# Patient Record
Sex: FEMALE | Race: WHITE | Employment: OTHER | RURAL
[De-identification: names, ages, dates, MRNs, and addresses within clinical notes are randomized per-mention and may not be internally consistent; named-entity substitution may affect disease eponyms.]

---

## 2017-01-25 ENCOUNTER — NURSE ONLY (OUTPATIENT)
Dept: PRIMARY CARE CLINIC | Age: 72
End: 2017-01-25
Payer: MEDICAID

## 2017-01-25 DIAGNOSIS — E53.8 VITAMIN B12 DEFICIENCY: Primary | ICD-10-CM

## 2017-01-25 PROCEDURE — 96372 THER/PROPH/DIAG INJ SC/IM: CPT | Performed by: NURSE PRACTITIONER

## 2017-01-25 RX ORDER — CYANOCOBALAMIN 1000 UG/ML
1000 INJECTION INTRAMUSCULAR; SUBCUTANEOUS ONCE
Status: COMPLETED | OUTPATIENT
Start: 2017-01-25 | End: 2017-01-25

## 2017-01-25 RX ADMIN — CYANOCOBALAMIN 1000 MCG: 1000 INJECTION INTRAMUSCULAR; SUBCUTANEOUS at 13:29

## 2017-01-31 ENCOUNTER — OFFICE VISIT (OUTPATIENT)
Dept: PRIMARY CARE CLINIC | Age: 72
End: 2017-01-31
Payer: MEDICAID

## 2017-01-31 ENCOUNTER — TELEPHONE (OUTPATIENT)
Dept: PRIMARY CARE CLINIC | Age: 72
End: 2017-01-31

## 2017-01-31 VITALS
BODY MASS INDEX: 42.38 KG/M2 | DIASTOLIC BLOOD PRESSURE: 60 MMHG | WEIGHT: 217 LBS | OXYGEN SATURATION: 98 % | HEART RATE: 63 BPM | SYSTOLIC BLOOD PRESSURE: 110 MMHG

## 2017-01-31 DIAGNOSIS — E03.9 HYPOTHYROIDISM, UNSPECIFIED TYPE: ICD-10-CM

## 2017-01-31 DIAGNOSIS — M54.9 CHRONIC BACK PAIN, UNSPECIFIED BACK LOCATION, UNSPECIFIED BACK PAIN LATERALITY: ICD-10-CM

## 2017-01-31 DIAGNOSIS — R32 URINARY INCONTINENCE, UNSPECIFIED TYPE: ICD-10-CM

## 2017-01-31 DIAGNOSIS — E11.9 TYPE 2 DIABETES MELLITUS WITHOUT COMPLICATION, WITHOUT LONG-TERM CURRENT USE OF INSULIN (HCC): ICD-10-CM

## 2017-01-31 DIAGNOSIS — K21.9 GASTROESOPHAGEAL REFLUX DISEASE WITHOUT ESOPHAGITIS: ICD-10-CM

## 2017-01-31 DIAGNOSIS — E53.8 B12 DEFICIENCY: ICD-10-CM

## 2017-01-31 DIAGNOSIS — I10 ESSENTIAL HYPERTENSION: Primary | ICD-10-CM

## 2017-01-31 DIAGNOSIS — E78.5 HYPERLIPIDEMIA, UNSPECIFIED HYPERLIPIDEMIA TYPE: ICD-10-CM

## 2017-01-31 DIAGNOSIS — G89.29 CHRONIC BACK PAIN, UNSPECIFIED BACK LOCATION, UNSPECIFIED BACK PAIN LATERALITY: ICD-10-CM

## 2017-01-31 LAB
A/G RATIO: 1.5 (ref 0.8–2)
ALBUMIN SERPL-MCNC: 3.9 G/DL (ref 3.4–4.8)
ALP BLD-CCNC: 113 U/L (ref 25–100)
ALT SERPL-CCNC: 12 U/L (ref 4–36)
ANION GAP SERPL CALCULATED.3IONS-SCNC: 13 MMOL/L (ref 3–16)
AST SERPL-CCNC: 13 U/L (ref 8–33)
BILIRUB SERPL-MCNC: 0.3 MG/DL (ref 0.3–1.2)
BUN BLDV-MCNC: 13 MG/DL (ref 6–20)
CALCIUM SERPL-MCNC: 9 MG/DL (ref 8.5–10.5)
CHLORIDE BLD-SCNC: 100 MMOL/L (ref 98–107)
CO2: 26 MMOL/L (ref 20–30)
CREAT SERPL-MCNC: 0.9 MG/DL (ref 0.4–1.2)
GFR AFRICAN AMERICAN: >59
GFR NON-AFRICAN AMERICAN: >60
GLOBULIN: 2.6 G/DL
GLUCOSE BLD-MCNC: 335 MG/DL (ref 74–106)
HBA1C MFR BLD: 11.1 %
POTASSIUM SERPL-SCNC: 4.2 MMOL/L (ref 3.4–5.1)
SODIUM BLD-SCNC: 139 MMOL/L (ref 136–145)
TOTAL PROTEIN: 6.5 G/DL (ref 6.4–8.3)
TSH SERPL DL<=0.05 MIU/L-ACNC: 1.76 UIU/ML (ref 0.35–5.5)

## 2017-01-31 PROCEDURE — 99214 OFFICE O/P EST MOD 30 MIN: CPT | Performed by: NURSE PRACTITIONER

## 2017-01-31 RX ORDER — LEVOTHYROXINE SODIUM 0.12 MG/1
125 TABLET ORAL DAILY
Qty: 30 TABLET | Refills: 5 | Status: SHIPPED | OUTPATIENT
Start: 2017-01-31 | End: 2017-07-24 | Stop reason: SDUPTHER

## 2017-01-31 RX ORDER — LEVOTHYROXINE SODIUM 0.12 MG/1
125 TABLET ORAL DAILY
COMMUNITY
End: 2017-01-31 | Stop reason: SDUPTHER

## 2017-01-31 ASSESSMENT — ENCOUNTER SYMPTOMS
RESPIRATORY NEGATIVE: 1
GASTROINTESTINAL NEGATIVE: 1

## 2017-02-02 ENCOUNTER — TELEPHONE (OUTPATIENT)
Dept: PRIMARY CARE CLINIC | Age: 72
End: 2017-02-02

## 2017-02-09 ENCOUNTER — TELEPHONE (OUTPATIENT)
Dept: PRIMARY CARE CLINIC | Age: 72
End: 2017-02-09

## 2017-02-09 RX ORDER — AMOXICILLIN AND CLAVULANATE POTASSIUM 875; 125 MG/1; MG/1
1 TABLET, FILM COATED ORAL 2 TIMES DAILY
Qty: 20 TABLET | Refills: 0 | Status: SHIPPED | OUTPATIENT
Start: 2017-02-09 | End: 2017-02-19

## 2017-02-19 ASSESSMENT — ENCOUNTER SYMPTOMS
NAUSEA: 0
BACK PAIN: 1
SHORTNESS OF BREATH: 0
ABDOMINAL PAIN: 0
EYE PAIN: 0
VOMITING: 0
SORE THROAT: 0
COUGH: 0

## 2017-03-17 ENCOUNTER — TELEPHONE (OUTPATIENT)
Dept: PRIMARY CARE CLINIC | Age: 72
End: 2017-03-17

## 2017-03-22 RX ORDER — LORATADINE 10 MG/1
TABLET ORAL
Qty: 30 TABLET | Refills: 0 | Status: SHIPPED | OUTPATIENT
Start: 2017-03-22 | End: 2017-05-22 | Stop reason: SDUPTHER

## 2017-04-05 ENCOUNTER — NURSE ONLY (OUTPATIENT)
Dept: PRIMARY CARE CLINIC | Age: 72
End: 2017-04-05
Payer: MEDICAID

## 2017-04-05 DIAGNOSIS — E53.8 VITAMIN B12 DEFICIENCY: Primary | ICD-10-CM

## 2017-04-05 PROCEDURE — 96372 THER/PROPH/DIAG INJ SC/IM: CPT | Performed by: NURSE PRACTITIONER

## 2017-04-05 RX ORDER — CYANOCOBALAMIN 1000 UG/ML
1000 INJECTION INTRAMUSCULAR; SUBCUTANEOUS ONCE
Status: COMPLETED | OUTPATIENT
Start: 2017-04-05 | End: 2017-04-05

## 2017-04-05 RX ADMIN — CYANOCOBALAMIN 1000 MCG: 1000 INJECTION INTRAMUSCULAR; SUBCUTANEOUS at 14:19

## 2017-04-11 RX ORDER — FLUCONAZOLE 150 MG/1
150 TABLET ORAL ONCE
Qty: 1 TABLET | Refills: 1 | Status: SHIPPED | OUTPATIENT
Start: 2017-04-11 | End: 2017-04-11

## 2017-04-20 ENCOUNTER — OFFICE VISIT (OUTPATIENT)
Dept: PRIMARY CARE CLINIC | Age: 72
End: 2017-04-20
Payer: MEDICAID

## 2017-04-20 VITALS
HEART RATE: 78 BPM | WEIGHT: 215 LBS | SYSTOLIC BLOOD PRESSURE: 124 MMHG | DIASTOLIC BLOOD PRESSURE: 64 MMHG | OXYGEN SATURATION: 94 % | BODY MASS INDEX: 41.99 KG/M2

## 2017-04-20 DIAGNOSIS — R13.10 DYSPHAGIA, UNSPECIFIED TYPE: ICD-10-CM

## 2017-04-20 DIAGNOSIS — E53.8 B12 DEFICIENCY: ICD-10-CM

## 2017-04-20 DIAGNOSIS — E11.9 TYPE 2 DIABETES MELLITUS WITHOUT COMPLICATION, WITHOUT LONG-TERM CURRENT USE OF INSULIN (HCC): ICD-10-CM

## 2017-04-20 DIAGNOSIS — E03.9 HYPOTHYROIDISM, UNSPECIFIED TYPE: ICD-10-CM

## 2017-04-20 DIAGNOSIS — I10 ESSENTIAL HYPERTENSION: Primary | ICD-10-CM

## 2017-04-20 PROCEDURE — 99213 OFFICE O/P EST LOW 20 MIN: CPT | Performed by: NURSE PRACTITIONER

## 2017-04-20 ASSESSMENT — ENCOUNTER SYMPTOMS
GASTROINTESTINAL NEGATIVE: 1
RESPIRATORY NEGATIVE: 1

## 2017-04-21 RX ORDER — CYANOCOBALAMIN 1000 UG/ML
1000 INJECTION INTRAMUSCULAR; SUBCUTANEOUS ONCE
Qty: 4 ML | Refills: 0 | Status: SHIPPED | OUTPATIENT
Start: 2017-04-21 | End: 2017-06-01 | Stop reason: SDUPTHER

## 2017-05-07 ASSESSMENT — ENCOUNTER SYMPTOMS
SHORTNESS OF BREATH: 0
ABDOMINAL PAIN: 0
EYE PAIN: 0
COUGH: 0
NAUSEA: 0
BACK PAIN: 1
VOMITING: 0
SORE THROAT: 0
TROUBLE SWALLOWING: 1

## 2017-07-15 ENCOUNTER — OFFICE VISIT (OUTPATIENT)
Dept: PRIMARY CARE CLINIC | Age: 72
End: 2017-07-15
Payer: MEDICAID

## 2017-07-15 VITALS
BODY MASS INDEX: 41.6 KG/M2 | OXYGEN SATURATION: 96 % | WEIGHT: 213 LBS | HEART RATE: 84 BPM | SYSTOLIC BLOOD PRESSURE: 144 MMHG | DIASTOLIC BLOOD PRESSURE: 72 MMHG

## 2017-07-15 DIAGNOSIS — M25.552 LEFT HIP PAIN: Primary | ICD-10-CM

## 2017-07-15 PROCEDURE — 99213 OFFICE O/P EST LOW 20 MIN: CPT | Performed by: NURSE PRACTITIONER

## 2017-07-15 RX ORDER — KETOROLAC TROMETHAMINE 30 MG/ML
60 INJECTION, SOLUTION INTRAMUSCULAR; INTRAVENOUS ONCE
Qty: 2 ML | Refills: 0
Start: 2017-07-15 | End: 2017-07-15 | Stop reason: CLARIF

## 2017-07-25 ENCOUNTER — HOSPITAL ENCOUNTER (OUTPATIENT)
Dept: OTHER | Age: 72
Discharge: HOME | End: 2017-07-25
Attending: NURSE PRACTITIONER | Admitting: NURSE PRACTITIONER

## 2017-07-25 DIAGNOSIS — E11.9 TYPE 2 DIABETES MELLITUS WITHOUT COMPLICATION, WITHOUT LONG-TERM CURRENT USE OF INSULIN (HCC): ICD-10-CM

## 2017-07-25 DIAGNOSIS — I10 ESSENTIAL HYPERTENSION: ICD-10-CM

## 2017-07-25 DIAGNOSIS — E03.9 HYPOTHYROIDISM, UNSPECIFIED TYPE: ICD-10-CM

## 2017-07-25 LAB
A/G RATIO: 1.2 (ref 0.8–2)
ALBUMIN SERPL-MCNC: 3.6 G/DL (ref 3.4–4.8)
ALP BLD-CCNC: 101 U/L (ref 25–100)
ALT SERPL-CCNC: 10 U/L (ref 4–36)
ANION GAP SERPL CALCULATED.3IONS-SCNC: 11 MMOL/L (ref 3–16)
AST SERPL-CCNC: 11 U/L (ref 8–33)
BASOPHILS ABSOLUTE: 0 K/UL (ref 0–0.1)
BASOPHILS RELATIVE PERCENT: 0.4 %
BILIRUB SERPL-MCNC: 0.3 MG/DL (ref 0.3–1.2)
BUN BLDV-MCNC: 13 MG/DL (ref 6–20)
CALCIUM SERPL-MCNC: 9.3 MG/DL (ref 8.5–10.5)
CHLORIDE BLD-SCNC: 102 MMOL/L (ref 98–107)
CO2: 28 MMOL/L (ref 20–30)
CREAT SERPL-MCNC: 0.8 MG/DL (ref 0.4–1.2)
EOSINOPHILS ABSOLUTE: 0.2 K/UL (ref 0–0.4)
EOSINOPHILS RELATIVE PERCENT: 1.7 %
GFR AFRICAN AMERICAN: >59
GFR NON-AFRICAN AMERICAN: >60
GLOBULIN: 3.1 G/DL
GLUCOSE BLD-MCNC: 226 MG/DL (ref 74–106)
HBA1C MFR BLD: 10.6 %
HCT VFR BLD CALC: 41.3 % (ref 37–47)
HEMOGLOBIN: 13.4 G/DL (ref 11.5–16.5)
LYMPHOCYTES ABSOLUTE: 2.2 K/UL (ref 1.5–4)
LYMPHOCYTES RELATIVE PERCENT: 24.2 % (ref 20–40)
MCH RBC QN AUTO: 28.6 PG (ref 27–32)
MCHC RBC AUTO-ENTMCNC: 32.4 G/DL (ref 31–35)
MCV RBC AUTO: 88.1 FL (ref 80–100)
MONOCYTES ABSOLUTE: 0.4 K/UL (ref 0.2–0.8)
MONOCYTES RELATIVE PERCENT: 4.5 % (ref 3–10)
NEUTROPHILS ABSOLUTE: 6.4 K/UL (ref 2–7.5)
NEUTROPHILS RELATIVE PERCENT: 69.2 %
PDW BLD-RTO: 13.9 % (ref 11–16)
PLATELET # BLD: 168 K/UL (ref 150–400)
PMV BLD AUTO: 11.5 FL (ref 6–10)
POTASSIUM SERPL-SCNC: 4.1 MMOL/L (ref 3.4–5.1)
RBC # BLD: 4.69 M/UL (ref 3.8–5.8)
SODIUM BLD-SCNC: 141 MMOL/L (ref 136–145)
TOTAL PROTEIN: 6.7 G/DL (ref 6.4–8.3)
TSH SERPL DL<=0.05 MIU/L-ACNC: 1.53 UIU/ML (ref 0.35–5.5)
WBC # BLD: 9.3 K/UL (ref 4–11)

## 2017-07-27 ENCOUNTER — OFFICE VISIT (OUTPATIENT)
Dept: PRIMARY CARE CLINIC | Age: 72
End: 2017-07-27
Payer: MEDICAID

## 2017-07-27 VITALS
DIASTOLIC BLOOD PRESSURE: 70 MMHG | SYSTOLIC BLOOD PRESSURE: 120 MMHG | HEIGHT: 60 IN | BODY MASS INDEX: 42.21 KG/M2 | HEART RATE: 81 BPM | OXYGEN SATURATION: 94 % | WEIGHT: 215 LBS

## 2017-07-27 DIAGNOSIS — Z12.39 SCREENING FOR BREAST CANCER: Primary | ICD-10-CM

## 2017-07-27 DIAGNOSIS — I10 ESSENTIAL HYPERTENSION: ICD-10-CM

## 2017-07-27 DIAGNOSIS — E11.9 TYPE 2 DIABETES MELLITUS WITHOUT COMPLICATION, WITHOUT LONG-TERM CURRENT USE OF INSULIN (HCC): ICD-10-CM

## 2017-07-27 DIAGNOSIS — R32 URINARY INCONTINENCE, UNSPECIFIED TYPE: ICD-10-CM

## 2017-07-27 PROCEDURE — 99213 OFFICE O/P EST LOW 20 MIN: CPT | Performed by: NURSE PRACTITIONER

## 2017-07-27 ASSESSMENT — ENCOUNTER SYMPTOMS
COUGH: 0
NAUSEA: 0
EYE DISCHARGE: 0
DIARRHEA: 0
EYE REDNESS: 0
ABDOMINAL PAIN: 0
BACK PAIN: 1
CONSTIPATION: 0
SORE THROAT: 0
VOMITING: 0
SHORTNESS OF BREATH: 0
EYE ITCHING: 0
RHINORRHEA: 0

## 2017-10-19 ENCOUNTER — OFFICE VISIT (OUTPATIENT)
Dept: UROLOGY | Facility: CLINIC | Age: 72
End: 2017-10-19

## 2017-10-19 VITALS
OXYGEN SATURATION: 96 % | TEMPERATURE: 98.5 F | HEART RATE: 87 BPM | DIASTOLIC BLOOD PRESSURE: 85 MMHG | SYSTOLIC BLOOD PRESSURE: 132 MMHG | RESPIRATION RATE: 16 BRPM

## 2017-10-19 DIAGNOSIS — G47.30 SLEEP APNEA, UNSPECIFIED TYPE: Primary | ICD-10-CM

## 2017-10-19 DIAGNOSIS — N32.81 OVERACTIVE BLADDER: ICD-10-CM

## 2017-10-19 DIAGNOSIS — R35.1 NOCTURIA MORE THAN TWICE PER NIGHT: ICD-10-CM

## 2017-10-19 DIAGNOSIS — R32 URINARY INCONTINENCE, UNSPECIFIED TYPE: Primary | ICD-10-CM

## 2017-10-19 LAB
BILIRUB BLD-MCNC: NEGATIVE MG/DL
CLARITY, POC: CLEAR
COLOR UR: YELLOW
GLUCOSE UR STRIP-MCNC: ABNORMAL MG/DL
KETONES UR QL: NEGATIVE
LEUKOCYTE EST, POC: NEGATIVE
NITRITE UR-MCNC: NEGATIVE MG/ML
PH UR: 5.5 [PH] (ref 5–8)
PROT UR STRIP-MCNC: NEGATIVE MG/DL
RBC # UR STRIP: ABNORMAL /UL
SP GR UR: 1.02 (ref 1–1.03)
UROBILINOGEN UR QL: NORMAL

## 2017-10-19 PROCEDURE — 99203 OFFICE O/P NEW LOW 30 MIN: CPT | Performed by: UROLOGY

## 2017-10-19 PROCEDURE — 52000 CYSTOURETHROSCOPY: CPT | Performed by: UROLOGY

## 2017-10-19 RX ORDER — LANCETS 30 GAUGE
EACH MISCELLANEOUS
COMMUNITY
Start: 2014-09-17 | End: 2021-05-11

## 2017-10-19 RX ORDER — LEVOTHYROXINE SODIUM 150 UG/1
175 CAPSULE ORAL NIGHTLY
COMMUNITY
Start: 2017-07-25

## 2017-10-19 RX ORDER — CYANOCOBALAMIN 1000 UG/ML
INJECTION, SOLUTION INTRAMUSCULAR; SUBCUTANEOUS
COMMUNITY
Start: 2016-02-01 | End: 2021-05-11

## 2017-10-19 RX ORDER — FLUOROURACIL 50 MG/G
CREAM TOPICAL
COMMUNITY
Start: 2014-10-16 | End: 2021-05-11

## 2017-10-19 RX ORDER — LANCETS 30 GAUGE
EACH MISCELLANEOUS
COMMUNITY
Start: 2014-10-16 | End: 2021-05-11

## 2017-10-19 RX ORDER — ESOMEPRAZOLE MAGNESIUM 20 MG/1
40 TABLET, DELAYED RELEASE ORAL 2 TIMES DAILY
COMMUNITY
Start: 2016-03-17

## 2017-10-19 RX ORDER — ATENOLOL 100 MG/1
100 TABLET ORAL NIGHTLY
COMMUNITY
Start: 2017-05-22

## 2017-10-19 RX ORDER — CHOLECALCIFEROL (VITAMIN D3) 1250 MCG
50000 CAPSULE ORAL
COMMUNITY
Start: 2015-08-20 | End: 2022-06-03

## 2017-10-19 RX ORDER — ESTRADIOL 0.5 MG/1
0.5 TABLET ORAL
COMMUNITY
Start: 2016-03-17 | End: 2021-05-11

## 2017-10-19 RX ORDER — CLONAZEPAM 1 MG/1
.5-1 TABLET ORAL
COMMUNITY
Start: 2015-03-05 | End: 2021-05-11

## 2017-10-19 RX ORDER — OXYCODONE HYDROCHLORIDE 15 MG/1
15 TABLET ORAL EVERY 6 HOURS PRN
COMMUNITY
End: 2022-08-02 | Stop reason: HOSPADM

## 2017-10-19 RX ORDER — FENTANYL 25 UG/H
PATCH TRANSDERMAL
COMMUNITY
End: 2021-05-11

## 2017-10-19 RX ORDER — CYANOCOBALAMIN 1000 UG/ML
INJECTION, SOLUTION INTRAMUSCULAR; SUBCUTANEOUS
COMMUNITY
Start: 2013-04-15 | End: 2021-05-11

## 2017-10-19 RX ORDER — GLIPIZIDE 10 MG/1
10 TABLET, FILM COATED, EXTENDED RELEASE ORAL 2 TIMES DAILY
COMMUNITY
Start: 2017-07-20

## 2017-10-19 RX ORDER — CITALOPRAM 20 MG/1
20 TABLET ORAL DAILY
COMMUNITY
Start: 2017-07-25 | End: 2021-06-04

## 2017-10-19 RX ORDER — FUROSEMIDE 20 MG/1
TABLET ORAL
COMMUNITY
Start: 2016-03-21 | End: 2021-05-11

## 2017-10-19 RX ORDER — SYRING-NEEDL,DISP,INSUL,0.3 ML 30 GX5/16"
SYRINGE, EMPTY DISPOSABLE MISCELLANEOUS
COMMUNITY
Start: 2014-10-16 | End: 2021-05-11

## 2017-10-19 RX ORDER — SIMVASTATIN 40 MG
40 TABLET ORAL NIGHTLY
COMMUNITY
Start: 2017-05-22

## 2017-10-19 RX ORDER — CYANOCOBALAMIN 1000 UG/ML
INJECTION, SOLUTION INTRAMUSCULAR; SUBCUTANEOUS
COMMUNITY
Start: 2017-09-22 | End: 2021-05-11

## 2017-10-19 RX ORDER — LORATADINE 10 MG/1
TABLET ORAL
COMMUNITY
Start: 2017-05-22 | End: 2021-05-11

## 2017-10-19 RX ORDER — LISINOPRIL 10 MG/1
20 TABLET ORAL NIGHTLY
COMMUNITY
Start: 2017-05-22

## 2017-11-21 ENCOUNTER — OFFICE VISIT (OUTPATIENT)
Dept: UROLOGY | Facility: CLINIC | Age: 72
End: 2017-11-21

## 2017-11-21 VITALS
OXYGEN SATURATION: 94 % | HEART RATE: 78 BPM | WEIGHT: 215 LBS | HEIGHT: 60 IN | BODY MASS INDEX: 42.21 KG/M2 | DIASTOLIC BLOOD PRESSURE: 80 MMHG | SYSTOLIC BLOOD PRESSURE: 147 MMHG | TEMPERATURE: 97.1 F

## 2017-11-21 DIAGNOSIS — N32.81 OVERACTIVE BLADDER: Primary | ICD-10-CM

## 2017-11-21 PROCEDURE — 99213 OFFICE O/P EST LOW 20 MIN: CPT | Performed by: UROLOGY

## 2017-12-05 ENCOUNTER — TELEPHONE (OUTPATIENT)
Dept: UROLOGY | Facility: CLINIC | Age: 72
End: 2017-12-05

## 2017-12-06 DIAGNOSIS — N32.81 OAB (OVERACTIVE BLADDER): Primary | ICD-10-CM

## 2017-12-06 RX ORDER — OXYBUTYNIN CHLORIDE 10 MG/1
10 TABLET, EXTENDED RELEASE ORAL DAILY
Qty: 30 TABLET | Refills: 2 | Status: SHIPPED | OUTPATIENT
Start: 2017-12-06 | End: 2017-12-20 | Stop reason: SINTOL

## 2017-12-20 DIAGNOSIS — N32.81 OAB (OVERACTIVE BLADDER): Primary | ICD-10-CM

## 2017-12-20 RX ORDER — TROSPIUM CHLORIDE ER 60 MG/1
60 CAPSULE ORAL EVERY MORNING
Qty: 30 CAPSULE | Refills: 11 | Status: SHIPPED | OUTPATIENT
Start: 2017-12-20 | End: 2021-05-11

## 2017-12-22 ENCOUNTER — TELEPHONE (OUTPATIENT)
Dept: PRIMARY CARE CLINIC | Age: 72
End: 2017-12-22

## 2017-12-22 ENCOUNTER — NURSE ONLY (OUTPATIENT)
Dept: PRIMARY CARE CLINIC | Age: 72
End: 2017-12-22
Payer: MEDICAID

## 2017-12-22 DIAGNOSIS — E53.8 VITAMIN B12 DEFICIENCY: Primary | ICD-10-CM

## 2017-12-22 PROCEDURE — 96372 THER/PROPH/DIAG INJ SC/IM: CPT | Performed by: NURSE PRACTITIONER

## 2017-12-22 RX ORDER — CYANOCOBALAMIN 1000 UG/ML
1000 INJECTION INTRAMUSCULAR; SUBCUTANEOUS ONCE
Status: COMPLETED | OUTPATIENT
Start: 2017-12-22 | End: 2017-12-22

## 2017-12-22 RX ADMIN — CYANOCOBALAMIN 1000 MCG: 1000 INJECTION INTRAMUSCULAR; SUBCUTANEOUS at 13:52

## 2017-12-22 NOTE — PROGRESS NOTES
After obtaining consent, and per orders of Dr. Christy Vazquez, injection of B 12 given in Right deltoid by Nasima Siu. Patient instructed to remain in clinic for 20 minutes afterwards, and to report any adverse reaction to me immediately.

## 2017-12-27 ENCOUNTER — TELEPHONE (OUTPATIENT)
Dept: PRIMARY CARE CLINIC | Age: 72
End: 2017-12-27

## 2018-01-18 RX ORDER — PROPRANOLOL HYDROCHLORIDE 10 MG/1
TABLET ORAL
Qty: 120 CAPSULE | Refills: 0 | Status: SHIPPED | OUTPATIENT
Start: 2018-01-18 | End: 2018-04-17 | Stop reason: SDUPTHER

## 2018-02-14 RX ORDER — CITALOPRAM 20 MG/1
TABLET ORAL
Qty: 30 TABLET | Refills: 5 | Status: SHIPPED | OUTPATIENT
Start: 2018-02-14 | End: 2018-09-27 | Stop reason: SDUPTHER

## 2018-02-14 RX ORDER — GLIPIZIDE 10 MG/1
TABLET, FILM COATED, EXTENDED RELEASE ORAL
Qty: 60 TABLET | Refills: 5 | Status: SHIPPED | OUTPATIENT
Start: 2018-02-14 | End: 2019-01-22 | Stop reason: SDUPTHER

## 2018-02-14 RX ORDER — EMPAGLIFLOZIN 10 MG/1
TABLET, FILM COATED ORAL
Qty: 30 TABLET | Refills: 5 | Status: SHIPPED | OUTPATIENT
Start: 2018-02-14 | End: 2018-04-20 | Stop reason: DRUGHIGH

## 2018-02-14 RX ORDER — LEVOTHYROXINE SODIUM 0.12 MG/1
TABLET ORAL
Qty: 30 TABLET | Refills: 5 | Status: SHIPPED | OUTPATIENT
Start: 2018-02-14 | End: 2018-04-20 | Stop reason: SDUPTHER

## 2018-03-21 ENCOUNTER — NURSE ONLY (OUTPATIENT)
Dept: PRIMARY CARE CLINIC | Age: 73
End: 2018-03-21
Payer: MEDICAID

## 2018-03-21 DIAGNOSIS — E53.8 VITAMIN B12 DEFICIENCY: Primary | ICD-10-CM

## 2018-03-21 PROCEDURE — 96372 THER/PROPH/DIAG INJ SC/IM: CPT | Performed by: NURSE PRACTITIONER

## 2018-03-21 RX ORDER — CYANOCOBALAMIN 1000 UG/ML
1000 INJECTION INTRAMUSCULAR; SUBCUTANEOUS ONCE
Status: COMPLETED | OUTPATIENT
Start: 2018-03-21 | End: 2018-03-21

## 2018-03-21 RX ADMIN — CYANOCOBALAMIN 1000 MCG: 1000 INJECTION INTRAMUSCULAR; SUBCUTANEOUS at 14:39

## 2018-03-21 NOTE — PROGRESS NOTES
After obtaining consent, and per orders of Marcin Hernandez, injection of  vitamon B 12 given in Left deltoid by [employer]. Patient instructed to remain in clinic for 20 minutes afterwards, and to report any adverse reaction to me immediately.

## 2018-04-02 ENCOUNTER — NURSE ONLY (OUTPATIENT)
Dept: PRIMARY CARE CLINIC | Age: 73
End: 2018-04-02
Payer: MEDICAID

## 2018-04-02 DIAGNOSIS — E53.8 VITAMIN B12 DEFICIENCY: Primary | ICD-10-CM

## 2018-04-02 PROCEDURE — 96372 THER/PROPH/DIAG INJ SC/IM: CPT | Performed by: FAMILY MEDICINE

## 2018-04-02 RX ORDER — CYANOCOBALAMIN 1000 UG/ML
1000 INJECTION INTRAMUSCULAR; SUBCUTANEOUS ONCE
Status: COMPLETED | OUTPATIENT
Start: 2018-04-02 | End: 2018-04-02

## 2018-04-02 RX ADMIN — CYANOCOBALAMIN 1000 MCG: 1000 INJECTION INTRAMUSCULAR; SUBCUTANEOUS at 13:44

## 2018-04-17 ENCOUNTER — OFFICE VISIT (OUTPATIENT)
Dept: PRIMARY CARE CLINIC | Age: 73
End: 2018-04-17
Payer: MEDICAID

## 2018-04-17 ENCOUNTER — HOSPITAL ENCOUNTER (OUTPATIENT)
Dept: OTHER | Age: 73
Discharge: HOME | End: 2018-04-17
Attending: NURSE PRACTITIONER | Admitting: NURSE PRACTITIONER

## 2018-04-17 VITALS
WEIGHT: 207 LBS | DIASTOLIC BLOOD PRESSURE: 80 MMHG | HEART RATE: 88 BPM | BODY MASS INDEX: 40.43 KG/M2 | OXYGEN SATURATION: 97 % | SYSTOLIC BLOOD PRESSURE: 122 MMHG

## 2018-04-17 DIAGNOSIS — E78.5 HYPERLIPIDEMIA, UNSPECIFIED HYPERLIPIDEMIA TYPE: ICD-10-CM

## 2018-04-17 DIAGNOSIS — E03.9 HYPOTHYROIDISM, UNSPECIFIED TYPE: ICD-10-CM

## 2018-04-17 DIAGNOSIS — E55.9 VITAMIN D DEFICIENCY: ICD-10-CM

## 2018-04-17 DIAGNOSIS — E11.9 TYPE 2 DIABETES MELLITUS WITHOUT COMPLICATION, WITHOUT LONG-TERM CURRENT USE OF INSULIN (HCC): ICD-10-CM

## 2018-04-17 DIAGNOSIS — I10 ESSENTIAL HYPERTENSION: ICD-10-CM

## 2018-04-17 DIAGNOSIS — R32 URINARY INCONTINENCE, UNSPECIFIED TYPE: ICD-10-CM

## 2018-04-17 DIAGNOSIS — E53.8 B12 DEFICIENCY: Primary | ICD-10-CM

## 2018-04-17 PROCEDURE — 99214 OFFICE O/P EST MOD 30 MIN: CPT | Performed by: NURSE PRACTITIONER

## 2018-04-17 RX ORDER — ATENOLOL 100 MG/1
TABLET ORAL
Qty: 30 TABLET | Refills: 5 | Status: SHIPPED | OUTPATIENT
Start: 2018-04-17 | End: 2019-03-21 | Stop reason: SDUPTHER

## 2018-04-17 RX ORDER — CYANOCOBALAMIN 1000 UG/ML
1000 INJECTION INTRAMUSCULAR; SUBCUTANEOUS ONCE
Status: COMPLETED | OUTPATIENT
Start: 2018-04-17 | End: 2018-04-17

## 2018-04-17 RX ORDER — LISINOPRIL 10 MG/1
TABLET ORAL
Qty: 30 TABLET | Refills: 5 | Status: SHIPPED | OUTPATIENT
Start: 2018-04-17 | End: 2018-10-29 | Stop reason: SDUPTHER

## 2018-04-17 RX ORDER — SIMVASTATIN 20 MG
TABLET ORAL
Qty: 30 TABLET | Refills: 5 | Status: SHIPPED | OUTPATIENT
Start: 2018-04-17 | End: 2018-04-20 | Stop reason: SDUPTHER

## 2018-04-17 RX ADMIN — CYANOCOBALAMIN 1000 MCG: 1000 INJECTION INTRAMUSCULAR; SUBCUTANEOUS at 15:43

## 2018-04-17 ASSESSMENT — ENCOUNTER SYMPTOMS
VOMITING: 0
NAUSEA: 0
ABDOMINAL PAIN: 0
COUGH: 0
SORE THROAT: 0
SHORTNESS OF BREATH: 0
EYE PAIN: 0

## 2018-04-18 LAB
A/G RATIO: 1.4 (ref 0.8–2)
ALBUMIN SERPL-MCNC: 4.2 G/DL (ref 3.4–4.8)
ALP BLD-CCNC: 118 U/L (ref 25–100)
ALT SERPL-CCNC: 15 U/L (ref 4–36)
ANION GAP SERPL CALCULATED.3IONS-SCNC: 12 MMOL/L (ref 3–16)
AST SERPL-CCNC: 16 U/L (ref 8–33)
BILIRUB SERPL-MCNC: 0.4 MG/DL (ref 0.3–1.2)
BUN BLDV-MCNC: 21 MG/DL (ref 6–20)
CALCIUM SERPL-MCNC: 9.3 MG/DL (ref 8.5–10.5)
CHLORIDE BLD-SCNC: 103 MMOL/L (ref 98–107)
CHOLESTEROL, TOTAL: 204 MG/DL (ref 0–200)
CO2: 26 MMOL/L (ref 20–30)
CREAT SERPL-MCNC: 0.9 MG/DL (ref 0.4–1.2)
GFR AFRICAN AMERICAN: >59
GFR NON-AFRICAN AMERICAN: >60
GLOBULIN: 2.9 G/DL
GLUCOSE BLD-MCNC: 262 MG/DL (ref 74–106)
HBA1C MFR BLD: 8.5 %
HDLC SERPL-MCNC: 56 MG/DL (ref 40–60)
LDL CHOLESTEROL CALCULATED: 121 MG/DL
POTASSIUM SERPL-SCNC: 4.5 MMOL/L (ref 3.4–5.1)
SODIUM BLD-SCNC: 141 MMOL/L (ref 136–145)
TOTAL PROTEIN: 7.1 G/DL (ref 6.4–8.3)
TRIGL SERPL-MCNC: 134 MG/DL (ref 0–249)
TSH SERPL DL<=0.05 MIU/L-ACNC: 0.18 UIU/ML (ref 0.35–5.5)
VITAMIN D 25-HYDROXY: 34.1 (ref 32–100)
VLDLC SERPL CALC-MCNC: 27 MG/DL

## 2018-04-20 ENCOUNTER — TELEPHONE (OUTPATIENT)
Dept: PRIMARY CARE CLINIC | Age: 73
End: 2018-04-20

## 2018-04-20 RX ORDER — SIMVASTATIN 40 MG
TABLET ORAL
Qty: 30 TABLET | Refills: 3 | Status: SHIPPED | OUTPATIENT
Start: 2018-04-20 | End: 2018-08-27 | Stop reason: SDUPTHER

## 2018-04-20 RX ORDER — LEVOTHYROXINE SODIUM 112 UG/1
TABLET ORAL
Qty: 30 TABLET | Refills: 3 | Status: SHIPPED | OUTPATIENT
Start: 2018-04-20 | End: 2018-08-27 | Stop reason: SDUPTHER

## 2018-04-22 ASSESSMENT — ENCOUNTER SYMPTOMS: BACK PAIN: 1

## 2018-05-02 ENCOUNTER — TELEPHONE (OUTPATIENT)
Dept: PRIMARY CARE CLINIC | Age: 73
End: 2018-05-02

## 2018-05-09 ENCOUNTER — NURSE ONLY (OUTPATIENT)
Dept: PRIMARY CARE CLINIC | Age: 73
End: 2018-05-09
Payer: MEDICAID

## 2018-05-09 DIAGNOSIS — E53.8 B12 DEFICIENCY: Primary | ICD-10-CM

## 2018-05-09 PROCEDURE — 96372 THER/PROPH/DIAG INJ SC/IM: CPT | Performed by: NURSE PRACTITIONER

## 2018-05-09 RX ORDER — CYANOCOBALAMIN 1000 UG/ML
1000 INJECTION INTRAMUSCULAR; SUBCUTANEOUS ONCE
Status: COMPLETED | OUTPATIENT
Start: 2018-05-09 | End: 2018-05-09

## 2018-05-09 RX ADMIN — CYANOCOBALAMIN 1000 MCG: 1000 INJECTION INTRAMUSCULAR; SUBCUTANEOUS at 11:34

## 2018-05-17 ENCOUNTER — NURSE ONLY (OUTPATIENT)
Dept: PRIMARY CARE CLINIC | Age: 73
End: 2018-05-17
Payer: MEDICAID

## 2018-05-17 DIAGNOSIS — E53.8 VITAMIN B12 DEFICIENCY: Primary | ICD-10-CM

## 2018-05-17 PROCEDURE — 96372 THER/PROPH/DIAG INJ SC/IM: CPT | Performed by: NURSE PRACTITIONER

## 2018-05-17 RX ORDER — CYANOCOBALAMIN 1000 UG/ML
1000 INJECTION INTRAMUSCULAR; SUBCUTANEOUS ONCE
Status: COMPLETED | OUTPATIENT
Start: 2018-05-17 | End: 2018-05-17

## 2018-05-17 RX ADMIN — CYANOCOBALAMIN 1000 MCG: 1000 INJECTION INTRAMUSCULAR; SUBCUTANEOUS at 14:30

## 2018-05-29 ENCOUNTER — NURSE ONLY (OUTPATIENT)
Dept: PRIMARY CARE CLINIC | Age: 73
End: 2018-05-29
Payer: MEDICAID

## 2018-05-29 ENCOUNTER — TELEPHONE (OUTPATIENT)
Dept: PRIMARY CARE CLINIC | Age: 73
End: 2018-05-29

## 2018-05-29 DIAGNOSIS — E53.8 VITAMIN B12 DEFICIENCY: Primary | ICD-10-CM

## 2018-05-29 PROCEDURE — 96372 THER/PROPH/DIAG INJ SC/IM: CPT | Performed by: NURSE PRACTITIONER

## 2018-05-29 RX ORDER — FLUCONAZOLE 150 MG/1
150 TABLET ORAL ONCE
Qty: 2 TABLET | Refills: 2 | Status: SHIPPED | OUTPATIENT
Start: 2018-05-29 | End: 2018-05-29

## 2018-05-29 RX ORDER — CYANOCOBALAMIN 1000 UG/ML
1000 INJECTION INTRAMUSCULAR; SUBCUTANEOUS ONCE
Status: COMPLETED | OUTPATIENT
Start: 2018-05-29 | End: 2018-05-29

## 2018-05-29 RX ADMIN — CYANOCOBALAMIN 1000 MCG: 1000 INJECTION INTRAMUSCULAR; SUBCUTANEOUS at 11:15

## 2018-06-11 ENCOUNTER — NURSE ONLY (OUTPATIENT)
Dept: PRIMARY CARE CLINIC | Age: 73
End: 2018-06-11
Payer: MEDICAID

## 2018-06-11 DIAGNOSIS — E53.8 VITAMIN B12 DEFICIENCY: Primary | ICD-10-CM

## 2018-06-11 PROCEDURE — 96372 THER/PROPH/DIAG INJ SC/IM: CPT | Performed by: NURSE PRACTITIONER

## 2018-06-11 RX ORDER — CYANOCOBALAMIN 1000 UG/ML
1000 INJECTION INTRAMUSCULAR; SUBCUTANEOUS ONCE
Status: COMPLETED | OUTPATIENT
Start: 2018-06-11 | End: 2018-06-11

## 2018-06-11 RX ADMIN — CYANOCOBALAMIN 1000 MCG: 1000 INJECTION INTRAMUSCULAR; SUBCUTANEOUS at 13:39

## 2018-06-15 ENCOUNTER — OFFICE VISIT (OUTPATIENT)
Dept: PRIMARY CARE CLINIC | Age: 73
End: 2018-06-15
Payer: MEDICAID

## 2018-06-15 VITALS
WEIGHT: 214 LBS | HEART RATE: 115 BPM | DIASTOLIC BLOOD PRESSURE: 70 MMHG | OXYGEN SATURATION: 90 % | BODY MASS INDEX: 41.79 KG/M2 | SYSTOLIC BLOOD PRESSURE: 112 MMHG

## 2018-06-15 DIAGNOSIS — R32 URINARY INCONTINENCE, UNSPECIFIED TYPE: ICD-10-CM

## 2018-06-15 DIAGNOSIS — R13.10 DYSPHAGIA, UNSPECIFIED TYPE: Primary | ICD-10-CM

## 2018-06-15 DIAGNOSIS — E89.0 HISTORY OF THYROIDECTOMY: ICD-10-CM

## 2018-06-15 DIAGNOSIS — R14.0 ABDOMINAL BLOATING: ICD-10-CM

## 2018-06-15 DIAGNOSIS — R10.31 RLQ ABDOMINAL PAIN: ICD-10-CM

## 2018-06-15 DIAGNOSIS — M54.2 NECK PAIN: ICD-10-CM

## 2018-06-15 PROCEDURE — 99213 OFFICE O/P EST LOW 20 MIN: CPT | Performed by: NURSE PRACTITIONER

## 2018-06-15 ASSESSMENT — ENCOUNTER SYMPTOMS
EYE PAIN: 0
VOMITING: 0
SHORTNESS OF BREATH: 0
COUGH: 0
NAUSEA: 0
SORE THROAT: 0

## 2018-06-18 ENCOUNTER — TELEPHONE (OUTPATIENT)
Dept: PRIMARY CARE CLINIC | Age: 73
End: 2018-06-18

## 2018-06-18 ASSESSMENT — ENCOUNTER SYMPTOMS
TROUBLE SWALLOWING: 1
ABDOMINAL PAIN: 1

## 2018-06-27 ENCOUNTER — HOSPITAL ENCOUNTER (OUTPATIENT)
Dept: GENERAL RADIOLOGY | Age: 73
Discharge: HOME | End: 2018-06-27
Admitting: NURSE PRACTITIONER

## 2018-06-27 DIAGNOSIS — R13.10 DYSPHAGIA, UNSPECIFIED TYPE: ICD-10-CM

## 2018-06-27 DIAGNOSIS — R14.0 ABDOMINAL BLOATING: ICD-10-CM

## 2018-06-27 DIAGNOSIS — M54.2 NECK PAIN: ICD-10-CM

## 2018-06-27 DIAGNOSIS — R10.31 RLQ ABDOMINAL PAIN: ICD-10-CM

## 2018-06-27 DIAGNOSIS — E89.0 HISTORY OF THYROIDECTOMY: ICD-10-CM

## 2018-06-27 DIAGNOSIS — R13.10 DYSPHAGIA: ICD-10-CM

## 2018-06-27 LAB
BUN BLDV-MCNC: 20 MG/DL (ref 6–20)
CREAT SERPL-MCNC: 1 MG/DL (ref 0.4–1.2)
GFR AFRICAN AMERICAN: >59
GFR NON-AFRICAN AMERICAN: 55

## 2018-06-28 ENCOUNTER — NURSE ONLY (OUTPATIENT)
Dept: PRIMARY CARE CLINIC | Age: 73
End: 2018-06-28
Payer: MEDICAID

## 2018-06-28 DIAGNOSIS — E53.8 B12 DEFICIENCY: Primary | ICD-10-CM

## 2018-06-28 PROCEDURE — 96372 THER/PROPH/DIAG INJ SC/IM: CPT | Performed by: NURSE PRACTITIONER

## 2018-06-28 RX ORDER — CYANOCOBALAMIN 1000 UG/ML
1000 INJECTION INTRAMUSCULAR; SUBCUTANEOUS ONCE
Status: COMPLETED | OUTPATIENT
Start: 2018-06-28 | End: 2018-06-28

## 2018-06-28 RX ADMIN — CYANOCOBALAMIN 1000 MCG: 1000 INJECTION INTRAMUSCULAR; SUBCUTANEOUS at 16:21

## 2018-09-06 ENCOUNTER — NURSE ONLY (OUTPATIENT)
Dept: PRIMARY CARE CLINIC | Age: 73
End: 2018-09-06
Payer: MEDICAID

## 2018-09-06 DIAGNOSIS — R21 RASH: ICD-10-CM

## 2018-09-06 DIAGNOSIS — E53.8 VITAMIN B12 DEFICIENCY: Primary | ICD-10-CM

## 2018-09-06 PROCEDURE — 96372 THER/PROPH/DIAG INJ SC/IM: CPT | Performed by: NURSE PRACTITIONER

## 2018-09-06 RX ORDER — DEXAMETHASONE SODIUM PHOSPHATE 10 MG/ML
6 INJECTION INTRAMUSCULAR; INTRAVENOUS ONCE
Status: COMPLETED | OUTPATIENT
Start: 2018-09-06 | End: 2018-09-06

## 2018-09-06 RX ORDER — CYANOCOBALAMIN 1000 UG/ML
1000 INJECTION INTRAMUSCULAR; SUBCUTANEOUS ONCE
Status: COMPLETED | OUTPATIENT
Start: 2018-09-06 | End: 2018-09-06

## 2018-09-06 RX ADMIN — CYANOCOBALAMIN 1000 MCG: 1000 INJECTION INTRAMUSCULAR; SUBCUTANEOUS at 17:32

## 2018-09-06 RX ADMIN — DEXAMETHASONE SODIUM PHOSPHATE 6 MG: 10 INJECTION INTRAMUSCULAR; INTRAVENOUS at 17:33

## 2018-09-06 NOTE — PROGRESS NOTES
Administrations This Visit     cyanocobalamin injection 1,000 mcg     Admin Date  09/06/2018  17:32 Action  Given Dose  1000 mcg Route  Intramuscular Site  Deltoid Right Administered By  Carter James MA    Ordering Provider:  TING Garcia    NDC:  84168-758-77    Lot#:  [id number]    :  [address] [facility]    Patient Supplied?:  No    Comments:  exp. 12/19          dexamethasone (DECADRON) injection 6 mg     Admin Date  09/06/2018  17:33 Action  Given Dose  6 mg Route  Intramuscular Site  Deltoid Left Administered By  Carter James MA    Ordering Provider:  TING Garcia    NDC:  76999-545-21    Lot#:  [id number]    :  [address] [facility]    Patient Supplied?:  No    Comments:  12/19

## 2018-09-16 ENCOUNTER — APPOINTMENT (OUTPATIENT)
Dept: GENERAL RADIOLOGY | Facility: HOSPITAL | Age: 73
End: 2018-09-16
Payer: MEDICAID

## 2018-09-16 ENCOUNTER — HOSPITAL ENCOUNTER (EMERGENCY)
Facility: HOSPITAL | Age: 73
Discharge: HOME | End: 2018-09-16
Attending: EMERGENCY MEDICINE
Payer: MEDICAID

## 2018-09-16 VITALS
OXYGEN SATURATION: 98 % | HEIGHT: 60 IN | WEIGHT: 205 LBS | RESPIRATION RATE: 16 BRPM | SYSTOLIC BLOOD PRESSURE: 161 MMHG | BODY MASS INDEX: 40.25 KG/M2 | TEMPERATURE: 98.3 F | HEART RATE: 87 BPM | DIASTOLIC BLOOD PRESSURE: 81 MMHG

## 2018-09-16 DIAGNOSIS — M19.049 ARTHRITIS PAIN, HAND: ICD-10-CM

## 2018-09-16 DIAGNOSIS — M25.531 WRIST PAIN, RIGHT: Primary | ICD-10-CM

## 2018-09-16 PROCEDURE — 29125 APPL SHORT ARM SPLINT STATIC: CPT

## 2018-09-16 PROCEDURE — 73110 X-RAY EXAM OF WRIST: CPT

## 2018-09-16 PROCEDURE — 99283 EMERGENCY DEPT VISIT LOW MDM: CPT

## 2018-09-16 ASSESSMENT — PAIN DESCRIPTION - ORIENTATION: ORIENTATION: RIGHT

## 2018-09-16 ASSESSMENT — PAIN SCALES - GENERAL: PAINLEVEL_OUTOF10: 9

## 2018-09-16 ASSESSMENT — PAIN DESCRIPTION - PAIN TYPE: TYPE: ACUTE PAIN

## 2018-09-16 ASSESSMENT — PAIN DESCRIPTION - LOCATION: LOCATION: WRIST

## 2018-09-16 NOTE — ED NOTES
Ice applied to pts right wrist.      [address] Piter Mccurdy RN  09/16/18 [id number]

## 2018-09-16 NOTE — ED PROVIDER NOTES
right distal radius without swelling, range of motion is limited of the right wrist secondary to pain. Distal pulses and neurovascular status is intact. There is no tenderness to palpation with range of motion to the right elbow. 5 out of 5 strength in all 4 extremities. No focal muscle deficits are appreciated  Neuro: Motor intact, sensory intact, level of consciousness is normal  Dermatology: Skin is warm and dry  Psych: Mentation is grossly normal, cognition is grossly normal. Affect is appropriate. DIAGNOSTIC RESULTS     EKG: All EKG's are interpreted by the Emergency Department Physician who either signs or Co-signs this chart in the absence of a cardiologist.      RADIOLOGY:   Non-plain film images such as CT, Ultrasound and MRI are read by the radiologist. Plain radiographic images are visualized and preliminarily interpreted by the emergency physician with the below findings:      [] Radiologist's Report Reviewed:  XR WRIST RIGHT (MIN 3 VIEWS)   Final Result      1. No acute osseous abnormality. 2. Degenerative changes in the STT and first CMC joints. ED BEDSIDE ULTRASOUND:   Performed by ED Physician - none    LABS:  Labs Reviewed - No data to display    I have reviewed and interpreted all of the currently available lab results from this visit (if applicable):  No results found for this visit on 09/16/18. All other labs were within normal range or not returned as of this dictation. EMERGENCY DEPARTMENT COURSE and DIFFERENTIAL DIAGNOSIS/MDM:   Vitals:    Vitals:    09/16/18 0834   BP: (!) 161/81   Pulse: 87   Resp: 16   Temp: 98.3 °F (36.8 °C)   TempSrc: Oral   SpO2: 98%   Weight: 205 lb (93 kg)   Height: 5' (1.524 m)       Patient with right wrist discomfort mainly over the distal radius. No injury or fall. Point tenderness on physical examination, distal neurovascular status intact.   Patient does have underlying rheumatoid arthritis with nodules in the hand, could be an underlying arthritic flare. We did obtain an x-ray of the right wrist, discussed placing the patient in a cock up wrist splint, ice, she does have a prescription for steroids at home which she should initiate in addition to her pain medication, ice and splinting. Patient cannot use anti-inflammatories due to gastric issues. patient updated findings, we discussed she would benefit from follow-up with a rheumatologist given her underlying arthritic changes and degeneration. The patient will follow-up with their PCP in 1-2 days for reevaluation. If the patient or family members have any further concerns or any worsening symptoms they will return to the ED for reevaluation. CONSULTS:  None    PROCEDURES:  Procedures    CRITICAL CARE TIME    Total Critical Care time was 0 minutes, excluding separately reportable procedures. There was a high probability of clinically significant/life threatening deterioration in the patient's condition which required my urgent intervention. FINAL IMPRESSION      1. Wrist pain, right    2. Arthritis pain, hand          DISPOSITION/PLAN   DISPOSITION  dc      PATIENT REFERRED TO:  Jared Dalal MD  [facility]  Phone:  [facility] [address] [facility] [address]  [facility] [address]  [place], [address] [facility]  Schedule an appointment as soon as possible for a visit       Dasia Barriga TING  [facility]  [phone]    In 2 days      [facility] Emergency Department  [facility] [address] [facility]  [phone]    If symptoms worsen      DISCHARGE MEDICATIONS:  New Prescriptions    No medications on file       Comment: Please note this report has been produced using speech recognition software and may contain errors related to that system including errors in grammar, punctuation, and spelling, as well as words and phrases that may be inappropriate.  If there are any questions or concerns please feel free to contact the dictating provider for clarification.     Janett Dies, DO  Attending Emergency Physician              Janett Dies, DO  09/16/18 7894

## 2018-09-18 ENCOUNTER — TELEPHONE (OUTPATIENT)
Dept: PRIMARY CARE CLINIC | Age: 73
End: 2018-09-18

## 2018-09-18 DIAGNOSIS — M06.30 RHEUMATOID NODULES (HCC): ICD-10-CM

## 2018-09-18 DIAGNOSIS — M25.539 PAIN IN WRIST, UNSPECIFIED LATERALITY: Primary | ICD-10-CM

## 2018-09-18 DIAGNOSIS — M19.039 ARTHRITIS, WRIST: Primary | ICD-10-CM

## 2018-09-20 ENCOUNTER — NURSE ONLY (OUTPATIENT)
Dept: PRIMARY CARE CLINIC | Age: 73
End: 2018-09-20
Payer: MEDICAID

## 2018-09-20 DIAGNOSIS — E53.8 VITAMIN B12 DEFICIENCY: Primary | ICD-10-CM

## 2018-09-20 PROCEDURE — 96372 THER/PROPH/DIAG INJ SC/IM: CPT | Performed by: INTERNAL MEDICINE

## 2018-09-20 RX ORDER — CYANOCOBALAMIN 1000 UG/ML
1000 INJECTION INTRAMUSCULAR; SUBCUTANEOUS ONCE
Status: COMPLETED | OUTPATIENT
Start: 2018-09-20 | End: 2018-09-20

## 2018-09-20 RX ADMIN — CYANOCOBALAMIN 1000 MCG: 1000 INJECTION INTRAMUSCULAR; SUBCUTANEOUS at 11:57

## 2018-09-27 ENCOUNTER — OFFICE VISIT (OUTPATIENT)
Dept: PRIMARY CARE CLINIC | Age: 73
End: 2018-09-27
Payer: MEDICAID

## 2018-09-27 ENCOUNTER — HOSPITAL ENCOUNTER (OUTPATIENT)
Facility: HOSPITAL | Age: 73
Discharge: HOME | End: 2018-09-27
Payer: MEDICAID

## 2018-09-27 VITALS
HEART RATE: 99 BPM | OXYGEN SATURATION: 96 % | SYSTOLIC BLOOD PRESSURE: 140 MMHG | BODY MASS INDEX: 40.62 KG/M2 | WEIGHT: 208 LBS | DIASTOLIC BLOOD PRESSURE: 80 MMHG

## 2018-09-27 DIAGNOSIS — B37.9 YEAST INFECTION: ICD-10-CM

## 2018-09-27 DIAGNOSIS — F41.9 ANXIETY: ICD-10-CM

## 2018-09-27 DIAGNOSIS — E11.9 TYPE 2 DIABETES MELLITUS WITHOUT COMPLICATION, UNSPECIFIED WHETHER LONG TERM INSULIN USE (HCC): ICD-10-CM

## 2018-09-27 DIAGNOSIS — E03.9 HYPOTHYROIDISM, UNSPECIFIED TYPE: Primary | ICD-10-CM

## 2018-09-27 DIAGNOSIS — R23.2 HOT FLASHES: ICD-10-CM

## 2018-09-27 DIAGNOSIS — E53.8 VITAMIN B12 DEFICIENCY: ICD-10-CM

## 2018-09-27 DIAGNOSIS — E03.9 HYPOTHYROIDISM, UNSPECIFIED TYPE: ICD-10-CM

## 2018-09-27 LAB
A/G RATIO: 1.4 (ref 0.8–2)
ALBUMIN SERPL-MCNC: 3.9 G/DL (ref 3.4–4.8)
ALP BLD-CCNC: 106 U/L (ref 25–100)
ALT SERPL-CCNC: 11 U/L (ref 4–36)
ANION GAP SERPL CALCULATED.3IONS-SCNC: 14 MMOL/L (ref 3–16)
AST SERPL-CCNC: 12 U/L (ref 8–33)
BILIRUB SERPL-MCNC: 0.4 MG/DL (ref 0.3–1.2)
BUN BLDV-MCNC: 17 MG/DL (ref 6–20)
CALCIUM SERPL-MCNC: 8.9 MG/DL (ref 8.5–10.5)
CHLORIDE BLD-SCNC: 104 MMOL/L (ref 98–107)
CO2: 24 MMOL/L (ref 20–30)
CREAT SERPL-MCNC: 0.8 MG/DL (ref 0.4–1.2)
GFR AFRICAN AMERICAN: >59
GFR NON-AFRICAN AMERICAN: >60
GLOBULIN: 2.7 G/DL
GLUCOSE BLD-MCNC: 149 MG/DL (ref 74–106)
HBA1C MFR BLD: 8.8 %
POTASSIUM SERPL-SCNC: 4 MMOL/L (ref 3.4–5.1)
SODIUM BLD-SCNC: 142 MMOL/L (ref 136–145)
TOTAL PROTEIN: 6.6 G/DL (ref 6.4–8.3)
TSH SERPL DL<=0.05 MIU/L-ACNC: 1.56 UIU/ML (ref 0.35–5.5)

## 2018-09-27 PROCEDURE — 84443 ASSAY THYROID STIM HORMONE: CPT

## 2018-09-27 PROCEDURE — 83036 HEMOGLOBIN GLYCOSYLATED A1C: CPT

## 2018-09-27 PROCEDURE — 80053 COMPREHEN METABOLIC PANEL: CPT

## 2018-09-27 PROCEDURE — 99214 OFFICE O/P EST MOD 30 MIN: CPT | Performed by: NURSE PRACTITIONER

## 2018-09-27 PROCEDURE — 36415 COLL VENOUS BLD VENIPUNCTURE: CPT

## 2018-09-27 RX ORDER — CYANOCOBALAMIN 1000 UG/ML
1000 INJECTION INTRAMUSCULAR; SUBCUTANEOUS ONCE
Status: COMPLETED | OUTPATIENT
Start: 2018-09-27 | End: 2018-09-27

## 2018-09-27 RX ORDER — FLUCONAZOLE 100 MG/1
100 TABLET ORAL DAILY
Qty: 10 TABLET | Refills: 2 | Status: SHIPPED | OUTPATIENT
Start: 2018-09-27 | End: 2018-10-02

## 2018-09-27 RX ORDER — CITALOPRAM 20 MG/1
TABLET ORAL
Qty: 30 TABLET | Refills: 5 | Status: SHIPPED | OUTPATIENT
Start: 2018-09-27 | End: 2019-02-14 | Stop reason: SDUPTHER

## 2018-09-27 RX ADMIN — CYANOCOBALAMIN 1000 MCG: 1000 INJECTION INTRAMUSCULAR; SUBCUTANEOUS at 11:25

## 2018-09-27 ASSESSMENT — ENCOUNTER SYMPTOMS
EYE PAIN: 0
VOMITING: 0
SHORTNESS OF BREATH: 0
SORE THROAT: 0
NAUSEA: 0
ABDOMINAL PAIN: 0
COUGH: 0

## 2018-09-27 ASSESSMENT — PATIENT HEALTH QUESTIONNAIRE - PHQ9
SUM OF ALL RESPONSES TO PHQ9 QUESTIONS 1 & 2: 0
SUM OF ALL RESPONSES TO PHQ QUESTIONS 1-9: 0
SUM OF ALL RESPONSES TO PHQ QUESTIONS 1-9: 0
2. FEELING DOWN, DEPRESSED OR HOPELESS: 0
1. LITTLE INTEREST OR PLEASURE IN DOING THINGS: 0

## 2018-10-07 NOTE — PROGRESS NOTES
SUBJECTIVE:    Patient ID: Julio Pa is a 72 y.o. female. Medical history Review  Past Medical, Family, and Social History reviewed and does contribute to the patient presenting condition    Health Maintenance Due   Topic Date Due    Hepatitis C screen  1945    DTaP/Tdap/Td vaccine (1 - Tdap) 09/19/1966    Shingles Vaccine (1 of 2 - 2 Dose Series) 09/19/1997    Pneumococcal low/med risk (1 of 2 - PCV13) 09/19/2012    Diabetic foot exam  11/19/2015    Diabetic microalbuminuria test  11/19/2015    Diabetic retinal exam  11/19/2015    Breast cancer screen  03/17/2018    Flu vaccine (1) 09/01/2018        HPI:   Chief Complaint   Patient presents with    Other     Patient here today for hot flashes, she doesn't know if it is her thyroid or her hormones. She states she has another yeast infection, those pills aren't working. She would like anxiety medication. She has mouth soreness and vaginal discharge, wears incontinence pads. Patient's medications, allergies, past medical, surgical, social and family histories were reviewed and updated as appropriate. Review of Systems Reviewed and acurate. See MA note. OBJECTIVE:  BP (!) 140/80 (Site: Right Upper Arm, Position: Sitting, Cuff Size: Medium Adult)   Pulse 99   Wt 208 lb (94.3 kg)   SpO2 96%   BMI 40.62 kg/m²    Physical Exam   Constitutional: She is oriented to person, place, and time. She appears well-developed and well-nourished. No distress. HENT:   Head: Normocephalic. Right Ear: Tympanic membrane normal.   Left Ear: Tympanic membrane normal.   Mouth/Throat: No oropharyngeal exudate. Tongue coated white   Eyes: Lids are normal.   Neck: Neck supple. Cardiovascular: Normal rate, regular rhythm and normal heart sounds. Pulmonary/Chest: Effort normal and breath sounds normal.   Abdominal: Soft. Bowel sounds are normal. She exhibits no distension. There is no tenderness.    Musculoskeletal: She exhibits no edema.   Lymphadenopathy:     She has no cervical adenopathy. Neurological: She is alert and oriented to person, place, and time. Skin: Skin is warm and dry. Psychiatric: She has a normal mood and affect. Vitals reviewed. Results in Past 30 Days  Result Component Current Result Ref Range Previous Result Ref Range   Alb 3.9 (9/27/2018) 3.4 - 4.8 g/dL Not in Time Range    Albumin/Globulin Ratio 1.4 (9/27/2018) 0.8 - 2.0 Not in Time Range    Alkaline Phosphatase 106 (H) (9/27/2018) 25 - 100 U/L Not in Time Range    ALT 11 (9/27/2018) 4 - 36 U/L Not in Time Range    AST 12 (9/27/2018) 8 - 33 U/L Not in Time Range    BUN 17 (9/27/2018) 6 - 20 mg/dL Not in Time Range    Calcium 8.9 (9/27/2018) 8.5 - 10.5 mg/dL Not in Time Range    Chloride 104 (9/27/2018) 98 - 107 mmol/L Not in Time Range    CO2 24 (9/27/2018) 20 - 30 mmol/L Not in Time Range    CREATININE 0.8 (9/27/2018) 0.4 - 1.2 mg/dL Not in Time Range    GFR  >59 (9/27/2018) >59 Not in Time Range    GFR Non- >60 (9/27/2018) >59 Not in Time Range    Globulin 2.7 (9/27/2018) g/dL Not in Time Range    Glucose 149 (H) (9/27/2018) 74 - 106 mg/dL Not in Time Range    Potassium 4.0 (9/27/2018) 3.4 - 5.1 mmol/L Not in Time Range    Sodium 142 (9/27/2018) 136 - 145 mmol/L Not in Time Range    Total Bilirubin 0.4 (9/27/2018) 0.3 - 1.2 mg/dL Not in Time Range    Total Protein 6.6 (9/27/2018) 6.4 - 8.3 g/dL Not in Time Range        Hemoglobin A1C (%)   Date Value   09/27/2018 8.8 (H)     LDL Calculated (mg/dL)   Date Value   04/17/2018 121         Lab Results   Component Value Date    WBC 9.3 07/25/2017    NEUTROABS 6.4 07/25/2017    HGB 13.4 07/25/2017    HCT 41.3 07/25/2017    MCV 88.1 07/25/2017     07/25/2017       Lab Results   Component Value Date    TSH 1.56 09/27/2018       Prior to Visit Medications    Medication Sig Taking?  Authorizing Provider   citalopram (CELEXA) 20 MG tablet TAKE ONE TABLET BY MOUTH DAILY FOR Take 1 tablet by mouth daily  TING Eagle   Lancet Device MISC 1 Device by Does not apply route once for 1 dose. TING Eagle       ASSESSMENT:  1. Hypothyroidism, unspecified type    2. Hot flashes    3. Type 2 diabetes mellitus without complication, unspecified whether long term insulin use ([facility]    4. Yeast infection    5. Vitamin B12 deficiency    6. Anxiety          PLAN:    Orders Placed This Encounter   Medications    cyanocobalamin injection 1,000 mcg    fluconazole (DIFLUCAN) 100 MG tablet     Sig: Take 1 tablet by mouth daily for 5 days     Dispense:  10 tablet     Refill:  2    citalopram (CELEXA) 20 MG tablet     Sig: TAKE ONE TABLET BY MOUTH DAILY FOR ANXIETY     Dispense:  30 tablet     Refill:  5     Orders Placed This Encounter   Procedures    TSH without Reflex    COMPREHENSIVE METABOLIC PANEL    HEMOGLOBIN A1C     There are no Patient Instructions on file for this visit. Return in about 3 months (around 12/27/2018).

## 2018-10-14 ENCOUNTER — OFFICE VISIT (OUTPATIENT)
Dept: PRIMARY CARE CLINIC | Age: 73
End: 2018-10-14
Payer: MEDICAID

## 2018-10-14 VITALS
OXYGEN SATURATION: 98 % | WEIGHT: 211.2 LBS | TEMPERATURE: 98.5 F | HEART RATE: 82 BPM | BODY MASS INDEX: 41.25 KG/M2 | SYSTOLIC BLOOD PRESSURE: 122 MMHG | DIASTOLIC BLOOD PRESSURE: 70 MMHG

## 2018-10-14 DIAGNOSIS — M19.90 ARTHRITIS: Primary | ICD-10-CM

## 2018-10-14 PROCEDURE — 99213 OFFICE O/P EST LOW 20 MIN: CPT | Performed by: NURSE PRACTITIONER

## 2018-10-14 RX ORDER — METHYLPREDNISOLONE 4 MG/1
TABLET ORAL
Qty: 30 TABLET | Refills: 0 | Status: SHIPPED | OUTPATIENT
Start: 2018-10-14 | End: 2019-04-22

## 2018-10-14 RX ORDER — METHYLPREDNISOLONE 4 MG/1
2 TABLET ORAL DAILY
COMMUNITY
End: 2018-10-14 | Stop reason: SDUPTHER

## 2018-10-14 NOTE — PATIENT INSTRUCTIONS
· Keep a list of your medicines with you. List all of the prescription medicines, nonprescription medicines, supplements, natural remedies, and vitamins that you take. Tell your healthcare providers who treat you about all of the products you are taking. Your provider can provide you with a form to keep track of them. Just ask. · Follow the directions that come with your medicine, including information about food or alcohol. Make sure you know how and when to take your medicine. Do not take more or less than you are supposed to take. · Keep all medicines out of the reach of children. · Store medicines according to the directions on the label. · Monitor yourself. Learn to know how your body reacts to your new medicine and keep track of how it makes you feel before attempting (If your provider has allowed you to do so) to drive or go to work. · Seek emergency medical attention if you think you have used too much of this medicine. An overdose of any prescription medicine can be fatal. Overdose symptoms may include extreme drowsiness, muscle weakness, confusion, cold and clammy skin, pinpoint pupils, shallow breathing, slow heart rate, fainting, or coma. · Don't share prescription medicines with others, even when they seem to have the same symptoms. What may be good for you may be harmful to others. · If you are no longer taking a prescribed medication and you have pills left please take your pills out of their original containers. Mix crushed pills with an undesirable substance, such as cat litter or used coffee grounds. Put the mixture into a disposable container with a lid, such as an empty margarine tub, or into a sealable bag. Cover up or remove any of your personal information on the empty containers by covering it with black permanent marker or duct tape. Place the sealed container with the mixture, and the empty drug containers, in the trash.    · If you use a medication that is in the form of a patch, last two joints of that finger. 4. Repeat 8 to 12 times. 5. Repeat steps 1 through 4 with each finger. 6. Switch hands and repeat steps 1 through 5, even if only one hand is sore. DIP flexion    4. With your good hand, grasp one finger of your affected hand. Your thumb will be on the top side of your finger just below the joint that is closest to your fingernail. 5. Slowly bend your affected finger only at the joint closest to your fingernail. 6. Repeat 8 to 12 times. 7. Repeat steps 1 through 3 with each finger. 8. Switch hands and repeat steps 1 through 4, even if only one hand is sore. Follow-up care is a key part of your treatment and safety. Be sure to make and go to all appointments, and call your doctor if you are having problems. It's also a good idea to know your test results and keep a list of the medicines you take. Where can you learn more? Go to https://[email or web address]. org and sign in to your [employer] account. Enter E328 in the [employer] box to learn more about \"Hand Arthritis: Exercises. \"     If you do not have an account, please click on the \"Sign Up Now\" link. Current as of: November 29, 2017  Content Version: 11.7  © 6463-8966 [employer]. Care instructions adapted under license by [facility] ([facility]). If you have questions about a medical condition or this instruction, always ask your healthcare professional. [place] [address] any warranty or liability for your use of this information.

## 2018-10-14 NOTE — PROGRESS NOTES
SUBJECTIVE:  Yani Prakash is a 72 y.o. y/o female that presents with Foot Pain (Patient has arthritis and her feet hurt, patient states she needs a steroid prescription.)  . HPI:  Symptoms have been present for greater than 5 year(s). she describes the pain as aching, burning, throbbing  in bilateral feet. She is going [place] [facility] [place] with a friend and she needs some prednisone to take on the trip    Inciting injury or history of trauma? No  Pain is relieved by - steroids  Pain is aggravated by - activity  Radiation of the pain? No  Paresthesias of the extremities? No  Saddle anesthesia? No  Bowel or bladder incontinence? No  Treatments tried - steroids and foot doc gives me injecions        OBJECTIVE:  /70   Pulse 82   Temp 98.5 °F (36.9 °C) (Oral)   Wt 211 lb 3.2 oz (95.8 kg)   SpO2 98%   BMI 41.25 kg/m²   Physical Examination: General appearance - alert, well appearing, and in no distress  Chest - clear to auscultation, no wheezes, rales or rhonchi, symmetric air entry  Heart - normal rate, regular rhythm, normal S1, S2, no murmurs, rubs, clicks or gallops  Back exam - limited range of motion,  5/5 strength globally and symmetrically in the LEs, 2+ patellar reflexes b/l  Extremities - peripheral pulses normal, no pedal edema, no clubbing or cyanosis  Skin -  Skin color, texture, turgor normal. No rashes or lesions. Psych - Affect normal, no evidence of depression or anxiety    ASSESSMENT & Rachid Perea was seen today for foot pain. Diagnoses and all orders for this visit:    Arthritis    Other orders  -     methylPREDNISolone (MEDROL) 4 MG tablet; You may take 1-2 tablets every 6 hours as needed for arthritis        Return if symptoms worsen or fail to improve. Kristen Barroso received counseling on the following healthy behaviors: medication adherence  Reviewed prior labs and health maintenance. Continue current medications, diet and exercise.   Discussed use, benefit, and side effects of

## 2018-10-15 ENCOUNTER — TELEPHONE (OUTPATIENT)
Dept: FAMILY MEDICINE CLINIC | Age: 73
End: 2018-10-15

## 2018-10-15 RX ORDER — METHYLPREDNISOLONE 4 MG/1
TABLET ORAL
Qty: 21 TABLET | Refills: 0 | Status: SHIPPED | OUTPATIENT
Start: 2018-10-15 | End: 2018-10-21

## 2018-10-30 ENCOUNTER — TELEPHONE (OUTPATIENT)
Dept: PRIMARY CARE CLINIC | Age: 73
End: 2018-10-30

## 2018-12-28 ENCOUNTER — NURSE ONLY (OUTPATIENT)
Dept: PRIMARY CARE CLINIC | Age: 73
End: 2018-12-28
Payer: MEDICAID

## 2018-12-28 DIAGNOSIS — E53.8 B12 DEFICIENCY: Primary | ICD-10-CM

## 2018-12-28 PROCEDURE — 96372 THER/PROPH/DIAG INJ SC/IM: CPT | Performed by: NURSE PRACTITIONER

## 2018-12-28 RX ORDER — CYANOCOBALAMIN 1000 UG/ML
1000 INJECTION INTRAMUSCULAR; SUBCUTANEOUS ONCE
Status: COMPLETED | OUTPATIENT
Start: 2018-12-28 | End: 2018-12-28

## 2018-12-28 RX ADMIN — CYANOCOBALAMIN 1000 MCG: 1000 INJECTION INTRAMUSCULAR; SUBCUTANEOUS at 14:35

## 2018-12-28 NOTE — PROGRESS NOTES
Administrations This Visit     cyanocobalamin injection 1,000 mcg     Admin Date  12/28/2018  14:35 Action  Given Dose  1000 mcg Route  Intramuscular Site  Deltoid Right Administered By  Colton Lopez    Ordering Provider:  TING Coleman    NDC:  18335-512-32    Lot#:  [id number]    :  [facility] [facility]    Patient Supplied?:  No

## 2019-01-10 ENCOUNTER — TELEPHONE (OUTPATIENT)
Dept: PRIMARY CARE CLINIC | Age: 74
End: 2019-01-10

## 2019-01-10 NOTE — TELEPHONE ENCOUNTER
I called patient today to let them know they were due for a pneumococcal vaccine and ask if they would like to come in next week to receive it. I was not able to reach them at this time. I will try again later. Patient also needs Mammo and flu vaccine.

## 2019-01-11 ENCOUNTER — TELEPHONE (OUTPATIENT)
Dept: PRIMARY CARE CLINIC | Age: 74
End: 2019-01-11

## 2019-02-14 ENCOUNTER — OFFICE VISIT (OUTPATIENT)
Dept: PRIMARY CARE CLINIC | Age: 74
End: 2019-02-14
Payer: MEDICAID

## 2019-02-14 VITALS
WEIGHT: 207 LBS | SYSTOLIC BLOOD PRESSURE: 130 MMHG | DIASTOLIC BLOOD PRESSURE: 80 MMHG | HEART RATE: 97 BPM | OXYGEN SATURATION: 97 % | BODY MASS INDEX: 40.43 KG/M2

## 2019-02-14 DIAGNOSIS — E03.9 HYPOTHYROIDISM, UNSPECIFIED TYPE: ICD-10-CM

## 2019-02-14 DIAGNOSIS — R59.1 LYMPHADENOPATHY: ICD-10-CM

## 2019-02-14 DIAGNOSIS — I10 ESSENTIAL HYPERTENSION: ICD-10-CM

## 2019-02-14 DIAGNOSIS — E78.5 HYPERLIPIDEMIA, UNSPECIFIED HYPERLIPIDEMIA TYPE: ICD-10-CM

## 2019-02-14 DIAGNOSIS — E11.9 TYPE 2 DIABETES MELLITUS WITHOUT COMPLICATION, UNSPECIFIED WHETHER LONG TERM INSULIN USE (HCC): Primary | ICD-10-CM

## 2019-02-14 PROCEDURE — 99214 OFFICE O/P EST MOD 30 MIN: CPT | Performed by: NURSE PRACTITIONER

## 2019-02-14 RX ORDER — CITALOPRAM 20 MG/1
TABLET ORAL
Qty: 30 TABLET | Refills: 5 | Status: SHIPPED | OUTPATIENT
Start: 2019-02-14 | End: 2020-01-17

## 2019-02-14 RX ORDER — CYANOCOBALAMIN 1000 UG/ML
1000 INJECTION INTRAMUSCULAR; SUBCUTANEOUS ONCE
Status: COMPLETED | OUTPATIENT
Start: 2019-02-14 | End: 2019-02-14

## 2019-02-14 RX ORDER — SIMVASTATIN 40 MG
TABLET ORAL
Qty: 30 TABLET | Refills: 5 | Status: SHIPPED | OUTPATIENT
Start: 2019-02-14 | End: 2019-11-26 | Stop reason: SDUPTHER

## 2019-02-14 RX ORDER — LEVOTHYROXINE SODIUM 112 UG/1
TABLET ORAL
Qty: 30 TABLET | Refills: 5 | Status: SHIPPED | OUTPATIENT
Start: 2019-02-14 | End: 2019-10-30 | Stop reason: SDUPTHER

## 2019-02-14 RX ADMIN — CYANOCOBALAMIN 1000 MCG: 1000 INJECTION INTRAMUSCULAR; SUBCUTANEOUS at 10:53

## 2019-02-14 ASSESSMENT — PATIENT HEALTH QUESTIONNAIRE - PHQ9
1. LITTLE INTEREST OR PLEASURE IN DOING THINGS: 0
SUM OF ALL RESPONSES TO PHQ QUESTIONS 1-9: 0
SUM OF ALL RESPONSES TO PHQ QUESTIONS 1-9: 0
SUM OF ALL RESPONSES TO PHQ9 QUESTIONS 1 & 2: 0
2. FEELING DOWN, DEPRESSED OR HOPELESS: 0

## 2019-02-14 ASSESSMENT — ENCOUNTER SYMPTOMS
EYE PAIN: 0
COUGH: 0
SORE THROAT: 0
ABDOMINAL PAIN: 0
SHORTNESS OF BREATH: 0
NAUSEA: 0
VOMITING: 0

## 2019-02-15 RX ORDER — CEPHALEXIN 500 MG/1
500 CAPSULE ORAL 2 TIMES DAILY
Qty: 20 CAPSULE | Refills: 0 | Status: SHIPPED | OUTPATIENT
Start: 2019-02-15 | End: 2019-03-21 | Stop reason: SDUPTHER

## 2019-03-21 ENCOUNTER — TELEPHONE (OUTPATIENT)
Dept: PRIMARY CARE CLINIC | Age: 74
End: 2019-03-21

## 2019-03-21 RX ORDER — CEPHALEXIN 500 MG/1
500 CAPSULE ORAL 2 TIMES DAILY
Qty: 20 CAPSULE | Refills: 0 | Status: SHIPPED | OUTPATIENT
Start: 2019-03-21 | End: 2019-03-31

## 2019-03-21 RX ORDER — ATENOLOL 100 MG/1
TABLET ORAL
Qty: 30 TABLET | Refills: 5 | Status: SHIPPED | OUTPATIENT
Start: 2019-03-21 | End: 2019-10-30 | Stop reason: SDUPTHER

## 2019-04-22 ENCOUNTER — OFFICE VISIT (OUTPATIENT)
Dept: PRIMARY CARE CLINIC | Age: 74
End: 2019-04-22
Payer: MEDICAID

## 2019-04-22 VITALS
HEART RATE: 87 BPM | DIASTOLIC BLOOD PRESSURE: 78 MMHG | WEIGHT: 205.6 LBS | BODY MASS INDEX: 40.15 KG/M2 | SYSTOLIC BLOOD PRESSURE: 128 MMHG | OXYGEN SATURATION: 98 %

## 2019-04-22 DIAGNOSIS — S30.861A TICK BITE OF ABDOMEN, INITIAL ENCOUNTER: Primary | ICD-10-CM
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
## 2019-04-22 DIAGNOSIS — L29.9 PRURITUS: ICD-10-CM

## 2019-04-22 PROCEDURE — 99213 OFFICE O/P EST LOW 20 MIN: CPT | Performed by: NURSE PRACTITIONER

## 2019-04-22 RX ORDER — PREDNISONE 10 MG/1
10 TABLET ORAL 2 TIMES DAILY
Qty: 10 TABLET | Refills: 0 | Status: SHIPPED | OUTPATIENT
Start: 2019-04-22 | End: 2019-04-27

## 2019-04-22 RX ORDER — TRIAMCINOLONE ACETONIDE 0.25 MG/G
OINTMENT TOPICAL
Qty: 1 TUBE | Refills: 0 | Status: SHIPPED | OUTPATIENT
Start: 2019-04-22 | End: 2019-04-29

## 2019-04-22 RX ORDER — CYANOCOBALAMIN 1000 UG/ML
1000 INJECTION INTRAMUSCULAR; SUBCUTANEOUS ONCE
Status: CANCELLED | OUTPATIENT
Start: 2019-04-22 | End: 2019-04-22

## 2019-04-22 RX ORDER — TOPIRAMATE 50 MG/1
TABLET, FILM COATED ORAL
Refills: 0 | COMMUNITY
Start: 2019-04-17

## 2019-04-22 RX ORDER — CYANOCOBALAMIN 1000 UG/ML
1000 INJECTION INTRAMUSCULAR; SUBCUTANEOUS ONCE
Status: COMPLETED | OUTPATIENT
Start: 2019-04-22 | End: 2019-04-22

## 2019-04-22 RX ORDER — DOXYCYCLINE HYCLATE 100 MG
100 TABLET ORAL 2 TIMES DAILY
Qty: 20 TABLET | Refills: 0 | Status: SHIPPED | OUTPATIENT
Start: 2019-04-22 | End: 2019-05-02

## 2019-04-22 RX ORDER — CLONAZEPAM 1 MG/1
.5-1 TABLET ORAL
COMMUNITY
Start: 2015-03-05

## 2019-04-22 RX ORDER — TROSPIUM CHLORIDE ER 60 MG/1
60 CAPSULE ORAL
COMMUNITY
Start: 2017-12-20 | End: 2019-11-26

## 2019-04-22 RX ADMIN — CYANOCOBALAMIN 1000 MCG: 1000 INJECTION INTRAMUSCULAR; SUBCUTANEOUS at 13:37

## 2019-04-22 NOTE — PROGRESS NOTES
SUBJECTIVE:    Patient ID: Thao Nash is a 72 y. o.female. Chief Complaint   Patient presents with    Tick Removal     Removed the tick. Redness around the bite. HPI:    Patient presents to clinic with complaints of tick bite and symptoms started Thursday. Pt reports working outside and removed tick below belly button then area started becoming red and itching a lot. Associated symptoms include redness, itching. Denies fevers, joint aches, body aches, rash, drainage, CP, palpitations, SOB. No relieving or worsening factors reported. Treatment regimens include OTC anti itch lotion with no help. Patient's medications, allergies, past medical, surgical, social and family histories were reviewed and updated as appropriate in electronic medical record. Outpatient Medications Marked as Taking for the 4/22/19 encounter (Office Visit) with TING Sanon CNP   Medication Sig Dispense Refill    clonazePAM (KLONOPIN) 1 MG tablet Take 0.5-1 mg by mouth.       topiramate (TOPAMAX) 50 MG tablet take 3 tablets by mouth at bedtime  0    trospium (SANCTURA) 60 MG CP24 extended release capsule Take 60 mg by mouth      atenolol (TENORMIN) 100 MG tablet TAKE ONE TABLET BY MOUTH DAILY 30 tablet 5    SITagliptin (JANUVIA) 50 MG tablet Take 1 tablet by mouth daily 30 tablet 5    citalopram (CELEXA) 20 MG tablet TAKE ONE TABLET BY MOUTH DAILY FOR ANXIETY 30 tablet 5    empagliflozin (JARDIANCE) 25 MG tablet TAKE ONE TABLET BY MOUTH DAILY 30 tablet 5    levothyroxine (SYNTHROID) 112 MCG tablet TAKE ONE TABLET BY MOUTH DAILY 30 tablet 5    simvastatin (ZOCOR) 40 MG tablet TAKE ONE TABLET BY MOUTH NIGHTLY 30 tablet 5    glipiZIDE (GLUCOTROL XL) 10 MG extended release tablet TAKE ONE TABLET BY MOUTH TWO TIMES A DAY BEFORE MEALS 60 tablet 5    NEXIUM 24HR 20 MG delayed release capsule TAKE TWO CAPSULES BY MOUTH TWO TIMES DAILY 120 capsule 5    MYRBETRIQ 50 MG TB24 TAKE 1 TABLET BY MOUTH DAILY 30 tablet 5    lisinopril (PRINIVIL;ZESTRIL) 10 MG tablet TAKE ONE TABLET BY MOUTH DAILY 30 tablet 5    esomeprazole (NEXIUM 24HR) 20 MG delayed release capsule TAKE TWO CAPSULES BY MOUTH TWO TIMES DAILY 120 capsule 5    conjugated estrogens (PREMARIN) 0.625 MG/GM vaginal cream Place vaginally 2-3x/ week 1 Tube 5    loratadine (CLARITIN) 10 MG tablet TAKE ONE TABLET BY MOUTH DAILY AS NEEDED (ALLERGIES) 30 tablet 5    diclofenac sodium 1 % GEL Apply 2 g topically 4 times daily as needed for Pain 1 Tube 5    Cholecalciferol (VITAMIN D3) 11848 UNITS CAPS Take 50,000 Units by mouth once a week 4 capsule 5    glucose blood VI test strips (FREESTYLE TEST STRIPS) strip 1 each by In Vitro route daily. As needed. DX: 250.00 100 each 3    fentaNYL (DURAGESIC) 25 MCG/HR Place 1 patch onto the skin every 72 hours.  oxyCODONE HCl (OXY-IR) 10 MG immediate release tablet Take 10 mg by mouth every 6 hours as needed for Pain.  Lancets MISC QD and PRN 50 each 5        Review of Systems   Constitutional: Negative for chills, fatigue and fever. Respiratory: Negative for chest tightness and shortness of breath. Cardiovascular: Negative for chest pain and palpitations. Gastrointestinal: Negative for abdominal distention, abdominal pain, constipation and nausea. Musculoskeletal: Negative for arthralgias, back pain, joint swelling, myalgias, neck pain and neck stiffness. Skin: Positive for color change. Negative for pallor and rash. Tick bite below belly button. Red and itching. Denies rash. Neurological: Negative for dizziness, light-headedness and headaches.        Past Medical History:   Diagnosis Date    Anxiety     Chronic back pain     Hiatal hernia     Hypertension     Inflammatory polyarthropathy (HCC)     Obesity     Osteoarthritis     right shoulder    Osteopenia     Reflux esophagitis     Type II or unspecified type diabetes mellitus without mention of complication, not stated as uncontrolled     Urinary incontinence     Vitamin B12 deficiency      Past Surgical History:   Procedure Laterality Date    CARPAL TUNNEL RELEASE      CHOLECYSTECTOMY      FRACTURE SURGERY  4295/8336    t12    HEMORRHOID SURGERY      VA REPAIR ROTATOR CUFF,ACUTE  11/06/2014    Rotator Cuff Repair    TOTAL THYROIDECTOMY  03/2015     Family History   Problem Relation Age of Onset    Diabetes Mother     Stroke Mother     Hypertension Mother       Social History     Tobacco Use   Smoking Status Never Smoker   Smokeless Tobacco Never Used       OBJECTIVE:   Wt Readings from Last 3 Encounters:   04/22/19 205 lb 9.6 oz (93.3 kg)   02/14/19 207 lb (93.9 kg)   10/14/18 211 lb 3.2 oz (95.8 kg)     BP Readings from Last 3 Encounters:   04/22/19 128/78   02/14/19 130/80   10/14/18 122/70       /78   Pulse 87   Wt 205 lb 9.6 oz (93.3 kg)   SpO2 98%   BMI 40.15 kg/m²      Physical Exam   Constitutional: She is oriented to person, place, and time. She appears well-developed and well-nourished. Neck: Normal range of motion. Neck supple. Cardiovascular: Normal rate and regular rhythm. Pulmonary/Chest: Effort normal and breath sounds normal.   Abdominal: Soft. Bowel sounds are normal. She exhibits no distension. There is no tenderness. Musculoskeletal: Normal range of motion. Lymphadenopathy:     She has no cervical adenopathy. Neurological: She is alert and oriented to person, place, and time. No sensory deficit. Skin: Skin is warm, dry and intact. No abrasion, no ecchymosis, no laceration, no lesion and no rash noted. She is not diaphoretic. There is erythema. Psychiatric: She has a normal mood and affect. Her behavior is normal.   Nursing note and vitals reviewed.       Lab Results   Component Value Date     09/27/2018    K 4.0 09/27/2018     09/27/2018    CO2 24 09/27/2018    GLUCOSE 149 09/27/2018    BUN 17 09/27/2018    CREATININE 0.8 09/27/2018    CALCIUM 8.9 09/27/2018 PROT 6.6 09/27/2018    LABALBU 3.9 09/27/2018    BILITOT 0.4 09/27/2018    ALT 11 09/27/2018    AST 12 09/27/2018       Hemoglobin A1C (%)   Date Value   09/27/2018 8.8 (H)     LDL Calculated (mg/dL)   Date Value   04/17/2018 121         Lab Results   Component Value Date    WBC 9.3 07/25/2017    NEUTROABS 6.4 07/25/2017    HGB 13.4 07/25/2017    HCT 41.3 07/25/2017    MCV 88.1 07/25/2017     07/25/2017       Lab Results   Component Value Date    TSH 1.56 09/27/2018         ASSESSMENT/PLAN:     1. Tick bite of abdomen, initial encounter  Start doxycycline and kenalog cream. Benadryl OTC itching. Area marked with marker and pt to notify if S&S worsen. Return to clinic or go to ED if S&S worsen or no improvement noted. Patient verbalized understanding.     - doxycycline hyclate (VIBRA-TABS) 100 MG tablet; Take 1 tablet by mouth 2 times daily for 10 days  Dispense: 20 tablet; Refill: 0  - triamcinolone (KENALOG) 0.025 % ointment; Apply topically 2 times daily. Dispense: 1 Tube; Refill: 0    2. Pruritus  See #1. Orders Placed This Encounter   Medications    cyanocobalamin injection 1,000 mcg    doxycycline hyclate (VIBRA-TABS) 100 MG tablet     Sig: Take 1 tablet by mouth 2 times daily for 10 days     Dispense:  20 tablet     Refill:  0    triamcinolone (KENALOG) 0.025 % ointment     Sig: Apply topically 2 times daily.      Dispense:  1 Tube     Refill:  0    predniSONE (DELTASONE) 10 MG tablet     Sig: Take 1 tablet by mouth 2 times daily for 5 days     Dispense:  10 tablet     Refill:  0

## 2019-04-22 NOTE — PROGRESS NOTES
Chief Complaint   Patient presents with    Tick Removal     Removed the tick. Redness around the bite. Patient is here for a tick bite on her stomach right below her belly button. She states she removed it on Thursday morning and then it started getting red on Friday.      Have you seen any other physician or provider since your last visit no    Have you had any other diagnostic tests since your last visit? no    Have you changed or stopped any medications since your last visit? no     Administrations This Visit     cyanocobalamin injection 1,000 mcg     Admin Date  04/22/2019  13:37 Action  Given Dose  1000 mcg Route  Intramuscular Site  Other Administered By  Alexandria Leonard    Ordering Provider:  TING Richmond CNP    NDC:  92804-586-73    Lot#:  [id number]    :  [facility]    Patient Supplied?:  No

## 2019-04-23 ASSESSMENT — ENCOUNTER SYMPTOMS
COLOR CHANGE: 1
BACK PAIN: 0
CHEST TIGHTNESS: 0
SHORTNESS OF BREATH: 0
CONSTIPATION: 0
NAUSEA: 0
ABDOMINAL DISTENTION: 0
ABDOMINAL PAIN: 0

## 2019-07-03 ENCOUNTER — NURSE ONLY (OUTPATIENT)
Dept: PRIMARY CARE CLINIC | Age: 74
End: 2019-07-03
Payer: MEDICAID

## 2019-07-03 DIAGNOSIS — E11.9 TYPE 2 DIABETES MELLITUS WITHOUT COMPLICATION, UNSPECIFIED WHETHER LONG TERM INSULIN USE (HCC): Primary | ICD-10-CM

## 2019-07-03 DIAGNOSIS — E53.8 B12 DEFICIENCY: ICD-10-CM

## 2019-07-03 PROCEDURE — 96372 THER/PROPH/DIAG INJ SC/IM: CPT | Performed by: NURSE PRACTITIONER

## 2019-07-03 RX ORDER — CYANOCOBALAMIN 1000 UG/ML
1000 INJECTION INTRAMUSCULAR; SUBCUTANEOUS ONCE
Status: COMPLETED | OUTPATIENT
Start: 2019-07-03 | End: 2019-07-03

## 2019-07-03 RX ADMIN — CYANOCOBALAMIN 1000 MCG: 1000 INJECTION INTRAMUSCULAR; SUBCUTANEOUS at 13:27

## 2019-08-16 RX ORDER — LISINOPRIL 10 MG/1
TABLET ORAL
Qty: 30 TABLET | Refills: 0 | Status: SHIPPED | OUTPATIENT
Start: 2019-08-16 | End: 2019-09-23 | Stop reason: SDUPTHER

## 2019-08-21 RX ORDER — MIRABEGRON 50 MG/1
TABLET, FILM COATED, EXTENDED RELEASE ORAL
Qty: 30 TABLET | Refills: 4 | Status: SHIPPED | OUTPATIENT
Start: 2019-08-21 | End: 2019-11-26 | Stop reason: SDUPTHER

## 2019-09-05 ENCOUNTER — NURSE ONLY (OUTPATIENT)
Dept: PRIMARY CARE CLINIC | Age: 74
End: 2019-09-05
Payer: MEDICAID

## 2019-09-05 DIAGNOSIS — E11.9 TYPE 2 DIABETES MELLITUS WITHOUT COMPLICATION, UNSPECIFIED WHETHER LONG TERM INSULIN USE (HCC): Primary | ICD-10-CM

## 2019-09-05 DIAGNOSIS — E53.8 B12 DEFICIENCY: ICD-10-CM

## 2019-09-05 PROCEDURE — 96372 THER/PROPH/DIAG INJ SC/IM: CPT | Performed by: NURSE PRACTITIONER

## 2019-09-05 RX ORDER — CYANOCOBALAMIN 1000 UG/ML
1000 INJECTION INTRAMUSCULAR; SUBCUTANEOUS ONCE
Status: COMPLETED | OUTPATIENT
Start: 2019-09-05 | End: 2019-09-05

## 2019-09-05 RX ADMIN — CYANOCOBALAMIN 1000 MCG: 1000 INJECTION INTRAMUSCULAR; SUBCUTANEOUS at 11:24

## 2019-09-05 NOTE — PROGRESS NOTES
Administrations This Visit     cyanocobalamin injection 1,000 mcg     Admin Date  09/05/2019  11:24 Action  Given Dose  1000 mcg Route  Intramuscular Site  Deltoid Left Administered By  Stone Bullock    Ordering Provider:  TING Leonard    NDC:  73766-730-35    Lot#:  [id number]    :  [address] [facility]    Patient Supplied?:  No

## 2019-10-30 RX ORDER — ATENOLOL 100 MG/1
TABLET ORAL
Qty: 30 TABLET | Refills: 5 | Status: SHIPPED | OUTPATIENT
Start: 2019-10-30 | End: 2020-03-10

## 2019-10-30 RX ORDER — GLIPIZIDE 10 MG/1
TABLET, FILM COATED, EXTENDED RELEASE ORAL
Qty: 60 TABLET | Refills: 5 | Status: SHIPPED | OUTPATIENT
Start: 2019-10-30 | End: 2020-03-04

## 2019-10-30 RX ORDER — LEVOTHYROXINE SODIUM 112 UG/1
TABLET ORAL
Qty: 30 TABLET | Refills: 5 | Status: SHIPPED | OUTPATIENT
Start: 2019-10-30 | End: 2020-11-11 | Stop reason: DRUGHIGH

## 2019-11-26 ENCOUNTER — OFFICE VISIT (OUTPATIENT)
Dept: PRIMARY CARE CLINIC | Age: 74
End: 2019-11-26
Payer: MEDICAID

## 2019-11-26 ENCOUNTER — HOSPITAL ENCOUNTER (OUTPATIENT)
Facility: HOSPITAL | Age: 74
Discharge: HOME | End: 2019-11-26
Payer: MEDICAID

## 2019-11-26 VITALS
SYSTOLIC BLOOD PRESSURE: 130 MMHG | DIASTOLIC BLOOD PRESSURE: 70 MMHG | OXYGEN SATURATION: 98 % | WEIGHT: 214 LBS | BODY MASS INDEX: 41.79 KG/M2 | HEART RATE: 76 BPM

## 2019-11-26 DIAGNOSIS — E11.9 TYPE 2 DIABETES MELLITUS WITHOUT COMPLICATION, UNSPECIFIED WHETHER LONG TERM INSULIN USE (HCC): ICD-10-CM

## 2019-11-26 DIAGNOSIS — E03.9 HYPOTHYROIDISM, UNSPECIFIED TYPE: ICD-10-CM

## 2019-11-26 DIAGNOSIS — S42.412A LEFT SUPRACONDYLAR HUMERUS FRACTURE, CLOSED, INITIAL ENCOUNTER: ICD-10-CM

## 2019-11-26 DIAGNOSIS — E11.9 TYPE 2 DIABETES MELLITUS WITHOUT COMPLICATION, UNSPECIFIED WHETHER LONG TERM INSULIN USE (HCC): Primary | ICD-10-CM

## 2019-11-26 DIAGNOSIS — I10 ESSENTIAL HYPERTENSION: ICD-10-CM

## 2019-11-26 DIAGNOSIS — E78.5 HYPERLIPIDEMIA, UNSPECIFIED HYPERLIPIDEMIA TYPE: ICD-10-CM

## 2019-11-26 PROCEDURE — 99214 OFFICE O/P EST MOD 30 MIN: CPT | Performed by: NURSE PRACTITIONER

## 2019-11-26 PROCEDURE — 80053 COMPREHEN METABOLIC PANEL: CPT

## 2019-11-26 PROCEDURE — 80061 LIPID PANEL: CPT

## 2019-11-26 PROCEDURE — 83036 HEMOGLOBIN GLYCOSYLATED A1C: CPT

## 2019-11-26 PROCEDURE — 84443 ASSAY THYROID STIM HORMONE: CPT

## 2019-11-26 PROCEDURE — 85025 COMPLETE CBC W/AUTO DIFF WBC: CPT

## 2019-11-26 RX ORDER — LISINOPRIL 10 MG/1
TABLET ORAL
Qty: 30 TABLET | Refills: 5 | Status: SHIPPED | OUTPATIENT
Start: 2019-11-26 | End: 2020-03-10

## 2019-11-26 RX ORDER — CYANOCOBALAMIN 1000 UG/ML
1000 INJECTION INTRAMUSCULAR; SUBCUTANEOUS ONCE
Status: COMPLETED | OUTPATIENT
Start: 2019-11-26 | End: 2019-11-26

## 2019-11-26 RX ORDER — SIMVASTATIN 40 MG
TABLET ORAL
Qty: 30 TABLET | Refills: 5 | Status: SHIPPED | OUTPATIENT
Start: 2019-11-26 | End: 2020-11-04 | Stop reason: SDUPTHER

## 2019-11-26 RX ORDER — ALBUTEROL SULFATE 90 UG/1
2 AEROSOL, METERED RESPIRATORY (INHALATION) EVERY 4 HOURS PRN
Qty: 1 INHALER | Refills: 5 | Status: SHIPPED | OUTPATIENT
Start: 2019-11-26 | End: 2020-11-11

## 2019-11-26 RX ORDER — TROSPIUM CHLORIDE ER 60 MG/1
60 CAPSULE ORAL DAILY
Qty: 30 CAPSULE | Refills: 5 | Status: CANCELLED | OUTPATIENT
Start: 2019-11-26

## 2019-11-26 RX ADMIN — CYANOCOBALAMIN 1000 MCG: 1000 INJECTION INTRAMUSCULAR; SUBCUTANEOUS at 15:41

## 2019-11-26 ASSESSMENT — ENCOUNTER SYMPTOMS
COUGH: 0
VOMITING: 0
SHORTNESS OF BREATH: 0
EYE PAIN: 0
NAUSEA: 0
ABDOMINAL PAIN: 0
SORE THROAT: 0

## 2019-11-27 ENCOUNTER — TELEPHONE (OUTPATIENT)
Dept: PRIMARY CARE CLINIC | Age: 74
End: 2019-11-27

## 2019-11-27 LAB
A/G RATIO: 1.5 (ref 0.8–2)
ALBUMIN SERPL-MCNC: 4.3 G/DL (ref 3.4–4.8)
ALP BLD-CCNC: 91 U/L (ref 25–100)
ALT SERPL-CCNC: 6 U/L (ref 4–36)
ANION GAP SERPL CALCULATED.3IONS-SCNC: 9 MMOL/L (ref 3–16)
AST SERPL-CCNC: 11 U/L (ref 8–33)
BASOPHILS ABSOLUTE: 0.1 K/UL (ref 0–0.1)
BASOPHILS RELATIVE PERCENT: 0.7 %
BILIRUB SERPL-MCNC: <0.2 MG/DL (ref 0.3–1.2)
BUN BLDV-MCNC: 16 MG/DL (ref 6–20)
CALCIUM SERPL-MCNC: 9.2 MG/DL (ref 8.5–10.5)
CHLORIDE BLD-SCNC: 107 MMOL/L (ref 98–107)
CHOLESTEROL, TOTAL: 168 MG/DL (ref 0–200)
CO2: 27 MMOL/L (ref 20–30)
CREAT SERPL-MCNC: 0.9 MG/DL (ref 0.4–1.2)
EOSINOPHILS ABSOLUTE: 0.2 K/UL (ref 0–0.4)
EOSINOPHILS RELATIVE PERCENT: 1.7 %
GFR AFRICAN AMERICAN: >59
GFR NON-AFRICAN AMERICAN: >60
GLOBULIN: 2.9 G/DL
GLUCOSE BLD-MCNC: 180 MG/DL (ref 74–106)
HBA1C MFR BLD: 6.8 %
HCT VFR BLD CALC: 39.3 % (ref 37–47)
HDLC SERPL-MCNC: 54 MG/DL (ref 40–60)
HEMOGLOBIN: 11.8 G/DL (ref 11.5–16.5)
IMMATURE GRANULOCYTES #: 0 K/UL
IMMATURE GRANULOCYTES %: 0.3 % (ref 0–5)
LDL CHOLESTEROL CALCULATED: 77 MG/DL
LYMPHOCYTES ABSOLUTE: 3 K/UL (ref 1.5–4)
LYMPHOCYTES RELATIVE PERCENT: 24.8 %
MCH RBC QN AUTO: 29.9 PG (ref 27–32)
MCHC RBC AUTO-ENTMCNC: 30 G/DL (ref 31–35)
MCV RBC AUTO: 99.5 FL (ref 80–100)
MONOCYTES ABSOLUTE: 0.6 K/UL (ref 0.2–0.8)
MONOCYTES RELATIVE PERCENT: 5.2 %
NEUTROPHILS ABSOLUTE: 8 K/UL (ref 2–7.5)
NEUTROPHILS RELATIVE PERCENT: 67.3 %
PDW BLD-RTO: 14.1 % (ref 11–16)
PLATELET # BLD: 183 K/UL (ref 150–400)
PMV BLD AUTO: 12.2 FL (ref 6–10)
POTASSIUM SERPL-SCNC: 4.4 MMOL/L (ref 3.4–5.1)
RBC # BLD: 3.95 M/UL (ref 3.8–5.8)
SODIUM BLD-SCNC: 143 MMOL/L (ref 136–145)
TOTAL PROTEIN: 7.2 G/DL (ref 6.4–8.3)
TRIGL SERPL-MCNC: 185 MG/DL (ref 0–249)
TSH SERPL DL<=0.05 MIU/L-ACNC: 16.29 UIU/ML (ref 0.35–5.5)
VLDLC SERPL CALC-MCNC: 37 MG/DL
WBC # BLD: 11.9 K/UL (ref 4–11)

## 2019-11-27 RX ORDER — LEVOTHYROXINE SODIUM 0.12 MG/1
125 TABLET ORAL DAILY
Qty: 30 TABLET | Refills: 5 | Status: SHIPPED | OUTPATIENT
Start: 2019-11-27 | End: 2020-03-11

## 2020-02-20 ENCOUNTER — OFFICE VISIT (OUTPATIENT)
Dept: PRIMARY CARE CLINIC | Age: 75
End: 2020-02-20
Payer: MEDICAID

## 2020-02-20 VITALS
OXYGEN SATURATION: 97 % | DIASTOLIC BLOOD PRESSURE: 70 MMHG | BODY MASS INDEX: 40.64 KG/M2 | WEIGHT: 207 LBS | HEART RATE: 66 BPM | HEIGHT: 60 IN | SYSTOLIC BLOOD PRESSURE: 120 MMHG

## 2020-02-20 PROCEDURE — 99213 OFFICE O/P EST LOW 20 MIN: CPT | Performed by: NURSE PRACTITIONER

## 2020-02-20 RX ORDER — CLONAZEPAM 0.5 MG/1
0.5 TABLET ORAL 2 TIMES DAILY PRN
Qty: 6 TABLET | Refills: 0 | Status: SHIPPED | OUTPATIENT
Start: 2020-02-20 | End: 2021-06-03 | Stop reason: SDUPTHER

## 2020-02-20 RX ORDER — AZITHROMYCIN 250 MG/1
TABLET, FILM COATED ORAL
Qty: 6 TABLET | Refills: 0 | Status: SHIPPED | OUTPATIENT
Start: 2020-02-20 | End: 2020-03-01

## 2020-02-20 RX ORDER — CYANOCOBALAMIN 1000 UG/ML
1000 INJECTION INTRAMUSCULAR; SUBCUTANEOUS ONCE
Status: COMPLETED | OUTPATIENT
Start: 2020-02-20 | End: 2020-02-20

## 2020-02-20 RX ADMIN — CYANOCOBALAMIN 1000 MCG: 1000 INJECTION INTRAMUSCULAR; SUBCUTANEOUS at 15:05

## 2020-02-20 ASSESSMENT — ENCOUNTER SYMPTOMS
ABDOMINAL PAIN: 0
NAUSEA: 0
COUGH: 0
VOMITING: 0
SHORTNESS OF BREATH: 0
EYE PAIN: 0
SORE THROAT: 0

## 2020-02-20 ASSESSMENT — PATIENT HEALTH QUESTIONNAIRE - PHQ9
2. FEELING DOWN, DEPRESSED OR HOPELESS: 0
SUM OF ALL RESPONSES TO PHQ9 QUESTIONS 1 & 2: 0
SUM OF ALL RESPONSES TO PHQ QUESTIONS 1-9: 0
1. LITTLE INTEREST OR PLEASURE IN DOING THINGS: 0
SUM OF ALL RESPONSES TO PHQ QUESTIONS 1-9: 0

## 2020-02-20 NOTE — PROGRESS NOTES
Chief Complaint   Patient presents with    Shoulder Pain     Patient here today for a follow up with left shoulder pain. She has had an MRI on it she is going to have a total repair. Have you seen any other physician or provider since your last visit? no    Have you had any other diagnostic tests since your last visit? no    Have you changed or stopped any medications since your last visit including any over-the-counter medicines, vitamins, or herbal medicines? no     Are you taking all your prescribed medications? Yes  If NO, why? -  N/A    I have recommended that this patient have a mammogram but she declines at this time. I have discussed the risks and benefits of this examination with her. The patient verbalizes understanding. No  Microalbumin test in office to complete. Will get next visit. Diabetic retinal exam completed this year? No, Patient will schedule own eye exam.                        * If yes please have patient sign a records release to obtain record to update Health Maintenance                       * If no, please order referral for patient to be scheduled      REVIEW OF SYSTEMS:  Review of Systems   Constitutional: Negative for chills and fever. HENT: Negative for ear pain and sore throat. Eyes: Negative for pain and visual disturbance. Respiratory: Negative for cough and shortness of breath. Cardiovascular: Negative for chest pain, palpitations and leg swelling. Gastrointestinal: Negative for abdominal pain, nausea and vomiting. Genitourinary: Negative for dysuria and hematuria. Musculoskeletal: Negative for joint swelling. Skin: Negative for rash. Neurological: Negative for dizziness and weakness. Psychiatric/Behavioral: Negative for sleep disturbance.

## 2020-02-20 NOTE — PROGRESS NOTES
Pulmonary:      Effort: Pulmonary effort is normal.      Breath sounds: Normal breath sounds. Abdominal:      General: Bowel sounds are normal. There is no distension. Palpations: Abdomen is soft. Tenderness: There is no abdominal tenderness. Musculoskeletal:      Left shoulder: She exhibits decreased range of motion, deformity and pain. Lymphadenopathy:      Cervical: No cervical adenopathy. Skin:     General: Skin is warm and dry. Neurological:      Mental Status: She is alert and oriented to person, place, and time. No results found for requested labs within last 30 days. Hemoglobin A1C (%)   Date Value   11/26/2019 6.8 (H)     LDL Calculated (mg/dL)   Date Value   11/26/2019 77       Lab Results   Component Value Date    WBC 11.9 (H) 11/26/2019    NEUTROABS 8.0 (H) 11/26/2019    HGB 11.8 11/26/2019    HCT 39.3 11/26/2019    MCV 99.5 11/26/2019     11/26/2019     Lab Results   Component Value Date    TSH 16.29 (H) 11/26/2019       Prior to Visit Medications    Medication Sig Taking? Authorizing Provider   clonazePAM (KLONOPIN) 0.5 MG tablet Take 1 tablet by mouth 2 times daily as needed for Anxiety for up to 3 days.  Yes TING Diaz   citalopram (CELEXA) 20 MG tablet take 1 tablet by mouth once daily for anxiety Yes TING Diaz   levothyroxine (SYNTHROID) 125 MCG tablet Take 1 tablet by mouth daily Yes TING Diaz   simvastatin (ZOCOR) 40 MG tablet TAKE ONE TABLET BY MOUTH NIGHTLY Yes TING Diaz   SITagliptin (JANUVIA) 50 MG tablet Take 1 tablet by mouth daily Yes TING Diaz   esomeprazole (NEXIUM 24HR) 20 MG delayed release capsule TAKE TWO CAPSULES BY MOUTH TWO TIMES DAILY Yes TING Diaz   lisinopril (PRINIVIL;ZESTRIL) 10 MG tablet TAKE ONE TABLET BY MOUTH DAILY Yes TING Diaz   mirabegron (MYRBETRIQ) 50 MG TB24 TAKE 1 TABLET BY MOUTH DAILY Yes TING Diaz   albuterol sulfate HFA (PROAIR HFA) 108 (90 Base) MCG/ACT inhaler Inhale 2 puffs into the lungs every 4 hours as needed for Wheezing Yes TING Thomas   atenolol (TENORMIN) 100 MG tablet TAKE ONE TABLET BY MOUTH DAILY Yes TING Thomas   levothyroxine (SYNTHROID) 112 MCG tablet TAKE ONE TABLET BY MOUTH DAILY Yes TING Thomas   clonazePAM (KLONOPIN) 1 MG tablet Take 0.5-1 mg by mouth. Yes Historical Provider, MD   topiramate (TOPAMAX) 50 MG tablet take 3 tablets by mouth at bedtime Yes Historical Provider, MD   loratadine (CLARITIN) 10 MG tablet TAKE ONE TABLET BY MOUTH DAILY AS NEEDED (ALLERGIES) Yes TING Thomas   diclofenac sodium 1 % GEL Apply 2 g topically 4 times daily as needed for Pain Yes TING Thomas   Cholecalciferol (VITAMIN D3) 46618 UNITS CAPS Take 50,000 Units by mouth once a week Yes TING Thomas   glucose blood VI test strips (FREESTYLE TEST STRIPS) strip 1 each by In Vitro route daily. As needed. DX: 250.00 Yes TING Thomas   fentaNYL (DURAGESIC) 25 MCG/HR Place 1 patch onto the skin every 72 hours. Yes Historical Provider, MD   oxyCODONE HCl (OXY-IR) 10 MG immediate release tablet Take 10 mg by mouth every 6 hours as needed for Pain. Yes Historical Provider, MD   Lancets MISC QD and PRN Yes TING Thomas   glipiZIDE (GLUCOTROL XL) 10 MG extended release tablet TAKE 1 TABLET BY MOUTH TWICE A DAY  TING Thomas   omeprazole (PRILOSEC) 40 MG delayed release capsule Take 1 capsule by mouth every morning (before breakfast) Replaces Nexium  TING Thomas   Lancet Device MISC 1 Device by Does not apply route once for 1 dose. TING Thomas       ASSESSMENT:  1. Left shoulder pain, unspecified chronicity    2. Anxiety    3.  Acute sinusitis, recurrence not specified, unspecified location        PLAN:  Orders Placed This Encounter   Medications    cyanocobalamin injection 1,000 mcg    clonazePAM (KLONOPIN) 0.5 MG tablet     Sig: Take 1 tablet by mouth 2 times daily
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
## 2020-02-26 ENCOUNTER — TELEPHONE (OUTPATIENT)
Dept: PRIMARY CARE CLINIC | Age: 75
End: 2020-02-26

## 2020-02-26 RX ORDER — OMEPRAZOLE 40 MG/1
40 CAPSULE, DELAYED RELEASE ORAL
Qty: 30 CAPSULE | Refills: 5 | Status: SHIPPED | OUTPATIENT
Start: 2020-02-26 | End: 2020-11-11 | Stop reason: CLARIF

## 2020-03-04 RX ORDER — GLIPIZIDE 10 MG/1
TABLET, FILM COATED, EXTENDED RELEASE ORAL
Qty: 60 TABLET | Refills: 5 | Status: SHIPPED | OUTPATIENT
Start: 2020-03-04 | End: 2020-11-04 | Stop reason: SDUPTHER

## 2020-03-11 RX ORDER — LEVOTHYROXINE SODIUM 0.12 MG/1
TABLET ORAL
Qty: 30 TABLET | Refills: 5 | Status: SHIPPED | OUTPATIENT
Start: 2020-03-11 | End: 2020-11-03 | Stop reason: SDUPTHER

## 2020-04-08 ENCOUNTER — NURSE TRIAGE (OUTPATIENT)
Dept: OTHER | Facility: CLINIC | Age: 75
End: 2020-04-08

## 2020-04-09 ENCOUNTER — VIRTUAL VISIT (OUTPATIENT)
Dept: PRIMARY CARE CLINIC | Age: 75
End: 2020-04-09
Payer: MEDICAID

## 2020-04-09 PROCEDURE — 99442 PR PHYS/QHP TELEPHONE EVALUATION 11-20 MIN: CPT | Performed by: NURSE PRACTITIONER

## 2020-04-09 RX ORDER — PHENAZOPYRIDINE HYDROCHLORIDE 100 MG/1
100 TABLET, FILM COATED ORAL 3 TIMES DAILY PRN
Qty: 30 TABLET | Refills: 2 | Status: SHIPPED | OUTPATIENT
Start: 2020-04-09 | End: 2020-11-11

## 2020-04-09 RX ORDER — SULFAMETHOXAZOLE AND TRIMETHOPRIM 800; 160 MG/1; MG/1
1 TABLET ORAL 2 TIMES DAILY
Qty: 20 TABLET | Refills: 0 | Status: SHIPPED | OUTPATIENT
Start: 2020-04-09 | End: 2020-04-19

## 2020-04-09 RX ORDER — LISINOPRIL 20 MG/1
TABLET ORAL
Qty: 30 TABLET | Refills: 5 | Status: SHIPPED | OUTPATIENT
Start: 2020-04-09 | End: 2020-11-04 | Stop reason: SDUPTHER

## 2020-04-23 ENCOUNTER — TRANSCRIBE ORDERS (OUTPATIENT)
Dept: ADMINISTRATIVE | Facility: HOSPITAL | Age: 75
End: 2020-04-23

## 2020-04-23 DIAGNOSIS — G54.0 BRACHIAL PLEXUS DYSFUNCTION: Primary | ICD-10-CM

## 2020-04-29 VITALS — HEART RATE: 64 BPM | DIASTOLIC BLOOD PRESSURE: 80 MMHG | SYSTOLIC BLOOD PRESSURE: 142 MMHG

## 2020-04-29 ASSESSMENT — ENCOUNTER SYMPTOMS
EYE PAIN: 0
COUGH: 0
ABDOMINAL PAIN: 0
SHORTNESS OF BREATH: 0
SORE THROAT: 0
NAUSEA: 0
VOMITING: 0

## 2020-04-30 NOTE — PROGRESS NOTES
Neurological:      Mental Status: She is alert and oriented to person, place, and time. No results found for requested labs within last 30 days. Hemoglobin A1C (%)   Date Value   11/26/2019 6.8 (H)     LDL Calculated (mg/dL)   Date Value   11/26/2019 77       Lab Results   Component Value Date    WBC 11.9 (H) 11/26/2019    NEUTROABS 8.0 (H) 11/26/2019    HGB 11.8 11/26/2019    HCT 39.3 11/26/2019    MCV 99.5 11/26/2019     11/26/2019     Lab Results   Component Value Date    TSH 16.29 (H) 11/26/2019       Prior to Visit Medications    Medication Sig Taking? Authorizing Provider   lisinopril (PRINIVIL;ZESTRIL) 20 MG tablet Dose increased TAKE 1 TABLET BY MOUTH ONCE DAILY Yes TING Cobos   phenazopyridine (PYRIDIUM) 100 MG tablet Take 1 tablet by mouth 3 times daily as needed for Pain (bladder pain) Yes TING Cobos   JANUVIA 50 MG tablet TAKE 1 TABLET BY MOUTH ONCE DAILY  TING Cobos   levothyroxine (SYNTHROID) 125 MCG tablet TAKE 1 TABLET BY MOUTH ONCE DAILY. TING Cobos   mirabegron (MYRBETRIQ) 50 MG TB24 TAKE 1 TABLET BY MOUTH ONCE DAILY  TING Cobos   atenolol (TENORMIN) 100 MG tablet TAKE 1 TABLET BY MOUTH ONCE DAILY  TING Cobos   citalopram (CELEXA) 20 MG tablet TAKE 1 TABLET BY MOUTH DAILY FOR ANXIETY  TING Cobos   glipiZIDE (GLUCOTROL XL) 10 MG extended release tablet TAKE 1 TABLET BY MOUTH TWICE A DAY  TING Cobos   omeprazole (PRILOSEC) 40 MG delayed release capsule Take 1 capsule by mouth every morning (before breakfast) Replaces Nexium  TING Cobos   clonazePAM (KLONOPIN) 0.5 MG tablet Take 1 tablet by mouth 2 times daily as needed for Anxiety for up to 3 days.   TING Cobos   simvastatin (ZOCOR) 40 MG tablet TAKE ONE TABLET BY MOUTH NIGHTLY  TING Cobos   esomeprazole (NEXIUM 24HR) 20 MG delayed release capsule TAKE TWO CAPSULES BY MOUTH TWO TIMES DAILY  TING Cobos

## 2020-05-14 RX ORDER — CLONAZEPAM 0.5 MG/1
TABLET ORAL
Qty: 6 TABLET | Refills: 0 | OUTPATIENT
Start: 2020-05-14

## 2020-05-28 ENCOUNTER — HOSPITAL ENCOUNTER (OUTPATIENT)
Dept: NEUROLOGY | Facility: HOSPITAL | Age: 75
Discharge: HOME | End: 2020-05-28
Admitting: ORTHOPAEDIC SURGERY

## 2020-05-28 DIAGNOSIS — G54.0 BRACHIAL PLEXUS DYSFUNCTION: ICD-10-CM

## 2020-05-28 PROCEDURE — 95886 MUSC TEST DONE W/N TEST COMP: CPT

## 2020-05-28 PROCEDURE — 95908 NRV CNDJ TST 3-4 STUDIES: CPT

## 2020-11-04 RX ORDER — LISINOPRIL 20 MG/1
TABLET ORAL
Qty: 30 TABLET | Refills: 5 | Status: SHIPPED | OUTPATIENT
Start: 2020-11-04 | End: 2021-06-29 | Stop reason: SDUPTHER

## 2020-11-04 RX ORDER — SIMVASTATIN 40 MG
TABLET ORAL
Qty: 30 TABLET | Refills: 5 | Status: SHIPPED | OUTPATIENT
Start: 2020-11-04 | End: 2021-06-29

## 2020-11-04 RX ORDER — LEVOTHYROXINE SODIUM 0.12 MG/1
TABLET ORAL
Qty: 30 TABLET | Refills: 5 | Status: SHIPPED | OUTPATIENT
Start: 2020-11-04 | End: 2020-11-13 | Stop reason: DRUGHIGH

## 2020-11-04 RX ORDER — GLIPIZIDE 10 MG/1
TABLET, FILM COATED, EXTENDED RELEASE ORAL
Qty: 60 TABLET | Refills: 5 | Status: SHIPPED | OUTPATIENT
Start: 2020-11-04 | End: 2021-06-29 | Stop reason: SDUPTHER

## 2020-11-04 RX ORDER — CITALOPRAM 20 MG/1
TABLET ORAL
Qty: 30 TABLET | Refills: 5 | Status: SHIPPED | OUTPATIENT
Start: 2020-11-04 | End: 2021-06-29 | Stop reason: SDUPTHER

## 2020-11-04 RX ORDER — ATENOLOL 100 MG/1
TABLET ORAL
Qty: 30 TABLET | Refills: 5 | Status: SHIPPED | OUTPATIENT
Start: 2020-11-04 | End: 2021-06-29 | Stop reason: SDUPTHER

## 2020-11-10 NOTE — PROGRESS NOTES
Chief Complaint   Patient presents with    Diabetes    Hypertension    Fatigue       Have you seen any other physician or provider since your last visit no    Have you had any other diagnostic tests since your last visit? no    Have you changed or stopped any medications since your last visit? no       Health Maintenance Due This Visit   Colonoscopy No   Mammogram Yes- refuses   Annual Wellness Visit No   Microalbumin Yes-ordered   HgbA1C No   Diabetic Eye Exam Yes- pt will sched own    [place] Due This Visit   HORTENSIA No   UDS No   Contract No    Review of Systems   Constitutional: Negative for chills, fatigue and fever. HENT: Negative for congestion, ear pain, rhinorrhea and sore throat. Eyes: Negative for discharge, redness and itching. Respiratory: Negative for cough and shortness of breath. Cardiovascular: Negative for chest pain, palpitations and leg swelling. Gastrointestinal: Negative for abdominal pain, constipation, diarrhea, nausea and vomiting. Endocrine: Negative for cold intolerance and heat intolerance. Genitourinary: Negative for dysuria. Musculoskeletal: Negative for arthralgias and joint swelling. Skin: Negative for rash and wound. Neurological: Negative for weakness and headaches. Hematological: Negative for adenopathy. Psychiatric/Behavioral: Negative for dysphoric mood and sleep disturbance. The patient is not nervous/anxious. Vaccine Information Sheet, \"Influenza - Inactivated\"  given to Yuriy Wellington, or parent/legal guardian of  Yuriy Wellington and verbalized understanding. Patient responses:    Have you ever had a reaction to a flu vaccine? No  Do you have any current illness? No  Have you ever had Guillian Hegins Syndrome? No  Do you have a serious allergy to any of the follow: Neomycin, Polymyxin, Thimerosal, eggs or egg products? No    Flu vaccine given per order. Please see immunization tab. Risks and benefits explained.   Current VIS given. Pt stayed in exam room x20 mins after imm.   Immunizations Administered     Name Date Dose Route    Influenza, Quadv, IM, (6 mo and older Fluzone, Flulaval, Fluarix and 3 yrs and older Afluria) 11/11/2020 0.5 mL Intramuscular    Site: Deltoid- Right    Lot: [id number]    NDC: 66831-331-03          Administrations This Visit     cyanocobalamin injection 1,000 mcg     Admin Date  11/11/2020 Action  Given Dose  1000 mcg Route  Intramuscular Administered By  Damaris Moreno MA

## 2020-11-11 ENCOUNTER — HOSPITAL ENCOUNTER (OUTPATIENT)
Facility: HOSPITAL | Age: 75
Discharge: HOME | End: 2020-11-11
Payer: MEDICARE

## 2020-11-11 ENCOUNTER — OFFICE VISIT (OUTPATIENT)
Dept: PRIMARY CARE CLINIC | Age: 75
End: 2020-11-11
Payer: MEDICARE

## 2020-11-11 VITALS
DIASTOLIC BLOOD PRESSURE: 80 MMHG | BODY MASS INDEX: 41.82 KG/M2 | OXYGEN SATURATION: 96 % | HEIGHT: 60 IN | TEMPERATURE: 96.4 F | SYSTOLIC BLOOD PRESSURE: 128 MMHG | WEIGHT: 213 LBS | RESPIRATION RATE: 16 BRPM | HEART RATE: 66 BPM

## 2020-11-11 PROCEDURE — 90688 IIV4 VACCINE SPLT 0.5 ML IM: CPT | Performed by: NURSE PRACTITIONER

## 2020-11-11 PROCEDURE — 84443 ASSAY THYROID STIM HORMONE: CPT

## 2020-11-11 PROCEDURE — 99214 OFFICE O/P EST MOD 30 MIN: CPT | Performed by: NURSE PRACTITIONER

## 2020-11-11 PROCEDURE — 3052F HG A1C>EQUAL 8.0%<EQUAL 9.0%: CPT | Performed by: NURSE PRACTITIONER

## 2020-11-11 PROCEDURE — 80053 COMPREHEN METABOLIC PANEL: CPT

## 2020-11-11 PROCEDURE — G0008 ADMIN INFLUENZA VIRUS VAC: HCPCS | Performed by: NURSE PRACTITIONER

## 2020-11-11 PROCEDURE — 80061 LIPID PANEL: CPT

## 2020-11-11 PROCEDURE — 96372 THER/PROPH/DIAG INJ SC/IM: CPT | Performed by: NURSE PRACTITIONER

## 2020-11-11 PROCEDURE — 83036 HEMOGLOBIN GLYCOSYLATED A1C: CPT

## 2020-11-11 RX ORDER — METHYLPREDNISOLONE 4 MG/1
TABLET ORAL
Qty: 1 KIT | Refills: 0 | Status: SHIPPED | OUTPATIENT
Start: 2020-11-11

## 2020-11-11 RX ORDER — CYANOCOBALAMIN 1000 UG/ML
1000 INJECTION INTRAMUSCULAR; SUBCUTANEOUS ONCE
Status: COMPLETED | OUTPATIENT
Start: 2020-11-11 | End: 2020-11-11

## 2020-11-11 RX ORDER — CYANOCOBALAMIN 1000 UG/ML
1000 INJECTION INTRAMUSCULAR; SUBCUTANEOUS
Qty: 2 ML | Refills: 5 | Status: SHIPPED | OUTPATIENT
Start: 2020-11-11

## 2020-11-11 RX ADMIN — CYANOCOBALAMIN 1000 MCG: 1000 INJECTION INTRAMUSCULAR; SUBCUTANEOUS at 15:19

## 2020-11-11 ASSESSMENT — ENCOUNTER SYMPTOMS
EYE ITCHING: 0
NAUSEA: 0
EYE REDNESS: 0
ABDOMINAL PAIN: 0
VOMITING: 0
DIARRHEA: 0
EYE DISCHARGE: 0
SORE THROAT: 0
SHORTNESS OF BREATH: 0
COUGH: 0
CONSTIPATION: 0
RHINORRHEA: 0

## 2020-11-11 NOTE — PROGRESS NOTES
SUBJECTIVE:    Patient ID: Ulisses Ying is a 70 y.o. female. Medical History Review  Past Medical, Family, and Social History reviewed and does contribute to the patient presenting condition    Health Maintenance Due   Topic Date Due    Hepatitis C screen  1945    DTaP/Tdap/Td vaccine (1 - Tdap) 09/19/1966    Shingles Vaccine (1 of 2) 09/19/1997    Diabetic foot exam  11/19/2015    Diabetic retinal exam  11/19/2015    Breast cancer screen  03/17/2018       HPI:   Chief Complaint   Patient presents with    Diabetes    Hypertension    Fatigue   She has not had her shoulder replacement yet. They are still waiting for her nerves to heal. She would like some steroids for pain in her feet. Patient's medications, allergies, past medical, surgical, social and family histories were reviewed and updated as appropriate. Review of Systems Reviewed and acurate. See MA note. OBJECTIVE:  /80 (Site: Right Upper Arm, Position: Sitting)   Pulse 66   Temp 96.4 °F (35.8 °C) (Temporal)   Resp 16   Ht 5' (1.524 m)   Wt 213 lb (96.6 kg)   SpO2 96% Comment: room air  BMI 41.60 kg/m²    Physical Exam  Vitals signs reviewed. Constitutional:       General: She is not in acute distress. Appearance: She is well-developed. HENT:      Head: Normocephalic. Right Ear: Tympanic membrane normal.      Left Ear: Tympanic membrane normal.      Mouth/Throat:      Pharynx: No oropharyngeal exudate. Eyes:      General: Lids are normal.   Neck:      Musculoskeletal: Neck supple. Cardiovascular:      Rate and Rhythm: Normal rate and regular rhythm. Heart sounds: Normal heart sounds. Pulmonary:      Effort: Pulmonary effort is normal.      Breath sounds: Normal breath sounds. Abdominal:      General: Bowel sounds are normal. There is no distension. Palpations: Abdomen is soft. Tenderness: There is no abdominal tenderness. Lymphadenopathy:      Cervical: No cervical adenopathy. Skin:     General: Skin is warm and dry. Neurological:      Mental Status: She is alert and oriented to person, place, and time. Results in Past 30 Days  Result Component Current Result Ref Range Previous Result Ref Range   Alb 4.0 (11/11/2020) 3.4 - 4.8 g/dL Not in Time Range    Albumin/Globulin Ratio 1.5 (11/11/2020) 0.8 - 2.0 Not in Time Range    Alkaline Phosphatase 108 (H) (11/11/2020) 25 - 100 U/L Not in Time Range    ALT 8 (11/11/2020) 4 - 36 U/L Not in Time Range    AST 10 (11/11/2020) 8 - 33 U/L Not in Time Range    BUN 24 (H) (11/11/2020) 6 - 20 mg/dL Not in Time Range    Calcium 8.6 (11/11/2020) 8.5 - 10.5 mg/dL Not in Time Range    Chloride 107 (11/11/2020) 98 - 107 mmol/L Not in Time Range    CO2 25 (11/11/2020) 20 - 30 mmol/L Not in Time Range    CREATININE 1.2 (11/11/2020) 0.4 - 1.2 mg/dL Not in Time Range    GFR  53 (L) (11/11/2020) >59 Not in Time Range    GFR Non- 44 (L) (11/11/2020) >59 Not in Time Range    Globulin 2.7 (11/11/2020) g/dL Not in Time Range    Glucose 206 (H) (11/11/2020) 74 - 106 mg/dL Not in Time Range    Potassium 4.4 (11/11/2020) 3.4 - 5.1 mmol/L Not in Time Range    Sodium 141 (11/11/2020) 136 - 145 mmol/L Not in Time Range    Total Bilirubin <0.2 (L) (11/11/2020) 0.3 - 1.2 mg/dL Not in Time Range    Total Protein 6.7 (11/11/2020) 6.4 - 8.3 g/dL Not in Time Range      Hemoglobin A1C (%)   Date Value   11/11/2020 8.7 (H)     LDL Calculated (mg/dL)   Date Value   11/11/2020 108       Lab Results   Component Value Date    WBC 11.9 (H) 11/26/2019    NEUTROABS 8.0 (H) 11/26/2019    HGB 11.8 11/26/2019    HCT 39.3 11/26/2019    MCV 99.5 11/26/2019     11/26/2019     Lab Results   Component Value Date    TSH 10.64 (H) 11/11/2020       Prior to Visit Medications    Medication Sig Taking?  Authorizing Provider   DICLOFENAC PO Take by mouth Yes Historical Provider, MD   cyanocobalamin 1000 MCG/ML injection Inject 1 mL into the muscle every 14 days Yes TING Kowalski   Syringe, Disposable, (GLASPAK DISP SYRINGE 2.5CC) 2.5 ML MISC For IM B12 injection q2w Yes TING Kowalski   methylPREDNISolone (MEDROL, CARMEN,) 4 MG tablet Take with food. Yes TING Kowalski   lisinopril (PRINIVIL;ZESTRIL) 20 MG tablet Dose increased TAKE 1 TABLET BY MOUTH ONCE DAILY Yes TING Kowalski   atenolol (TENORMIN) 100 MG tablet TAKE 1 TABLET BY MOUTH ONCE DAILY Yes TING Kowalski   mirabegron (MYRBETRIQ) 50 MG TB24 TAKE 1 TABLET BY MOUTH ONCE DAILY Yes TING Kowalski   glipiZIDE (GLUCOTROL XL) 10 MG extended release tablet TAKE 1 TABLET BY MOUTH TWICE A DAY Yes TING Kowalski   esomeprazole (NEXIUM 24HR) 20 MG delayed release capsule TAKE TWO CAPSULES BY MOUTH TWO TIMES DAILY Yes TING Kowalski   simvastatin (ZOCOR) 40 MG tablet TAKE ONE TABLET BY MOUTH NIGHTLY Yes TING Kowalski   clonazePAM (KLONOPIN) 1 MG tablet Take 0.5-1 mg by mouth. Yes Historical Provider, MD   topiramate (TOPAMAX) 50 MG tablet take 3 tablets by mouth at bedtime Yes Historical Provider, MD   loratadine (CLARITIN) 10 MG tablet TAKE ONE TABLET BY MOUTH DAILY AS NEEDED (ALLERGIES) Yes TING Kowalski   glucose blood VI test strips (FREESTYLE TEST STRIPS) strip 1 each by In Vitro route daily. As needed. DX: 250.00 Yes TING Kowalski   fentaNYL (DURAGESIC) 25 MCG/HR Place 1 patch onto the skin every 72 hours. Yes Historical Provider, MD   oxyCODONE HCl (OXY-IR) 10 MG immediate release tablet Take 10 mg by mouth every 6 hours as needed for Pain.  Yes Historical Provider, MD   Lancets MISC QD and PRN Yes TING Kowalski   SITagliptin (JANUVIA) 100 MG tablet Take 1 tablet by mouth daily  TING Kowalski   levothyroxine (SYNTHROID) 150 MCG tablet Take 1 tablet by mouth daily  TING Kowalski   citalopram (CELEXA) 20 MG tablet TAKE 1 TABLET BY MOUTH DAILY FOR ANXIETY  Patient not taking: Reported on 11/11/2020  TING Kowalski clonazePAM (KLONOPIN) 0.5 MG tablet Take 1 tablet by mouth 2 times daily as needed for Anxiety for up to 3 days. Karin Romero, TING       ASSESSMENT:  1. Type 2 diabetes mellitus without complication, unspecified whether long term insulin use ([facility] [address]    2. Hypothyroidism, unspecified type    3. B12 deficiency    4. Need for influenza vaccination    5. Essential hypertension    6. Hyperlipidemia, unspecified hyperlipidemia type    7. Bilateral foot pain        PLAN:  Orders Placed This Encounter   Medications    cyanocobalamin 1000 MCG/ML injection     Sig: Inject 1 mL into the muscle every 14 days     Dispense:  2 mL     Refill:  5    Syringe, Disposable, (GLASPAK DISP SYRINGE 2.5CC) 2.5 ML MISC     Sig: For IM B12 injection q2w     Dispense:  2 each     Refill:  5    methylPREDNISolone (MEDROL, CARMEN,) 4 MG tablet     Sig: Take with food. Dispense:  1 kit     Refill:  0    cyanocobalamin injection 1,000 mcg     Orders Placed This Encounter   Procedures    INFLUENZA, QUADV, 3 YRS AND OLDER, IM, MDV, 0.5ML (AFLURIA QUADV)    Hemoglobin A1C    TSH without Reflex    COMPREHENSIVE METABOLIC PANEL    LIPID PANEL     Patient Instructions   · Keep a list of your medicines with you. List all of the prescription medicines, nonprescription medicines, supplements, natural remedies, and vitamins that you take. Tell your healthcare providers who treat you about all of the products you are taking. Your provider can provide you with a form to keep track of them. Just ask. · Follow the directions that come with your medicine, including information about food or alcohol. Make sure you know how and when to take your medicine. Do not take more or less than you are supposed to take. · Keep all medicines out of the reach of children. · Store medicines according to the directions on the label. · Monitor yourself.  Learn to know how your body reacts to your new medicine and keep track of how it makes you feel before attempting (If your provider has allowed you to do so) to drive or go to work. · Seek emergency medical attention if you think you have used too much of this medicine. An overdose of any prescription medicine can be fatal. Overdose symptoms may include extreme drowsiness, muscle weakness, confusion, cold and clammy skin, pinpoint pupils, shallow breathing, slow heart rate, fainting, or coma. · Don't share prescription medicines with others, even when they seem to have the same symptoms. What may be good for you may be harmful to others. · If you are no longer taking a prescribed medication and you have pills left please take your pills out of their original containers. Mix crushed pills with an undesirable substance, such as cat litter or used coffee grounds. Put the mixture into a disposable container with a lid, such as an empty margarine tub, or into a sealable bag. Cover up or remove any of your personal information on the empty containers by covering it with black permanent marker or duct tape. Place the sealed container with the mixture, and the empty drug containers, in the trash. · If you use a medication that is in the form of a patch, dispose of used patches by folding them in half so that the sticky sides meet, and then flushing them down a toilet. They should not be placed in the household trash where children or pets can find them. · If you have any questions, ask your provider or pharmacist for more information. · Be sure to keep all appointments for provider visits or tests. We are committed to providing you with the best care possible. In order to help us achieve these goals please remember to bring all medications, herbal products, and over the counter supplements with you to each visit. If your provider has ordered testing for you, please be sure to follow up with our office if you have not received results within 7 days after the testing took place.      *If you receive a survey

## 2020-11-12 ENCOUNTER — NURSE ONLY (OUTPATIENT)
Dept: PRIMARY CARE CLINIC | Age: 75
End: 2020-11-12
Payer: MEDICARE

## 2020-11-12 ENCOUNTER — TELEPHONE (OUTPATIENT)
Dept: PRIMARY CARE CLINIC | Age: 75
End: 2020-11-12

## 2020-11-12 LAB
A/G RATIO: 1.5 (ref 0.8–2)
ALBUMIN SERPL-MCNC: 4 G/DL (ref 3.4–4.8)
ALP BLD-CCNC: 108 U/L (ref 25–100)
ALT SERPL-CCNC: 8 U/L (ref 4–36)
ANION GAP SERPL CALCULATED.3IONS-SCNC: 9 MMOL/L (ref 3–16)
AST SERPL-CCNC: 10 U/L (ref 8–33)
BILIRUB SERPL-MCNC: <0.2 MG/DL (ref 0.3–1.2)
BILIRUBIN, POC: ABNORMAL
BLOOD URINE, POC: ABNORMAL
BUN BLDV-MCNC: 24 MG/DL (ref 6–20)
CALCIUM SERPL-MCNC: 8.6 MG/DL (ref 8.5–10.5)
CHLORIDE BLD-SCNC: 107 MMOL/L (ref 98–107)
CHOLESTEROL, TOTAL: 178 MG/DL (ref 0–200)
CLARITY, POC: ABNORMAL
CO2: 25 MMOL/L (ref 20–30)
COLOR, POC: ABNORMAL
CREAT SERPL-MCNC: 1.2 MG/DL (ref 0.4–1.2)
GFR AFRICAN AMERICAN: 53
GFR NON-AFRICAN AMERICAN: 44
GLOBULIN: 2.7 G/DL
GLUCOSE BLD-MCNC: 206 MG/DL (ref 74–106)
GLUCOSE URINE, POC: ABNORMAL
HBA1C MFR BLD: 8.7 %
HDLC SERPL-MCNC: 44 MG/DL (ref 40–60)
KETONES, POC: ABNORMAL
LDL CHOLESTEROL CALCULATED: 108 MG/DL
LEUKOCYTE EST, POC: ABNORMAL
NITRITE, POC: ABNORMAL
PH, POC: 5.5
POTASSIUM SERPL-SCNC: 4.4 MMOL/L (ref 3.4–5.1)
PROTEIN, POC: ABNORMAL
SODIUM BLD-SCNC: 141 MMOL/L (ref 136–145)
SPECIFIC GRAVITY, POC: 1.03
TOTAL PROTEIN: 6.7 G/DL (ref 6.4–8.3)
TRIGL SERPL-MCNC: 129 MG/DL (ref 0–249)
TSH SERPL DL<=0.05 MIU/L-ACNC: 10.64 UIU/ML (ref 0.35–5.5)
UROBILINOGEN, POC: 0.2
VLDLC SERPL CALC-MCNC: 26 MG/DL

## 2020-11-12 PROCEDURE — 82044 UR ALBUMIN SEMIQUANTITATIVE: CPT | Performed by: NURSE PRACTITIONER

## 2020-11-12 PROCEDURE — 81002 URINALYSIS NONAUTO W/O SCOPE: CPT | Performed by: NURSE PRACTITIONER

## 2020-11-13 LAB
CREATININE URINE POCT: 369.8
MICROALBUMIN/CREAT 24H UR: 9.6 MG/G{CREAT}
MICROALBUMIN/CREAT UR-RTO: 2.6

## 2020-11-13 RX ORDER — LEVOTHYROXINE SODIUM 0.15 MG/1
150 TABLET ORAL DAILY
Qty: 90 TABLET | Refills: 1 | Status: SHIPPED | OUTPATIENT
Start: 2020-11-13 | End: 2021-03-31 | Stop reason: SDUPTHER

## 2020-12-09 ENCOUNTER — TELEPHONE (OUTPATIENT)
Dept: PRIMARY CARE CLINIC | Age: 75
End: 2020-12-09

## 2020-12-09 NOTE — TELEPHONE ENCOUNTER
Patient brought by some denials for coverage on her insurance for medication. Called pharmacy they stated she was covered all but a $1-$3 copay for her medication. Patient will have to pay this to get her medication if she calls back please inform.

## 2021-01-19 ENCOUNTER — OFFICE VISIT (OUTPATIENT)
Dept: PRIMARY CARE CLINIC | Age: 76
End: 2021-01-19
Payer: MEDICARE

## 2021-01-19 VITALS
SYSTOLIC BLOOD PRESSURE: 128 MMHG | DIASTOLIC BLOOD PRESSURE: 88 MMHG | WEIGHT: 203.4 LBS | OXYGEN SATURATION: 99 % | HEART RATE: 65 BPM | TEMPERATURE: 97.2 F | RESPIRATION RATE: 16 BRPM | BODY MASS INDEX: 39.93 KG/M2 | HEIGHT: 60 IN

## 2021-01-19 DIAGNOSIS — M79.672 BILATERAL FOOT PAIN: Primary | ICD-10-CM

## 2021-01-19 DIAGNOSIS — M79.671 BILATERAL FOOT PAIN: Primary | ICD-10-CM

## 2021-01-19 DIAGNOSIS — E11.9 TYPE 2 DIABETES MELLITUS WITHOUT COMPLICATION, UNSPECIFIED WHETHER LONG TERM INSULIN USE (HCC): ICD-10-CM

## 2021-01-19 DIAGNOSIS — E53.8 B12 DEFICIENCY: ICD-10-CM

## 2021-01-19 DIAGNOSIS — E03.9 HYPOTHYROIDISM, UNSPECIFIED TYPE: ICD-10-CM

## 2021-01-19 PROCEDURE — G8482 FLU IMMUNIZE ORDER/ADMIN: HCPCS | Performed by: NURSE PRACTITIONER

## 2021-01-19 PROCEDURE — 1090F PRES/ABSN URINE INCON ASSESS: CPT | Performed by: NURSE PRACTITIONER

## 2021-01-19 PROCEDURE — 4040F PNEUMOC VAC/ADMIN/RCVD: CPT | Performed by: NURSE PRACTITIONER

## 2021-01-19 PROCEDURE — G8427 DOCREV CUR MEDS BY ELIG CLIN: HCPCS | Performed by: NURSE PRACTITIONER

## 2021-01-19 PROCEDURE — 3046F HEMOGLOBIN A1C LEVEL >9.0%: CPT | Performed by: NURSE PRACTITIONER

## 2021-01-19 PROCEDURE — 1036F TOBACCO NON-USER: CPT | Performed by: NURSE PRACTITIONER

## 2021-01-19 PROCEDURE — 1123F ACP DISCUSS/DSCN MKR DOCD: CPT | Performed by: NURSE PRACTITIONER

## 2021-01-19 PROCEDURE — 99213 OFFICE O/P EST LOW 20 MIN: CPT | Performed by: NURSE PRACTITIONER

## 2021-01-19 PROCEDURE — 2022F DILAT RTA XM EVC RTNOPTHY: CPT | Performed by: NURSE PRACTITIONER

## 2021-01-19 PROCEDURE — G8400 PT W/DXA NO RESULTS DOC: HCPCS | Performed by: NURSE PRACTITIONER

## 2021-01-19 PROCEDURE — G8417 CALC BMI ABV UP PARAM F/U: HCPCS | Performed by: NURSE PRACTITIONER

## 2021-01-19 PROCEDURE — 3017F COLORECTAL CA SCREEN DOC REV: CPT | Performed by: NURSE PRACTITIONER

## 2021-01-19 PROCEDURE — 96372 THER/PROPH/DIAG INJ SC/IM: CPT | Performed by: NURSE PRACTITIONER

## 2021-01-19 RX ORDER — CYANOCOBALAMIN 1000 UG/ML
1000 INJECTION INTRAMUSCULAR; SUBCUTANEOUS ONCE
Status: COMPLETED | OUTPATIENT
Start: 2021-01-19 | End: 2021-01-19

## 2021-01-19 RX ORDER — DICLOFENAC SODIUM 75 MG/1
75 TABLET, DELAYED RELEASE ORAL 2 TIMES DAILY
Qty: 60 TABLET | Refills: 5 | Status: SHIPPED | OUTPATIENT
Start: 2021-01-19 | End: 2021-01-19 | Stop reason: SDUPTHER

## 2021-01-19 RX ORDER — DICLOFENAC SODIUM 75 MG/1
75 TABLET, DELAYED RELEASE ORAL 2 TIMES DAILY
Qty: 60 TABLET | Refills: 5 | Status: SHIPPED | OUTPATIENT
Start: 2021-01-19 | End: 2021-06-29 | Stop reason: SDUPTHER

## 2021-01-19 RX ADMIN — CYANOCOBALAMIN 1000 MCG: 1000 INJECTION INTRAMUSCULAR; SUBCUTANEOUS at 12:49

## 2021-01-19 ASSESSMENT — ENCOUNTER SYMPTOMS
NAUSEA: 0
SHORTNESS OF BREATH: 0
COUGH: 0
ABDOMINAL PAIN: 0
EYE PAIN: 0
VOMITING: 0
BACK PAIN: 1
SORE THROAT: 0

## 2021-01-19 ASSESSMENT — PATIENT HEALTH QUESTIONNAIRE - PHQ9
1. LITTLE INTEREST OR PLEASURE IN DOING THINGS: 0
SUM OF ALL RESPONSES TO PHQ9 QUESTIONS 1 & 2: 0

## 2021-01-19 NOTE — PROGRESS NOTES
Administrations This Visit     cyanocobalamin injection 1,000 mcg     Admin Date  01/19/2021  12:49 Action  Given Dose  1,000 mcg Route  Intramuscular Site  Deltoid Right Administered By  Francesco Layton MA    Ordering Provider: TING Foss    NDC: 96845-714-96    Lot#:     : [address] [place] [address]    Patient Supplied?: No

## 2021-01-19 NOTE — PROGRESS NOTES
Chief Complaint   Patient presents with    Arthritis     shoulders and feet     Pt here today c/o arthritis pain in shoulders and feet    Have you seen any other physician or provider since your last visit yes - Pain clinic    Have you had any other diagnostic tests since your last visit? no    Have you changed or stopped any medications since your last visit? no     Leilani: Pt refuses at this time  SUBJECTIVE:    Patient ID: Rosita Wiseman is a 70 y.o. female. Medical History Review  Past Medical, Family, and Social History reviewed. Health Maintenance Due   Topic Date Due    Hepatitis C screen  1945    COVID-19 Vaccine (1 of 2) 09/19/1963    DTaP/Tdap/Td vaccine (1 - Tdap) 09/19/1966    Shingles Vaccine (1 of 2) 09/19/1997    Diabetic foot exam  11/19/2015    Diabetic retinal exam  11/19/2015    Breast cancer screen  03/17/2018    Annual Wellness Visit (AWV)  12/09/2020       HPI:   Chief Complaint   Patient presents with    Arthritis     shoulders and feet   She states she may be able to have her shoulder surgery in 3 months. She does not have much energy and feels like she can't get anything done. She has a lot of pain in her hands and feet. She had foot injections which have helped. She recently also had back injections. Patient's medications, allergies, past medical, surgical, social and family histories were reviewed and updated as appropriate. Review of Systems   Constitutional: Positive for fatigue. Negative for chills and fever. HENT: Negative for ear pain and sore throat. Eyes: Negative for pain and visual disturbance. Respiratory: Negative for cough and shortness of breath. Cardiovascular: Negative for chest pain, palpitations and leg swelling. Gastrointestinal: Negative for abdominal pain, nausea and vomiting. Genitourinary: Negative for dysuria and hematuria. Musculoskeletal: Positive for back pain. Negative for joint swelling. Bilateral foot and hand pain   Skin: Negative for rash. Neurological: Negative for dizziness and weakness. Psychiatric/Behavioral: Negative for sleep disturbance. Reviewed and acurate. See MA note. OBJECTIVE:  /88 (Site: Left Upper Arm, Position: Sitting)   Pulse 65   Temp 97.2 °F (36.2 °C) (Temporal)   Resp 16   Ht 5' (1.524 m)   Wt 203 lb 6.4 oz (92.3 kg)   SpO2 99% Comment: ROOM AIR  BMI 39.72 kg/m²    Physical Exam  Vitals signs reviewed. Constitutional:       General: She is not in acute distress. Appearance: She is well-developed. HENT:      Head: Normocephalic. Mouth/Throat:      Pharynx: No oropharyngeal exudate. Eyes:      General: Lids are normal.   Neck:      Musculoskeletal: Neck supple. Cardiovascular:      Rate and Rhythm: Normal rate and regular rhythm. Heart sounds: Normal heart sounds. Pulmonary:      Effort: Pulmonary effort is normal.      Breath sounds: Normal breath sounds. Abdominal:      General: Bowel sounds are normal. There is no distension. Palpations: Abdomen is soft. Tenderness: There is no abdominal tenderness. Lymphadenopathy:      Cervical: No cervical adenopathy. Skin:     General: Skin is warm and dry. Neurological:      Mental Status: She is alert and oriented to person, place, and time. No results found for requested labs within last 30 days. Hemoglobin A1C (%)   Date Value   11/11/2020 8.7 (H)     LDL Calculated (mg/dL)   Date Value   11/11/2020 108       Lab Results   Component Value Date    WBC 11.9 (H) 11/26/2019    NEUTROABS 8.0 (H) 11/26/2019    HGB 11.8 11/26/2019    HCT 39.3 11/26/2019    MCV 99.5 11/26/2019     11/26/2019     Lab Results   Component Value Date    TSH 10.64 (H) 11/11/2020       Prior to Visit Medications    Medication Sig Taking?  Authorizing Provider vitamin D (CHOLECALCIFEROL) 25 MCG (1000 UT) TABS tablet Take 1,000 Units by mouth nightly Yes Historical Provider, MD   diclofenac (VOLTAREN) 75 MG EC tablet Take 1 tablet by mouth 2 times daily Yes TING Carlisle   SITagliptin (JANUVIA) 100 MG tablet Take 1 tablet by mouth daily Yes TING Carlisle   levothyroxine (SYNTHROID) 150 MCG tablet Take 1 tablet by mouth daily Yes TING Carlisle   DICLOFENAC PO Take by mouth Yes Historical Provider, MD   cyanocobalamin 1000 MCG/ML injection Inject 1 mL into the muscle every 14 days Yes TING Carlisle   Syringe, Disposable, (easyfolioPAK DISP SYRINGE 2.5CC) 2.5 ML MISC For IM B12 injection q2w Yes TING Carlisle   lisinopril (PRINIVIL;ZESTRIL) 20 MG tablet Dose increased TAKE 1 TABLET BY MOUTH ONCE DAILY Yes TING Carlisle   atenolol (TENORMIN) 100 MG tablet TAKE 1 TABLET BY MOUTH ONCE DAILY Yes TING [place]isle   mirabegron (MYRBETRIQ) 50 MG TB24 TAKE 1 TABLET BY MOUTH ONCE DAILY Yes TING Carlisle   citalopram (CELEXA) 20 MG tablet TAKE 1 TABLET BY MOUTH DAILY FOR ANXIETY Yes TING Carlisle   glipiZIDE (GLUCOTROL XL) 10 MG extended release tablet TAKE 1 TABLET BY MOUTH TWICE A DAY Yes TING Carlisle   esomeprazole (NEXIUM 24HR) 20 MG delayed release capsule TAKE TWO CAPSULES BY MOUTH TWO TIMES DAILY Yes TING Carlisle   simvastatin (ZOCOR) 40 MG tablet TAKE ONE TABLET BY MOUTH NIGHTLY Yes TING [place]isle   clonazePAM (KLONOPIN) 1 MG tablet Take 0.5-1 mg by mouth. Yes Historical Provider, MD   topiramate (TOPAMAX) 50 MG tablet take 3 tablets by mouth at bedtime Yes Historical Provider, MD   loratadine (CLARITIN) 10 MG tablet TAKE ONE TABLET BY MOUTH DAILY AS NEEDED (ALLERGIES) Yes TING Carlisle   fentaNYL (DURAGESIC) 25 MCG/HR Place 1 patch onto the skin every 72 hours.  Yes Historical Provider, MD oxyCODONE HCl (OXY-IR) 10 MG immediate release tablet Take 10 mg by mouth every 6 hours as needed for Pain. Yes Historical Provider, MD   Lancets MISC QD and PRN Yes TING Shaffer   methylPREDNISolone (MEDROL, CARMEN,) 4 MG tablet Take with food. Patient not taking: Reported on 1/19/2021  TING Shaffer   clonazePAM (KLONOPIN) 0.5 MG tablet Take 1 tablet by mouth 2 times daily as needed for Anxiety for up to 3 days. TING Shaffer       ASSESSMENT:  1. Bilateral foot pain    2. B12 deficiency    3. Type 2 diabetes mellitus without complication, unspecified whether long term insulin use ([facility]    4. Hypothyroidism, unspecified type        PLAN:  Orders Placed This Encounter   Medications    cyanocobalamin injection 1,000 mcg    DISCONTD: diclofenac (VOLTAREN) 75 MG EC tablet     Sig: Take 1 tablet by mouth 2 times daily     Dispense:  60 tablet     Refill:  5    diclofenac (VOLTAREN) 75 MG EC tablet     Sig: Take 1 tablet by mouth 2 times daily     Dispense:  60 tablet     Refill:  5       Return in about 4 weeks (around 2/16/2021).

## 2021-03-31 RX ORDER — LEVOTHYROXINE SODIUM 0.15 MG/1
150 TABLET ORAL DAILY
Qty: 90 TABLET | Refills: 1 | Status: SHIPPED | OUTPATIENT
Start: 2021-03-31 | End: 2021-06-29 | Stop reason: SDUPTHER

## 2021-04-15 ENCOUNTER — TRANSCRIBE ORDERS (OUTPATIENT)
Dept: ADMINISTRATIVE | Facility: HOSPITAL | Age: 76
End: 2021-04-15

## 2021-04-15 DIAGNOSIS — G54.0 BRACHIAL PLEXUS LESIONS: Primary | ICD-10-CM

## 2021-05-05 ENCOUNTER — HOSPITAL ENCOUNTER (OUTPATIENT)
Dept: NEUROLOGY | Facility: HOSPITAL | Age: 76
Discharge: HOME | End: 2021-05-05
Admitting: ORTHOPAEDIC SURGERY

## 2021-05-05 DIAGNOSIS — G54.0 BRACHIAL PLEXUS LESIONS: ICD-10-CM

## 2021-05-05 PROCEDURE — 95909 NRV CNDJ TST 5-6 STUDIES: CPT

## 2021-05-05 PROCEDURE — 95886 MUSC TEST DONE W/N TEST COMP: CPT | Performed by: PSYCHIATRY & NEUROLOGY

## 2021-05-05 PROCEDURE — 95909 NRV CNDJ TST 5-6 STUDIES: CPT | Performed by: PSYCHIATRY & NEUROLOGY

## 2021-05-05 PROCEDURE — 95886 MUSC TEST DONE W/N TEST COMP: CPT

## 2021-05-11 ENCOUNTER — HOSPITAL ENCOUNTER (OUTPATIENT)
Dept: GENERAL RADIOLOGY | Facility: HOSPITAL | Age: 76
Discharge: HOME | End: 2021-05-11

## 2021-05-11 ENCOUNTER — PRE-ADMISSION TESTING (OUTPATIENT)
Dept: PREADMISSION TESTING | Facility: HOSPITAL | Age: 76
End: 2021-05-11

## 2021-05-11 VITALS — HEIGHT: 60 IN | WEIGHT: 207.89 LBS | BODY MASS INDEX: 40.82 KG/M2

## 2021-05-11 LAB
ANION GAP SERPL CALCULATED.3IONS-SCNC: 13 MMOL/L (ref 5–15)
BUN SERPL-MCNC: 33 MG/DL (ref 8–23)
BUN/CREAT SERPL: 19.1 (ref 7–25)
CALCIUM SPEC-SCNC: 9.1 MG/DL (ref 8.6–10.5)
CHLORIDE SERPL-SCNC: 104 MMOL/L (ref 98–107)
CO2 SERPL-SCNC: 23 MMOL/L (ref 22–29)
CREAT SERPL-MCNC: 1.73 MG/DL (ref 0.57–1)
DEPRECATED RDW RBC AUTO: 51.8 FL (ref 37–54)
ERYTHROCYTE [DISTWIDTH] IN BLOOD BY AUTOMATED COUNT: 14.9 % (ref 12.3–15.4)
GFR SERPL CREATININE-BSD FRML MDRD: 29 ML/MIN/1.73
GLUCOSE SERPL-MCNC: 336 MG/DL (ref 65–99)
HBA1C MFR BLD: 9.2 % (ref 4.8–5.6)
HCT VFR BLD AUTO: 41.5 % (ref 34–46.6)
HGB BLD-MCNC: 12.5 G/DL (ref 12–15.9)
MCH RBC QN AUTO: 28.3 PG (ref 26.6–33)
MCHC RBC AUTO-ENTMCNC: 30.1 G/DL (ref 31.5–35.7)
MCV RBC AUTO: 93.9 FL (ref 79–97)
PLATELET # BLD AUTO: 169 10*3/MM3 (ref 140–450)
PMV BLD AUTO: 11.7 FL (ref 6–12)
POTASSIUM SERPL-SCNC: 4.7 MMOL/L (ref 3.5–5.2)
QT INTERVAL: 306 MS
QTC INTERVAL: 436 MS
RBC # BLD AUTO: 4.42 10*6/MM3 (ref 3.77–5.28)
SODIUM SERPL-SCNC: 140 MMOL/L (ref 136–145)
WBC # BLD AUTO: 13.57 10*3/MM3 (ref 3.4–10.8)

## 2021-05-11 PROCEDURE — 93005 ELECTROCARDIOGRAM TRACING: CPT

## 2021-05-11 PROCEDURE — 36415 COLL VENOUS BLD VENIPUNCTURE: CPT

## 2021-05-11 PROCEDURE — 93010 ELECTROCARDIOGRAM REPORT: CPT | Performed by: INTERNAL MEDICINE

## 2021-05-11 PROCEDURE — 71046 X-RAY EXAM CHEST 2 VIEWS: CPT

## 2021-05-11 PROCEDURE — 85027 COMPLETE CBC AUTOMATED: CPT

## 2021-05-11 PROCEDURE — 83036 HEMOGLOBIN GLYCOSYLATED A1C: CPT

## 2021-05-11 PROCEDURE — 80048 BASIC METABOLIC PNL TOTAL CA: CPT

## 2021-05-12 ENCOUNTER — NURSE TRIAGE (OUTPATIENT)
Dept: OTHER | Facility: CLINIC | Age: 76
End: 2021-05-12

## 2021-05-12 ENCOUNTER — OFFICE VISIT (OUTPATIENT)
Dept: PRIMARY CARE CLINIC | Age: 76
End: 2021-05-12
Payer: MEDICARE

## 2021-05-12 ENCOUNTER — HOSPITAL ENCOUNTER (OUTPATIENT)
Facility: HOSPITAL | Age: 76
Discharge: HOME | End: 2021-05-12
Payer: MEDICARE

## 2021-05-12 VITALS
OXYGEN SATURATION: 96 % | DIASTOLIC BLOOD PRESSURE: 64 MMHG | HEART RATE: 57 BPM | WEIGHT: 208 LBS | TEMPERATURE: 97.5 F | BODY MASS INDEX: 40.62 KG/M2 | SYSTOLIC BLOOD PRESSURE: 122 MMHG

## 2021-05-12 DIAGNOSIS — E03.9 HYPOTHYROIDISM, UNSPECIFIED TYPE: ICD-10-CM

## 2021-05-12 DIAGNOSIS — R06.09 DYSPNEA ON EXERTION: ICD-10-CM

## 2021-05-12 DIAGNOSIS — E11.9 TYPE 2 DIABETES MELLITUS WITHOUT COMPLICATION, UNSPECIFIED WHETHER LONG TERM INSULIN USE (HCC): Primary | ICD-10-CM

## 2021-05-12 DIAGNOSIS — I10 ESSENTIAL HYPERTENSION: ICD-10-CM

## 2021-05-12 DIAGNOSIS — E78.5 HYPERLIPIDEMIA, UNSPECIFIED HYPERLIPIDEMIA TYPE: ICD-10-CM

## 2021-05-12 DIAGNOSIS — E53.8 B12 DEFICIENCY: ICD-10-CM

## 2021-05-12 DIAGNOSIS — E11.9 TYPE 2 DIABETES MELLITUS WITHOUT COMPLICATION, UNSPECIFIED WHETHER LONG TERM INSULIN USE (HCC): ICD-10-CM

## 2021-05-12 LAB
CHOLESTEROL, TOTAL: 176 MG/DL (ref 0–200)
CHP ED QC CHECK: ABNORMAL
GLUCOSE BLD-MCNC: 263 MG/DL
HDLC SERPL-MCNC: 46 MG/DL (ref 40–60)
LDL CHOLESTEROL CALCULATED: 99 MG/DL
TRIGL SERPL-MCNC: 153 MG/DL (ref 0–249)
TSH SERPL DL<=0.05 MIU/L-ACNC: 0.37 UIU/ML (ref 0.27–4.2)
VLDLC SERPL CALC-MCNC: 31 MG/DL

## 2021-05-12 PROCEDURE — 82962 GLUCOSE BLOOD TEST: CPT | Performed by: NURSE PRACTITIONER

## 2021-05-12 PROCEDURE — 1123F ACP DISCUSS/DSCN MKR DOCD: CPT | Performed by: NURSE PRACTITIONER

## 2021-05-12 PROCEDURE — 4040F PNEUMOC VAC/ADMIN/RCVD: CPT | Performed by: NURSE PRACTITIONER

## 2021-05-12 PROCEDURE — 3046F HEMOGLOBIN A1C LEVEL >9.0%: CPT | Performed by: NURSE PRACTITIONER

## 2021-05-12 PROCEDURE — G8417 CALC BMI ABV UP PARAM F/U: HCPCS | Performed by: NURSE PRACTITIONER

## 2021-05-12 PROCEDURE — 3017F COLORECTAL CA SCREEN DOC REV: CPT | Performed by: NURSE PRACTITIONER

## 2021-05-12 PROCEDURE — 80061 LIPID PANEL: CPT

## 2021-05-12 PROCEDURE — 96372 THER/PROPH/DIAG INJ SC/IM: CPT | Performed by: NURSE PRACTITIONER

## 2021-05-12 PROCEDURE — G8400 PT W/DXA NO RESULTS DOC: HCPCS | Performed by: NURSE PRACTITIONER

## 2021-05-12 PROCEDURE — 1090F PRES/ABSN URINE INCON ASSESS: CPT | Performed by: NURSE PRACTITIONER

## 2021-05-12 PROCEDURE — 2022F DILAT RTA XM EVC RTNOPTHY: CPT | Performed by: NURSE PRACTITIONER

## 2021-05-12 PROCEDURE — 1036F TOBACCO NON-USER: CPT | Performed by: NURSE PRACTITIONER

## 2021-05-12 PROCEDURE — 99213 OFFICE O/P EST LOW 20 MIN: CPT | Performed by: NURSE PRACTITIONER

## 2021-05-12 PROCEDURE — G8427 DOCREV CUR MEDS BY ELIG CLIN: HCPCS | Performed by: NURSE PRACTITIONER

## 2021-05-12 PROCEDURE — 84443 ASSAY THYROID STIM HORMONE: CPT

## 2021-05-12 RX ORDER — LORATADINE 10 MG/1
TABLET ORAL
Qty: 30 TABLET | Refills: 5 | Status: SHIPPED | OUTPATIENT
Start: 2021-05-12 | End: 2021-06-29 | Stop reason: SDUPTHER

## 2021-05-12 RX ORDER — CYANOCOBALAMIN 1000 UG/ML
1000 INJECTION INTRAMUSCULAR; SUBCUTANEOUS ONCE
Status: COMPLETED | OUTPATIENT
Start: 2021-05-12 | End: 2021-05-12

## 2021-05-12 RX ORDER — LANCETS 30 GAUGE
1 EACH MISCELLANEOUS 3 TIMES DAILY
Qty: 200 EACH | Refills: 5 | Status: SHIPPED | OUTPATIENT
Start: 2021-05-12

## 2021-05-12 RX ORDER — GLUCOSAMINE HCL/CHONDROITIN SU 500-400 MG
CAPSULE ORAL
Qty: 100 STRIP | Refills: 3 | Status: SHIPPED | OUTPATIENT
Start: 2021-05-12

## 2021-05-12 RX ADMIN — CYANOCOBALAMIN 1000 MCG: 1000 INJECTION INTRAMUSCULAR; SUBCUTANEOUS at 11:59

## 2021-05-12 ASSESSMENT — ENCOUNTER SYMPTOMS
COUGH: 0
VOMITING: 0
EYES NEGATIVE: 1
STRIDOR: 0
DIARRHEA: 0
ABDOMINAL PAIN: 0
SHORTNESS OF BREATH: 1
WHEEZING: 0
NAUSEA: 0
ABDOMINAL DISTENTION: 0
CHEST TIGHTNESS: 0

## 2021-05-12 NOTE — PATIENT INSTRUCTIONS

## 2021-05-12 NOTE — TELEPHONE ENCOUNTER
Reason for Disposition   Caller has already spoken with the PCP (or office), and has no further questions    Protocols used: NO CONTACT OR DUPLICATE CONTACT CALL-ADULT-[place]    Received call from Jeannette Rosa at pre-service center [facility]) [address] with [employer] Complaint. Brief description of triage: Pt called to follow up with PCP, as instructed by surgeon. Pt seen for pre-op appt yesterday- informed her EKG was abnormal and . Pt states she feels well (no changes signs/symptoms of illness per pt), and has no glucometer to check BG today. Her surgery has been postponed. It is recommended pt be seen at PCP today. Care advice provided, patient verbalizes understanding; denies any other questions or concerns; instructed to call back for any new or worsening symptoms. Writer provided warm transfer to Bruce Cabello at [facility] for appointment scheduling. Attention Provider: Thank you for allowing me to participate in the care of your patient. The patient was connected to triage in response to information provided to the E[facility]. Please do not respond through this encounter as the response is not directed to a shared pool.

## 2021-05-12 NOTE — PROGRESS NOTES
SUBJECTIVE:    Patient ID: Shayan Weiss is a 70 y. o.female. Chief Complaint   Patient presents with    Shortness of Breath    Blood Sugar Problem         HPI:    Pt presents to clinic due to worsening diabetes. Also reports went for pre op for shoulder surgery yesterday and postponed until diabetes more controlled and they want clearance from cardiology. Needs referral to cardiology. Per chart review, EKG Sinus tachycardia 122 yesterday at surgeon office-but admits she had not taken her atenolol for 4 days prior to visit because had not had med filled yet. Has taken med today. HR 60 today and regular. Denies CP, edema, palpitations, syncope. Does have some MICHAELS past month. Blood pressure -130s. DM  HA1c 9.2 yesterday per chart review in care everywhere. Previously, 11/2020 HA1c 8.7. Surgeon Wants that better too. Has been eating more sugary foods lately. Fingerstick on arrival to clinic 260s. Taking oral regimen. Did not get her diabetic supplies and has not been checking sugar at home-requesting it be resent to [employer] today. TSH elevated 6 months ago and synthroid was increased to 150mg. C/o allergy flare up and needs refill on claritin, has not had it in few months. No fevers. Would like B12 shot today too, hx def. Patient's medications, allergies, past medical, surgical, social and family histories were reviewed and updated as appropriate in electronic medical record.         Outpatient Medications Marked as Taking for the 5/12/21 encounter (Office Visit) with TING Watkins - CNP   Medication Sig Dispense Refill    levothyroxine (SYNTHROID) 150 MCG tablet Take 1 tablet by mouth daily 90 tablet 1    vitamin D (CHOLECALCIFEROL) 25 MCG (1000 UT) TABS tablet Take 1,000 Units by mouth nightly      diclofenac (VOLTAREN) 75 MG EC tablet Take 1 tablet by mouth 2 times daily 60 tablet 5    blood glucose monitor kit and supplies Dispense sufficient amount for indicated testing frequency plus additional to accommodate PRN testing needs. Dispense all needed supplies to include: monitor, strips, lancing device, lancets, control solutions, alcohol swabs. WHAT IS COVERED OR PATIENT PREFERENCE. 1 kit 0    SITagliptin (JANUVIA) 100 MG tablet Take 1 tablet by mouth daily 90 tablet 1    DICLOFENAC PO Take by mouth      cyanocobalamin 1000 MCG/ML injection Inject 1 mL into the muscle every 14 days 2 mL 5    Syringe, Disposable, (GazeHawkPAK DISP SYRINGE 2.5CC) 2.5 ML MISC For IM B12 injection q2w 2 each 5    methylPREDNISolone (MEDROL, CARMEN,) 4 MG tablet Take with food. 1 kit 0    lisinopril (PRINIVIL;ZESTRIL) 20 MG tablet Dose increased TAKE 1 TABLET BY MOUTH ONCE DAILY 30 tablet 5    atenolol (TENORMIN) 100 MG tablet TAKE 1 TABLET BY MOUTH ONCE DAILY 30 tablet 5    mirabegron (MYRBETRIQ) 50 MG TB24 TAKE 1 TABLET BY MOUTH ONCE DAILY 30 tablet 5    citalopram (CELEXA) 20 MG tablet TAKE 1 TABLET BY MOUTH DAILY FOR ANXIETY 30 tablet 5    glipiZIDE (GLUCOTROL XL) 10 MG extended release tablet TAKE 1 TABLET BY MOUTH TWICE A DAY 60 tablet 5    esomeprazole (NEXIUM 24HR) 20 MG delayed release capsule TAKE TWO CAPSULES BY MOUTH TWO TIMES DAILY 120 capsule 5    simvastatin (ZOCOR) 40 MG tablet TAKE ONE TABLET BY MOUTH NIGHTLY 30 tablet 5    clonazePAM (KLONOPIN) 1 MG tablet Take 0.5-1 mg by mouth.  topiramate (TOPAMAX) 50 MG tablet take 3 tablets by mouth at bedtime  0    loratadine (CLARITIN) 10 MG tablet TAKE ONE TABLET BY MOUTH DAILY AS NEEDED (ALLERGIES) 30 tablet 5    fentaNYL (DURAGESIC) 25 MCG/HR Place 1 patch onto the skin every 72 hours.  oxyCODONE HCl (OXY-IR) 10 MG immediate release tablet Take 10 mg by mouth every 6 hours as needed for Pain.  Lancets MISC QD and PRN 50 each 5        Review of Systems   Constitutional: Positive for fatigue. Negative for chills, diaphoresis and fever. HENT: Negative. Eyes: Negative. Respiratory: Positive for shortness of breath. Negative for cough, chest tightness, wheezing and stridor. Cardiovascular: Negative. Gastrointestinal: Negative for abdominal distention, abdominal pain, diarrhea, nausea and vomiting. Genitourinary: Negative. Musculoskeletal: Negative. Neurological: Negative. Past Medical History:   Diagnosis Date    Anxiety     Chronic back pain     Hiatal hernia     Hypertension     Inflammatory polyarthropathy (HCC)     Obesity     Osteoarthritis     right shoulder    Osteopenia     Reflux esophagitis     Type II or unspecified type diabetes mellitus without mention of complication, not stated as uncontrolled     Urinary incontinence     Vitamin B12 deficiency      Past Surgical History:   Procedure Laterality Date    CARPAL TUNNEL RELEASE      CHOLECYSTECTOMY      FRACTURE SURGERY  9979/[id number]    t12    HEMORRHOID SURGERY      MS REPAIR ROTATOR CUFF,ACUTE  11/06/2014    Rotator Cuff Repair    SHOULDER SURGERY      TOTAL THYROIDECTOMY  03/2015     Family History   Problem Relation Age of Onset    Diabetes Mother     Stroke Mother     Hypertension Mother       Social History     Tobacco Use   Smoking Status Never Smoker   Smokeless Tobacco Never Used       OBJECTIVE:   Wt Readings from Last 3 Encounters:   05/12/21 208 lb (94.3 kg)   01/19/21 203 lb 6.4 oz (92.3 kg)   11/11/20 213 lb (96.6 kg)     BP Readings from Last 3 Encounters:   05/12/21 122/64   01/19/21 128/88   11/11/20 128/80       /64 (Site: Right Upper Arm, Position: Sitting, Cuff Size: Large Adult)   Pulse 57   Temp 97.5 °F (36.4 °C) (Temporal)   Wt 208 lb (94.3 kg)   SpO2 96% Comment: room air  BMI 40.62 kg/m²      Physical Exam  Vitals signs and nursing note reviewed. Constitutional:       General: She is not in acute distress. Appearance: Normal appearance. HENT:      Head: Normocephalic. Nose: Rhinorrhea present.       Mouth/Throat:      Mouth: Mucous membranes are moist.      Pharynx: Oropharynx is clear.   Eyes:      Extraocular Movements: Extraocular movements intact. Conjunctiva/sclera: Conjunctivae normal.      Pupils: Pupils are equal, round, and reactive to light. Neck:      Musculoskeletal: Normal range of motion and neck supple. Cardiovascular:      Rate and Rhythm: Normal rate and regular rhythm. Pulses: Normal pulses. Heart sounds: Normal heart sounds. Pulmonary:      Effort: Pulmonary effort is normal. No respiratory distress. Breath sounds: Normal breath sounds. Abdominal:      Tenderness: There is no guarding. Musculoskeletal: Normal range of motion. Right lower leg: No edema. Left lower leg: No edema. Lymphadenopathy:      Cervical: No cervical adenopathy. Skin:     General: Skin is warm. Capillary Refill: Capillary refill takes 2 to 3 seconds. Neurological:      General: No focal deficit present. Mental Status: She is alert and oriented to person, place, and time. Sensory: No sensory deficit. Motor: No weakness. Coordination: Coordination normal.      Gait: Gait normal.   Psychiatric:         Mood and Affect: Mood normal.         Behavior: Behavior normal.         Thought Content:  Thought content normal.         Lab Results   Component Value Date     11/11/2020    K 4.4 11/11/2020     11/11/2020    CO2 25 11/11/2020    GLUCOSE 263 05/12/2021    BUN 24 11/11/2020    CREATININE 1.2 11/11/2020    CALCIUM 8.6 11/11/2020    PROT 6.7 11/11/2020    LABALBU 4.0 11/11/2020    BILITOT <0.2 11/11/2020    ALT 8 11/11/2020    AST 10 11/11/2020       Hemoglobin A1C (%)   Date Value   11/11/2020 8.7 (H)     LDL Calculated (mg/dL)   Date Value   11/11/2020 108         Lab Results   Component Value Date    WBC 11.9 11/26/2019    NEUTROABS 8.0 11/26/2019    HGB 11.8 11/26/2019    HCT 39.3 11/26/2019    MCV 99.5 11/26/2019     11/26/2019       Lab Results   Component Value Date    TSH 10.64 (H) 11/11/2020 ASSESSMENT/PLAN:     Refer to cardiology for surgical claerance, EKG showing sinus tach 122 but regualr and 60 today. Pt had not had atenolol. See below. 1. Type 2 diabetes mellitus without complication, unspecified whether long term insulin use (HCC)  Worsening HA1c (see HPI), fingerstick 260s today. Not following diet. Long conversation on importance of medical and diet compliance. Monitor fingersticks TID at home and new kit sent over to her pharmacy. Educated on Hypoglycemia protocol. Advised Diabetic diet. Diabetes education and lifestyle modifications provided. - POCT Glucose  - LIPID PANEL; Future    2. Essential hypertension  Stable. Cont meds. Cardiac diet. F/u PRN    - External Referral To Cardiology  - TSH without Reflex; Future  - LIPID PANEL; Future    3. Dyspnea on exertion  Refer to cardiology. Lifestyle modifications. F/u PNR    - External Referral To Cardiology    4. Hypothyroidism, unspecified type  Labs. Cont home meds. Will adjust if needed. - TSH without Reflex; Future  - LIPID PANEL; Future    5. Hyperlipidemia, unspecified hyperlipidemia type  Labs. Cont home meds. Low fat diet. Education provided on diet. F/u prn.     - LIPID PANEL; Future    6. B12 deficiency  - cyanocobalamin injection 1,000 mcg        Orders Placed This Encounter   Medications    loratadine (CLARITIN) 10 MG tablet     Sig: TAKE ONE TABLET BY MOUTH DAILY AS NEEDED (ALLERGIES)     Dispense:  30 tablet     Refill:  5    blood glucose monitor kit and supplies     Sig: Dispense sufficient amount for indicated testing frequency plus additional to accommodate PRN testing needs. Dispense all needed supplies to include: monitor, strips, lancing device, lancets, control solutions, alcohol swabs. WHAT IS COVERED OR PATIENT PREFERENCE. Dispense:  1 kit     Refill:  0     Brand per patient preference. May round up to next available package size.     cyanocobalamin injection 1,000 mcg

## 2021-05-21 ENCOUNTER — APPOINTMENT (OUTPATIENT)
Dept: PREADMISSION TESTING | Facility: HOSPITAL | Age: 76
End: 2021-05-21

## 2021-06-03 ENCOUNTER — OFFICE VISIT (OUTPATIENT)
Dept: PRIMARY CARE CLINIC | Age: 76
End: 2021-06-03
Payer: MEDICARE

## 2021-06-03 VITALS
HEART RATE: 68 BPM | DIASTOLIC BLOOD PRESSURE: 80 MMHG | OXYGEN SATURATION: 95 % | SYSTOLIC BLOOD PRESSURE: 132 MMHG | WEIGHT: 211.8 LBS | BODY MASS INDEX: 41.58 KG/M2 | TEMPERATURE: 96.9 F | HEIGHT: 60 IN

## 2021-06-03 DIAGNOSIS — E53.8 B12 DEFICIENCY: ICD-10-CM

## 2021-06-03 DIAGNOSIS — I10 ESSENTIAL HYPERTENSION: ICD-10-CM

## 2021-06-03 DIAGNOSIS — E11.9 TYPE 2 DIABETES MELLITUS WITHOUT COMPLICATION, UNSPECIFIED WHETHER LONG TERM INSULIN USE (HCC): Primary | ICD-10-CM

## 2021-06-03 DIAGNOSIS — J45.909 UNCOMPLICATED ASTHMA, UNSPECIFIED ASTHMA SEVERITY, UNSPECIFIED WHETHER PERSISTENT: ICD-10-CM

## 2021-06-03 LAB
CHP ED QC CHECK: NORMAL
GLUCOSE BLD-MCNC: 211 MG/DL

## 2021-06-03 PROCEDURE — G8417 CALC BMI ABV UP PARAM F/U: HCPCS | Performed by: NURSE PRACTITIONER

## 2021-06-03 PROCEDURE — 2022F DILAT RTA XM EVC RTNOPTHY: CPT | Performed by: NURSE PRACTITIONER

## 2021-06-03 PROCEDURE — 1090F PRES/ABSN URINE INCON ASSESS: CPT | Performed by: NURSE PRACTITIONER

## 2021-06-03 PROCEDURE — G8400 PT W/DXA NO RESULTS DOC: HCPCS | Performed by: NURSE PRACTITIONER

## 2021-06-03 PROCEDURE — 3046F HEMOGLOBIN A1C LEVEL >9.0%: CPT | Performed by: NURSE PRACTITIONER

## 2021-06-03 PROCEDURE — 3017F COLORECTAL CA SCREEN DOC REV: CPT | Performed by: NURSE PRACTITIONER

## 2021-06-03 PROCEDURE — 99213 OFFICE O/P EST LOW 20 MIN: CPT | Performed by: NURSE PRACTITIONER

## 2021-06-03 PROCEDURE — 96372 THER/PROPH/DIAG INJ SC/IM: CPT | Performed by: NURSE PRACTITIONER

## 2021-06-03 PROCEDURE — 82962 GLUCOSE BLOOD TEST: CPT | Performed by: NURSE PRACTITIONER

## 2021-06-03 PROCEDURE — 1036F TOBACCO NON-USER: CPT | Performed by: NURSE PRACTITIONER

## 2021-06-03 PROCEDURE — 4040F PNEUMOC VAC/ADMIN/RCVD: CPT | Performed by: NURSE PRACTITIONER

## 2021-06-03 PROCEDURE — G8427 DOCREV CUR MEDS BY ELIG CLIN: HCPCS | Performed by: NURSE PRACTITIONER

## 2021-06-03 PROCEDURE — 1123F ACP DISCUSS/DSCN MKR DOCD: CPT | Performed by: NURSE PRACTITIONER

## 2021-06-03 RX ORDER — ALBUTEROL SULFATE 90 UG/1
2 AEROSOL, METERED RESPIRATORY (INHALATION) EVERY 4 HOURS PRN
Qty: 1 INHALER | Refills: 5 | Status: SHIPPED | OUTPATIENT
Start: 2021-06-03 | End: 2021-06-29 | Stop reason: SDUPTHER

## 2021-06-03 RX ORDER — CYANOCOBALAMIN 1000 UG/ML
1000 INJECTION INTRAMUSCULAR; SUBCUTANEOUS ONCE
Status: COMPLETED | OUTPATIENT
Start: 2021-06-03 | End: 2021-06-03

## 2021-06-03 RX ADMIN — CYANOCOBALAMIN 1000 MCG: 1000 INJECTION INTRAMUSCULAR; SUBCUTANEOUS at 17:30

## 2021-06-03 NOTE — PROGRESS NOTES
Chief Complaint   Patient presents with    Diabetes    Hypertension    Hyperlipidemia     Pt here today for f/u on htn, hyperlipidemia, and DM.  Sugar running under 150    Have you seen any other physician or provider since your last visit no    Have you had any other diagnostic tests since your last visit? no    Have you changed or stopped any medications since your last visit? no

## 2021-06-03 NOTE — PROGRESS NOTES
SUBJECTIVE:    Patient ID: Tyree Weathers is a 70 y.o. female. Medical History Review  Past Medical, Family, and Social History reviewed. Health Maintenance Due   Topic Date Due    Hepatitis C screen  Never done    DTaP/Tdap/Td vaccine (1 - Tdap) Never done    Shingles Vaccine (1 of 2) Never done    Diabetic foot exam  11/19/2015    Diabetic retinal exam  11/19/2015    Breast cancer screen  03/17/2018    Annual Wellness Visit (AWV)  Never done       HPI:   Chief Complaint   Patient presents with    Diabetes    Hypertension    Hyperlipidemia   She has changed her diet and her sugar is much better. She has an appointment with cardiology tomorrow due to SOB with exertion. She had been out of her Atenolol for 4 days. She feels like her asthma is flaring up with allergies. She feels like her breathing was been better when she was getting a lot of steroid shots in her back. She is not getting them as frequently. Her A1C 9.2 with her pre-op labs (for shoulder replacement). Patient's medications, allergies, past medical, surgical, social and family histories were reviewed and updated as appropriate. Review of Systems   Constitutional: Negative for chills and fever. HENT: Negative for ear pain and sore throat. Eyes: Negative for pain and visual disturbance. Respiratory: Negative for cough and shortness of breath. Cardiovascular: Negative for chest pain, palpitations and leg swelling. Gastrointestinal: Negative for abdominal pain, nausea and vomiting. Genitourinary: Negative for dysuria and hematuria. Musculoskeletal: Negative for joint swelling. Skin: Negative for rash. Neurological: Negative for dizziness and weakness. Psychiatric/Behavioral: Negative for sleep disturbance. Reviewed and acurate. See MA note.     OBJECTIVE:  /80 (Site: Right Upper Arm, Position: Sitting)   Pulse 68   Temp 96.9 °F (36.1 °C) (Temporal)   Ht 5' (1.524 m)   Wt 211 lb 12.8 oz (96.1 kg) (GLASPAK DISP SYRINGE 2.5CC) 2.5 ML MISC For IM B12 injection q2w Yes [place] Larger, APRN   clonazePAM (KLONOPIN) 1 MG tablet Take 0.5-1 mg by mouth. Yes Historical Provider, MD   topiramate (TOPAMAX) 50 MG tablet take 3 tablets by mouth at bedtime Yes Historical Provider, MD   oxyCODONE HCl (OXY-IR) 10 MG immediate release tablet Take 10 mg by mouth every 6 hours as needed for Pain. Yes Historical Provider, MD   Lancets MISC QD and PRN Yes William Larger, APRN   simvastatin (ZOCOR) 40 MG tablet TAKE ONE TABLET BY MOUTH NIGHTLY  William Larger, APRN   albuterol sulfate HFA (VENTOLIN HFA) 108 (90 Base) MCG/ACT inhaler Inhale 2 puffs into the lungs every 4 hours as needed for Wheezing  William Larger, APRN   loratadine (CLARITIN) 10 MG tablet TAKE ONE TABLET BY MOUTH DAILY AS NEEDED (ALLERGIES)  [place] Larger, APRN   levothyroxine (SYNTHROID) 150 MCG tablet Take 1 tablet by mouth daily  [place] Larger, APRN   vitamin D (CHOLECALCIFEROL) 25 MCG (1000 UT) TABS tablet Take 1 tablet by mouth nightly  William Larger, APRN   diclofenac (VOLTAREN) 75 MG EC tablet Take 1 tablet by mouth 2 times daily  William Larger, APRN   lisinopril (PRINIVIL;ZESTRIL) 20 MG tablet Dose increased TAKE 1 TABLET BY MOUTH ONCE DAILY  William Larger, APRN   atenolol (TENORMIN) 100 MG tablet TAKE 1 TABLET BY MOUTH ONCE DAILY  [place] Larger, APRN   mirabegron (MYRBETRIQ) 50 MG TB24 TAKE 1 TABLET BY MOUTH ONCE DAILY  [place] Larger, APRN   citalopram (CELEXA) 20 MG tablet TAKE 1 TABLET BY MOUTH DAILY FOR ANXIETY  William Larger, APRN   glipiZIDE (GLUCOTROL XL) 10 MG extended release tablet TAKE 1 TABLET BY MOUTH TWICE A DAY  William Larger, APRN   esomeprazole (NEXIUM 24HR) 20 MG delayed release capsule TAKE TWO CAPSULES BY MOUTH TWO TIMES DAILY  William Larger, APRN   SITagliptin (JANUVIA) 100 MG tablet Take 1 tablet by mouth daily  William Larger, APRN   methylPREDNISolone (MEDROL, CARMEN,) 4 MG tablet Take with food.   Patient not taking: allowed you to do so) to drive or go to work. · Seek emergency medical attention if you think you have used too much of this medicine. An overdose of any prescription medicine can be fatal. Overdose symptoms may include extreme drowsiness, muscle weakness, confusion, cold and clammy skin, pinpoint pupils, shallow breathing, slow heart rate, fainting, or coma. · Don't share prescription medicines with others, even when they seem to have the same symptoms. What may be good for you may be harmful to others. · If you are no longer taking a prescribed medication and you have pills left please take your pills out of their original containers. Mix crushed pills with an undesirable substance, such as cat litter or used coffee grounds. Put the mixture into a disposable container with a lid, such as an empty margarine tub, or into a sealable bag. Cover up or remove any of your personal information on the empty containers by covering it with black permanent marker or duct tape. Place the sealed container with the mixture, and the empty drug containers, in the trash. · If you use a medication that is in the form of a patch, dispose of used patches by folding them in half so that the sticky sides meet, and then flushing them down a toilet. They should not be placed in the household trash where children or pets can find them. · If you have any questions, ask your provider or pharmacist for more information. · Be sure to keep all appointments for provider visits or tests. We are committed to providing you with the best care possible. In order to help us achieve these goals please remember to bring all medications, herbal products, and over the counter supplements with you to each visit. If your provider has ordered testing for you, please be sure to follow up with our office if you have not received results within 7 days after the testing took place.      *If you receive a survey after visiting one of our offices, please take time to share your experience concerning your physician office visit. These surveys are confidential and no health information about you is shared. We are eager to improve for you and we are counting on your feedback to help make that happen. Return in about 3 months (around 9/3/2021).

## 2021-06-04 ENCOUNTER — HOSPITAL ENCOUNTER (OUTPATIENT)
Facility: HOSPITAL | Age: 76
Discharge: HOME | End: 2021-06-04
Payer: MEDICARE

## 2021-06-04 ENCOUNTER — OFFICE VISIT (OUTPATIENT)
Dept: CARDIOLOGY | Facility: CLINIC | Age: 76
End: 2021-06-04

## 2021-06-04 VITALS
RESPIRATION RATE: 20 BRPM | HEIGHT: 60 IN | HEART RATE: 75 BPM | WEIGHT: 214 LBS | SYSTOLIC BLOOD PRESSURE: 160 MMHG | OXYGEN SATURATION: 94 % | BODY MASS INDEX: 42.01 KG/M2 | DIASTOLIC BLOOD PRESSURE: 82 MMHG

## 2021-06-04 DIAGNOSIS — E78.2 MIXED HYPERLIPIDEMIA: ICD-10-CM

## 2021-06-04 DIAGNOSIS — I20.8 ANGINAL EQUIVALENT (HCC): Primary | ICD-10-CM

## 2021-06-04 DIAGNOSIS — I10 ESSENTIAL HYPERTENSION: ICD-10-CM

## 2021-06-04 DIAGNOSIS — R06.09 DYSPNEA ON EXERTION: ICD-10-CM

## 2021-06-04 DIAGNOSIS — R94.31 ABNORMAL EKG: ICD-10-CM

## 2021-06-04 PROCEDURE — 93005 ELECTROCARDIOGRAM TRACING: CPT

## 2021-06-04 PROCEDURE — 93000 ELECTROCARDIOGRAM COMPLETE: CPT | Performed by: INTERNAL MEDICINE

## 2021-06-04 PROCEDURE — 99204 OFFICE O/P NEW MOD 45 MIN: CPT | Performed by: INTERNAL MEDICINE

## 2021-06-04 RX ORDER — DICLOFENAC SODIUM AND MISOPROSTOL 75; 200 MG/1; UG/1
75 TABLET, DELAYED RELEASE ORAL 2 TIMES DAILY
Status: ON HOLD | COMMUNITY
End: 2022-08-02 | Stop reason: SDUPTHER

## 2021-06-04 RX ORDER — TOPIRAMATE 50 MG/1
50 TABLET, FILM COATED ORAL 2 TIMES DAILY
COMMUNITY

## 2021-06-04 RX ORDER — LORATADINE 10 MG/1
10 TABLET ORAL AS NEEDED
COMMUNITY

## 2021-06-10 ENCOUNTER — HOSPITAL ENCOUNTER (OUTPATIENT)
Dept: RESPIRATORY THERAPY | Facility: HOSPITAL | Age: 76
Discharge: HOME | End: 2021-06-10
Payer: MEDICARE

## 2021-06-10 PROCEDURE — 94010 BREATHING CAPACITY TEST: CPT

## 2021-06-29 ENCOUNTER — TELEPHONE (OUTPATIENT)
Dept: PRIMARY CARE CLINIC | Age: 76
End: 2021-06-29

## 2021-06-29 RX ORDER — SIMVASTATIN 40 MG
TABLET ORAL
Qty: 30 TABLET | Refills: 5 | Status: SHIPPED | OUTPATIENT
Start: 2021-06-29 | End: 2021-09-02 | Stop reason: SDUPTHER

## 2021-06-29 RX ORDER — CLONAZEPAM 1 MG/1
.5-1 TABLET ORAL
Qty: 60 TABLET | Status: CANCELLED | OUTPATIENT
Start: 2021-06-29

## 2021-06-29 RX ORDER — GLIPIZIDE 10 MG/1
TABLET, FILM COATED, EXTENDED RELEASE ORAL
Qty: 60 TABLET | Refills: 5 | Status: SHIPPED | OUTPATIENT
Start: 2021-06-29

## 2021-06-29 RX ORDER — OXYCODONE HYDROCHLORIDE 10 MG/1
10 TABLET ORAL EVERY 6 HOURS PRN
Status: CANCELLED | OUTPATIENT
Start: 2021-06-29

## 2021-06-29 RX ORDER — DICLOFENAC SODIUM 75 MG/1
75 TABLET, DELAYED RELEASE ORAL 2 TIMES DAILY
Qty: 60 TABLET | Refills: 5 | Status: SHIPPED | OUTPATIENT
Start: 2021-06-29

## 2021-06-29 RX ORDER — LEVOTHYROXINE SODIUM 0.15 MG/1
150 TABLET ORAL DAILY
Qty: 90 TABLET | Refills: 1 | Status: SHIPPED | OUTPATIENT
Start: 2021-06-29

## 2021-06-29 RX ORDER — ATENOLOL 100 MG/1
TABLET ORAL
Qty: 30 TABLET | Refills: 5 | Status: SHIPPED | OUTPATIENT
Start: 2021-06-29

## 2021-06-29 RX ORDER — LISINOPRIL 20 MG/1
TABLET ORAL
Qty: 30 TABLET | Refills: 5 | Status: SHIPPED | OUTPATIENT
Start: 2021-06-29

## 2021-06-29 RX ORDER — ALBUTEROL SULFATE 90 UG/1
2 AEROSOL, METERED RESPIRATORY (INHALATION) EVERY 4 HOURS PRN
Qty: 1 INHALER | Refills: 5 | Status: SHIPPED | OUTPATIENT
Start: 2021-06-29

## 2021-06-29 RX ORDER — CITALOPRAM 20 MG/1
TABLET ORAL
Qty: 30 TABLET | Refills: 5 | Status: SHIPPED | OUTPATIENT
Start: 2021-06-29

## 2021-06-29 RX ORDER — LORATADINE 10 MG/1
TABLET ORAL
Qty: 30 TABLET | Refills: 5 | Status: SHIPPED | OUTPATIENT
Start: 2021-06-29

## 2021-06-29 NOTE — TELEPHONE ENCOUNTER
Patient came to the office today for more samples of Januvia 100 mg. This was prescribed by Adan Rice. Patient was given 2 box/boxes. Qty. 28, Lot # [id number], Exp. Date 3/23.

## 2021-06-30 ASSESSMENT — ENCOUNTER SYMPTOMS
SHORTNESS OF BREATH: 0
ABDOMINAL PAIN: 0
VOMITING: 0
EYE PAIN: 0
COUGH: 0
SORE THROAT: 0
NAUSEA: 0

## 2021-07-13 ENCOUNTER — TELEPHONE (OUTPATIENT)
Dept: PRIMARY CARE CLINIC | Age: 76
End: 2021-07-13

## 2021-07-13 RX ORDER — FEXOFENADINE HCL 180 MG/1
180 TABLET ORAL DAILY PRN
Qty: 30 TABLET | Refills: 5 | Status: SHIPPED | OUTPATIENT
Start: 2021-07-13

## 2021-07-13 NOTE — TELEPHONE ENCOUNTER
Patient said, her Januvia 150, wasn't called in to the pharmacy and can she get another type of allergy medicine, the one she has been taking is not helping her. ..

## 2021-07-21 ENCOUNTER — HOSPITAL ENCOUNTER (OUTPATIENT)
Dept: CARDIOLOGY | Facility: HOSPITAL | Age: 76
Discharge: HOME | End: 2021-07-21
Admitting: NURSE PRACTITIONER

## 2021-07-21 VITALS
HEART RATE: 80 BPM | DIASTOLIC BLOOD PRESSURE: 105 MMHG | WEIGHT: 214 LBS | BODY MASS INDEX: 42.01 KG/M2 | HEIGHT: 60 IN | SYSTOLIC BLOOD PRESSURE: 137 MMHG

## 2021-07-21 DIAGNOSIS — I20.8 ANGINAL EQUIVALENT (HCC): ICD-10-CM

## 2021-07-21 PROCEDURE — 93018 CV STRESS TEST I&R ONLY: CPT | Performed by: INTERNAL MEDICINE

## 2021-07-21 PROCEDURE — 0 RUBIDIUM CHLORIDE: Performed by: NURSE PRACTITIONER

## 2021-07-21 PROCEDURE — 78431 MYOCRD IMG PET RST&STRS CT: CPT

## 2021-07-21 PROCEDURE — 78492 MYOCRD IMG PET MLT RST&STRS: CPT

## 2021-07-21 PROCEDURE — 78492 MYOCRD IMG PET MLT RST&STRS: CPT | Performed by: INTERNAL MEDICINE

## 2021-07-21 PROCEDURE — 25010000002 REGADENOSON 0.4 MG/5ML SOLUTION: Performed by: NURSE PRACTITIONER

## 2021-07-21 PROCEDURE — 93017 CV STRESS TEST TRACING ONLY: CPT

## 2021-07-21 PROCEDURE — A9555 RB82 RUBIDIUM: HCPCS | Performed by: NURSE PRACTITIONER

## 2021-07-21 RX ADMIN — RUBIDIUM CHLORIDE RB-82 1 DOSE: 150 INJECTION, SOLUTION INTRAVENOUS at 14:22

## 2021-07-21 RX ADMIN — RUBIDIUM CHLORIDE RB-82 1 DOSE: 150 INJECTION, SOLUTION INTRAVENOUS at 14:33

## 2021-07-21 RX ADMIN — REGADENOSON 0.4 MG: 0.08 INJECTION, SOLUTION INTRAVENOUS at 14:31

## 2021-07-22 ENCOUNTER — TELEPHONE (OUTPATIENT)
Dept: CARDIOLOGY | Facility: CLINIC | Age: 76
End: 2021-07-22

## 2021-07-22 LAB
BH CV REST NUCLEAR ISOTOPE DOSE: 33 MCI
BH CV STRESS BP STAGE 1: NORMAL
BH CV STRESS BP STAGE 3: NORMAL
BH CV STRESS COMMENTS STAGE 1: NORMAL
BH CV STRESS DOSE REGADENOSON STAGE 1: 0.4
BH CV STRESS DURATION MIN STAGE 1: 1
BH CV STRESS DURATION MIN STAGE 2: 1
BH CV STRESS DURATION MIN STAGE 3: 1
BH CV STRESS DURATION MIN STAGE 4: 1
BH CV STRESS DURATION SEC STAGE 1: 0
BH CV STRESS DURATION SEC STAGE 2: 0
BH CV STRESS DURATION SEC STAGE 3: 0
BH CV STRESS DURATION SEC STAGE 4: 0
BH CV STRESS HR STAGE 1: 89
BH CV STRESS HR STAGE 2: 99
BH CV STRESS HR STAGE 3: 96
BH CV STRESS HR STAGE 4: 93
BH CV STRESS NUCLEAR ISOTOPE DOSE: 32.9 MCI
BH CV STRESS O2 STAGE 1: 94
BH CV STRESS O2 STAGE 2: 96
BH CV STRESS O2 STAGE 3: 98
BH CV STRESS O2 STAGE 4: 97
BH CV STRESS PROTOCOL 1: NORMAL
BH CV STRESS RECOVERY BP: NORMAL MMHG
BH CV STRESS RECOVERY HR: 88 BPM
BH CV STRESS RECOVERY O2: 95 %
BH CV STRESS STAGE 1: 1
BH CV STRESS STAGE 2: 2
BH CV STRESS STAGE 3: 3
BH CV STRESS STAGE 4: 4
LV EF NUC BP: 70 %
MAXIMAL PREDICTED HEART RATE: 147 BPM
PERCENT MAX PREDICTED HR: 67.35 %
STRESS BASELINE BP: NORMAL MMHG
STRESS BASELINE HR: 77 BPM
STRESS O2 SAT REST: 97 %
STRESS PERCENT HR: 79 %
STRESS POST ESTIMATED WORKLOAD: 1 METS
STRESS POST EXERCISE DUR MIN: 4 MIN
STRESS POST EXERCISE DUR SEC: 0 SEC
STRESS POST O2 SAT PEAK: 98 %
STRESS POST PEAK BP: NORMAL MMHG
STRESS POST PEAK HR: 99 BPM
STRESS TARGET HR: 125 BPM

## 2021-07-28 ENCOUNTER — TELEPHONE (OUTPATIENT)
Dept: PRIMARY CARE CLINIC | Age: 76
End: 2021-07-28

## 2021-07-28 ENCOUNTER — NURSE ONLY (OUTPATIENT)
Dept: PRIMARY CARE CLINIC | Age: 76
End: 2021-07-28
Payer: MEDICARE

## 2021-07-28 DIAGNOSIS — E53.8 B12 DEFICIENCY: Primary | ICD-10-CM

## 2021-07-28 PROCEDURE — 96372 THER/PROPH/DIAG INJ SC/IM: CPT | Performed by: NURSE PRACTITIONER

## 2021-07-28 RX ORDER — CYANOCOBALAMIN 1000 UG/ML
1000 INJECTION INTRAMUSCULAR; SUBCUTANEOUS ONCE
Status: COMPLETED | OUTPATIENT
Start: 2021-07-28 | End: 2021-07-28

## 2021-07-28 RX ADMIN — CYANOCOBALAMIN 1000 MCG: 1000 INJECTION INTRAMUSCULAR; SUBCUTANEOUS at 14:12

## 2021-07-28 NOTE — TELEPHONE ENCOUNTER
Pt wanting results of recent stress test, states cardiology told her she would have to get them from you

## 2021-07-29 NOTE — TELEPHONE ENCOUNTER
Stress test reviewed in chart under media. Per Dr Kim Latter day cardiology note, normal study. F/u as scheduled.

## 2021-08-19 ENCOUNTER — NURSE ONLY (OUTPATIENT)
Dept: PRIMARY CARE CLINIC | Age: 76
End: 2021-08-19
Payer: MEDICARE

## 2021-08-19 DIAGNOSIS — E53.8 B12 DEFICIENCY: Primary | ICD-10-CM

## 2021-08-19 PROCEDURE — 96372 THER/PROPH/DIAG INJ SC/IM: CPT | Performed by: NURSE PRACTITIONER

## 2021-08-19 RX ORDER — CYANOCOBALAMIN 1000 UG/ML
1000 INJECTION INTRAMUSCULAR; SUBCUTANEOUS ONCE
Status: COMPLETED | OUTPATIENT
Start: 2021-08-19 | End: 2021-08-19

## 2021-08-19 RX ADMIN — CYANOCOBALAMIN 1000 MCG: 1000 INJECTION INTRAMUSCULAR; SUBCUTANEOUS at 14:53

## 2021-09-02 ENCOUNTER — OFFICE VISIT (OUTPATIENT)
Dept: PRIMARY CARE CLINIC | Age: 76
End: 2021-09-02
Payer: MEDICARE

## 2021-09-02 VITALS
SYSTOLIC BLOOD PRESSURE: 132 MMHG | DIASTOLIC BLOOD PRESSURE: 85 MMHG | HEIGHT: 60 IN | WEIGHT: 210 LBS | OXYGEN SATURATION: 96 % | TEMPERATURE: 97.4 F | BODY MASS INDEX: 41.23 KG/M2 | HEART RATE: 73 BPM

## 2021-09-02 DIAGNOSIS — E78.5 HYPERLIPIDEMIA, UNSPECIFIED HYPERLIPIDEMIA TYPE: ICD-10-CM

## 2021-09-02 DIAGNOSIS — I10 ESSENTIAL HYPERTENSION: ICD-10-CM

## 2021-09-02 DIAGNOSIS — E66.01 OBESITY, CLASS III, BMI 40-49.9 (MORBID OBESITY) (HCC): ICD-10-CM

## 2021-09-02 DIAGNOSIS — J30.9 ALLERGIC RHINITIS, UNSPECIFIED SEASONALITY, UNSPECIFIED TRIGGER: ICD-10-CM

## 2021-09-02 DIAGNOSIS — E11.9 TYPE 2 DIABETES MELLITUS WITHOUT COMPLICATION, UNSPECIFIED WHETHER LONG TERM INSULIN USE (HCC): Primary | ICD-10-CM

## 2021-09-02 DIAGNOSIS — E53.8 B12 DEFICIENCY: ICD-10-CM

## 2021-09-02 LAB — HBA1C MFR BLD: 8.9 %

## 2021-09-02 PROCEDURE — 96372 THER/PROPH/DIAG INJ SC/IM: CPT | Performed by: NURSE PRACTITIONER

## 2021-09-02 PROCEDURE — 3017F COLORECTAL CA SCREEN DOC REV: CPT | Performed by: NURSE PRACTITIONER

## 2021-09-02 PROCEDURE — 4040F PNEUMOC VAC/ADMIN/RCVD: CPT | Performed by: NURSE PRACTITIONER

## 2021-09-02 PROCEDURE — 1036F TOBACCO NON-USER: CPT | Performed by: NURSE PRACTITIONER

## 2021-09-02 PROCEDURE — 2022F DILAT RTA XM EVC RTNOPTHY: CPT | Performed by: NURSE PRACTITIONER

## 2021-09-02 PROCEDURE — G8417 CALC BMI ABV UP PARAM F/U: HCPCS | Performed by: NURSE PRACTITIONER

## 2021-09-02 PROCEDURE — 99214 OFFICE O/P EST MOD 30 MIN: CPT | Performed by: NURSE PRACTITIONER

## 2021-09-02 PROCEDURE — G8427 DOCREV CUR MEDS BY ELIG CLIN: HCPCS | Performed by: NURSE PRACTITIONER

## 2021-09-02 PROCEDURE — G8400 PT W/DXA NO RESULTS DOC: HCPCS | Performed by: NURSE PRACTITIONER

## 2021-09-02 PROCEDURE — 1123F ACP DISCUSS/DSCN MKR DOCD: CPT | Performed by: NURSE PRACTITIONER

## 2021-09-02 PROCEDURE — 1090F PRES/ABSN URINE INCON ASSESS: CPT | Performed by: NURSE PRACTITIONER

## 2021-09-02 PROCEDURE — 83036 HEMOGLOBIN GLYCOSYLATED A1C: CPT | Performed by: NURSE PRACTITIONER

## 2021-09-02 PROCEDURE — 3052F HG A1C>EQUAL 8.0%<EQUAL 9.0%: CPT | Performed by: NURSE PRACTITIONER

## 2021-09-02 RX ORDER — CYANOCOBALAMIN 1000 UG/ML
1000 INJECTION INTRAMUSCULAR; SUBCUTANEOUS ONCE
Status: COMPLETED | OUTPATIENT
Start: 2021-09-02 | End: 2021-09-02

## 2021-09-02 RX ORDER — METHYLPREDNISOLONE 4 MG/1
TABLET ORAL
Qty: 1 KIT | Refills: 0 | Status: SHIPPED | OUTPATIENT
Start: 2021-09-02 | End: 2021-09-02 | Stop reason: SDUPTHER

## 2021-09-02 RX ORDER — FLUTICASONE PROPIONATE 50 MCG
2 SPRAY, SUSPENSION (ML) NASAL DAILY
Qty: 48 G | Refills: 5 | Status: SHIPPED | OUTPATIENT
Start: 2021-09-02

## 2021-09-02 RX ORDER — METHYLPREDNISOLONE 4 MG/1
TABLET ORAL
Qty: 1 KIT | Refills: 0 | Status: SHIPPED | OUTPATIENT
Start: 2021-09-02

## 2021-09-02 RX ORDER — FLUTICASONE PROPIONATE 50 MCG
2 SPRAY, SUSPENSION (ML) NASAL DAILY
Qty: 48 G | Refills: 5 | Status: SHIPPED | OUTPATIENT
Start: 2021-09-02 | End: 2021-09-02 | Stop reason: SDUPTHER

## 2021-09-02 RX ORDER — SIMVASTATIN 40 MG
TABLET ORAL
Qty: 30 TABLET | Refills: 5 | Status: SHIPPED | OUTPATIENT
Start: 2021-09-02

## 2021-09-02 RX ADMIN — CYANOCOBALAMIN 1000 MCG: 1000 INJECTION INTRAMUSCULAR; SUBCUTANEOUS at 17:36

## 2021-09-02 NOTE — PROGRESS NOTES
Health Maintenance Due This Visit   Colonoscopy No   Mammogram Yes- Pt refuses at this time    Annual Wellness Visit Yes   Microalbumin No   HgbA1C No   Diabetic Eye Exam Yes- Pt will schedule     House Bill One Due This Visit   HORTENSIA Yes   UDS Yes   Contract Yes    Chief Complaint   Patient presents with    Hypertension    Diabetes    Hyperlipidemia     Pt here today for f/u on htn, hyperlipidemia, and DM. C/o allergies and left  shoulder pain     Have you seen any other physician or provider since your last visit yes - pain clinic, cardiology    Have you had any other diagnostic tests since your last visit? yes - stress test    Have you changed or stopped any medications since your last visit? no       SUBJECTIVE:    Patient ID: Rosa M Cates is a 78 y.o. female. Medical History Review  Past Medical, Family, and Social History reviewed. Health Maintenance Due   Topic Date Due    Hepatitis C screen  Never done    DTaP/Tdap/Td vaccine (1 - Tdap) Never done    Shingles Vaccine (1 of 2) Never done    Diabetic foot exam  11/19/2015    Diabetic retinal exam  11/19/2015    Breast cancer screen  03/17/2018    Annual Wellness Visit (AWV)  Never done    Flu vaccine (1) 09/01/2021       HPI:   Chief Complaint   Patient presents with    Hypertension    Diabetes    Hyperlipidemia   She has had trouble with allergies and sinus drainage. She did not get to have her shoulder surgery. She had two teeth worked on recently. Patient's medications, allergies, past medical, surgical, social and family histories were reviewed and updated as appropriate. Review of Systems   Constitutional: Negative for chills and fever. HENT: Negative for ear pain and sore throat. Eyes: Negative for pain and visual disturbance. Respiratory: Negative for cough and shortness of breath. Cardiovascular: Negative for chest pain, palpitations and leg swelling.    Gastrointestinal: Negative for abdominal pain, nausea and vomiting. Genitourinary: Negative for dysuria and hematuria. Musculoskeletal: Negative for joint swelling. Skin: Negative for rash. Neurological: Negative for dizziness and weakness. Psychiatric/Behavioral: Negative for sleep disturbance. Reviewed and acurate. See MA note. OBJECTIVE:  /85 (Site: Right Upper Arm, Position: Sitting)   Pulse 73   Temp 97.4 °F (36.3 °C) (Temporal)   Ht 5' (1.524 m)   Wt 210 lb (95.3 kg)   SpO2 96%   BMI 41.01 kg/m²    Physical Exam  Vitals reviewed. Constitutional:       General: She is not in acute distress. Appearance: She is well-developed. HENT:      Head: Normocephalic. Mouth/Throat:      Pharynx: No oropharyngeal exudate. Eyes:      General: Lids are normal.   Cardiovascular:      Rate and Rhythm: Normal rate and regular rhythm. Heart sounds: Normal heart sounds. Pulmonary:      Effort: Pulmonary effort is normal.      Breath sounds: Normal breath sounds. Abdominal:      General: Bowel sounds are normal. There is no distension. Palpations: Abdomen is soft. Tenderness: There is no abdominal tenderness. Musculoskeletal:      Cervical back: Neck supple. Lymphadenopathy:      Cervical: No cervical adenopathy. Skin:     General: Skin is warm and dry. Neurological:      Mental Status: She is alert and oriented to person, place, and time. No results found for requested labs within last 30 days. Hemoglobin A1C (%)   Date Value   09/02/2021 8.9     LDL Calculated (mg/dL)   Date Value   05/12/2021 99       Lab Results   Component Value Date    WBC 11.9 (H) 11/26/2019    NEUTROABS 8.0 (H) 11/26/2019    HGB 11.8 11/26/2019    HCT 39.3 11/26/2019    MCV 99.5 11/26/2019     11/26/2019     Lab Results   Component Value Date    TSH 0.37 05/12/2021       Prior to Visit Medications    Medication Sig Taking?  Authorizing Provider   simvastatin (ZOCOR) 40 MG tablet TAKE ONE TABLET BY MOUTH NIGHTLY Yes Pam Thornton TING Johnson   methylPREDNISolone (MEDROL, CARMEN,) 4 MG tablet Take with food.  Yes TING Ayon   fluticasone (FLONASE) 50 MCG/ACT nasal spray 2 sprays by Each Nostril route daily Yes TING Ayon   SITagliptin (JANUVIA) 100 MG tablet Take 1 tablet by mouth daily Yes TING Ayon   albuterol sulfate HFA (VENTOLIN HFA) 108 (90 Base) MCG/ACT inhaler Inhale 2 puffs into the lungs every 4 hours as needed for Wheezing Yes TING Ayon   loratadine (CLARITIN) 10 MG tablet TAKE ONE TABLET BY MOUTH DAILY AS NEEDED (ALLERGIES) Yes TING Ayon   levothyroxine (SYNTHROID) 150 MCG tablet Take 1 tablet by mouth daily Yes TING Ayon   vitamin D (CHOLECALCIFEROL) 25 MCG (1000 UT) TABS tablet Take 1 tablet by mouth nightly Yes TING Ayon   diclofenac (VOLTAREN) 75 MG EC tablet Take 1 tablet by mouth 2 times daily Yes TING Ayon   lisinopril (PRINIVIL;ZESTRIL) 20 MG tablet Dose increased TAKE 1 TABLET BY MOUTH ONCE DAILY Yes TING Ayon   atenolol (TENORMIN) 100 MG tablet TAKE 1 TABLET BY MOUTH ONCE DAILY Yes TING Ayon   mirabegron (MYRBETRIQ) 50 MG TB24 TAKE 1 TABLET BY MOUTH ONCE DAILY Yes TING Ayon   citalopram (CELEXA) 20 MG tablet TAKE 1 TABLET BY MOUTH DAILY FOR ANXIETY Yes TING Ayon   glipiZIDE (GLUCOTROL XL) 10 MG extended release tablet TAKE 1 TABLET BY MOUTH TWICE A DAY Yes TING Ayon   esomeprazole (NEXIUM 24HR) 20 MG delayed release capsule TAKE TWO CAPSULES BY MOUTH TWO TIMES DAILY Yes TING Ayon   blood glucose monitor kit and supplies DX E11.9 TID Yes TING Ayon   blood glucose monitor strips Test TID DX E11.9 Yes TING Ayon   Lancets MISC 1 each by Does not apply route 3 times daily DX E11.9 TID Yes TING Ayon   cyanocobalamin 1000 MCG/ML injection Inject 1 mL into the muscle every 14 days Yes TING Ayon   Syringe, Disposable, ([address] 2.5CC) 2.5 ML MISC For IM B12 injection q2w Yes TING Wu   clonazePAM (KLONOPIN) 1 MG tablet Take 0.5-1 mg by mouth. Yes Historical Provider, MD   topiramate (TOPAMAX) 50 MG tablet take 3 tablets by mouth at bedtime Yes Historical Provider, MD   oxyCODONE HCl (OXY-IR) 10 MG immediate release tablet Take 10 mg by mouth every 6 hours as needed for Pain. Yes Historical Provider, MD   Lancets MISC QD and PRN Yes Archie Frazier APRN   triamcinolone (KENALOG) 0.1 % cream Apply topically 2 times daily. Archie Frazier APRHUDSON   fexofenadine TY [facility] ALLERGY) 180 MG tablet Take 1 tablet by mouth daily as needed (As needed for allergies)  Patient not taking: Reported on 2021  Archie Frazier APRN   methylPREDNISolone (MEDROL, CARMEN,) 4 MG tablet Take with food. Patient not taking: Reported on 6/3/2021  TING Wu   fentaNYL (DURAGESIC) 25 MCG/HR Place 1 patch onto the skin every 72 hours. Patient not taking: Reported on 6/3/2021  Historical Provider, MD       ASSESSMENT:  1. Type 2 diabetes mellitus without complication, unspecified whether long term insulin use (HCC)    2. Obesity, Class III, BMI 40-49.9 (morbid obesity) ([facility] [address]    3. B12 deficiency    4. Essential hypertension    5. Hyperlipidemia, unspecified hyperlipidemia type    6. Allergic rhinitis, unspecified seasonality, unspecified trigger        PLAN:  Orders Placed This Encounter   Medications    DISCONTD: fluticasone (FLONASE) 50 MCG/ACT nasal spray     Si sprays by Each Nostril route daily     Dispense:  48 g     Refill:  5    DISCONTD: methylPREDNISolone (MEDROL, CARMEN,) 4 MG tablet     Sig: Take with food. Dispense:  1 kit     Refill:  0    simvastatin (ZOCOR) 40 MG tablet     Sig: TAKE ONE TABLET BY MOUTH NIGHTLY     Dispense:  30 tablet     Refill:  5     This prescription was filled on 2021. Any refills authorized will be placed on file.  methylPREDNISolone (MEDROL, CARMEN,) 4 MG tablet     Sig: Take with food. Dispense:  1 kit     Refill:  0    fluticasone (FLONASE) 50 MCG/ACT nasal spray     Si sprays by Each Nostril route daily     Dispense:  48 g     Refill:  5    cyanocobalamin injection 1,000 mcg     Orders Placed This Encounter   Procedures    POCT glycosylated hemoglobin (Hb A1C)     Patient Instructions   The medication list included in this document is our record of what you are currently taking, including any changes that were made at today's visit.  If you find any differences when compared to your medications at home, or have any questions that were not answered at your visit, please contact the office. · Keep a list of your medicines with you. List all of the prescription medicines, nonprescription medicines, supplements, natural remedies, and vitamins that you take. Tell your healthcare providers who treat you about all of the products you are taking. Your provider can provide you with a form to keep track of them. Just ask. · Follow the directions that come with your medicine, including information about food or alcohol. Make sure you know how and when to take your medicine. Do not take more or less than you are supposed to take. · Keep all medicines out of the reach of children. · Store medicines according to the directions on the label. · Monitor yourself. Learn to know how your body reacts to your new medicine and keep track of how it makes you feel before attempting (If your provider has allowed you to do so) to drive or go to work. · Seek emergency medical attention if you think you have used too much of this medicine. An overdose of any prescription medicine can be fatal. Overdose symptoms may include extreme drowsiness, muscle weakness, confusion, cold and clammy skin, pinpoint pupils, shallow breathing, slow heart rate, fainting, or coma. · Don't share prescription medicines with others, even when they seem to have the same symptoms.  What may be good for you may be harmful to others. · If you are no longer taking a prescribed medication and you have pills left please take your pills out of their original containers. Mix crushed pills with an undesirable substance, such as cat litter or used coffee grounds. Put the mixture into a disposable container with a lid, such as an empty margarine tub, or into a sealable bag. Cover up or remove any of your personal information on the empty containers by covering it with black permanent marker or duct tape. Place the sealed container with the mixture, and the empty drug containers, in the trash. · If you use a medication that is in the form of a patch, dispose of used patches by folding them in half so that the sticky sides meet, and then flushing them down a toilet. They should not be placed in the household trash where children or pets can find them. · If you have any questions, ask your provider or pharmacist for more information. · Be sure to keep all appointments for provider visits or tests. We are committed to providing you with the best care possible. In order to help us achieve these goals please remember to bring all medications, herbal products, and over the counter supplements with you to each visit. If your provider has ordered testing for you, please be sure to follow up with our office if you have not received results within 7 days after the testing took place. *If you receive a survey after visiting one of our offices, please take time to share your experience concerning your physician office visit. These surveys are confidential and no health information about you is shared. We are eager to improve for you and we are counting on your feedback to help make that happen. Transportation and [address]  [address]. 0943 Josefina Zhang [phone]  Help with food, clothing, transportation, utilities, prescription assistance.      [facility] [phone]  Senior Citizens Programs  [address]  93.   [phone]  Kiko Garcia, [address] [facility]    [address] [facility] Aging and Independent Living Program.  They handle meals on wheels and senior centers.    Julieta Villalobos [phone] [facility] senior citizens center     [employer]   [phone] or [address] [facility]             [phone]    [facility]             [phone]    Helping [address] Franc[address] Hernández ([facility])            [phone]    [address] [facility] [phone]    [facility] senior citizens center             [address] [facility] [address]/ [address]              [phone]   [facility]     [phone]   [employer] and [address] (may visit once monthly      8-4:30)              [phone]    [facility] Moser [address], food and utility assistance (T,W,[facility])    Energy Assistance  [address]  [address].     [phone]     [address]   [phone]    [address]       [address] with applying for insurance coverage with Medicaid and Medicare including part D  Help with medication cost, eyeglasses or exams, hearing aides  [place]    [address],[place] [address] [phone] [facility]     [phone] Cas LAM   (Coordinating and Assisting the Reuse of Assistive Technology)  Help with durable medical equipment and assistive technology   [place] [phone]  [facility]     [phone]    Domestic Violence Shelters   [place] Domestic Violence Hotline [phone]  [facility] in [place] but services Julieta Padillakobe and Yolette SimpsonRoger Williams Medical Centerhuy  93. [phone]  Todd Southward in [facility] but services Arn Seen [phone]    [facility]   Emergency Shelter and [employer] [phone] [address] [phone]  [facility]

## 2021-09-02 NOTE — PATIENT INSTRUCTIONS
The medication list included in this document is our record of what you are currently taking, including any changes that were made at today's visit.  If you find any differences when compared to your medications at home, or have any questions that were not answered at your visit, please contact the office. · Keep a list of your medicines with you. List all of the prescription medicines, nonprescription medicines, supplements, natural remedies, and vitamins that you take. Tell your healthcare providers who treat you about all of the products you are taking. Your provider can provide you with a form to keep track of them. Just ask. · Follow the directions that come with your medicine, including information about food or alcohol. Make sure you know how and when to take your medicine. Do not take more or less than you are supposed to take. · Keep all medicines out of the reach of children. · Store medicines according to the directions on the label. · Monitor yourself. Learn to know how your body reacts to your new medicine and keep track of how it makes you feel before attempting (If your provider has allowed you to do so) to drive or go to work. · Seek emergency medical attention if you think you have used too much of this medicine. An overdose of any prescription medicine can be fatal. Overdose symptoms may include extreme drowsiness, muscle weakness, confusion, cold and clammy skin, pinpoint pupils, shallow breathing, slow heart rate, fainting, or coma. · Don't share prescription medicines with others, even when they seem to have the same symptoms. What may be good for you may be harmful to others. · If you are no longer taking a prescribed medication and you have pills left please take your pills out of their original containers. Mix crushed pills with an undesirable substance, such as cat litter or used coffee grounds.  Put the mixture into a disposable container with a lid, such as an empty margarine tub, or into a sealable bag. Cover up or remove any of your personal information on the empty containers by covering it with black permanent marker or duct tape. Place the sealed container with the mixture, and the empty drug containers, in the trash. · If you use a medication that is in the form of a patch, dispose of used patches by folding them in half so that the sticky sides meet, and then flushing them down a toilet. They should not be placed in the household trash where children or pets can find them. · If you have any questions, ask your provider or pharmacist for more information. · Be sure to keep all appointments for provider visits or tests. We are committed to providing you with the best care possible. In order to help us achieve these goals please remember to bring all medications, herbal products, and over the counter supplements with you to each visit. If your provider has ordered testing for you, please be sure to follow up with our office if you have not received results within 7 days after the testing took place. *If you receive a survey after visiting one of our offices, please take time to share your experience concerning your physician office visit. These surveys are confidential and no health information about you is shared. We are eager to improve for you and we are counting on your feedback to help make that happen. Transportation and [address]  [address]. 9444 Josefina Zhang [phone]  Help with food, clothing, transportation, utilities, prescription assistance. [facility] [phone]  Senior Citizens Programs  [address] [facility],[address]  Zach Bazan, [address] [facility]    [address] [facility] Aging and Independent Living Program.  They handle meals on wheels and senior centers.    Norma Sigala [phone] [facility] citizens center     [facility]   [phone] or [address] [facility] [address]             [phone]    [place] (Yu Brito)            [phone]    [address] [facility] [phone]    [facility] senior citizens center             [address] [facility] [address]/ [address] [facility] [address]              [phone]   [employer] Cj Worrell     [phone]   [employer] and [address] (may visit once monthly      8-4:30)              [phone]    [facility] Duncan [address], food and utility assistance (T,W,[place])    Energy Assistance  [place]     [phone]     [address]   [phone]    [address]       [address] with applying for insurance coverage with Medicaid and Medicare including part D  Help with medication cost, eyeglasses or exams, hearing aides  [place]    [address] [phone] Krystal Villareal     [phone] Zeina LAM   (Coordinating and Assisting the Reuse of Assistive Technology)  Help with durable medical equipment and assistive technology   [place] [phone]  [facility]     [phone]    Domestic Violence Shelters   [place] Domestic Violence Hotline [phone]  [address] in [place] but services Tereasa Racer and [place] [phone]  Doyne Barbadian in [facility] but services Danella Duverney [phone]    [facility]   Emergency Shelter and [employer] [phone]  [address] [phone] of [facility] [employer]  [address] for [facility] [address]     Available 24 hours daily in [place] and [place]

## 2021-09-17 ENCOUNTER — TELEPHONE (OUTPATIENT)
Dept: PRIMARY CARE CLINIC | Age: 76
End: 2021-09-17

## 2021-09-17 RX ORDER — DOXYCYCLINE HYCLATE 100 MG
100 TABLET ORAL 2 TIMES DAILY
Qty: 20 TABLET | Refills: 0 | Status: SHIPPED | OUTPATIENT
Start: 2021-09-17 | End: 2021-09-27

## 2021-09-17 RX ORDER — TRIAMCINOLONE ACETONIDE 1 MG/G
CREAM TOPICAL
Qty: 80 G | Refills: 0 | Status: SHIPPED | OUTPATIENT
Start: 2021-09-17

## 2021-09-17 NOTE — TELEPHONE ENCOUNTER
Pt called stating that she has pulled several ticks off and that they've left large red knots.  Asking if she needs antibx

## 2021-10-04 ASSESSMENT — ENCOUNTER SYMPTOMS
ABDOMINAL PAIN: 0
VOMITING: 0
SORE THROAT: 0
SHORTNESS OF BREATH: 0
COUGH: 0
NAUSEA: 0
EYE PAIN: 0

## 2022-05-04 ENCOUNTER — APPOINTMENT (OUTPATIENT)
Dept: PREADMISSION TESTING | Facility: HOSPITAL | Age: 77
End: 2022-05-04

## 2022-05-12 ENCOUNTER — PRE-ADMISSION TESTING (OUTPATIENT)
Dept: PREADMISSION TESTING | Facility: HOSPITAL | Age: 77
End: 2022-05-12

## 2022-05-12 ENCOUNTER — HOSPITAL ENCOUNTER (OUTPATIENT)
Dept: GENERAL RADIOLOGY | Facility: HOSPITAL | Age: 77
Discharge: HOME | End: 2022-05-12

## 2022-05-12 VITALS — HEIGHT: 60 IN | BODY MASS INDEX: 39.08 KG/M2 | WEIGHT: 199.08 LBS

## 2022-05-12 LAB
ANION GAP SERPL CALCULATED.3IONS-SCNC: 12 MMOL/L (ref 5–15)
BUN SERPL-MCNC: 46 MG/DL (ref 8–23)
BUN/CREAT SERPL: 34.1 (ref 7–25)
CALCIUM SPEC-SCNC: 8.8 MG/DL (ref 8.6–10.5)
CHLORIDE SERPL-SCNC: 112 MMOL/L (ref 98–107)
CO2 SERPL-SCNC: 18 MMOL/L (ref 22–29)
CREAT SERPL-MCNC: 1.35 MG/DL (ref 0.57–1)
DEPRECATED RDW RBC AUTO: 46.9 FL (ref 37–54)
EGFRCR SERPLBLD CKD-EPI 2021: 41.3 ML/MIN/1.73
ERYTHROCYTE [DISTWIDTH] IN BLOOD BY AUTOMATED COUNT: 13.7 % (ref 12.3–15.4)
GLUCOSE SERPL-MCNC: 146 MG/DL (ref 65–99)
HBA1C MFR BLD: 8.6 % (ref 4.8–5.6)
HCT VFR BLD AUTO: 39.3 % (ref 34–46.6)
HGB BLD-MCNC: 12.3 G/DL (ref 12–15.9)
MCH RBC QN AUTO: 29.4 PG (ref 26.6–33)
MCHC RBC AUTO-ENTMCNC: 31.3 G/DL (ref 31.5–35.7)
MCV RBC AUTO: 94 FL (ref 79–97)
PLATELET # BLD AUTO: 162 10*3/MM3 (ref 140–450)
PMV BLD AUTO: 11.6 FL (ref 6–12)
POTASSIUM SERPL-SCNC: 4.6 MMOL/L (ref 3.5–5.2)
QT INTERVAL: 422 MS
QTC INTERVAL: 471 MS
RBC # BLD AUTO: 4.18 10*6/MM3 (ref 3.77–5.28)
SODIUM SERPL-SCNC: 142 MMOL/L (ref 136–145)
WBC NRBC COR # BLD: 15.62 10*3/MM3 (ref 3.4–10.8)

## 2022-05-12 PROCEDURE — 36415 COLL VENOUS BLD VENIPUNCTURE: CPT

## 2022-05-12 PROCEDURE — 93010 ELECTROCARDIOGRAM REPORT: CPT | Performed by: STUDENT IN AN ORGANIZED HEALTH CARE EDUCATION/TRAINING PROGRAM

## 2022-05-12 PROCEDURE — 80048 BASIC METABOLIC PNL TOTAL CA: CPT

## 2022-05-12 PROCEDURE — 93005 ELECTROCARDIOGRAM TRACING: CPT

## 2022-05-12 PROCEDURE — 71046 X-RAY EXAM CHEST 2 VIEWS: CPT

## 2022-05-12 PROCEDURE — 83036 HEMOGLOBIN GLYCOSYLATED A1C: CPT

## 2022-05-12 PROCEDURE — 85027 COMPLETE CBC AUTOMATED: CPT

## 2022-05-12 RX ORDER — AMOXICILLIN 500 MG/1
TABLET, FILM COATED ORAL
COMMUNITY
Start: 2022-04-12 | End: 2022-06-03

## 2022-05-12 RX ORDER — EMPAGLIFLOZIN 25 MG/1
25 TABLET, FILM COATED ORAL DAILY
COMMUNITY
Start: 2022-03-18

## 2022-05-12 RX ORDER — CITALOPRAM 20 MG/1
20 TABLET ORAL DAILY
COMMUNITY
Start: 2022-05-02

## 2022-05-12 RX ORDER — CLINDAMYCIN HYDROCHLORIDE 300 MG/1
CAPSULE ORAL
COMMUNITY
Start: 2022-04-25 | End: 2022-06-03

## 2022-05-12 RX ORDER — BENZOYL PEROXIDE 50 MG/ML
LIQUID TOPICAL 2 TIMES DAILY
Qty: 142 G | Refills: 12 | Status: SHIPPED | OUTPATIENT
Start: 2022-05-12 | End: 2022-06-03

## 2022-05-12 RX ORDER — DICLOFENAC SODIUM 75 MG/1
75 TABLET, DELAYED RELEASE ORAL 2 TIMES DAILY
COMMUNITY
Start: 2022-03-31

## 2022-05-12 RX ORDER — FLUCONAZOLE 100 MG/1
100 TABLET ORAL 2 TIMES DAILY
COMMUNITY
Start: 2022-04-27 | End: 2022-06-03

## 2022-05-13 ENCOUNTER — APPOINTMENT (OUTPATIENT)
Dept: PREADMISSION TESTING | Facility: HOSPITAL | Age: 77
End: 2022-05-13

## 2022-06-03 ENCOUNTER — OFFICE VISIT (OUTPATIENT)
Dept: CARDIOLOGY | Facility: CLINIC | Age: 77
End: 2022-06-03

## 2022-06-03 VITALS
HEART RATE: 108 BPM | WEIGHT: 197 LBS | DIASTOLIC BLOOD PRESSURE: 68 MMHG | SYSTOLIC BLOOD PRESSURE: 124 MMHG | HEIGHT: 60 IN | BODY MASS INDEX: 38.68 KG/M2 | OXYGEN SATURATION: 98 %

## 2022-06-03 DIAGNOSIS — E78.2 MIXED HYPERLIPIDEMIA: ICD-10-CM

## 2022-06-03 DIAGNOSIS — I10 ESSENTIAL HYPERTENSION: ICD-10-CM

## 2022-06-03 DIAGNOSIS — Z01.810 PRE-OPERATIVE CARDIOVASCULAR EXAMINATION: Primary | ICD-10-CM

## 2022-06-03 PROCEDURE — 99214 OFFICE O/P EST MOD 30 MIN: CPT | Performed by: INTERNAL MEDICINE

## 2022-06-03 RX ORDER — FAMOTIDINE 20 MG
1 TABLET ORAL DAILY
COMMUNITY

## 2022-07-06 ENCOUNTER — APPOINTMENT (OUTPATIENT)
Dept: PREADMISSION TESTING | Facility: HOSPITAL | Age: 77
End: 2022-07-06

## 2022-07-21 ENCOUNTER — PRE-ADMISSION TESTING (OUTPATIENT)
Dept: PREADMISSION TESTING | Facility: HOSPITAL | Age: 77
End: 2022-07-21

## 2022-07-21 VITALS — BODY MASS INDEX: 38.95 KG/M2 | WEIGHT: 198.41 LBS | HEIGHT: 60 IN

## 2022-07-21 LAB
ANION GAP SERPL CALCULATED.3IONS-SCNC: 10 MMOL/L (ref 5–15)
BUN SERPL-MCNC: 26 MG/DL (ref 8–23)
BUN/CREAT SERPL: 21.7 (ref 7–25)
CALCIUM SPEC-SCNC: 8.8 MG/DL (ref 8.6–10.5)
CHLORIDE SERPL-SCNC: 110 MMOL/L (ref 98–107)
CO2 SERPL-SCNC: 24 MMOL/L (ref 22–29)
CREAT SERPL-MCNC: 1.2 MG/DL (ref 0.57–1)
DEPRECATED RDW RBC AUTO: 49.8 FL (ref 37–54)
EGFRCR SERPLBLD CKD-EPI 2021: 47.6 ML/MIN/1.73
ERYTHROCYTE [DISTWIDTH] IN BLOOD BY AUTOMATED COUNT: 14.7 % (ref 12.3–15.4)
GLUCOSE SERPL-MCNC: 145 MG/DL (ref 65–99)
HBA1C MFR BLD: 7.6 % (ref 4.8–5.6)
HCT VFR BLD AUTO: 37.9 % (ref 34–46.6)
HGB BLD-MCNC: 12 G/DL (ref 12–15.9)
MCH RBC QN AUTO: 29.2 PG (ref 26.6–33)
MCHC RBC AUTO-ENTMCNC: 31.7 G/DL (ref 31.5–35.7)
MCV RBC AUTO: 92.2 FL (ref 79–97)
PLATELET # BLD AUTO: 184 10*3/MM3 (ref 140–450)
PMV BLD AUTO: 11.1 FL (ref 6–12)
POTASSIUM SERPL-SCNC: 4.5 MMOL/L (ref 3.5–5.2)
RBC # BLD AUTO: 4.11 10*6/MM3 (ref 3.77–5.28)
SODIUM SERPL-SCNC: 144 MMOL/L (ref 136–145)
WBC NRBC COR # BLD: 10.31 10*3/MM3 (ref 3.4–10.8)

## 2022-07-21 PROCEDURE — 83036 HEMOGLOBIN GLYCOSYLATED A1C: CPT

## 2022-07-21 PROCEDURE — 36415 COLL VENOUS BLD VENIPUNCTURE: CPT

## 2022-07-21 PROCEDURE — 85027 COMPLETE CBC AUTOMATED: CPT

## 2022-07-21 PROCEDURE — 80048 BASIC METABOLIC PNL TOTAL CA: CPT

## 2022-07-21 RX ORDER — CYANOCOBALAMIN, ISOPROPYL ALCOHOL 1000MCG/ML
KIT INJECTION
COMMUNITY

## 2022-07-29 ENCOUNTER — CLINICAL SUPPORT NO REQUIREMENTS (OUTPATIENT)
Dept: PREADMISSION TESTING | Facility: HOSPITAL | Age: 77
End: 2022-07-29

## 2022-07-29 PROCEDURE — U0005 INFEC AGEN DETEC AMPLI PROBE: HCPCS

## 2022-07-29 PROCEDURE — C9803 HOPD COVID-19 SPEC COLLECT: HCPCS

## 2022-07-29 PROCEDURE — U0004 COV-19 TEST NON-CDC HGH THRU: HCPCS

## 2022-07-30 LAB — SARS-COV-2 RNA PNL SPEC NAA+PROBE: NOT DETECTED

## 2022-08-01 ENCOUNTER — APPOINTMENT (OUTPATIENT)
Dept: GENERAL RADIOLOGY | Facility: HOSPITAL | Age: 77
End: 2022-08-01

## 2022-08-01 ENCOUNTER — ANESTHESIA EVENT CONVERTED (OUTPATIENT)
Dept: ANESTHESIOLOGY | Facility: HOSPITAL | Age: 77
End: 2022-08-01

## 2022-08-01 ENCOUNTER — HOSPITAL ENCOUNTER (OUTPATIENT)
Facility: HOSPITAL | Age: 77
Discharge: HOME | End: 2022-08-02
Attending: ORTHOPAEDIC SURGERY | Admitting: ORTHOPAEDIC SURGERY

## 2022-08-01 ENCOUNTER — ANESTHESIA (OUTPATIENT)
Dept: PERIOP | Facility: HOSPITAL | Age: 77
End: 2022-08-01

## 2022-08-01 ENCOUNTER — ANESTHESIA EVENT (OUTPATIENT)
Dept: PERIOP | Facility: HOSPITAL | Age: 77
End: 2022-08-01

## 2022-08-01 PROBLEM — K21.9 GERD (GASTROESOPHAGEAL REFLUX DISEASE): Status: ACTIVE | Noted: 2022-08-01

## 2022-08-01 PROBLEM — E66.9 OBESITY (BMI 30-39.9): Status: ACTIVE | Noted: 2022-08-01

## 2022-08-01 PROBLEM — Z96.611 STATUS POST REVERSE ARTHROPLASTY OF RIGHT SHOULDER: Status: ACTIVE | Noted: 2022-08-01

## 2022-08-01 LAB
GLUCOSE BLDC GLUCOMTR-MCNC: 189 MG/DL (ref 70–130)
GLUCOSE BLDC GLUCOMTR-MCNC: 200 MG/DL (ref 70–130)

## 2022-08-01 PROCEDURE — C1776 JOINT DEVICE (IMPLANTABLE): HCPCS | Performed by: ORTHOPAEDIC SURGERY

## 2022-08-01 PROCEDURE — 76942 ECHO GUIDE FOR BIOPSY: CPT | Performed by: ORTHOPAEDIC SURGERY

## 2022-08-01 PROCEDURE — 63710000001 INSULIN LISPRO (HUMAN) PER 5 UNITS: Performed by: INTERNAL MEDICINE

## 2022-08-01 PROCEDURE — 73030 X-RAY EXAM OF SHOULDER: CPT

## 2022-08-01 PROCEDURE — 82962 GLUCOSE BLOOD TEST: CPT

## 2022-08-01 PROCEDURE — 23472 RECONSTRUCT SHOULDER JOINT: CPT

## 2022-08-01 PROCEDURE — 25010000002 DEXAMETHASONE PER 1 MG: Performed by: NURSE ANESTHETIST, CERTIFIED REGISTERED

## 2022-08-01 PROCEDURE — 25010000002 DEXAMETHASONE SODIUM PHOSPHATE 10 MG/ML SOLUTION: Performed by: NURSE ANESTHETIST, CERTIFIED REGISTERED

## 2022-08-01 PROCEDURE — 63710000001 INSULIN LISPRO (HUMAN) PER 5 UNITS

## 2022-08-01 PROCEDURE — 20680 REMOVAL OF IMPLANT DEEP: CPT

## 2022-08-01 PROCEDURE — 25010000002 FENTANYL CITRATE (PF) 50 MCG/ML SOLUTION: Performed by: NURSE ANESTHETIST, CERTIFIED REGISTERED

## 2022-08-01 PROCEDURE — 63710000001 INSULIN DETEMIR PER 5 UNITS: Performed by: INTERNAL MEDICINE

## 2022-08-01 PROCEDURE — 25010000002 PROPOFOL 10 MG/ML EMULSION: Performed by: NURSE ANESTHETIST, CERTIFIED REGISTERED

## 2022-08-01 PROCEDURE — 25010000002 VANCOMYCIN 10 G RECONSTITUTED SOLUTION: Performed by: ORTHOPAEDIC SURGERY

## 2022-08-01 PROCEDURE — 25010000002 ONDANSETRON PER 1 MG: Performed by: NURSE ANESTHETIST, CERTIFIED REGISTERED

## 2022-08-01 PROCEDURE — C1889 IMPLANT/INSERT DEVICE, NOC: HCPCS | Performed by: ORTHOPAEDIC SURGERY

## 2022-08-01 DEVICE — IMPLANTABLE DEVICE: Type: IMPLANTABLE DEVICE | Site: SHOULDER | Status: FUNCTIONAL

## 2022-08-01 DEVICE — IMPLANTABLE DEVICE
Type: IMPLANTABLE DEVICE | Site: SHOULDER | Status: FUNCTIONAL
Brand: EQUINOXE

## 2022-08-01 DEVICE — SCRW COMPR EQUINOXE LK 4.5X26MM: Type: IMPLANTABLE DEVICE | Site: SHOULDER | Status: FUNCTIONAL

## 2022-08-01 DEVICE — PLT/JOINT GLEN EQUINOXE STD/POST: Type: IMPLANTABLE DEVICE | Site: SHOULDER | Status: FUNCTIONAL

## 2022-08-01 DEVICE — SUT FW #2 W/TPR NDL 1/2 CIR 38IN 97CM 26.5MM BLU: Type: IMPLANTABLE DEVICE | Site: SHOULDER | Status: FUNCTIONAL

## 2022-08-01 RX ORDER — LABETALOL HYDROCHLORIDE 5 MG/ML
10 INJECTION, SOLUTION INTRAVENOUS EVERY 4 HOURS PRN
Status: DISCONTINUED | OUTPATIENT
Start: 2022-08-01 | End: 2022-08-02 | Stop reason: HOSPADM

## 2022-08-01 RX ORDER — SODIUM CHLORIDE 0.9 % (FLUSH) 0.9 %
10 SYRINGE (ML) INJECTION AS NEEDED
Status: CANCELLED | OUTPATIENT
Start: 2022-08-01

## 2022-08-01 RX ORDER — LISINOPRIL 20 MG/1
20 TABLET ORAL NIGHTLY
Status: DISCONTINUED | OUTPATIENT
Start: 2022-08-01 | End: 2022-08-02 | Stop reason: HOSPADM

## 2022-08-01 RX ORDER — NALOXONE HCL 0.4 MG/ML
0.1 VIAL (ML) INJECTION
Status: DISCONTINUED | OUTPATIENT
Start: 2022-08-01 | End: 2022-08-02 | Stop reason: HOSPADM

## 2022-08-01 RX ORDER — MAGNESIUM HYDROXIDE 1200 MG/15ML
LIQUID ORAL AS NEEDED
Status: DISCONTINUED | OUTPATIENT
Start: 2022-08-01 | End: 2022-08-01 | Stop reason: HOSPADM

## 2022-08-01 RX ORDER — DEXTROSE MONOHYDRATE 25 G/50ML
25 INJECTION, SOLUTION INTRAVENOUS
Status: DISCONTINUED | OUTPATIENT
Start: 2022-08-01 | End: 2022-08-02 | Stop reason: HOSPADM

## 2022-08-01 RX ORDER — FENTANYL CITRATE 50 UG/ML
INJECTION, SOLUTION INTRAMUSCULAR; INTRAVENOUS
Status: COMPLETED | OUTPATIENT
Start: 2022-08-01 | End: 2022-08-01

## 2022-08-01 RX ORDER — PANTOPRAZOLE SODIUM 40 MG/1
40 TABLET, DELAYED RELEASE ORAL DAILY
Status: DISCONTINUED | OUTPATIENT
Start: 2022-08-02 | End: 2022-08-02 | Stop reason: HOSPADM

## 2022-08-01 RX ORDER — ATORVASTATIN CALCIUM 20 MG/1
20 TABLET, FILM COATED ORAL DAILY
Status: DISCONTINUED | OUTPATIENT
Start: 2022-08-01 | End: 2022-08-02 | Stop reason: HOSPADM

## 2022-08-01 RX ORDER — DEXAMETHASONE SODIUM PHOSPHATE 4 MG/ML
INJECTION, SOLUTION INTRA-ARTICULAR; INTRALESIONAL; INTRAMUSCULAR; INTRAVENOUS; SOFT TISSUE AS NEEDED
Status: DISCONTINUED | OUTPATIENT
Start: 2022-08-01 | End: 2022-08-01 | Stop reason: SURG

## 2022-08-01 RX ORDER — OXYCODONE HYDROCHLORIDE 5 MG/1
5 TABLET ORAL EVERY 4 HOURS PRN
Status: DISCONTINUED | OUTPATIENT
Start: 2022-08-01 | End: 2022-08-02 | Stop reason: HOSPADM

## 2022-08-01 RX ORDER — ROCURONIUM BROMIDE 10 MG/ML
INJECTION, SOLUTION INTRAVENOUS AS NEEDED
Status: DISCONTINUED | OUTPATIENT
Start: 2022-08-01 | End: 2022-08-01 | Stop reason: SURG

## 2022-08-01 RX ORDER — SODIUM CHLORIDE 9 MG/ML
9 INJECTION, SOLUTION INTRAVENOUS ONCE
Status: COMPLETED | OUTPATIENT
Start: 2022-08-01 | End: 2022-08-01

## 2022-08-01 RX ORDER — ACETAMINOPHEN 500 MG
1000 TABLET ORAL ONCE
Status: COMPLETED | OUTPATIENT
Start: 2022-08-01 | End: 2022-08-01

## 2022-08-01 RX ORDER — LIDOCAINE HYDROCHLORIDE 10 MG/ML
0.5 INJECTION, SOLUTION EPIDURAL; INFILTRATION; INTRACAUDAL; PERINEURAL ONCE AS NEEDED
Status: COMPLETED | OUTPATIENT
Start: 2022-08-01 | End: 2022-08-01

## 2022-08-01 RX ORDER — NICOTINE POLACRILEX 4 MG
15 LOZENGE BUCCAL
Status: DISCONTINUED | OUTPATIENT
Start: 2022-08-01 | End: 2022-08-02 | Stop reason: HOSPADM

## 2022-08-01 RX ORDER — TRANEXAMIC ACID 10 MG/ML
1000 INJECTION, SOLUTION INTRAVENOUS ONCE
Status: COMPLETED | OUTPATIENT
Start: 2022-08-01 | End: 2022-08-01

## 2022-08-01 RX ORDER — SODIUM CHLORIDE 450 MG/100ML
50 INJECTION, SOLUTION INTRAVENOUS CONTINUOUS
Status: DISCONTINUED | OUTPATIENT
Start: 2022-08-01 | End: 2022-08-02 | Stop reason: HOSPADM

## 2022-08-01 RX ORDER — DEXAMETHASONE SODIUM PHOSPHATE 10 MG/ML
INJECTION, SOLUTION INTRAMUSCULAR; INTRAVENOUS
Status: COMPLETED | OUTPATIENT
Start: 2022-08-01 | End: 2022-08-01

## 2022-08-01 RX ORDER — EPHEDRINE SULFATE 50 MG/ML
INJECTION, SOLUTION INTRAVENOUS AS NEEDED
Status: DISCONTINUED | OUTPATIENT
Start: 2022-08-01 | End: 2022-08-01 | Stop reason: SURG

## 2022-08-01 RX ORDER — SODIUM CHLORIDE, SODIUM LACTATE, POTASSIUM CHLORIDE, CALCIUM CHLORIDE 600; 310; 30; 20 MG/100ML; MG/100ML; MG/100ML; MG/100ML
9 INJECTION, SOLUTION INTRAVENOUS CONTINUOUS PRN
Status: DISCONTINUED | OUTPATIENT
Start: 2022-08-01 | End: 2022-08-01 | Stop reason: HOSPADM

## 2022-08-01 RX ORDER — HYDROMORPHONE HYDROCHLORIDE 1 MG/ML
0.5 INJECTION, SOLUTION INTRAMUSCULAR; INTRAVENOUS; SUBCUTANEOUS
Status: DISCONTINUED | OUTPATIENT
Start: 2022-08-01 | End: 2022-08-02 | Stop reason: HOSPADM

## 2022-08-01 RX ORDER — INSULIN LISPRO 100 [IU]/ML
INJECTION, SOLUTION INTRAVENOUS; SUBCUTANEOUS
Status: COMPLETED
Start: 2022-08-01 | End: 2022-08-01

## 2022-08-01 RX ORDER — PROPOFOL 10 MG/ML
VIAL (ML) INTRAVENOUS AS NEEDED
Status: DISCONTINUED | OUTPATIENT
Start: 2022-08-01 | End: 2022-08-01 | Stop reason: SURG

## 2022-08-01 RX ORDER — FENTANYL CITRATE 50 UG/ML
50 INJECTION, SOLUTION INTRAMUSCULAR; INTRAVENOUS
Status: DISCONTINUED | OUTPATIENT
Start: 2022-08-01 | End: 2022-08-01 | Stop reason: HOSPADM

## 2022-08-01 RX ORDER — BUPIVACAINE HYDROCHLORIDE 2.5 MG/ML
INJECTION, SOLUTION EPIDURAL; INFILTRATION; INTRACAUDAL
Status: COMPLETED | OUTPATIENT
Start: 2022-08-01 | End: 2022-08-01

## 2022-08-01 RX ORDER — BUPIVACAINE HCL/0.9 % NACL/PF 0.125 %
PLASTIC BAG, INJECTION (ML) EPIDURAL AS NEEDED
Status: DISCONTINUED | OUTPATIENT
Start: 2022-08-01 | End: 2022-08-01 | Stop reason: SURG

## 2022-08-01 RX ORDER — ACETAMINOPHEN 325 MG/1
650 TABLET ORAL EVERY 4 HOURS PRN
Status: DISCONTINUED | OUTPATIENT
Start: 2022-08-01 | End: 2022-08-02 | Stop reason: HOSPADM

## 2022-08-01 RX ORDER — LIDOCAINE HYDROCHLORIDE 10 MG/ML
INJECTION, SOLUTION EPIDURAL; INFILTRATION; INTRACAUDAL; PERINEURAL AS NEEDED
Status: DISCONTINUED | OUTPATIENT
Start: 2022-08-01 | End: 2022-08-01 | Stop reason: SURG

## 2022-08-01 RX ORDER — ATENOLOL 50 MG/1
100 TABLET ORAL NIGHTLY
Status: DISCONTINUED | OUTPATIENT
Start: 2022-08-02 | End: 2022-08-02 | Stop reason: HOSPADM

## 2022-08-01 RX ORDER — ACETAMINOPHEN 650 MG/1
650 SUPPOSITORY RECTAL EVERY 4 HOURS PRN
Status: DISCONTINUED | OUTPATIENT
Start: 2022-08-01 | End: 2022-08-02 | Stop reason: HOSPADM

## 2022-08-01 RX ORDER — CEFAZOLIN SODIUM IN 0.9 % NACL 2 G/100 ML
2 PLASTIC BAG, INJECTION (ML) INTRAVENOUS ONCE
Status: COMPLETED | OUTPATIENT
Start: 2022-08-01 | End: 2022-08-01

## 2022-08-01 RX ORDER — CITALOPRAM 20 MG/1
20 TABLET ORAL DAILY
Status: DISCONTINUED | OUTPATIENT
Start: 2022-08-01 | End: 2022-08-02 | Stop reason: HOSPADM

## 2022-08-01 RX ORDER — INSULIN LISPRO 100 [IU]/ML
2 INJECTION, SOLUTION INTRAVENOUS; SUBCUTANEOUS ONCE
Status: COMPLETED | OUTPATIENT
Start: 2022-08-01 | End: 2022-08-01

## 2022-08-01 RX ORDER — TRANEXAMIC ACID 10 MG/ML
1000 INJECTION, SOLUTION INTRAVENOUS ONCE
Status: DISCONTINUED | OUTPATIENT
Start: 2022-08-01 | End: 2022-08-01 | Stop reason: HOSPADM

## 2022-08-01 RX ORDER — ROPIVACAINE HYDROCHLORIDE 2 MG/ML
INJECTION, SOLUTION EPIDURAL; INFILTRATION; PERINEURAL CONTINUOUS
Status: DISCONTINUED | OUTPATIENT
Start: 2022-08-01 | End: 2022-08-02 | Stop reason: HOSPADM

## 2022-08-01 RX ORDER — MIDAZOLAM HYDROCHLORIDE 1 MG/ML
0.5 INJECTION INTRAMUSCULAR; INTRAVENOUS
Status: CANCELLED | OUTPATIENT
Start: 2022-08-01

## 2022-08-01 RX ORDER — SODIUM CHLORIDE 0.9 % (FLUSH) 0.9 %
10 SYRINGE (ML) INJECTION EVERY 12 HOURS SCHEDULED
Status: CANCELLED | OUTPATIENT
Start: 2022-08-01

## 2022-08-01 RX ORDER — HYDROMORPHONE HYDROCHLORIDE 1 MG/ML
0.5 INJECTION, SOLUTION INTRAMUSCULAR; INTRAVENOUS; SUBCUTANEOUS
Status: DISCONTINUED | OUTPATIENT
Start: 2022-08-01 | End: 2022-08-01 | Stop reason: HOSPADM

## 2022-08-01 RX ORDER — FAMOTIDINE 20 MG/1
20 TABLET, FILM COATED ORAL
Status: COMPLETED | OUTPATIENT
Start: 2022-08-01 | End: 2022-08-01

## 2022-08-01 RX ORDER — TOPIRAMATE 25 MG/1
50 TABLET ORAL NIGHTLY
Status: DISCONTINUED | OUTPATIENT
Start: 2022-08-01 | End: 2022-08-02 | Stop reason: HOSPADM

## 2022-08-01 RX ORDER — ONDANSETRON 2 MG/ML
INJECTION INTRAMUSCULAR; INTRAVENOUS AS NEEDED
Status: DISCONTINUED | OUTPATIENT
Start: 2022-08-01 | End: 2022-08-01 | Stop reason: SURG

## 2022-08-01 RX ORDER — INSULIN LISPRO 100 [IU]/ML
0-7 INJECTION, SOLUTION INTRAVENOUS; SUBCUTANEOUS
Status: DISCONTINUED | OUTPATIENT
Start: 2022-08-01 | End: 2022-08-02

## 2022-08-01 RX ADMIN — Medication 100 MCG: at 13:59

## 2022-08-01 RX ADMIN — LISINOPRIL 20 MG: 20 TABLET ORAL at 20:42

## 2022-08-01 RX ADMIN — CITALOPRAM HYDROBROMIDE 20 MG: 20 TABLET ORAL at 18:36

## 2022-08-01 RX ADMIN — SODIUM CHLORIDE 9 ML/HR: 9 INJECTION, SOLUTION INTRAVENOUS at 13:08

## 2022-08-01 RX ADMIN — CEFAZOLIN 2 G: 10 INJECTION, POWDER, FOR SOLUTION INTRAVENOUS at 13:41

## 2022-08-01 RX ADMIN — EPHEDRINE SULFATE 15 MG: 50 INJECTION INTRAVENOUS at 14:47

## 2022-08-01 RX ADMIN — TRANEXAMIC ACID 1000 MG: 10 INJECTION, SOLUTION INTRAVENOUS at 13:50

## 2022-08-01 RX ADMIN — Medication 1250 MG: at 13:50

## 2022-08-01 RX ADMIN — TRANEXAMIC ACID 1000 MG: 10 INJECTION, SOLUTION INTRAVENOUS at 15:21

## 2022-08-01 RX ADMIN — LEVOTHYROXINE SODIUM 175 MCG: 150 TABLET ORAL at 18:36

## 2022-08-01 RX ADMIN — PROPOFOL 150 MG: 10 INJECTION, EMULSION INTRAVENOUS at 13:48

## 2022-08-01 RX ADMIN — BUPIVACAINE HYDROCHLORIDE 30 ML: 2.5 INJECTION, SOLUTION EPIDURAL; INFILTRATION; INTRACAUDAL at 13:25

## 2022-08-01 RX ADMIN — ATORVASTATIN CALCIUM 20 MG: 20 TABLET, FILM COATED ORAL at 18:36

## 2022-08-01 RX ADMIN — TOPIRAMATE 50 MG: 25 TABLET, FILM COATED ORAL at 20:42

## 2022-08-01 RX ADMIN — BUPIVACAINE HYDROCHLORIDE 15 ML: 2.5 INJECTION, SOLUTION EPIDURAL; INFILTRATION; INTRACAUDAL at 13:12

## 2022-08-01 RX ADMIN — FAMOTIDINE 20 MG: 20 TABLET ORAL at 13:04

## 2022-08-01 RX ADMIN — INSULIN LISPRO 2 UNITS: 100 INJECTION, SOLUTION INTRAVENOUS; SUBCUTANEOUS at 18:40

## 2022-08-01 RX ADMIN — INSULIN LISPRO 2 UNITS: 100 INJECTION, SOLUTION INTRAVENOUS; SUBCUTANEOUS at 16:29

## 2022-08-01 RX ADMIN — ACETAMINOPHEN 1000 MG: 500 TABLET ORAL at 13:04

## 2022-08-01 RX ADMIN — SODIUM CHLORIDE 50 ML/HR: 4.5 INJECTION, SOLUTION INTRAVENOUS at 19:27

## 2022-08-01 RX ADMIN — EPHEDRINE SULFATE 15 MG: 50 INJECTION INTRAVENOUS at 14:00

## 2022-08-01 RX ADMIN — DEXAMETHASONE SODIUM PHOSPHATE 8 MG: 4 INJECTION, SOLUTION INTRA-ARTICULAR; INTRALESIONAL; INTRAMUSCULAR; INTRAVENOUS; SOFT TISSUE at 13:53

## 2022-08-01 RX ADMIN — DEXAMETHASONE SODIUM PHOSPHATE 2 MG: 10 INJECTION, SOLUTION INTRAMUSCULAR; INTRAVENOUS at 13:25

## 2022-08-01 RX ADMIN — SODIUM CHLORIDE, POTASSIUM CHLORIDE, SODIUM LACTATE AND CALCIUM CHLORIDE: 600; 310; 30; 20 INJECTION, SOLUTION INTRAVENOUS at 13:44

## 2022-08-01 RX ADMIN — VANCOMYCIN HYDROCHLORIDE 1250 MG: 10 INJECTION, POWDER, LYOPHILIZED, FOR SOLUTION INTRAVENOUS at 20:42

## 2022-08-01 RX ADMIN — INSULIN DETEMIR 15 UNITS: 100 INJECTION, SOLUTION SUBCUTANEOUS at 18:36

## 2022-08-01 RX ADMIN — FENTANYL CITRATE 100 MCG: 50 INJECTION, SOLUTION INTRAMUSCULAR; INTRAVENOUS at 13:12

## 2022-08-01 RX ADMIN — PROPOFOL 25 MCG/KG/MIN: 10 INJECTION, EMULSION INTRAVENOUS at 13:50

## 2022-08-01 RX ADMIN — LIDOCAINE HYDROCHLORIDE 0.5 ML: 10 INJECTION, SOLUTION EPIDURAL; INFILTRATION; INTRACAUDAL; PERINEURAL at 13:05

## 2022-08-01 RX ADMIN — ROCURONIUM BROMIDE 50 MG: 10 INJECTION, SOLUTION INTRAVENOUS at 13:48

## 2022-08-01 RX ADMIN — Medication 100 MCG: at 13:50

## 2022-08-01 RX ADMIN — LIDOCAINE HYDROCHLORIDE 50 MG: 10 INJECTION, SOLUTION EPIDURAL; INFILTRATION; INTRACAUDAL; PERINEURAL at 13:48

## 2022-08-01 RX ADMIN — ONDANSETRON 4 MG: 2 INJECTION INTRAMUSCULAR; INTRAVENOUS at 15:21

## 2022-08-02 VITALS
RESPIRATION RATE: 18 BRPM | OXYGEN SATURATION: 96 % | SYSTOLIC BLOOD PRESSURE: 116 MMHG | HEART RATE: 85 BPM | DIASTOLIC BLOOD PRESSURE: 52 MMHG | TEMPERATURE: 98.8 F

## 2022-08-02 LAB
ANION GAP SERPL CALCULATED.3IONS-SCNC: 7 MMOL/L (ref 5–15)
BASOPHILS # BLD AUTO: 0.02 10*3/MM3 (ref 0–0.2)
BASOPHILS NFR BLD AUTO: 0.2 % (ref 0–1.5)
BUN SERPL-MCNC: 19 MG/DL (ref 8–23)
BUN/CREAT SERPL: 18.8 (ref 7–25)
CALCIUM SPEC-SCNC: 8.3 MG/DL (ref 8.6–10.5)
CHLORIDE SERPL-SCNC: 109 MMOL/L (ref 98–107)
CO2 SERPL-SCNC: 24 MMOL/L (ref 22–29)
CREAT SERPL-MCNC: 1.01 MG/DL (ref 0.57–1)
DEPRECATED RDW RBC AUTO: 47.3 FL (ref 37–54)
EGFRCR SERPLBLD CKD-EPI 2021: 58.5 ML/MIN/1.73
EOSINOPHIL # BLD AUTO: 0 10*3/MM3 (ref 0–0.4)
EOSINOPHIL NFR BLD AUTO: 0 % (ref 0.3–6.2)
ERYTHROCYTE [DISTWIDTH] IN BLOOD BY AUTOMATED COUNT: 14.5 % (ref 12.3–15.4)
GLUCOSE BLDC GLUCOMTR-MCNC: 285 MG/DL (ref 70–130)
GLUCOSE BLDC GLUCOMTR-MCNC: 289 MG/DL (ref 70–130)
GLUCOSE SERPL-MCNC: 299 MG/DL (ref 65–99)
HCT VFR BLD AUTO: 28.2 % (ref 34–46.6)
HGB BLD-MCNC: 9 G/DL (ref 12–15.9)
IMM GRANULOCYTES # BLD AUTO: 0.11 10*3/MM3 (ref 0–0.05)
IMM GRANULOCYTES NFR BLD AUTO: 0.9 % (ref 0–0.5)
LYMPHOCYTES # BLD AUTO: 1.03 10*3/MM3 (ref 0.7–3.1)
LYMPHOCYTES NFR BLD AUTO: 8.2 % (ref 19.6–45.3)
MCH RBC QN AUTO: 28.8 PG (ref 26.6–33)
MCHC RBC AUTO-ENTMCNC: 31.9 G/DL (ref 31.5–35.7)
MCV RBC AUTO: 90.1 FL (ref 79–97)
MONOCYTES # BLD AUTO: 0.61 10*3/MM3 (ref 0.1–0.9)
MONOCYTES NFR BLD AUTO: 4.8 % (ref 5–12)
NEUTROPHILS NFR BLD AUTO: 10.82 10*3/MM3 (ref 1.7–7)
NEUTROPHILS NFR BLD AUTO: 85.9 % (ref 42.7–76)
NRBC BLD AUTO-RTO: 0 /100 WBC (ref 0–0.2)
PLATELET # BLD AUTO: 143 10*3/MM3 (ref 140–450)
PMV BLD AUTO: 11.8 FL (ref 6–12)
POTASSIUM SERPL-SCNC: 4.5 MMOL/L (ref 3.5–5.2)
RBC # BLD AUTO: 3.13 10*6/MM3 (ref 3.77–5.28)
SODIUM SERPL-SCNC: 140 MMOL/L (ref 136–145)
WBC NRBC COR # BLD: 12.59 10*3/MM3 (ref 3.4–10.8)

## 2022-08-02 PROCEDURE — 82962 GLUCOSE BLOOD TEST: CPT

## 2022-08-02 PROCEDURE — 80048 BASIC METABOLIC PNL TOTAL CA: CPT | Performed by: ORTHOPAEDIC SURGERY

## 2022-08-02 PROCEDURE — 97166 OT EVAL MOD COMPLEX 45 MIN: CPT

## 2022-08-02 PROCEDURE — 97162 PT EVAL MOD COMPLEX 30 MIN: CPT

## 2022-08-02 PROCEDURE — 97535 SELF CARE MNGMENT TRAINING: CPT

## 2022-08-02 PROCEDURE — 63710000001 INSULIN DETEMIR PER 5 UNITS: Performed by: INTERNAL MEDICINE

## 2022-08-02 PROCEDURE — 97110 THERAPEUTIC EXERCISES: CPT

## 2022-08-02 PROCEDURE — 85025 COMPLETE CBC W/AUTO DIFF WBC: CPT | Performed by: ORTHOPAEDIC SURGERY

## 2022-08-02 PROCEDURE — 63710000001 INSULIN LISPRO (HUMAN) PER 5 UNITS

## 2022-08-02 RX ORDER — INSULIN LISPRO 100 [IU]/ML
0-9 INJECTION, SOLUTION INTRAVENOUS; SUBCUTANEOUS
Status: DISCONTINUED | OUTPATIENT
Start: 2022-08-02 | End: 2022-08-02 | Stop reason: HOSPADM

## 2022-08-02 RX ORDER — DICLOFENAC SODIUM AND MISOPROSTOL 75; 200 MG/1; UG/1
1 TABLET, DELAYED RELEASE ORAL DAILY
Start: 2022-08-02

## 2022-08-02 RX ORDER — DOCUSATE SODIUM 100 MG/1
100 CAPSULE, LIQUID FILLED ORAL 2 TIMES DAILY
Qty: 30 CAPSULE | Refills: 0 | Status: SHIPPED | OUTPATIENT
Start: 2022-08-02 | End: 2022-08-17

## 2022-08-02 RX ORDER — ROPIVACAINE HYDROCHLORIDE 2 MG/ML
1 INJECTION, SOLUTION EPIDURAL; INFILTRATION; PERINEURAL CONTINUOUS
Qty: 500 ML | Refills: 0
Start: 2022-08-02

## 2022-08-02 RX ORDER — OXYCODONE HYDROCHLORIDE 5 MG/1
5 TABLET ORAL EVERY 4 HOURS PRN
Qty: 25 TABLET | Refills: 0 | Status: SHIPPED | OUTPATIENT
Start: 2022-08-02 | End: 2022-08-11

## 2022-08-02 RX ADMIN — INSULIN LISPRO 6 UNITS: 100 INJECTION, SOLUTION INTRAVENOUS; SUBCUTANEOUS at 09:04

## 2022-08-02 RX ADMIN — OXYCODONE 5 MG: 5 TABLET ORAL at 03:17

## 2022-08-02 RX ADMIN — ATORVASTATIN CALCIUM 20 MG: 20 TABLET, FILM COATED ORAL at 09:04

## 2022-08-02 RX ADMIN — CITALOPRAM HYDROBROMIDE 20 MG: 20 TABLET ORAL at 09:04

## 2022-08-02 RX ADMIN — INSULIN DETEMIR 15 UNITS: 100 INJECTION, SOLUTION SUBCUTANEOUS at 09:03

## 2022-08-02 RX ADMIN — LEVOTHYROXINE SODIUM 175 MCG: 150 TABLET ORAL at 05:18

## 2022-08-02 RX ADMIN — PANTOPRAZOLE SODIUM 40 MG: 40 TABLET, DELAYED RELEASE ORAL at 09:04

## 2022-09-08 ENCOUNTER — HOSPITAL ENCOUNTER (OUTPATIENT)
Dept: PHYSICAL THERAPY | Facility: HOSPITAL | Age: 77
Setting detail: THERAPIES SERIES
Discharge: HOME | End: 2022-09-08
Payer: MEDICARE

## 2022-09-08 PROCEDURE — 97110 THERAPEUTIC EXERCISES: CPT

## 2022-09-08 PROCEDURE — 97162 PT EVAL MOD COMPLEX 30 MIN: CPT

## 2022-09-08 ASSESSMENT — PAIN DESCRIPTION - ORIENTATION: ORIENTATION: LEFT

## 2022-09-08 ASSESSMENT — PAIN DESCRIPTION - LOCATION: LOCATION: SHOULDER

## 2022-09-08 ASSESSMENT — PAIN - FUNCTIONAL ASSESSMENT: PAIN_FUNCTIONAL_ASSESSMENT: PREVENTS OR INTERFERES SOME ACTIVE ACTIVITIES AND ADLS

## 2022-09-08 ASSESSMENT — PAIN SCALES - GENERAL: PAINLEVEL_OUTOF10: 2

## 2022-09-08 ASSESSMENT — PAIN DESCRIPTION - DESCRIPTORS: DESCRIPTORS: ACHING

## 2022-09-08 ASSESSMENT — PAIN DESCRIPTION - FREQUENCY: FREQUENCY: INTERMITTENT

## 2022-09-08 ASSESSMENT — PAIN DESCRIPTION - PAIN TYPE: TYPE: SURGICAL PAIN

## 2022-09-08 NOTE — PROGRESS NOTES
Physical Therapy: Initial Evaluation    Patient: Cyndi Vargas (20 y.o. 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 PERTINENT MEDICAL HISTORY           Medical History: Chart Reviewed: Yes   Past Medical History:   Diagnosis Date    Anxiety     Chronic back pain     Hiatal hernia     Hypertension     Inflammatory polyarthropathy (HCC)     Obesity     Osteoarthritis     right shoulder    Osteopenia     Reflux esophagitis     Type II or unspecified type diabetes mellitus without mention of complication, not stated as uncontrolled     Urinary incontinence     Vitamin B12 deficiency      Surgical History:   Past Surgical History:   Procedure Laterality Date    CARPAL TUNNEL RELEASE      CHOLECYSTECTOMY      FRACTURE SURGERY      t12    HEMORRHOID SURGERY      DC REPAIR ROTATOR CUFF,ACUTE  2014    Rotator Cuff Repair    SHOULDER SURGERY      TOTAL THYROIDECTOMY  2015       Medications:   Current Outpatient Medications:     triamcinolone (KENALOG) 0.1 % cream, Apply topically 2 times daily. , Disp: 80 g, Rfl: 0    simvastatin (ZOCOR) 40 MG tablet, TAKE ONE TABLET BY MOUTH NIGHTLY, Disp: 30 tablet, Rfl: 5    methylPREDNISolone (MEDROL, CARMEN,) 4 MG tablet, Take with food. , Disp: 1 kit, Rfl: 0    fluticasone (FLONASE) 50 MCG/ACT nasal spray, 2 sprays by Each Nostril route daily, Disp: 48 g, Rfl: 5    SITagliptin (JANUVIA) 100 MG tablet, Take 1 tablet by mouth daily, Disp: 28 tablet, Rfl: 2    fexofenadine (ALLEGRA ALLERGY) 180 MG tablet, Take 1 tablet by mouth daily as needed (As needed for allergies) (Patient not taking: Reported on 9/2/2021), Disp: 30 tablet, Rfl: 5    albuterol sulfate HFA (VENTOLIN HFA) 108 (90 Base) MCG/ACT inhaler, Inhale 2 puffs into the lungs every 4 hours as needed for Wheezing, Disp: 1 Inhaler, Rfl: 5    loratadine (CLARITIN) 10 MG tablet, TAKE ONE TABLET BY MOUTH DAILY AS NEEDED (ALLERGIES), Disp: 30 tablet, Rfl: 5    levothyroxine (SYNTHROID) 150 MCG tablet, Take 1 tablet by mouth daily, Disp: 90 tablet, Rfl: 1    vitamin D (CHOLECALCIFEROL) 25 MCG (1000 UT) TABS tablet, Take 1 tablet by mouth nightly, Disp: 30 tablet, Rfl: 5    diclofenac (VOLTAREN) 75 MG EC tablet, Take 1 tablet by mouth 2 times daily, Disp: 60 tablet, Rfl: 5    lisinopril (PRINIVIL;ZESTRIL) 20 MG tablet, Dose increased TAKE 1 TABLET BY MOUTH ONCE DAILY, Disp: 30 tablet, Rfl: 5    atenolol (TENORMIN) 100 MG tablet, TAKE 1 TABLET BY MOUTH ONCE DAILY, Disp: 30 tablet, Rfl: 5    mirabegron (MYRBETRIQ) 50 MG TB24, TAKE 1 TABLET BY MOUTH ONCE DAILY, Disp: 30 tablet, Rfl: 5    citalopram (CELEXA) 20 MG tablet, TAKE 1 TABLET BY MOUTH DAILY FOR ANXIETY, Disp: 30 tablet, Rfl: 5    glipiZIDE (GLUCOTROL XL) 10 MG extended release tablet, TAKE 1 TABLET BY MOUTH TWICE A DAY, Disp: 60 tablet, Rfl: 5    esomeprazole (NEXIUM 24HR) 20 MG delayed release capsule, TAKE TWO CAPSULES BY MOUTH TWO TIMES DAILY, Disp: 120 capsule, Rfl: 5    blood glucose monitor kit and supplies, DX E11.9 TID, Disp: 1 kit, Rfl: 0    blood glucose monitor strips, Test TID DX E11.9, Disp: 100 strip, Rfl: 3    Lancets MISC, 1 each by Does not apply route 3 times daily DX E11.9 TID, Disp: 200 each, Rfl: 5    cyanocobalamin 1000 MCG/ML injection, Inject 1 mL into the muscle every 14 days, Disp: 2 mL, Rfl: 5    Syringe, Disposable, (GLASPAK DISP SYRINGE 2.5CC) 2.5 ML MISC, For IM B12 injection q2w, Disp: 2 each, Rfl: 5    methylPREDNISolone (MEDROL, CARMEN,) 4 MG tablet, Take with food. (Patient not taking: Reported on 6/3/2021), Disp: 1 kit, Rfl: 0    clonazePAM (KLONOPIN) 1 MG tablet, Take 0.5-1 mg by mouth., Disp: , Rfl:     topiramate (TOPAMAX) 50 MG tablet, take 3 tablets by mouth at bedtime, Disp: , Rfl: 0    fentaNYL (DURAGESIC) 25 MCG/HR, Place 1 patch onto the skin every 72 hours. (Patient not taking: Reported on 6/3/2021), Disp: , Rfl:     oxyCODONE HCl (OXY-IR) 10 MG immediate release tablet, Take 10 mg by mouth every 6 hours as needed for Pain., Disp: , Rfl:     Lancets MISC, QD and PRN, Disp: 50 each, Rfl: 5    Current Facility-Administered Medications:     cyanocobalamin injection 1,000 mcg, 1,000 mcg, IntraMUSCular, Once, TING Vargas    cyanocobalamin injection 1,000 mcg, 1,000 mcg, IntraMUSCular, Once, Su Mercedes APRN  Allergies: Allopurinol, Augmentin [amoxicillin-pot clavulanate], Lyrica [pregabalin], Metformin and related, Morphine, Nsaids, and Oxycontin [oxycodone]      SUBJECTIVE EXAMINATION     History obtained from[de-identified] Patient, Chart Review,      Family/Caregiver Present: No    Subjective History: Onset Date: 08/01/22  Subjective: Pt states on Aug 1 pt had a reverse total shoulder procedure to her L shoulder. She states that 3 years ago she fell in [place] and fell and broke it and had a plate and screw put in her shoulder. It took 12-15 months to get feeling back into her shoulder. She had a lot of pain which led her to get an ortho consult and led her to receiving a Reverse total shoulder surgery.   Additional Pertinent Hx (if applicable):            Learning/Language: Learning  What is the preferred language of the patient/guardian?: English     Pain Screening   Pain Screening  Patient Currently in Pain: Yes  Pain Assessment: 0-10  Pain Level: 2  Best Pain Level: 2  Worst Pain Level: 7  Pain Type: Surgical pain  Pain Location: Shoulder  Pain Orientation: Left  Pain 3+/5  L Elbow Flexion: 4/5  L Elbow Extension: 4-/5     WNL     Muscle Length/Flexibility:      Additional Finding(s) (if applicable):      Dash 483% disability    ASSESSMENT     Impression: Assessment: Pt is a 79 y/o female that underwent a reverse total shoulder procedure to her L shoulder. Upon evaluation, pt presented with significant decrease in ROM, increased pain, decreased sensation, poor postural control and decreased functional status with her L shoulder. Pt will benefit from skilled PT services to address pt impairments in order to maximize QOL and LOF. Body Structures, Functions, Activity Limitations Requiring Skilled Therapeutic Intervention: Decreased functional mobility , Decreased ADL status, Decreased ROM, Decreased body mechanics, Decreased strength, Decreased endurance, Increased pain    Statement of Medical Necessity: Physical Therapy is both indicated and medically necessary as outlined in the POC to increase the likelihood of meeting the functionally related goals stated below. Patient's Activity Tolerance: Patient tolerated treatment well, Patient tolerated evaluation without incident, Patient limited by fatigue, Patient limited by pain, Patient limited by endurance      Patient's rehabilitation potential/prognosis is considered to be: Fair    Factors which may impact rehabilitation potential include: Age, Chronicity of problem        GOALS   Patient Goal(s):    Short Term Goals Completed by   Goal Status   pt to be I with HEP     Pt to improve L shoulder flexion AROM to 0-90     Pt to improve L shoulder ABD AROM to 0-90     Pt to have 0/10 pain upon L shoulder palpation                                             Long Term Goals Completed by   Goal Status   Pt to improve Dash score to 10% or less.      Pt to improve L shoulder flexion AROM to 0-120     Pt to improve L shoulder ABD AROM to 0-120     Pt to have 0/10 pain in L shoulder during AROM into all planes     Pt to improve gross LUE shoulder str to 4+/5                                        TREATMENT PLAN       Requires PT Follow-Up: Yes    Pt. actively involved in establishing Plan of Care and Goals: Yes  Patient/ Caregiver education and instruction: Goals, PT Role, Plan of Care, Evaluative findings, Home Exercise Program, Precautions, Body mechanics             Treatment may include any combination of the following: ROM, Endurance training, Neuromuscular re-education, Therapeutic activities, Modalities, Safety education & training, Integrated dry needling, Home exercise program, Pain management, Manual Therapy - Soft Tissue Mobilization, Strengthening     Frequency / Duration:  Patient to be seen 2-3x week for 8 weeks weeks      Eval Complexity:    Decision Making: Medium Complexity    PT Treatment Completed:  Exercises:  Therapeutic exercise (CPT 87830)   Treatment Reasoning    Exercise 1: pulleys 1x5\"  Exercise 2: Table slide flexion 3x10 L  Exercise 3: Table slide ABD 3x10 L  Exercise 4: Pendulums 30 each way  Exercise 5: AAROM ER Dowel 3x10  Exercise 6: AAROM ABD Dowel 3x10  Exercise 7: AAROM ER Dowel 3x10    Pt educated with HEP above    Next time:     Wall slides ABD/Flex 2x10  HH depression at 40-90 degree range 1x15    Manual tx: Shoulder Flex, Abd PROM    Limitations addressed: Mobility, Strength, Pain modulation, Posture                        Therapist Signature: Angel Davila, PT    Date: 8/0/7805     I certify that the above Therapy Services are being furnished while the patient is under my care. I agree with the treatment plan and certify that this therapy is necessary. [de-identified] Signature:  ___________________________   Date:_______                                                                   Khalida Frazier PA-C        Physician Comments: _______________________________________________    Please sign and return to [address] [facility]. Please fax to the location listed below.  [address] for this referral!    [address]  [facility]  [address] [facility] [address]  Dept: [address]: [phone]

## 2022-09-12 ENCOUNTER — HOSPITAL ENCOUNTER (OUTPATIENT)
Dept: PHYSICAL THERAPY | Facility: HOSPITAL | Age: 77
Setting detail: THERAPIES SERIES
Discharge: HOME | End: 2022-09-12
Payer: MEDICARE

## 2022-09-12 PROCEDURE — 97140 MANUAL THERAPY 1/> REGIONS: CPT

## 2022-09-12 PROCEDURE — 97110 THERAPEUTIC EXERCISES: CPT

## 2022-09-12 NOTE — FLOWSHEET NOTE
Physical Therapy Daily Treatment Note   Date:  2022    TIme In: 9425                      Time Out: 1437    Patient Name:  Margo Vega    :    MRN: [id number]    Restrictions/Precautions:    Pertinent Medical History:  Medical/Treatment Diagnosis Information:  H/O total shoulder replacement [X20.732]     Insurance/Certification information:  Payor: MEDICARE / Plan: MEDICARE PART B / Product Type: *No Product type* /   Physician Information:  Chidi Espinoza PA-C  Plan of care signed (Y/N):    Visit# / total visits:     2 /    G-Code (if applicable):      Date / Visit # G-Code Applied:         Progress Note: []  Yes  [x]  No  Next due by: Visit #10       Pain level:   0/10  Subjective: Pt states she has been able to perform her exercises at home with increased pain this weekend. Objective:  Observation:   Test measurements:    Palpation:    Exercises:  Exercise Resistance/Repetitions Other comments   pulleys  1x5\" 12   Table slide flexion 3x10 L 12   Table slide ABD 3x10 L 12   Pendulums  30 each way 12   AAROM ER Dowel  3x10 12   AAROM ABD Dowel  3x10 12   AAROM IR Dowel  3x10 (D/c next time)   Pole walk in flex  3x 12   Pole walk in ABD   3x 12   Wall slides ABD/Flex AAROM 2x10 12   HH depression at 40-90 degree range  1x15 2s hold 12   D1/D2 in supine 1x10 12               Other Therapeutic Activities:      Manual Treatments:  STM with Transverse friction to pec major, PROM into flex and abd 18'    Modalities:        Timed Code Treatment Minutes:  59      Total Treatment Minutes:  65    Treatment/Activity Tolerance:  [x] Patient tolerated treatment well [x] Patient limited by fatigue  [] Patient limited by pain  [] Patient limited by other medical complications  [x] Other: Pt able to tolerate exercise routine with additional exercises. Mod cues for proper form. Pt able to demonstrate improved PROM with manual tx.      Pain after treatment:      0/10    Prognosis: [x] Good [] Fair  [] Poor    Patient Requires Follow-up: [x] Yes  [] No    Plan:   [x] Continue per plan of care [] Alter current plan (see comments)  [] Plan of care initiated [] Hold pending MD visit [] Discharge    Plan for Next Session:        Electronically signed by:  Angel Davila, PT

## 2022-09-14 ENCOUNTER — HOSPITAL ENCOUNTER (OUTPATIENT)
Dept: PHYSICAL THERAPY | Facility: HOSPITAL | Age: 77
Setting detail: THERAPIES SERIES
Discharge: HOME | End: 2022-09-14
Payer: MEDICARE

## 2022-09-14 PROCEDURE — 97150 GROUP THERAPEUTIC PROCEDURES: CPT

## 2022-09-14 NOTE — FLOWSHEET NOTE
plan of care [] Alter current plan (see comments)  [] Plan of care initiated [] Hold pending MD visit [] Discharge    Plan for Next Session:        Electronically signed by:  James Alvarez PT

## 2022-09-19 ENCOUNTER — HOSPITAL ENCOUNTER (OUTPATIENT)
Dept: PHYSICAL THERAPY | Facility: HOSPITAL | Age: 77
Setting detail: THERAPIES SERIES
Discharge: HOME | End: 2022-09-19
Payer: MEDICARE

## 2022-09-19 PROCEDURE — 97140 MANUAL THERAPY 1/> REGIONS: CPT

## 2022-09-19 PROCEDURE — 97110 THERAPEUTIC EXERCISES: CPT

## 2022-09-19 NOTE — FLOWSHEET NOTE
Physical Therapy Daily Treatment Note   Date:  2022    TIme In:     1333                  Time Out: 1427    Patient Name:  Pema Santiago    :    MRN: [id number]    Restrictions/Precautions:    Pertinent Medical History:  Medical/Treatment Diagnosis Information:  H/O total shoulder replacement [R34.800]     Insurance/Certification information:  Payor: MEDICARE / Plan: MEDICARE PART B / Product Type: *No Product type* /   Physician Information:  Haylie Rodney PA-C  Plan of care signed (Y/N):    Visit# / total visits:     4 /    G-Code (if applicable):      Date / Visit # G-Code Applied:         Progress Note: []  Yes  [x]  No  Next due by: Visit #10       Pain level:   0/10  Subjective: Pt states she felt good when she left here Thursday but this weekend she has hurt and had soreness, numbness, and burning. Objective:  Observation:   Test measurements:    Palpation:    Exercises:  Exercise Resistance/Repetitions Other comments   pulleys  1x5\" 19   Table slide flexion 3x10 L 19   Table slide ABD 3x10 L 19   Pendulums  30 each way 19   AAROM ER Dowel  3x10 19   AAROM ABD Dowel  3x10 19   Pole walk in flex  3x 19   Pole walk in ABD   3x 19   Wall slides ABD/Flex (AAROM) 2x10 19   HH depression at 40-90 degree range  1x15 2s hold 19   D1/D2 in supine 1x15 19               Other Therapeutic Activities:      Manual Treatments:  STM with Transverse friction to pec major, PROM into flex and abd 12'    Modalities:        Timed Code Treatment Minutes:  50      Total Treatment Minutes:  55    Treatment/Activity Tolerance:  [x] Patient tolerated treatment well [x] Patient limited by fatigue  [] Patient limited by pain  [] Patient limited by other medical complications  [x] Other: Pt completed tx with no pain and mod muscle tension in left pec major.      Pain after treatment:      0/10    Prognosis: [x] Good [] Fair  [] Poor    Patient Requires Follow-up: [x] Yes  [] No    Plan:   [x] Continue per plan of care [] Alter current plan (see comments)  [] Plan of care initiated [] Hold pending MD visit [] Discharge    Plan for Next Session:        Electronically signed by:  Anat Snowden PTA

## 2022-09-21 ENCOUNTER — HOSPITAL ENCOUNTER (OUTPATIENT)
Dept: PHYSICAL THERAPY | Facility: HOSPITAL | Age: 77
Setting detail: THERAPIES SERIES
Discharge: HOME | End: 2022-09-21
Payer: MEDICARE

## 2022-09-21 PROCEDURE — 97140 MANUAL THERAPY 1/> REGIONS: CPT

## 2022-09-21 PROCEDURE — 97150 GROUP THERAPEUTIC PROCEDURES: CPT

## 2022-09-21 PROCEDURE — 97110 THERAPEUTIC EXERCISES: CPT

## 2022-09-21 NOTE — FLOWSHEET NOTE
Physical Therapy Daily Treatment Note   Date:  2022    TIme In:     6695                  Time Out: 1443    Patient Name:  Celestine Dahl    :  [birth year]  MRN: [id number]    Restrictions/Precautions:    Pertinent Medical History:  Medical/Treatment Diagnosis Information:  H/O total shoulder replacement [X96.299]     Insurance/Certification information:  Payor: MEDICARE / Plan: MEDICARE PART B / Product Type: *No Product type* /   Physician Information:  Elberta Phoenix, PA-C  Plan of care signed (Y/N):    Visit# / total visits:     5 /    G-Code (if applicable):      Date / Visit # G-Code Applied:         Progress Note: []  Yes  [x]  No  Next due by: Visit #10       Pain level:   0/10  Subjective: Pt states she feels like her shoulder is getting better. Objective:  Observation:   Test measurements:    Palpation:    Exercises:  Exercise Resistance/Repetitions Other comments   pulleys  1x5\" 21   Table slide flexion 3x10 L 21   Table slide ABD 3x10 L 21   Pendulums  30 each way 21   AAROM ER Dowel  3x10 21   AAROM ABD Dowel  3x10 21   Pole walk in flex  7x 21   Pole walk in ABD   7x 21   Wall slides ABD/Flex (AAROM) 2x10 21   HH depression at 40-90 degree range  1x15 2s hold 21   D1/D2 in supine 1x15 21               Other Therapeutic Activities:      Manual Treatments:  STM with Transverse friction to pec major, PROM into flex and abd 15'    Modalities:        Timed Code Treatment Minutes:  62-32'group, 15'ther-ex, 15'man      Total Treatment Minutes:  68    Treatment/Activity Tolerance:  [x] Patient tolerated treatment well [x] Patient limited by fatigue  [] Patient limited by pain  [] Patient limited by other medical complications  [x] Other: Pt completed tx with no pain and improved ROM post tx. Pt able to improve repetitions with exercises this reporting period.      Pain after treatment:      0/10    Prognosis: [x] Good [] Fair  [] Poor    Patient Requires Follow-up: [x] Yes  [] No    Plan:   [x] Continue per plan of care [] Alter current plan (see comments)  [] Plan of care initiated [] Hold pending MD visit [] Discharge    Plan for Next Session:        Electronically signed by:  Candy Leigh PT

## 2022-09-26 ENCOUNTER — HOSPITAL ENCOUNTER (OUTPATIENT)
Dept: PHYSICAL THERAPY | Facility: HOSPITAL | Age: 77
Setting detail: THERAPIES SERIES
Discharge: HOME | End: 2022-09-26
Payer: MEDICARE

## 2022-09-26 PROCEDURE — 97110 THERAPEUTIC EXERCISES: CPT

## 2022-09-26 PROCEDURE — 97140 MANUAL THERAPY 1/> REGIONS: CPT

## 2022-09-26 NOTE — FLOWSHEET NOTE
Physical Therapy Daily Treatment Note   Date:  2022    TIme In:       1356               Time Out: 1546    Patient Name:  Theo King    :  1945  MRN: [id number]    Restrictions/Precautions:    Pertinent Medical History:  Medical/Treatment Diagnosis Information:  H/O total shoulder replacement [P08.875]     Insurance/Certification information:  Payor: MEDICARE / Plan: MEDICARE PART B / Product Type: *No Product type* /   Physician Information:  Caron Rosales PA-C  Plan of care signed (Y/N):    Visit# / total visits:     6 /    G-Code (if applicable):      Date / Visit # G-Code Applied:         Progress Note: []  Yes  [x]  No  Next due by: Visit #10       Pain level:   0/10  Subjective: Pt states her sh is hurting more than normal today but she has done 6 loads of laundry and a lot more driving than normal.     Objective:  Observation:   Test measurements:    Palpation:    Exercises:  Exercise Resistance/Repetitions Other comments   pulleys  1x5\" 26   Table slide flexion 3x10 L 26   Table slide ABD 3x10 L 26   Pendulums  30 each way 26   AAROM ER Dowel  3x10 26   AAROM ABD Dowel  3x10 26   Pole walk in flex  7x 26   Pole walk in ABD   7x 26   Wall slides ABD/Flex (AAROM) 2x10 26   HH depression at 40-90 degree range  1x15 2s hold 26   D1/D2 in supine 1x15 26               Other Therapeutic Activities:      Manual Treatments:  STM with Transverse friction to pec major, PROM into flex and abd 15'    Modalities:  MH x 10' to left sh. Timed Code Treatment Minutes:  50    Total Treatment Minutes:  60    Treatment/Activity Tolerance:  [x] Patient tolerated treatment well [] Patient limited by fatigue  [] Patient limited by pain  [] Patient limited by other medical complications  [x] Other: Pt had difficulty with standing AAROM activities today and had mod muscle tension in left pec and post delt.     Pain after treatment:      0/10    Prognosis: [x] Good [] Fair  [] Poor    Patient Requires Follow-up: [x] Yes  [] No    Plan:   [x] Continue per plan of care [] Alter current plan (see comments)  [] Plan of care initiated [] Hold pending MD visit [] Discharge    Plan for Next Session:        Electronically signed by:  Nahid Snowden PTA

## 2022-09-28 ENCOUNTER — HOSPITAL ENCOUNTER (OUTPATIENT)
Dept: PHYSICAL THERAPY | Facility: HOSPITAL | Age: 77
Setting detail: THERAPIES SERIES
Discharge: HOME | End: 2022-09-28
Payer: MEDICARE

## 2022-09-28 PROCEDURE — 97140 MANUAL THERAPY 1/> REGIONS: CPT

## 2022-09-28 PROCEDURE — 97110 THERAPEUTIC EXERCISES: CPT

## 2022-09-28 NOTE — FLOWSHEET NOTE
Physical Therapy Daily Treatment Note   Date:  2022    TIme In:       1332               Time Out: 1430    Patient Name:  Joycelyn Hamm    :    MRN: [id number]    Restrictions/Precautions:    Pertinent Medical History:  Medical/Treatment Diagnosis Information:  H/O total shoulder replacement [A53.884]     Insurance/Certification information:  Payor: MEDICARE / Plan: MEDICARE PART B / Product Type: *No Product type* /   Physician Information:  Jessica Nicole PA-C  Plan of care signed (Y/N):    Visit# / total visits:     7 /    G-Code (if applicable):      Date / Visit # G-Code Applied:         Progress Note: []  Yes  [x]  No  Next due by: Visit #10       Pain level:   0/10  Subjective: Pt reports she hurt her shoulder from driving the car. Objective:  Observation:   Test measurements:    Palpation:    Exercises:  Exercise Resistance/Repetitions Other comments   pulleys  1x5\" 28   Table slide flexion 3x10 L 28   Table slide ABD 3x10 L 28   Pendulums  30 each way 28   AAROM ER Dowel  3x10 28   AAROM ABD Dowel  3x10 28   Pole walk in flex  7x 28   Pole walk in ABD   7x 28   Wall slides ABD/Flex (AAROM) 2x10 28   HH depression at 40-90 degree range  1x15 2s hold 28   D1/D2 in supine 1x15 28               Other Therapeutic Activities: There-ex observed by Tarah Garcia PTA. Manual Treatments:  STM with Transverse friction to pec major, PROM into flex and abd, contract relax x15'    Modalities:  MH x 10' to left sh. (Not today)      Timed Code Treatment Minutes:  54    Total Treatment Minutes:  58    Treatment/Activity Tolerance:  [x] Patient tolerated treatment well [] Patient limited by fatigue  [] Patient limited by pain  [] Patient limited by other medical complications  [x] Other: Pt shows improved muscle recruitment and improved mobility in shoulder.     Pain after treatment:      0/10    Prognosis: [x] Good [] Fair  [] Poor    Patient Requires Follow-up: [x] Yes  [] No    Plan:   [x] Continue per plan of care [] Alter current plan (see comments)  [] Plan of care initiated [] Hold pending MD visit [] Discharge    Plan for Next Session:        Electronically signed by:  Angel Davila PT

## 2022-10-03 ENCOUNTER — HOSPITAL ENCOUNTER (OUTPATIENT)
Dept: PHYSICAL THERAPY | Facility: HOSPITAL | Age: 77
Setting detail: THERAPIES SERIES
Discharge: HOME | End: 2022-10-03
Payer: MEDICARE

## 2022-10-03 PROCEDURE — 97110 THERAPEUTIC EXERCISES: CPT

## 2022-10-03 PROCEDURE — 97140 MANUAL THERAPY 1/> REGIONS: CPT

## 2022-10-03 NOTE — FLOWSHEET NOTE
Physical Therapy Daily Treatment Note   Date:  10/3/2022    TIme In:   2331                   Time Out: 7449    Patient Name:  Marcus Montenegro    :  1945  MRN: [id number]    Restrictions/Precautions:    Pertinent Medical History:  Medical/Treatment Diagnosis Information:  H/O total shoulder replacement [K76.393]     Insurance/Certification information:  Payor: MEDICARE / Plan: MEDICARE PART B / Product Type: *No Product type* /   Physician Information:  oRbert Vinson PA-C  Plan of care signed (Y/N):    Visit# / total visits:     8 /    G-Code (if applicable):      Date / Visit # G-Code Applied:         Progress Note: []  Yes  [x]  No  Next due by: Visit #10       Pain level:   0/10  Subjective: Pt reports her sh has hurt all weekend and is sore but she took a pain pill before she came in.     Objective:  Observation:   Test measurements:    Palpation:     Exercises:  Exercise Resistance/Repetitions Other comments   pulleys  1x5\" 3   Table slide flexion 3x10 L 3   Table slide ABD 3x10 L 3   Pendulums  30 each way 3   AAROM ER Dowel  3x10 3   AAROM ABD Dowel  3x10 3   Pole walk in flex  7x 3   Pole walk in ABD   7x 3   Wall slides ABD/Flex (AAROM) 2x10 3   HH depression at 40-90 degree range  2x10 2s hold 3   D1/D2 in supine 1x15 3               Other Therapeutic Activities:      Manual Treatments:  STM with Transverse friction to pec major, PROM into flex and abd, contract relax x15'    Modalities:  MH x 15' to left sh. Timed Code Treatment Minutes:  60    Total Treatment Minutes:  79    Treatment/Activity Tolerance:  [x] Patient tolerated treatment well [] Patient limited by fatigue  [] Patient limited by pain  [] Patient limited by other medical complications  [x] Other: Pt completed tx with no pain and improved PROM today in all planes of left sh motion.     Pain after treatment:      \"okay\"/10    Prognosis: [x] Good [] Fair  [] Poor    Patient Requires Follow-up: [x] Yes  [] No    Plan:   [x] Continue per plan of care [] Alter current plan (see comments)  [] Plan of care initiated [] Hold pending MD visit [] Discharge    Plan for Next Session:        Electronically signed by:  Johnathon Snowden PTA

## 2022-10-05 ENCOUNTER — HOSPITAL ENCOUNTER (OUTPATIENT)
Dept: PHYSICAL THERAPY | Facility: HOSPITAL | Age: 77
Setting detail: THERAPIES SERIES
Discharge: HOME | End: 2022-10-05
Payer: MEDICARE

## 2022-10-05 PROCEDURE — 97150 GROUP THERAPEUTIC PROCEDURES: CPT

## 2022-10-05 PROCEDURE — 97110 THERAPEUTIC EXERCISES: CPT

## 2022-10-05 NOTE — FLOWSHEET NOTE
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 Objective:  Observation:   Test measurements:  DASH 45%   Strength LUE  L Shoulder Flexion: 3+/5  L Shoulder Extension: 4+/5  L Shoulder ABduction: 4-/5  L Shoulder Internal Rotation: 4-/5  L Shoulder External Rotation: 3+/5  L Elbow Flexion: 4+/5  L Elbow Extension: 4/5  AROM LUE (degrees)  L Shoulder Flexion 0-180: 0-60  L Shoulder ABduction 0-180: 0-76  L Shoulder Int Rotation  0-70: WNL  L Shoulder Ext Rotation  0-90: 0-23  Palpation: Tenderness present upon palpation. Exercises:  Exercise Resistance/Repetitions Other comments   pulleys  1x5\" 5   Table slide flexion 3x10 L 5   Table slide ABD 3x10 L 5   Pendulums  30 each way 5   AAROM ER Dowel  3x10 5   AAROM ABD Dowel  3x10 5   Pole walk in flex  7x 5   Pole walk in ABD   7x 5   Wall slides ABD/Flex (AAROM) 2x10 5   HH depression at 40-90 degree range in flex and ABD 2x10 2s hold 5   D1/D2 in supine 1x15 5               Other Therapeutic Activities:      Manual Treatments:  STM with Transverse friction to pec major, PROM into flex and abd, contract relax x15' (not done d/t time restraints)     Modalities:  MH x 10' to left sh.        Timed Code Treatment Minutes:  x 53, x23 group, x29 ther-ex    Total Treatment Minutes:  76    GOALS   Patient Goal(s):    Short Term Goals Completed by   Goal Status   pt to be I with HEP MET   Pt to improve L shoulder flexion AROM to 0-90 Improving   Pt to improve L shoulder ABD AROM to 0-90 Improving   Pt to have 0/10 pain upon L shoulder palpation NOT MET                 Long Term Goals Completed by   Goal Status   Pt to improve Dash score to 10% or less. Improving    Pt to improve L shoulder flexion AROM to 0-120 improving   Pt to improve L shoulder ABD AROM to 0-120 improving   Pt to have 0/10 pain in L shoulder during AROM into all planes improving   Pt to improve gross LUE shoulder str to 4+/5 improving                Treatment/Activity Tolerance:  [x] Patient tolerated treatment well [] Patient limited by fatigue  [] Patient limited by pain  [] Patient limited by other medical complications  [x] Other: Pt completed tx with no pain, pt re-assessed this reporting period, pt demonstrated improved ROM, exercise compliance, increased str and quick dash scores indicating improvement since SOC. Pt will benefit from continued PT services to further improve pt LOF and QOL.      Pain after treatment:     0/10    Prognosis: [x] Good [] Fair  [] Poor    Patient Requires Follow-up: [x] Yes  [] No    Plan:   [x] Continue per plan of care [] Alter current plan (see comments)  [] Plan of care initiated [] Hold pending MD visit [] Discharge    Plan for Next Session:        Electronically signed by:  Luis Rios PT

## 2022-10-10 ENCOUNTER — HOSPITAL ENCOUNTER (OUTPATIENT)
Dept: PHYSICAL THERAPY | Facility: HOSPITAL | Age: 77
Setting detail: THERAPIES SERIES
Discharge: HOME | End: 2022-10-10
Payer: MEDICARE

## 2022-10-10 PROCEDURE — 97110 THERAPEUTIC EXERCISES: CPT

## 2022-10-10 PROCEDURE — 97140 MANUAL THERAPY 1/> REGIONS: CPT

## 2022-10-10 NOTE — FLOWSHEET NOTE
Physical Therapy Daily Treatment Note    Date:  10/10/2022    TIme In:     2775                 Time Out: [phone]    Patient Name:  Gege Fair    :    MRN: [id number]    Restrictions/Precautions:    Pertinent Medical History:  Medical/Treatment Diagnosis Information:  H/O total shoulder replacement [G42.508]     Insurance/Certification information:  Payor: Sharda Covert / Plan: MEDICARE PART B / Product Type: *No Product type* /   Physician Information:  Tracy Mathias PA-C  Plan of care signed (Y/N):    Visit# / total visits:     10 /    G-Code (if applicable):      Date / Visit # G-Code Applied:         Progress Note: [x]  Yes  []  No  Next due by: 22    Pain level:   0/10  Subjective: Pt reports she is able to push her arm up the wall forwards without help now. She expressed that her arm pit feels like its loosening up. Objective:  Observation:   Test measurements:  DASH 45%   Strength LUE  L Shoulder Flexion: 3+/5  L Shoulder Extension: 4+/5  L Shoulder ABduction: 4-/5  L Shoulder Internal Rotation: 4-/5  L Shoulder External Rotation: 3+/5  L Elbow Flexion: 4+/5  L Elbow Extension: 4/5  AROM LUE (degrees)  L Shoulder Flexion 0-180: 0-60  L Shoulder ABduction 0-180: 0-76  L Shoulder Int Rotation  0-70: WNL  L Shoulder Ext Rotation  0-90: 0-23  Palpation: Tenderness present upon palpation. Exercises:  Exercise Resistance/Repetitions Other comments   pulleys  1x5\" 10   Table slide flexion 3x10 L 10   Table slide ABD 3x10 L 10   Pendulums  30 each way 10   AAROM ER Dowel  3x10 10   AAROM ABD Dowel  3x10 10   Pole walk in flex  7x 10   Pole walk in ABD   7x 10   Wall slides ABD/Flex (AAROM) 2x10 10   HH depression at 40-90 degree range in flex and ABD 2x10 2s hold 10   D1/D2 in supine 1x15 --               Other Therapeutic Activities:      Manual Treatments:  STM with Transverse friction to pec major, PROM into flex and abd, contract relax x15'     Modalities:  MH x 10' to left sh.        Timed Code Treatment Minutes:  60    Total Treatment Minutes:  68    GOALS   Patient Goal(s):    Short Term Goals Completed by   Goal Status   pt to be I with HEP MET   Pt to improve L shoulder flexion AROM to 0-90 Improving   Pt to improve L shoulder ABD AROM to 0-90 Improving   Pt to have 0/10 pain upon L shoulder palpation NOT MET                 Long Term Goals Completed by   Goal Status   Pt to improve Dash score to 10% or less. Improving    Pt to improve L shoulder flexion AROM to 0-120 improving   Pt to improve L shoulder ABD AROM to 0-120 improving   Pt to have 0/10 pain in L shoulder during AROM into all planes improving   Pt to improve gross LUE shoulder str to 4+/5 improving                Treatment/Activity Tolerance:  [x] Patient tolerated treatment well [] Patient limited by fatigue  [] Patient limited by pain  [] Patient limited by other medical complications  [x] Other: Pt completed tx with no pain and improving PROM of left sh in all planes.     Pain after treatment:     0/10    Prognosis: [x] Good [] Fair  [] Poor    Patient Requires Follow-up: [x] Yes  [] No    Plan:   [x] Continue per plan of care [] Alter current plan (see comments)  [] Plan of care initiated [] Hold pending MD visit [] Discharge    Plan for Next Session:        Electronically signed by:  Nelson Snowden PTA

## 2022-10-13 ENCOUNTER — HOSPITAL ENCOUNTER (OUTPATIENT)
Dept: PHYSICAL THERAPY | Facility: HOSPITAL | Age: 77
Setting detail: THERAPIES SERIES
Discharge: HOME | End: 2022-10-13
Payer: MEDICARE

## 2022-10-13 PROCEDURE — 97140 MANUAL THERAPY 1/> REGIONS: CPT

## 2022-10-13 PROCEDURE — 97110 THERAPEUTIC EXERCISES: CPT

## 2022-10-13 NOTE — FLOWSHEET NOTE
Physical Therapy Daily Treatment Note    Date:  10/13/2022    TIme In:  1330                   Time Out: [address]    Patient Name:  Suma Blackmon    :    MRN: [id number]    Restrictions/Precautions:    Pertinent Medical History:  Medical/Treatment Diagnosis Information:  H/O total shoulder replacement [M44.304]     Insurance/Certification information:  Payor: [address] [place] [address],[facility] Floor / Plan: MEDICARE PART B / Product Type: *No Product type* /   Physician Information:  Everett Garza PA-C  Plan of care signed (Y/N):    Visit# / total visits:     -Code (if applicable):      Date / Visit # G-Code Applied:         Progress Note: [x]  Yes  []  No  Next due by: 22    Pain level:   0/10  Subjective: Pt reports her sh really isn't bothering her but her allergies are really bad today. Objective:  Observation:   Test measurements:  DASH 45%   Strength LUE  L Shoulder Flexion: 3+/5  L Shoulder Extension: 4+/5  L Shoulder ABduction: 4-/5  L Shoulder Internal Rotation: 4-/5  L Shoulder External Rotation: 3+/5  L Elbow Flexion: 4+/5  L Elbow Extension: 4/5  AROM LUE (degrees)  L Shoulder Flexion 0-180: 0-60  L Shoulder ABduction 0-180: 0-76  L Shoulder Int Rotation  0-70: WNL  L Shoulder Ext Rotation  0-90: 0-23  Palpation: Tenderness present upon palpation. Exercises:  Exercise Resistance/Repetitions Other comments   pulleys  1x5' 13   Table slide flexion 3x10 L 13   Table slide ABD 3x10 L 13   Pendulums  30 each way 13   AAROM ER Dowel  3x10 13   AAROM ABD Dowel  3x10 13   Pole walk in flex  7x 13   Pole walk in ABD   7x 13   Wall slides ABD/Flex (AAROM) 2x10 13   HH depression at 40-90 degree range in flex and ABD 2x10 2s hold 13   D1/D2 in supine 1x15 --               Other Therapeutic Activities:      Manual Treatments:  STM with Transverse friction to pec major, PROM into flex and abd, contract relax x15'     Modalities:  MH x 10' to left sh.        Timed Code Treatment Minutes:  65    Total Treatment Minutes:  68    GOALS   Patient Goal(s):    Short Term Goals Completed by   Goal Status   pt to be I with HEP MET   Pt to improve L shoulder flexion AROM to 0-90 Improving   Pt to improve L shoulder ABD AROM to 0-90 Improving   Pt to have 0/10 pain upon L shoulder palpation NOT MET                 Long Term Goals Completed by   Goal Status   Pt to improve Dash score to 10% or less. Improving    Pt to improve L shoulder flexion AROM to 0-120 improving   Pt to improve L shoulder ABD AROM to 0-120 improving   Pt to have 0/10 pain in L shoulder during AROM into all planes improving   Pt to improve gross LUE shoulder str to 4+/5 improving                Treatment/Activity Tolerance:  [x] Patient tolerated treatment well [] Patient limited by fatigue  [] Patient limited by pain  [] Patient limited by other medical complications  [x] Other: Pt completed tx with no pain and had improved flexibility of left sh abd and flex. Pt continues to have mod muscle tension in pec major.     Pain after treatment:     0/10    Prognosis: [x] Good [] Fair  [] Poor    Patient Requires Follow-up: [x] Yes  [] No    Plan:   [x] Continue per plan of care [] Alter current plan (see comments)  [] Plan of care initiated [] Hold pending MD visit [] Discharge    Plan for Next Session:        Electronically signed by:  Jeffrey Snowden PTA

## 2022-10-17 ENCOUNTER — HOSPITAL ENCOUNTER (OUTPATIENT)
Dept: PHYSICAL THERAPY | Facility: HOSPITAL | Age: 77
Setting detail: THERAPIES SERIES
Discharge: HOME | End: 2022-10-17
Payer: MEDICARE

## 2022-10-17 PROCEDURE — 97110 THERAPEUTIC EXERCISES: CPT

## 2022-10-17 PROCEDURE — 97140 MANUAL THERAPY 1/> REGIONS: CPT

## 2022-10-17 NOTE — FLOWSHEET NOTE
Physical Therapy Daily Treatment Note    Date:  10/17/2022    TIme In:   1333                  Time Out: 1433    Patient Name:  Phu Perdomo    :    MRN: [id number]    Restrictions/Precautions:    Pertinent Medical History:  Medical/Treatment Diagnosis Information:  H/O total shoulder replacement [H64.431]     Insurance/Certification information:  Payor: Seble Avilez / Plan: MEDICARE PART B / Product Type: *No Product type* /   Physician Information:  Ketty Dowd PA-C  Plan of care signed (Y/N):    Visit# / total visits:     12 /    G-Code (if applicable):      Date / Visit # G-Code Applied:         Progress Note: []  Yes  [x]  No  Next due by: 22    Pain level:   0/10  Subjective: Pt reports she isn't hurting too bad today but she took a pain pill before she came in. She expressed that she is still having a hard time sleeping. Objective:  Observation:   Test measurements:  DASH 45%   Strength LUE  L Shoulder Flexion: 3+/5  L Shoulder Extension: 4+/5  L Shoulder ABduction: 4-/5  L Shoulder Internal Rotation: 4-/5  L Shoulder External Rotation: 3+/5  L Elbow Flexion: 4+/5  L Elbow Extension: 4/5  AROM LUE (degrees)  L Shoulder Flexion 0-180: 0-60  L Shoulder ABduction 0-180: 0-76  L Shoulder Int Rotation  0-70: WNL  L Shoulder Ext Rotation  0-90: 0-23  Palpation: Tenderness present upon palpation.      Exercises:  Exercise Resistance/Repetitions Other comments   pulleys  1x5' 17   Table slide flexion 3x10 L 17   Table slide ABD 3x10 L 17   Pendulums  30 each way 17   AAROM ER Dowel  3x10 17   AAROM ABD Dowel  3x10 17   Pole walk in flex  7x 17   Pole walk in ABD   7x 17   Wall slides ABD/Flex (AAROM) 2x10 17   HH depression at 40-90 degree range in flex and ABD 2x10 2s hold 17   D1/D2 in supine 1x15 17               Other Therapeutic Activities:      Manual Treatments:  STM with Transverse friction to pec major, PROM into flex and abd, contract relax x15'     Modalities:  MH x 10' to left sh. Timed Code Treatment Minutes:  48    Total Treatment Minutes:  61    GOALS   Patient Goal(s):    Short Term Goals Completed by   Goal Status   pt to be I with HEP MET   Pt to improve L shoulder flexion AROM to 0-90 Improving   Pt to improve L shoulder ABD AROM to 0-90 Improving   Pt to have 0/10 pain upon L shoulder palpation NOT MET                 Long Term Goals Completed by   Goal Status   Pt to improve Dash score to 10% or less. Improving    Pt to improve L shoulder flexion AROM to 0-120 improving   Pt to improve L shoulder ABD AROM to 0-120 improving   Pt to have 0/10 pain in L shoulder during AROM into all planes improving   Pt to improve gross LUE shoulder str to 4+/5 improving                Treatment/Activity Tolerance:  [x] Patient tolerated treatment well [] Patient limited by fatigue  [] Patient limited by pain  [] Patient limited by other medical complications  [x] Other: Pt completed tx with no pain and is demonstrating improved mobility with wall activity.     Pain after treatment:     0/10    Prognosis: [x] Good [] Fair  [] Poor    Patient Requires Follow-up: [x] Yes  [] No    Plan:   [x] Continue per plan of care [] Alter current plan (see comments)  [] Plan of care initiated [] Hold pending MD visit [] Discharge    Plan for Next Session:        Electronically signed by:  Oliver Snowden PTA

## 2022-10-20 ENCOUNTER — APPOINTMENT (OUTPATIENT)
Dept: PHYSICAL THERAPY | Facility: HOSPITAL | Age: 77
End: 2022-10-20
Payer: MEDICARE

## 2022-10-21 ENCOUNTER — HOSPITAL ENCOUNTER (OUTPATIENT)
Dept: PHYSICAL THERAPY | Facility: HOSPITAL | Age: 77
Setting detail: THERAPIES SERIES
Discharge: HOME | End: 2022-10-21
Payer: MEDICARE

## 2022-10-21 PROCEDURE — G0283 ELEC STIM OTHER THAN WOUND: HCPCS

## 2022-10-21 PROCEDURE — 97110 THERAPEUTIC EXERCISES: CPT

## 2022-10-21 NOTE — FLOWSHEET NOTE
Physical Therapy Daily Treatment Note    Date:  10/21/2022    TIme In:   1134                  Time Out: 1234    Patient Name:  Richard Rodgers    :    MRN: [id number]    Restrictions/Precautions:    Pertinent Medical History:  Medical/Treatment Diagnosis Information:  H/O total shoulder replacement [G62.813]     Insurance/Certification information:  Payor: Hemant Chiang / Plan: MEDICARE PART B / Product Type: *No Product type* /   Physician Information:  Brendan Brown PA-C  Plan of care signed (Y/N):    Visit# / total visits:     13 /    G-Code (if applicable):      Date / Visit # G-Code Applied:         Progress Note: []  Yes  [x]  No  Next due by: 22    Pain level:   8/10  Subjective: Pt reports she woke up on Tuesday and her shoulder felt like it locked up. She is in a lot of pain today. Objective:  Observation:   Test measurements:  Palpation: Tenderness present upon palpation. Exercises:  Exercise Resistance/Repetitions Other comments   pulleys  1x5' 21   Table slide flexion 3x10 L 21   Table slide ABD 3x10 L 21   Pendulums  30 each way 21   AAROM ER Dowel  3x10 21   AAROM ABD Dowel  3x10 21   Pole walk in flex AAROM 7x 21   Pole walk in ABD  AAROM 7x 21   Wall slides ABD/Flex (AAROM) 1x10 21   HH depression at 40-90 degree range in flex and ABD 2x10 2s hold 21   D1/D2 in supine 1x15 -               Other Therapeutic Activities:      Manual Treatments:  STM with Transverse friction to pec major, PROM into flex and abd, contract relax x15' (Not done today)    Modalities:  ICE with IFC ESTIM x 15' to left sh.        Timed Code Treatment Minutes:  57    Total Treatment Minutes:  60    GOALS   Patient Goal(s):    Short Term Goals Completed by   Goal Status   pt to be I with HEP MET   Pt to improve L shoulder flexion AROM to 0-90 Improving   Pt to improve L shoulder ABD AROM to 0-90 Improving   Pt to have 0/10 pain upon L shoulder palpation NOT MET                 Long Term Goals Completed by Goal Status   Pt to improve Dash score to 10% or less. Improving    Pt to improve L shoulder flexion AROM to 0-120 improving   Pt to improve L shoulder ABD AROM to 0-120 improving   Pt to have 0/10 pain in L shoulder during AROM into all planes improving   Pt to improve gross LUE shoulder str to 4+/5 improving                Treatment/Activity Tolerance:  [x] Patient tolerated treatment well [] Patient limited by fatigue  [] Patient limited by pain  [] Patient limited by other medical complications  [x] Other: Pt required AAROM with exercises d/t increased pain. Pt completed tx with decreased pain, deferred from manual until after appointment with  On 10/25/22.     Pain after treatment:     7/10    Prognosis: [x] Good [] Fair  [] Poor    Patient Requires Follow-up: [x] Yes  [] No    Plan:   [x] Continue per plan of care [] Alter current plan (see comments)  [] Plan of care initiated [] Hold pending MD visit [] Discharge    Plan for Next Session:        Electronically signed by:  Petey Christie PT

## 2022-10-25 ENCOUNTER — HOSPITAL ENCOUNTER (OUTPATIENT)
Dept: PHYSICAL THERAPY | Facility: HOSPITAL | Age: 77
Setting detail: THERAPIES SERIES
Discharge: HOME | End: 2022-10-25
Payer: MEDICARE

## 2022-10-25 PROCEDURE — 97110 THERAPEUTIC EXERCISES: CPT

## 2022-10-25 PROCEDURE — 97140 MANUAL THERAPY 1/> REGIONS: CPT

## 2022-10-25 NOTE — FLOWSHEET NOTE
Physical Therapy Daily Treatment Note    Date:  10/25/2022    TIme In:   1339                  Time Out: 5141    Patient Name:  Melchor Rm    :    MRN: [id number]    Restrictions/Precautions:    Pertinent Medical History:  Medical/Treatment Diagnosis Information:  H/O total shoulder replacement [J25.350]     Insurance/Certification information:  Payor: Jam Flores / Plan: MEDICARE PART B / Product Type: *No Product type* /   Physician Information:  Beverly Harvey PA-C  Plan of care signed (Y/N):    Visit# / total visits:     15 /    G-Code (if applicable):      Date / Visit # G-Code Applied:         Progress Note: []  Yes  [x]  No  Next due by: 22    Pain level:   0/10  Subjective: Pt reports she is finally able to start moving her arm again but its still tight and painful. Objective:  Observation:   Test measurements:   Palpation: Tenderness present upon palpation. Exercises:  Exercise Resistance/Repetitions Other comments   pulleys  1x5' 25   Table slide flexion 3x10 L 25   Table slide ABD 3x10 L 25   Pendulums  30 each way 25   AAROM ER Dowel  3x10 25   AAROM ABD Dowel  3x10 25   Pole walk in flex AAROM 7x 25   Pole walk in ABD  AAROM 7x 25   Wall slides ABD/Flex (AAROM) 1x10 Pt unable to tolerate abd. HH depression at 40-90 degree range in flex and ABD 2x10 2s hold Pt unable to tolerate   D1/D2 in supine 1x15 -               Other Therapeutic Activities:      Manual Treatments:  STM with Transverse friction to pec major, PROM into flex and abd, contract relax x15'     Modalities:  ICE with IFC ESTIM x 15' to left sh. MH only today.       Timed Code Treatment Minutes:  40    Total Treatment Minutes:  62    GOALS   Patient Goal(s):    Short Term Goals Completed by   Goal Status   pt to be I with HEP MET   Pt to improve L shoulder flexion AROM to 0-90 Improving   Pt to improve L shoulder ABD AROM to 0-90 Improving   Pt to have 0/10 pain upon L shoulder palpation NOT MET

## 2022-10-26 ENCOUNTER — APPOINTMENT (OUTPATIENT)
Dept: PHYSICAL THERAPY | Facility: HOSPITAL | Age: 77
End: 2022-10-26
Payer: MEDICARE

## 2022-10-28 ENCOUNTER — HOSPITAL ENCOUNTER (OUTPATIENT)
Dept: PHYSICAL THERAPY | Facility: HOSPITAL | Age: 77
Setting detail: THERAPIES SERIES
Discharge: HOME | End: 2022-10-28
Payer: MEDICARE

## 2022-10-28 PROCEDURE — 97140 MANUAL THERAPY 1/> REGIONS: CPT

## 2022-10-28 PROCEDURE — 97110 THERAPEUTIC EXERCISES: CPT

## 2022-10-28 NOTE — FLOWSHEET NOTE
Physical Therapy Daily Treatment Note    Date:  10/28/2022    TIme In:   1115                  Time Out: [address]    Patient Name:  Justyn Sue    :    MRN: [id number]    Restrictions/Precautions:    Pertinent Medical History:  Medical/Treatment Diagnosis Information:  H/O total shoulder replacement [L80.349]     Insurance/Certification information:  Payor: Cary Ahuja / Plan: MEDICARE PART B / Product Type: *No Product type* /   Physician Information:  Andressa Weaver PA-C  Plan of care signed (Y/N):    Visit# / total visits:     15 /    G-Code (if applicable):      Date / Visit # G-Code Applied:         Progress Note: []  Yes  [x]  No  Next due by: 22    Pain level:   6/10  Subjective: Pt reports her shoulder is hurting her a lot today. Objective:  Observation:   Test measurements:   Palpation: Tenderness present upon palpation. Exercises:  Exercise Resistance/Repetitions Other comments   pulleys  1x5' 28   Table slide flexion 3x10 L 28   Table slide ABD 3x10 L 28   Pendulums  30 each way 28   AAROM ER Dowel  3x10 28   AAROM ABD Dowel  3x10 28   Pole walk in flex AAROM 7x 28   Pole walk in ABD  AAROM 7x 28   Wall slides ABD/Flex (AAROM) 1x10 Pt unable to tolerate abd. HH depression at 40-90 degree range in flex and ABD 2x10 2s hold Pt unable to tolerate   D1/D2 in supine 1x15 Pt unable to tolerate               Other Therapeutic Activities: There-ex observed by Fauzia Peña, PT    Manual Treatments:  STM with Transverse friction to pec major, PROM into flex and abd, contract relax x15' (Not today), PROM flex and ext, grade II-III post mobs, biofreeze post session x15'    Modalities:  ICE with IFC ESTIM x 15' to left sh. MH only today.  (Not today)      Timed Code Treatment Minutes:  50    Total Treatment Minutes:  48    GOALS   Patient Goal(s):    Short Term Goals Completed by   Goal Status   pt to be I with HEP MET   Pt to improve L shoulder flexion AROM to 0-90 Improving   Pt to improve L shoulder ABD AROM to 0-90 Improving   Pt to have 0/10 pain upon L shoulder palpation NOT MET                 Long Term Goals Completed by   Goal Status   Pt to improve Dash score to 10% or less. Improving    Pt to improve L shoulder flexion AROM to 0-120 improving   Pt to improve L shoulder ABD AROM to 0-120 improving   Pt to have 0/10 pain in L shoulder during AROM into all planes improving   Pt to improve gross LUE shoulder str to 4+/5 improving                Treatment/Activity Tolerance:  [x] Patient tolerated treatment well [] Patient limited by fatigue  [] Patient limited by pain  [] Patient limited by other medical complications  [x] Other: Pt unable to complete all exercises d/t increase in pain. Pt shoulder assessed and noted small mass inferior to scar line with slightly ant rotated shoulders. Pt Dr. Maycol Chowdary called and given details to pt current LOF. Pt completed tx with minimal decrease in pain.      Pain after treatment:     5/10    Prognosis: [x] Good [] Fair  [] Poor    Patient Requires Follow-up: [x] Yes  [] No    Plan:   [x] Continue per plan of care [] Alter current plan (see comments)  [] Plan of care initiated [] Hold pending MD visit [] Discharge    Plan for Next Session:        Electronically signed by:  Jessie Myers, PT

## 2022-10-28 NOTE — FLOWSHEET NOTE
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 Objective:  Observation:   Test measurements:   Palpation: Tenderness present upon palpation. Exercises:  Exercise Resistance/Repetitions Other comments   pulleys  1x5' 28   Table slide flexion 3x10 L 28   Table slide ABD 3x10 L 28   Pendulums  30 each way 28   AAROM ER Dowel  3x10 28   AAROM ABD Dowel  3x10 28   Pole walk in flex AAROM 5x 28   Pole walk in ABD  AAROM 3x 28   Wall slides ABD/Flex (AAROM) 1x10 Pt unable to tolerate abd. HH depression at 40-90 degree range in flex and ABD 2x10 2s hold Pt unable to tolerate   D1/D2 in supine 1x15 Pt unable to tolerate               Other Therapeutic Activities: There-ex observed by Arely Rodney, PT. Manual Treatments:  STM with Transverse friction to pec major, PROM into flex and abd, contract relax(not done), Post joint mobs grade II-III, abd/flex PROM applied biofreeze post session to shoulder joint. x15'    Modalities:  ICE with IFC ESTIM x 15' to left sh. MH only today. (Not done today)      Timed Code Treatment Minutes:  50    Total Treatment Minutes:  48    GOALS   Patient Goal(s):    Short Term Goals Completed by   Goal Status   pt to be I with HEP MET   Pt to improve L shoulder flexion AROM to 0-90 Improving   Pt to improve L shoulder ABD AROM to 0-90 Improving   Pt to have 0/10 pain upon L shoulder palpation NOT MET                 Long Term Goals Completed by   Goal Status   Pt to improve Dash score to 10% or less. Improving    Pt to improve L shoulder flexion AROM to 0-120 improving   Pt to improve L shoulder ABD AROM to 0-120 improving   Pt to have 0/10 pain in L shoulder during AROM into all planes improving   Pt to improve gross LUE shoulder str to 4+/5 improving                Treatment/Activity Tolerance:  [x] Patient tolerated treatment well [] Patient limited by fatigue  [] Patient limited by pain  [] Patient limited by other medical complications  [x] Other: Pt completed tx with slight decrease in pain. Pt unable to complete all exercises d/t increase in pain. Reassessed pt shoulder, small ball of tissue noted to be inferior to scar line with slight ant rotated shoulder. Pt Dr. Mcneal Felt to report findings.     Pain after treatment:     5/10    Prognosis: [x] Good [] Fair  [] Poor    Patient Requires Follow-up: [x] Yes  [] No    Plan:   [x] Continue per plan of care [] Alter current plan (see comments)  [] Plan of care initiated [] Hold pending MD visit [] Discharge    Plan for Next Session:        Electronically signed by:  Tyler Peres, PT

## 2022-10-31 ENCOUNTER — APPOINTMENT (OUTPATIENT)
Dept: PHYSICAL THERAPY | Facility: HOSPITAL | Age: 77
End: 2022-10-31
Payer: MEDICARE

## 2022-11-02 ENCOUNTER — APPOINTMENT (OUTPATIENT)
Dept: PHYSICAL THERAPY | Facility: HOSPITAL | Age: 77
End: 2022-11-02
Payer: MEDICARE

## 2022-11-03 ENCOUNTER — HOSPITAL ENCOUNTER (OUTPATIENT)
Dept: PHYSICAL THERAPY | Facility: HOSPITAL | Age: 77
Setting detail: THERAPIES SERIES
Discharge: HOME | End: 2022-11-03
Payer: MEDICARE

## 2022-11-03 PROCEDURE — 97110 THERAPEUTIC EXERCISES: CPT

## 2022-11-03 PROCEDURE — 97140 MANUAL THERAPY 1/> REGIONS: CPT

## 2022-11-03 NOTE — FLOWSHEET NOTE
Physical Therapy Daily Treatment Note    Date:  11/3/2022    TIme In:   5789                Time Out: [address] [place] [address]    Patient Name:  Abran Hampton    :  891  MRN: [id number]    Restrictions/Precautions:    Pertinent Medical History:  Medical/Treatment Diagnosis Information:  H/O total shoulder replacement [F03.097]     Insurance/Certification information:  Payor: MEDICARE / Plan: MEDICARE PART B / Product Type: *No Product type* /   Physician Information:  Mariah Pierce PA-C  Plan of care signed (Y/N):    Visit# / total visits:     12 /    G-Code (if applicable):      Date / Visit # G-Code Applied:         Progress Note: []  Yes  [x]  No  Next due by: 22    Pain level:   0/10    Subjective: Pt reports her shoulder was cleared by the ortho Dr. Jerry Schultz states she has no pain today since she took a pain pill. Objective:  Observation:   Test measurements:   Palpation:      Exercises:  Exercise Resistance/Repetitions Other comments   pulleys  1x5' 3   Table slide flexion 3x10 L 3   Table slide ABD 3x10 L 3   Pendulums  30 each way 3   AAROM ER Dowel  3x10 3   AAROM ABD Dowel  3x10 3   Pole walk in flex AAROM 7x 3   Pole walk in ABD  AAROM 7x 3   Wall slides ABD/Flex (AAROM) 1x10 3   HH depression at 40-90 degree range in flex and ABD 2x10 2s hold 3   D1/D2 in supine 1x15 3   ER/IR OTB x25 Next time   Flashers OTBx20 Next time   UBE x5' 3               Other Therapeutic Activities:  Portion of There-ex observed by Nabila Rivera, PT    Manual Treatments:  STM with Transverse friction to pec major, PROM into flex and abd, contract relax,  biofreeze post session x15'    Modalities:  ICE with IFC ESTIM x 15' to left sh. MH only today.  (Not today)      Timed Code Treatment Minutes:  52    Total Treatment Minutes:  69    GOALS   Patient Goal(s):    Short Term Goals Completed by   Goal Status   pt to be I with HEP MET   Pt to improve L shoulder flexion AROM to 0-90 Improving   Pt to improve L shoulder ABD AROM to 0-90 Improving   Pt to have 0/10 pain upon L shoulder palpation NOT MET                 Long Term Goals Completed by   Goal Status   Pt to improve Dash score to 10% or less. Improving    Pt to improve L shoulder flexion AROM to 0-120 improving   Pt to improve L shoulder ABD AROM to 0-120 improving   Pt to have 0/10 pain in L shoulder during AROM into all planes improving   Pt to improve gross LUE shoulder str to 4+/5 improving                Treatment/Activity Tolerance:  [x] Patient tolerated treatment well [x] Patient limited by fatigue  [] Patient limited by pain  [] Patient limited by other medical complications  [x] Other: Pt able to complete all exercises with decreased pain but required increased rest breaks d/t fatigue. Pt to upgrade exercises next session.      Pain after treatment:     0/10    Prognosis: [x] Good [] Fair  [] Poor    Patient Requires Follow-up: [x] Yes  [] No    Plan:   [x] Continue per plan of care [] Alter current plan (see comments)  [] Plan of care initiated [] Hold pending MD visit [] Discharge    Plan for Next Session:        Electronically signed by:  Nils Delatorre PT

## 2022-11-08 ENCOUNTER — HOSPITAL ENCOUNTER (OUTPATIENT)
Dept: PHYSICAL THERAPY | Facility: HOSPITAL | Age: 77
Setting detail: THERAPIES SERIES
Discharge: HOME | End: 2022-11-08
Payer: MEDICARE

## 2022-11-08 PROCEDURE — 97110 THERAPEUTIC EXERCISES: CPT

## 2022-11-08 PROCEDURE — 97140 MANUAL THERAPY 1/> REGIONS: CPT

## 2022-11-08 PROCEDURE — 97150 GROUP THERAPEUTIC PROCEDURES: CPT

## 2022-11-08 NOTE — FLOWSHEET NOTE
Physical Therapy Daily Treatment Note / Re-Assessment     Date:  2022    TIme In:   1546                Time Out:  Liss Hoskins    Patient Name:  Marcus Montenegro    :    MRN: [id number]    Restrictions/Precautions:    Pertinent Medical History:  Medical/Treatment Diagnosis Information:  H/O total shoulder replacement [U25.195]     Insurance/Certification information:  Payor: Rosalbalin Shabazz / Plan: MEDICARE PART B / Product Type: *No Product type* /   Physician Information:  Robert Vinson PA-C  Plan of care signed (Y/N):    Visit# / total visits:     16 /    G-Code (if applicable):      Date / Visit # G-Code Applied:         Progress Note: [x]  Yes  []  No  Next due by: 22    Pain level:   6/10    Subjective: Pt reports her shoulder is doing better after having the exacerbation last week. She notes seeing improvement as compared to last week. Objective:  Observation:   Test measurements: Quick DASH: 27%. PROM flexion: approximately 110 deg, abd: 0-90 deg   Palpation:      Exercises:  Exercise Resistance/Repetitions Other comments   pulleys  1x5' 8   Table slide flexion 3x10 L 8   Table slide ABD 3x10 L 8   Pendulums  30 each way 8   AAROM ER Dowel  3x10 8   AAROM ABD Dowel  3x10 8   Pole walk in flex AAROM 7x 8   Pole walk in ABD  AAROM 7x 8   Wall slides ABD/Flex (AAROM) 1x10 8   HH depression at 40-90 degree range in flex and ABD 2x10 2s hold 8   D1/D2 in supine 1x15 8   ER/IR OTB x25 8   Flashers OTBx20 8   UBE x5' 8               Other Therapeutic Activities:      Manual Treatments:  STM with gentle transverse friction to pec major, PROM into flex and abd, biofreeze post session x15'    Modalities:  ICE with IFC ESTIM x 15' to left sh. MH only today.  (Not today)      Timed Code Treatment Minutes:  65 - 30 min grouped    Total Treatment Minutes:  75    GOALS   Patient Goal(s):    Short Term Goals Completed by   Goal Status   pt to be I with HEP MET   Pt to improve L shoulder flexion AROM to 0-90 Improving   Pt to improve L shoulder ABD AROM to 0-90 Improving   Pt to have 0/10 pain upon L shoulder palpation NOT MET                 Long Term Goals Completed by   Goal Status   Pt to improve Dash score to 10% or less. Improving    Pt to improve L shoulder flexion AROM to 0-120 improving   Pt to improve L shoulder ABD AROM to 0-120 improving   Pt to have 0/10 pain in L shoulder during AROM into all planes improving   Pt to improve gross LUE shoulder str to 4+/5 improving                Treatment/Activity Tolerance:  [x] Patient tolerated treatment well [x] Patient limited by fatigue  [] Patient limited by pain  [] Patient limited by other medical complications  [x] Other: Pt is progressing well with PT POC. She had an exacerbation the past week with limited tolerance to motion. She was seen by her surgeon and cleared regarding any new injury or dislocation. She was able to tolerate PROM better today achieving approximately 110 deg flexion and 90 deg abd. She is limited with AROM and demonstrates UT substitution which is being addressed with exercise and activity. She will continue to benefit from skilled PT to further address deficits and restore optimal functional level. Pain after treatment:     0/10    Prognosis: [x] Good [] Fair  [] Poor    Patient Requires Follow-up: [x] Yes  [] No    Plan:   [x] Continue per plan of care [] Alter current plan (see comments)  [] Plan of care initiated [] Hold pending MD visit [] Discharge    Plan for Next Session:        Electronically signed by:  Myrene Angelucci, PT      Certification of Medical Necessity: It will be understood that this treatment plan is certified medically necessary by the documenting therapist and referring physician mentioned in this report. Unless the physician indicated otherwise through written correspondence with our office, all further referrals will act as certification of medical necessity on this treatment plan.       Thank you for this referral.  If you have questions regarding this plan of care, please call [phone].           Revisions to this plan (optional):                     Please sign and return this plan to:   FAX: [phone]      Signature:                                 Date:

## 2022-11-10 ENCOUNTER — HOSPITAL ENCOUNTER (OUTPATIENT)
Dept: PHYSICAL THERAPY | Facility: HOSPITAL | Age: 77
Setting detail: THERAPIES SERIES
End: 2022-11-10
Payer: MEDICARE

## 2022-11-10 ENCOUNTER — HOSPITAL ENCOUNTER (OUTPATIENT)
Dept: PHYSICAL THERAPY | Facility: HOSPITAL | Age: 77
Setting detail: THERAPIES SERIES
Discharge: HOME | End: 2022-11-10
Payer: MEDICARE

## 2022-11-10 PROCEDURE — 97110 THERAPEUTIC EXERCISES: CPT

## 2022-11-10 PROCEDURE — 97140 MANUAL THERAPY 1/> REGIONS: CPT

## 2022-11-10 NOTE — FLOWSHEET NOTE
Physical Therapy Daily Treatment Note    Date:  11/10/2022    TIme In:   1504                Time Out: 6118    Patient Name:  Sandra Denise    :    MRN: [id number]    Restrictions/Precautions:    Pertinent Medical History:  Medical/Treatment Diagnosis Information:  H/O total shoulder replacement [Z00.900]     Insurance/Certification information:  Payor: Joseph Sue / Plan: MEDICARE PART B / Product Type: *No Product type* /   Physician Information:  Ana Chan PA-C  Plan of care signed (Y/N):    Visit# / total visits:     25 /    G-Code (if applicable):      Date / Visit # G-Code Applied:         Progress Note: []  Yes  [x]  No  Next due by: 22    Pain level:   5/10    Subjective: Pt reports increased pain today with medication changes given to her by her Dr. Valencia Potterft:  Observation:   Test measurements: Quick DASH: 27%.   PROM flexion: approximately 110 deg, abd: 0-90 deg   Palpation:      Exercises:  Exercise Resistance/Repetitions Other comments   pulleys  1x5' 10   Table slide flexion 3x10 L 10   Table slide ABD 3x10 L 10   Pendulums  30 each way 10   AAROM ER Dowel  3x10 10   AAROM ABD Dowel  3x10 10   Pole walk in flex AAROM 7x 10   Pole walk in ABD  AAROM 7x 10   Wall slides ABD/Flex (AAROM) 1x10 10   HH depression at 40-90 degree range in flex and ABD 2x10 2s hold 10   D1/D2 in supine 1x15 10   ER/IR OTB x25 10   Flashers OTBx20 10   UBE x5' 10               Other Therapeutic Activities:      Manual Treatments:  STM with Transverse friction to pec major, PROM into flex and abd, contract relax,  biofreeze post session (no biofreeze today) x15'    Modalities:  ICE with IFC ESTIM x 15' to left sh (Ice only today)      Timed Code Treatment Minutes:  69    Total Treatment Minutes:  89    GOALS   Patient Goal(s):    Short Term Goals Completed by   Goal Status   pt to be I with HEP MET   Pt to improve L shoulder flexion AROM to 0-90 Improving   Pt to improve L shoulder ABD AROM to 0-90 Improving   Pt to have 0/10 pain upon L shoulder palpation NOT MET                 Long Term Goals Completed by   Goal Status   Pt to improve Dash score to 10% or less. Improving    Pt to improve L shoulder flexion AROM to 0-120 improving   Pt to improve L shoulder ABD AROM to 0-120 improving   Pt to have 0/10 pain in L shoulder during AROM into all planes improving   Pt to improve gross LUE shoulder str to 4+/5 improving                Treatment/Activity Tolerance:  [x] Patient tolerated treatment well [x] Patient limited by fatigue  [] Patient limited by pain  [] Patient limited by other medical complications  [x] Other: Pt able to complete all exercises with decreased pain but required increased rest breaks d/t fatigue. Pt continues to require mod/max cues for proper form with exercises. Poor ER muscle activation noted.      Pain after treatment:     0/10    Prognosis: [x] Good [] Fair  [] Poor    Patient Requires Follow-up: [x] Yes  [] No    Plan:   [x] Continue per plan of care [] Alter current plan (see comments)  [] Plan of care initiated [] Hold pending MD visit [] Discharge    Plan for Next Session:        Electronically signed by:  Ramiro Bolivar, PT

## 2022-11-15 ENCOUNTER — HOSPITAL ENCOUNTER (OUTPATIENT)
Dept: PHYSICAL THERAPY | Facility: HOSPITAL | Age: 77
Setting detail: THERAPIES SERIES
Discharge: HOME | End: 2022-11-15
Payer: MEDICARE

## 2022-11-15 PROCEDURE — 97110 THERAPEUTIC EXERCISES: CPT

## 2022-11-15 PROCEDURE — 97140 MANUAL THERAPY 1/> REGIONS: CPT

## 2022-11-15 NOTE — FLOWSHEET NOTE
Physical Therapy Daily Treatment Note    Date:  11/15/2022    TIme In:     1422              Time Out: 1526    Patient Name:  Abran Hampton    :    MRN: [id number]    Restrictions/Precautions:    Pertinent Medical History:  Medical/Treatment Diagnosis Information:  H/O total shoulder replacement [L42.500]     Insurance/Certification information:  Payor: Luther Dang / Plan: MEDICARE PART B / Product Type: *No Product type* /   Physician Information:  Mariah Piecre PA-C  Plan of care signed (Y/N):    Visit# / total visits:     23 /    G-Code (if applicable):      Date / Visit # G-Code Applied:         Progress Note: []  Yes  [x]  No  Next due by: 22    Pain level:   0/10    Subjective: Pt reports her sh started hurting really bad yesterday and its just so tight all the way down to her elbow. Objective:  Observation:   Test measurements: Quick DASH: 27%. PROM flexion: approximately 110 deg, abd: 0-90 deg   Palpation:      Exercises:  Exercise Resistance/Repetitions Other comments   pulleys  1x5' 15   Ball roll up incline 2x10 Next visit   Pendulums  30 each way 15   AAROM ER Dowel  3x10 15   AAROM ABD Dowel  3x10 15   Pole walk in flex AAROM 7x 15   Pole walk in ABD  AAROM 7x 15   Wall slides ABD/Flex (AAROM) 1x10  Hold due to pain. HH depression at 40-90 degree range in flex and ABD 2x10 2s hold 15   D1/D2 in supine 1x15 15   ER/IR OTB x25 15   Flashers OTBx20 15   UBE x5' 15               Other Therapeutic Activities:      Manual Treatments:  STM with Transverse friction to pec major, PROM into flex and abd, contract relax.  x15'    Modalities:  ICE with IFC ESTIM x 15' to left sh (Ice only today)      Timed Code Treatment Minutes:  50    Total Treatment Minutes:  59    GOALS   Patient Goal(s):    Short Term Goals Completed by   Goal Status   pt to be I with HEP MET   Pt to improve L shoulder flexion AROM to 0-90 Improving   Pt to improve L shoulder ABD AROM to 0-90 Improving   Pt to have 0/10 pain upon L shoulder palpation NOT MET                 Long Term Goals Completed by   Goal Status   Pt to improve Dash score to 10% or less. Improving    Pt to improve L shoulder flexion AROM to 0-120 improving   Pt to improve L shoulder ABD AROM to 0-120 improving   Pt to have 0/10 pain in L shoulder during AROM into all planes improving   Pt to improve gross LUE shoulder str to 4+/5 improving                Treatment/Activity Tolerance:  [x] Patient tolerated treatment well [x] Patient limited by fatigue  [] Patient limited by pain  [] Patient limited by other medical complications  [x] Other: Pt completed tx with mod muscle tenison throughout left UE and minimal guarding during PROM.     Pain after treatment:     /10    Prognosis: [x] Good [] Fair  [] Poor    Patient Requires Follow-up: [x] Yes  [] No    Plan:   [x] Continue per plan of care [] Alter current plan (see comments)  [] Plan of care initiated [] Hold pending MD visit [] Discharge    Plan for Next Session:        Electronically signed by:  Luma Snowden PTA

## 2022-11-22 ENCOUNTER — APPOINTMENT (OUTPATIENT)
Dept: PHYSICAL THERAPY | Facility: HOSPITAL | Age: 77
End: 2022-11-22
Payer: MEDICARE

## 2022-12-01 ENCOUNTER — HOSPITAL ENCOUNTER (EMERGENCY)
Facility: HOSPITAL | Age: 77
Discharge: HOME | End: 2022-12-01
Attending: HOSPITALIST
Payer: MEDICARE

## 2022-12-01 VITALS
TEMPERATURE: 98 F | RESPIRATION RATE: 18 BRPM | DIASTOLIC BLOOD PRESSURE: 79 MMHG | SYSTOLIC BLOOD PRESSURE: 125 MMHG | WEIGHT: 184 LBS | BODY MASS INDEX: 36.12 KG/M2 | HEIGHT: 60 IN | HEART RATE: 87 BPM | OXYGEN SATURATION: 94 %

## 2022-12-01 DIAGNOSIS — K13.70 MOUTH PROBLEM: Primary | ICD-10-CM

## 2022-12-01 PROCEDURE — 99283 EMERGENCY DEPT VISIT LOW MDM: CPT

## 2022-12-01 RX ORDER — CHLORHEXIDINE GLUCONATE 0.12 MG/ML
15 RINSE ORAL 2 TIMES DAILY
Qty: 420 ML | Refills: 0 | Status: SHIPPED | OUTPATIENT
Start: 2022-12-01 | End: 2022-12-15

## 2022-12-01 NOTE — ED PROVIDER NOTES
[address] ENCOUNTER      Pt Name: Gege Fair  MRN: [id number]  [place]: 1945  Date of evaluation: 12/1/2022  Provider: Johanna Barajas DO    CHIEF COMPLAINT       Chief Complaint   Patient presents with    Weight Loss    Other     Patient reports she is not sleeping, has no appetite, has lost tons of weight without trying, her hair is falling out. HISTORY OF PRESENT ILLNESS  (Location/Symptom, Timing/Onset, Context/Setting, Quality, Duration, Modifying Factors, Severity.)   Gege Fair is a 78 y.o. female who presents to the emergency department for mouth complaints/dental problem. Patient states that symptoms actually started back in August several months ago. She states that she has had a sensation that she has drainage in her mouth where she gets a lot of saliva. She states that she also feels like there is a coating and the only way that she can describe it is like ice or something over her teeth which she can states that she can break off and get off of her teeth when this happens. She states that she can do more get it off and then its right there again. She states is a continual thing. She is seeing her family doctor for this and has been on antibiotics twice without any improvement. She is also seeing the dentist twice without any improvement. They thought that she could have had a fungal or yeast infection in her mouth so she was given clotrimazole troches to use but she states that she tried them and they did not change anything. She still has medication left over. She denies any fevers or chills. She states that she has difficulty with eating because she does has no appetite and she is lost about 11 pounds in the last 2 weeks. Its not that she cannot eat she states she just does not have an appetite she could force her make her self eat she has no obstructive process.   Patient states that her hair is also falling out which I think is really more related to stress over this. She states that she has had multiple family members in her family die from cancer but they have all been different types and she was concerned that she could have cancer in her mouth. She denies any trauma or injury. Denies any open sores or lesions in the mouth. They initially thought she could have had a sinus infection also describing the mucus and stuff she states that she gets out of her mouth sometimes she states that she coughs it up because there is such amount going down. Denies any fevers or chills. Denies any headaches out of ordinary. Denies any nausea vomiting or diarrhea. Denies any abdominal pain. Denies any trauma or injury to the mouth. Patient states that she tells everyone this even her family and she thinks that they believe that she is crazy. But she can isolated down to the time and August after she had her shoulder surgery she states that she woke and she had a lot of mucus and film in her mouth I asked if she was intubated during that surgery she states she does not know she had no idea but she did not have a sore throat afterwards. Nursing notes were reviewed. REVIEW OFSYSTEMS    (2-9 systems for level 4, 10 or more for level 5)   ROS:  General:  No fevers, no chills, no weakness,  +oral problem  Cardiovascular:  No chest pain, no palpitations  Respiratory:  No shortness of breath, no cough, no wheezing  Gastrointestinal:  No pain, no nausea, no vomiting, no diarrhea  Musculoskeletal:  No muscle pain, no joint pain  Skin:  No rash, no easy bruising  Neurologic:  No speech problems, no headache, no extremity weakness  Psychiatric:  No anxiety  Genitourinary:  No dysuria, no hematuria    Except as noted above the remainder of the review of systems was reviewed and negative.        PAST MEDICAL HISTORY     Past Medical History:   Diagnosis Date    Anxiety     Chronic back pain     Hiatal hernia     Hypertension Inflammatory polyarthropathy (HCC)     Obesity     Osteoarthritis     right shoulder    Osteopenia     Reflux esophagitis     Type II or unspecified type diabetes mellitus without mention of complication, not stated as uncontrolled     Urinary incontinence     Vitamin B12 deficiency          SURGICAL HISTORY       Past Surgical History:   Procedure Laterality Date    CARPAL TUNNEL RELEASE      CHOLECYSTECTOMY      FRACTURE SURGERY  [id number]/[id number]    t12    HEMORRHOID SURGERY      [place] REPAIR ROTATOR CUFF,ACUTE  11/06/2014    Rotator Cuff Repair    SHOULDER SURGERY      TOTAL THYROIDECTOMY  03/2015         CURRENT MEDICATIONS       Previous Medications    ALBUTEROL SULFATE HFA (VENTOLIN HFA) 108 (90 BASE) MCG/ACT INHALER    Inhale 2 puffs into the lungs every 4 hours as needed for Wheezing    ATENOLOL (TENORMIN) 100 MG TABLET    TAKE 1 TABLET BY MOUTH ONCE DAILY    BLOOD GLUCOSE MONITOR KIT AND SUPPLIES    DX E11.9 TID    BLOOD GLUCOSE MONITOR STRIPS    Test TID DX E11.9    CITALOPRAM (CELEXA) 20 MG TABLET    TAKE 1 TABLET BY MOUTH DAILY FOR ANXIETY    CLONAZEPAM (KLONOPIN) 1 MG TABLET    Take 0.5-1 mg by mouth. CYANOCOBALAMIN 1000 MCG/ML INJECTION    Inject 1 mL into the muscle every 14 days    DICLOFENAC (VOLTAREN) 75 MG EC TABLET    Take 1 tablet by mouth 2 times daily    ESOMEPRAZOLE (NEXIUM 24HR) 20 MG DELAYED RELEASE CAPSULE    TAKE TWO CAPSULES BY MOUTH TWO TIMES DAILY    FENTANYL (DURAGESIC) 25 MCG/HR    Place 1 patch onto the skin every 72 hours.     FEXOFENADINE (ALLEGRA ALLERGY) 180 MG TABLET    Take 1 tablet by mouth daily as needed (As needed for allergies)    FLUTICASONE (FLONASE) 50 MCG/ACT NASAL SPRAY    2 sprays by Each Nostril route daily    GLIPIZIDE (GLUCOTROL XL) 10 MG EXTENDED RELEASE TABLET    TAKE 1 TABLET BY MOUTH TWICE A DAY    LANCETS MISC    QD and PRN    LANCETS MISC    1 each by Does not apply route 3 times daily DX E11.9 TID    LEVOTHYROXINE (SYNTHROID) 150 MCG TABLET    Take 1 tablet by mouth daily    LISINOPRIL (PRINIVIL;ZESTRIL) 20 MG TABLET    Dose increased TAKE 1 TABLET BY MOUTH ONCE DAILY    LORATADINE (CLARITIN) 10 MG TABLET    TAKE ONE TABLET BY MOUTH DAILY AS NEEDED (ALLERGIES)    METHYLPREDNISOLONE (MEDROL, CARMEN,) 4 MG TABLET    Take with food. METHYLPREDNISOLONE (MEDROL, CARMEN,) 4 MG TABLET    Take with food. MIRABEGRON (MYRBETRIQ) 50 MG TB24    TAKE 1 TABLET BY MOUTH ONCE DAILY    OXYCODONE HCL (OXY-IR) 10 MG IMMEDIATE RELEASE TABLET    Take 10 mg by mouth every 6 hours as needed for Pain. SIMVASTATIN (ZOCOR) 40 MG TABLET    TAKE ONE TABLET BY MOUTH NIGHTLY    SITAGLIPTIN (JANUVIA) 100 MG TABLET    Take 1 tablet by mouth daily    SYRINGE, DISPOSABLE, (AlgolyticsPAK DISP SYRINGE 2.5CC) 2.5 ML MISC    For IM B12 injection q2w    TOPIRAMATE (TOPAMAX) 50 MG TABLET    take 3 tablets by mouth at bedtime    TRIAMCINOLONE (KENALOG) 0.1 % CREAM    Apply topically 2 times daily.     VITAMIN D (CHOLECALCIFEROL) 25 MCG (1000 UT) TABS TABLET    Take 1 tablet by mouth nightly       ALLERGIES     Allopurinol, Augmentin [amoxicillin-pot clavulanate], Lyrica [pregabalin], Metformin and related, Morphine, Nsaids, and Oxycontin [oxycodone]    FAMILY HISTORY       Family History   Problem Relation Age of Onset    Diabetes Mother     Stroke Mother     Hypertension Mother           SOCIAL HISTORY       Social History     Socioeconomic History    Marital status:    Tobacco Use    Smoking status: Never    Smokeless tobacco: Never   Substance and Sexual Activity    Alcohol use: No    Drug use: No         PHYSICAL EXAM    (up to 7 for level 4, 8 or more for level 5)     ED Triage Vitals [12/01/22 1529]   BP Temp Temp Source Heart Rate Resp SpO2 Height Weight   125/79 98 °F (36.7 °C) Oral 87 18 97 % 5' (1.524 m) 184 lb (83.5 kg)       Physical Exam  General :Patient is awake, alert, oriented, in no acute distress, nontoxic appearing  HEENT: Pupils are equally round and reactive to light, EOMI, conjunctivae clear.  Oral mucosa is moist, no exudate. Uvula is midline  MOUTH: No obvious oral lesions or trauma to the mouth. And there is no obvious change in color in the mucosa. There is no leukoplakia noted. There is no obvious discharge or drainage anywhere. The teeth are intact she has several fake teeth from root canals there is no loose tooth found on examination. Even ran finger along the roof of the mouth along the teeth and cells there is no abnormal finding to the tooth but patient adamant that that sensation is still there  Neck: Neck is supple, full range of motion, trachea midline  Cardiac: Heart regular rate, rhythm, no murmurs, rubs, or gallops  Lungs: Lungs are clear to auscultation, there is no wheezing, rhonchi, or rales. There is no use of accessory muscles. Abdomen: Abdomen is soft, nontender, nondistended. There is no firm or pulsatile masses, no rebound rigidity or guarding. Musculoskeletal: No focal muscle deficits are appreciated  Neuro: Motor intact, sensory intact, level of consciousness is normal  Dermatology: Skin is warm and dry  Psych: Mentation is grossly normal, cognition is grossly normal. Affect is appropriate. DIAGNOSTIC RESULTS     EKG: All EKG's are interpreted by the Emergency Department Physician who either signs or Co-signs this chart in the [address] a cardiologist.        RADIOLOGY:   Non-plain film images such as CT, Ultrasound and MRI are read by the radiologist. Plain radiographic images are visualized and preliminarily interpreted by the emergency physician with the below findings:      [] Radiologist's Report Reviewed:  No orders to display         ED BEDSIDE ULTRASOUND:   Performed by ED Physician - none    LABS:    I have reviewed and interpreted all of the currently available lab results from this visit (ifapplicable):  No results found for this visit on 12/01/22. All other labs were within normal range or not returned as of this dictation.     EMERGENCY DEPARTMENT COURSE and DIFFERENTIAL DIAGNOSIS/MDM:   Vitals:    Vitals:    12/01/22 1529   BP: 125/79   Pulse: 87   Resp: 18   Temp: 98 °F (36.7 °C)   TempSrc: Oral   SpO2: 97%   Weight: 184 lb (83.5 kg)   Height: 5' (1.524 m)       MEDICATIONS ADMINISTERED IN ED:  Medications - No data to display    After initial evaluation examination I did have a conversation with the patient about upcoming plan, treatment possible disposition which she was agreeable to the time of dictation. Patient vies I cannot find anything abnormal with her mouth or her dentition out of the ordinary which would contribute to the patient's symptoms. She does not really have a hypersalivation noted. Advised that I am really not sure what could be causing her symptoms especially the way that she describes them she is already been on antibiotics twice and seeing the dentist.  Xiomara Flower that we could try maybe some Peridex mouthwash to see if this helps with her symptoms if it does not then we would provide her with information for Dr. Genevieve Tellez and oral maxillary facial surgeon in [place] so she could follow-up with the oral specialist to see if there is anything that he could do to help or to contribute to her evaluation or work-up. Patient is agreeable to this plan otherwise no further evaluation or work-up performed here. The patient advised that she does need to follow-up with her regular family physician within the next 1 to 2 days for evaluation. She was also given instructions of her symptoms worsens or new symptoms arise she should return back to emergency department for further evaluation work-up. Is this patient to be included in the SEP-1 Core Measure due to severe sepsis or septic shock? No   Exclusion criteria - the patient is NOT to be included for SEP-1 Core Measure due to:   Infection is not suspected          CONSULTS:  None    PROCEDURES:  Procedures    CRITICAL CARE TIME    Total Critical Care time was 0 minutes, excluding separately reportable procedures. There was a high probability of clinically significant/life threatening deterioration in the patient's condition which required my urgent intervention. FINAL IMPRESSION      1. Mouth problem          DISPOSITION/PLAN   DISPOSITION Decision To Discharge 12/01/2022 04:10:57 PM      PATIENT REFERRED TO:  Scooter Crump, [address]  [phone]    In 2 days      [facility] Emergency Department  [facility] [address] [facility]  [phone]    As needed, If symptoms worsen    Genette Grain III, DMD  [address] [facility]   [phone]    Schedule an appointment as soon as possible for a visit in 1 week  If symptoms worsen or does not improve    DISCHARGE MEDICATIONS:  New Prescriptions    CHLORHEXIDINE (PERIDEX) 0.12 % SOLUTION    Take 15 mLs by mouth 2 times daily for 14 days       Comment: Please note this report has been produced using speech recognition software and may contain errorsrelated to that system including errors in grammar, punctuation, and spelling, as well as words and phrases that may be inappropriate. If there are any questions or concerns please feel free to contact the dictating providerfor clarification.     Kacey Cantu DO  Attending Emergency Physician               Kacey Cantu DO  12/01/22 [id number]

## 2022-12-01 NOTE — ED NOTES
Discharge instructions provided to patient. Patient verbalizes understanding of d/c plan and follow up care. Patient ambulatory off unit to POV at this time with no further needs noted.       Rafa Francisco RN  12/01/22 [id number]

## 2023-03-06 ENCOUNTER — HOSPITAL ENCOUNTER (OUTPATIENT)
Dept: PHYSICAL THERAPY | Facility: HOSPITAL | Age: 78
Setting detail: THERAPIES SERIES
Discharge: HOME | End: 2023-03-06
Payer: MEDICARE

## 2023-03-06 PROCEDURE — 97162 PT EVAL MOD COMPLEX 30 MIN: CPT

## 2023-03-06 PROCEDURE — 97110 THERAPEUTIC EXERCISES: CPT

## 2023-03-06 ASSESSMENT — PAIN DESCRIPTION - DESCRIPTORS: DESCRIPTORS: THROBBING

## 2023-03-06 ASSESSMENT — PAIN SCALES - GENERAL: PAINLEVEL_OUTOF10: 5

## 2023-03-06 ASSESSMENT — PAIN DESCRIPTION - PAIN TYPE: TYPE: SURGICAL PAIN;CHRONIC PAIN

## 2023-03-06 ASSESSMENT — PAIN DESCRIPTION - LOCATION: LOCATION: SHOULDER

## 2023-03-06 ASSESSMENT — PAIN DESCRIPTION - ORIENTATION: ORIENTATION: LEFT

## 2023-03-06 NOTE — PROGRESS NOTES
Physical Therapy: Initial Evaluation    Patient: Annamaria Richard (24 y.o. female)   Examination Date:   Plan of Care Certification Period: 3/6/2023 to        :  1945 ;    Confirmed: Yes MRN: [id number]  CSN: [id number]   Insurance: Payor: Teresa Flores / Plan: MEDICARE PART B / Product Type: *No Product type* /   Insurance ID: [id number] - (Medicare) Secondary Insurance (if applicable): Fabi Sutton   Referring Physician: MD Lukas Allen MD.   PCP: TING Pina Visits to Date/Visits Approved:   /      No Show/Cancelled Appts:   /       Medical Diagnosis: Left shoulder pain [M25.512]    Treatment Diagnosis: Z47.1     PERTINENT MEDICAL HISTORY           Medical History:     Past Medical History:   Diagnosis Date    Anxiety     Chronic back pain     Hiatal hernia     Hypertension     Inflammatory polyarthropathy ([address]     Obesity     Osteoarthritis     right shoulder    Osteopenia     Reflux esophagitis     Type II or unspecified type diabetes mellitus without mention of complication, not stated as uncontrolled     Urinary incontinence     Vitamin B12 deficiency      Surgical History:   Past Surgical History:   Procedure Laterality Date    CARPAL TUNNEL RELEASE      CHOLECYSTECTOMY      FRACTURE SURGERY      t12    HEMORRHOID SURGERY      IN REPAIR ROTATOR CUFF,ACUTE  2014    Rotator Cuff Repair    SHOULDER SURGERY      TOTAL THYROIDECTOMY  2015       Medications:   Current Outpatient Medications:     triamcinolone (KENALOG) 0.1 % cream, Apply topically 2 times daily. , Disp: 80 g, Rfl: 0    simvastatin (ZOCOR) 40 MG tablet, TAKE ONE TABLET BY MOUTH NIGHTLY, Disp: 30 tablet, Rfl: 5    methylPREDNISolone (MEDROL, CARMEN,) 4 MG tablet, Take with food. , Disp: 1 kit, Rfl: 0    fluticasone (FLONASE) 50 MCG/ACT nasal spray, 2 sprays by Each Nostril route daily, Disp: 48 g, Rfl: 5    SITagliptin (JANUVIA) 100 MG tablet, Take 1 tablet by mouth daily, Disp: 28 tablet, Rfl: 2    fexofenadine (ALLEGRA ALLERGY) 180 MG tablet, Take 1 tablet by mouth daily as needed (As needed for allergies) (Patient not taking: Reported on 9/2/2021), Disp: 30 tablet, Rfl: 5    albuterol sulfate HFA (VENTOLIN HFA) 108 (90 Base) MCG/ACT inhaler, Inhale 2 puffs into the lungs every 4 hours as needed for Wheezing, Disp: 1 Inhaler, Rfl: 5    loratadine (CLARITIN) 10 MG tablet, TAKE ONE TABLET BY MOUTH DAILY AS NEEDED (ALLERGIES), Disp: 30 tablet, Rfl: 5    levothyroxine (SYNTHROID) 150 MCG tablet, Take 1 tablet by mouth daily, Disp: 90 tablet, Rfl: 1    vitamin D (CHOLECALCIFEROL) 25 MCG (1000 UT) TABS tablet, Take 1 tablet by mouth nightly, Disp: 30 tablet, Rfl: 5    diclofenac (VOLTAREN) 75 MG EC tablet, Take 1 tablet by mouth 2 times daily, Disp: 60 tablet, Rfl: 5    lisinopril (PRINIVIL;ZESTRIL) 20 MG tablet, Dose increased TAKE 1 TABLET BY MOUTH ONCE DAILY, Disp: 30 tablet, Rfl: 5    atenolol (TENORMIN) 100 MG tablet, TAKE 1 TABLET BY MOUTH ONCE DAILY, Disp: 30 tablet, Rfl: 5    mirabegron (MYRBETRIQ) 50 MG TB24, TAKE 1 TABLET BY MOUTH ONCE DAILY, Disp: 30 tablet, Rfl: 5    citalopram (CELEXA) 20 MG tablet, TAKE 1 TABLET BY MOUTH DAILY FOR ANXIETY, Disp: 30 tablet, Rfl: 5    glipiZIDE (GLUCOTROL XL) 10 MG extended release tablet, TAKE 1 TABLET BY MOUTH TWICE A DAY, Disp: 60 tablet, Rfl: 5    esomeprazole (NEXIUM 24HR) 20 MG delayed release capsule, TAKE TWO CAPSULES BY MOUTH TWO TIMES DAILY, Disp: 120 capsule, Rfl: 5    blood glucose monitor kit and supplies, DX E11.9 TID, Disp: 1 kit, Rfl: 0    blood glucose monitor strips, Test TID DX E11.9, Disp: 100 strip, Rfl: 3    Lancets MISC, 1 each by Does not apply route 3 times daily DX E11.9 TID, Disp: 200 each, Rfl: 5    cyanocobalamin 1000 MCG/ML injection, Inject 1 mL into the muscle every 14 days, Disp: 2 mL, Rfl: 5    Syringe, Disposable, (GLASPAK DISP SYRINGE 2.5CC) 2.5 ML MISC, For IM B12 injection q2w, Disp: 2 each, Rfl: 5 methylPREDNISolone (MEDROL, CARMEN,) 4 MG tablet, Take with food. (Patient not taking: Reported on 6/3/2021), Disp: 1 kit, Rfl: 0    clonazePAM (KLONOPIN) 1 MG tablet, Take 0.5-1 mg by mouth., Disp: , Rfl:     topiramate (TOPAMAX) 50 MG tablet, take 3 tablets by mouth at bedtime, Disp: , Rfl: 0    fentaNYL (DURAGESIC) 25 MCG/HR, Place 1 patch onto the skin every 72 hours. (Patient not taking: Reported on 6/3/2021), Disp: , Rfl:     oxyCODONE HCl (OXY-IR) 10 MG immediate release tablet, Take 10 mg by mouth every 6 hours as needed for Pain., Disp: , Rfl:     Lancets MISC, QD and PRN, Disp: 50 each, Rfl: 5    Current Facility-Administered Medications:     cyanocobalamin injection 1,000 mcg, 1,000 mcg, IntraMUSCular, Once, Napolean Current, APRN    cyanocobalamin injection 1,000 mcg, 1,000 mcg, IntraMUSCular, Once, Napolean Current, APRN  Allergies: Allopurinol, Augmentin [amoxicillin-pot clavulanate], Lyrica [pregabalin], Metformin and related, Morphine, Nsaids, and Oxycontin [oxycodone]      SUBJECTIVE EXAMINATION     History obtained from[de-identified] Patient, Chart Review,      Family/Caregiver Present: No    Subjective History:    Subjective: Pt states she was being seen here for her L reverse total shoulder last year until 11/15/22. She got sick and got sores on her L shoulder in NOV and had to stay home and got so deconditoned that she could barely get around I. Pt finally got good enough to where she could visit her orthopedic Dr. and he said that she needs to continue PT services.   Additional Pertinent Hx (if applicable):     Prior diagnostic testing[de-identified] X-ray  Previous treatments prior to current episode?: Injections, Medications      Learning/Language: Learning  What is the preferred language of the patient/guardian?: English     Pain Screening   Pain Screening  Patient Currently in Pain: Yes  Pain Assessment: 0-10  Pain Level: 5  Best Pain Level: 1  Worst Pain Level: 9  Pain Type: Surgical pain, Chronic pain  Pain Location: Shoulder  Pain Orientation: Left  Pain Descriptors:  Throbbing    Functional Status    Dominant Hand: : Right    Social History:  Social History  Lives With: Spouse  Type of Home: House  Home Layout: Two level  Home Access: Stairs to enter with rails  Entrance Stairs - Rails: Right  Entrance Stairs - Number of Steps: 1STE  Bathroom Shower/Tub: Walk-in shower  Bathroom Toilet: Standard  Bathroom Accessibility: Accessible  Home Equipment: [employer], Rollator    Occupation/Interests:  Occupation: Retired  Job Duties: Prolonged sitting  Leisure & Hobbies: TV    Prior Level of Function:    Independent        Current Level of Function:         Receives Help From: Family  ADL Assistance: Independent  Homemaking Assistance: Needs assistance  Meal Prep: Stand by  [employer]: Stand by  Shopping: Stand by  [employer] Responsibilities: Yes  Ambulation Assistance: Independent  Transfer Assistance: Independent  Active : Yes  Mode of Transportation: Car    OBJECTIVE EXAMINATION   Restrictions:         None    Review of Systems:  Vision: Impaired  Visual Deficits: Bifocals or Progressive lenses  Hearing: Within functional limits  Overall Orientation Status: Within Normal Limits  Follows Commands: Within Functional Limits      Regional Screen:         Observations:   Pt presents with SPC     Palpation:   Left Shoulder Palpation: ant deltoid and Spine of scapula      Neuro Screen:  Sensation      Sensation  Overall Sensation Status: WNL     Left AROM  Right AROM      General AROM UE: AROM Assessed  AROM LUE (degrees)  L Shoulder Flexion (0-180): 0-60  L Shoulder ABduction (0-180): 0-170  L Shoulder Int Rotation  (0-70): 0-70  L Shoulder Ext Rotation  (0-90): 0-25 General AROM UE: AROM Assessed  AROM RUE (degrees)  R Shoulder Flexion (0-180): 0-150  R Shoulder ABduction (0-180): 0-165  R Shoulder Int Rotation  (0-70): 0-70  R Shoulder Ext Rotation (0-90): 0-80     Left PROM  Right PROM      General PROM UE: PROM Assessed  PROM LUE (degrees)  L Shoulder Flex  (0-180): 0-130  L Shoulder ABduction (0-180): 0-128  L Shoulder Int Rotation  (0-70): 0-80  L Shoulder Ext Rotation  (0-90): 0-55 General PROM UE: PROM Assessed  PROM RUE (degrees)  R Shoulder Flex  (0-180): 0-175  R Shoulder ABduction (0-180): 0-180  R Shoulder Int Rotation  (0-70): 0-80  R Shoulder Ext Rotation  (0-90): 0-90     Left Strength  Right Strength         Strength LUE  L Shoulder Flexion: 3-/5  L Shoulder Extension: 4+/5  L Shoulder ABduction: 3-/5  L Shoulder Internal Rotation: 5/5  L Shoulder External Rotation: 3-/5  L Shoulder Horizontal ABduction: 3-/5  L Shoulder Horizontal ADduction: 5/5  L Elbow Flexion: 5/5  L Elbow Extension: 5/5    Strength RUE  R Shoulder Flexion: 5/5  R Shoulder Extension: 5/5  R Shoulder ABduction: 4+/5  R Shoulder Internal Rotation: 5/5  R Shoulder External Rotation: 3+/5  R Shoulder Horizontal ABduction: 4+/5  R Shoulder Horizontal ADduction: 5/5  R Elbow Flexion: 5/5  R Elbow Extension: 5/5     Cervical Assessment    FHP         Thoracic Assessment     Kyphotic         Muscle Length/Flexibility:  Tight LUE pec, tight UT, and scalenes    Joint Mobility (if applicable): tight capsular pattern       Additional Finding(s) (if applicable): Quick Dash 75%       ASSESSMENT     Impression: Assessment: Pt is a 77 y/o female that presents to PT services post LUE reverse total surgery that was performed in 2022. Pt has been seen here before for the same condition, but pt got sick for a couple of months and has gotten well enough to continue performing PT services. Pt presents with new order to address pt shoulder impairment. Upon evaluation, pt presents with decreased ROM, decreased strength, increased tenderness to touch and decreased functional scores. Pt will benefit from skilled PT services to address pt impairment to improve pt LOF and overall QOL.     Body Structures, Functions, Activity Limitations Requiring Skilled Therapeutic Intervention: Decreased functional mobility , Decreased ADL status, Decreased ROM, Decreased strength, Decreased endurance, Decreased posture, Increased pain, Decreased body mechanics, Decreased tolerance to work activity    Statement of Medical Necessity: Physical Therapy is both indicated and medically necessary as outlined in the POC to increase the likelihood of meeting the functionally related goals stated below.      Patient's Activity Tolerance: Patient tolerated treatment well, Patient tolerated evaluation without incident, Patient limited by endurance      Patient's rehabilitation potential/prognosis is considered to be: Good    Factors which may impact rehabilitation potential include: Age, Medical co-morbidities, Chronicity of problem        GOALS   Patient Goal(s):    Short Term Goals Completed by 4 weeks Goal Status   Pt to be I with HEP     Pt to have 1/10 or less pain with palpation to LUE shoulder     Pt to improve LUE ext rot PROM to 0-80 In supine     Pt to improve LUE ABD PROM to 0-150  in sitting     Pt to improve LUE flex PROM to 0-150  in sitting                                       Long Term Goals Completed by 8-12 weeks Goal Status   Pt to improve Quick Dash to 20% or less     Pt to improve LUE gross str to 4/5 or greater     Pt to improve LUE AROM flexion to 0-120  in sitting     Pt to improve LUE AROM abduction to 0-120 in sitting                                              TREATMENT PLAN            Pt. actively involved in establishing Plan of Care and Goals: Yes  Patient/ Caregiver education and instruction: Goals, Home Exercise Program, Plan of Care, Evaluative findings, Posture, General Safety             Treatment may include any combination of the following: Current Treatment Recommendations: Strengthening, ROM, Manual, Pain management, Home exercise program, Neuromuscular re-education, Modalities, Positioning, Therapeutic activities     Frequency / Duration:  Patient to be seen 2x week for 8-12 weeks weeks      Eval Complexity:    Decision Making: Medium Complexity    PT Treatment Completed:  Exercises:  Therapeutic exercise (CPT 42270)   Treatment Reasoning    Exercise 1: Flexion pulleys x2\"  Exercise 2: Abduction pulleys x2\"  Exercise 3: Seated scapular 3x10 2\"  Exercise 4: GTB rows 3x10  Exercise 5: ER with OTB 3x10  Exercise 6: Shoulder flexion wall slides 3x10  Exercise 7: shoulder abduction wall slides 3x10     Pt was educated in and issued a written HEP of the above exercises. Next time:  UBE 2\" fwd 2\" bwd  Pec stretch at wall 3x30\"  Supine shldr flex #2bar x20  Supine shoulder protraction with bar x20  Manual PROM, ER/IR flex/abd   Limitations addressed: Mobility, Strength, Pain modulation, Posture, Flexibility, Activity tolerance                     Therapist Signature: Benna Essex, PT    Date: 7/4/8492     I certify that the above Therapy Services are being furnished while the patient is under my care. I agree with the treatment plan and certify that this therapy is necessary. Physician's Signature:  ___________________________   Date:_______                                                                   Gerry Mathur MD        Physician Comments: _______________________________________________    Please sign and return to [address] [place] [address]. Please fax to the location listed below.  [facility] YOU for this referral!    [address] [place] [address] PHYSICAL THERAPY  [address] [place] [id number]  Dept: [address] [place] [address] Drive: [phone]

## 2023-03-12 ENCOUNTER — HOSPITAL ENCOUNTER (OUTPATIENT)
Facility: HOSPITAL | Age: 78
Discharge: HOME | End: 2023-03-12
Payer: MEDICARE

## 2023-03-12 LAB
BASOPHILS ABSOLUTE: 0 K/UL (ref 0–0.1)
BASOPHILS RELATIVE PERCENT: 0.4 %
C-REACTIVE PROTEIN: 16.4 MG/L (ref 0–5.1)
EOSINOPHILS ABSOLUTE: 0.2 K/UL (ref 0–0.4)
EOSINOPHILS RELATIVE PERCENT: 1.4 %
HCT VFR BLD CALC: 35.6 % (ref 37–47)
HEMOGLOBIN: 10.6 G/DL (ref 11.5–16.5)
IMMATURE GRANULOCYTES #: 0.1 K/UL
IMMATURE GRANULOCYTES %: 0.7 % (ref 0–5)
LYMPHOCYTES ABSOLUTE: 2.4 K/UL (ref 1.5–4)
LYMPHOCYTES RELATIVE PERCENT: 21.3 %
MCH RBC QN AUTO: 25.2 PG (ref 27–32)
MCHC RBC AUTO-ENTMCNC: 29.8 G/DL (ref 31–35)
MCV RBC AUTO: 84.6 FL (ref 80–100)
MONOCYTES ABSOLUTE: 0.4 K/UL (ref 0.2–0.8)
MONOCYTES RELATIVE PERCENT: 3.5 %
NEUTROPHILS ABSOLUTE: 8.2 K/UL (ref 2–7.5)
NEUTROPHILS RELATIVE PERCENT: 72.7 %
PDW BLD-RTO: 16.4 % (ref 11–16)
PLATELET # BLD: 205 K/UL (ref 150–400)
PMV BLD AUTO: 10.7 FL (ref 6–10)
RBC # BLD: 4.21 M/UL (ref 3.8–5.8)
RHEUMATOID FACTOR: <10 IU/ML
SEDIMENTATION RATE, ERYTHROCYTE: 53 MM/HR (ref 0–30)
URIC ACID, SERUM: 4.8 MG/DL (ref 2.5–7.1)
WBC # BLD: 11.3 K/UL (ref 4–11)

## 2023-03-12 PROCEDURE — 36415 COLL VENOUS BLD VENIPUNCTURE: CPT

## 2023-03-12 PROCEDURE — 84550 ASSAY OF BLOOD/URIC ACID: CPT

## 2023-03-12 PROCEDURE — 86140 C-REACTIVE PROTEIN: CPT

## 2023-03-12 PROCEDURE — 85652 RBC SED RATE AUTOMATED: CPT

## 2023-03-12 PROCEDURE — 86617 LYME DISEASE ANTIBODY: CPT

## 2023-03-12 PROCEDURE — 85025 COMPLETE CBC W/AUTO DIFF WBC: CPT

## 2023-03-12 PROCEDURE — 86431 RHEUMATOID FACTOR QUANT: CPT

## 2023-03-12 PROCEDURE — 86812 HLA TYPING A B OR C: CPT

## 2023-03-12 PROCEDURE — 86038 ANTINUCLEAR ANTIBODIES: CPT

## 2023-03-12 PROCEDURE — 86200 CCP ANTIBODY: CPT

## 2023-03-13 ENCOUNTER — HOSPITAL ENCOUNTER (OUTPATIENT)
Dept: PHYSICAL THERAPY | Facility: HOSPITAL | Age: 78
Setting detail: THERAPIES SERIES
Discharge: HOME | End: 2023-03-13
Payer: MEDICARE

## 2023-03-13 PROCEDURE — 97140 MANUAL THERAPY 1/> REGIONS: CPT

## 2023-03-13 PROCEDURE — 97110 THERAPEUTIC EXERCISES: CPT

## 2023-03-13 NOTE — FLOWSHEET NOTE
Physical Therapy Daily Treatment Note   Date:  3/13/2023    TIme In:    6398                  Time Out: 9286    Patient Name:  Heather Shepherd    :  [birth year]  MRN: [id number]    Restrictions/Precautions:    Pertinent Medical History:  Medical/Treatment Diagnosis Information:  Left shoulder pain [V60.300]     Insurance/Certification information:  Payor: MEDICARE / Plan: MEDICARE PART B / Product Type: *No Product type* /   Physician Information:  Merly Garrett MD  Plan of care signed (Y/N):    Visit# / total visits:     2 /    G-Code (if applicable):      Date / Visit # G-Code Applied:         Progress Note: []  Yes  [x]  No  Next due by: Visit #10       Pain level:   0/10  Subjective: Pt reports she can lift with her arm but she can't get it to go up as high as she wants. Objective:  Observation:   Test measurements:    Palpation:    Exercises:  Exercise Resistance/Repetitions Other comments   UBE:fwd/bwd 2'/2' 13   Pec st at wall 3x30\" 13   Pulleys: flex/abd X2' ea 13   Scap retraction 3x10x2\" 13   Mid rows 3x10 GTB 13   Sh ER with band 3x10 OTB 13   Wall slides: flex/abd 3x10 13   Supine sh flex with bar 2x10 2# 13   Supine sh protraction with bar 2x10 13                    Other Therapeutic Activities:      Manual Treatments:  PROM, ER/IR flex/abd x 10'. Biofreeze applied after. Modalities:        Timed Code Treatment Minutes:  50      Total Treatment Minutes:  55    Treatment/Activity Tolerance:  [x] Patient tolerated treatment well [] Patient limited by fatigue  [] Patient limited by pain  [] Patient limited by other medical complications  [x] Other: Pt completed tx with no tenderness noted but did have mod limitations in all planes of motion.     Pain after treatment:      0/10    Prognosis: [x] Good [] Fair  [] Poor    Patient Requires Follow-up: [x] Yes  [] No    Plan:   [x] Continue per plan of care [] Alter current plan (see comments)  [] Plan of care initiated [] Hold pending MD visit [] Discharge    Plan for Next Session:        Electronically signed by:  Ashley Snowden PTA

## 2023-03-14 LAB
ANA SER QL IA: NEGATIVE
CCP IGG SERPL-ACNC: <0.5 U/ML (ref 0–2.9)

## 2023-03-15 LAB
B BURGDOR IGG SER QL IB: NEGATIVE
B BURGDOR IGM SER QL IB: NEGATIVE

## 2023-03-16 ENCOUNTER — HOSPITAL ENCOUNTER (OUTPATIENT)
Dept: PHYSICAL THERAPY | Facility: HOSPITAL | Age: 78
Setting detail: THERAPIES SERIES
Discharge: HOME | End: 2023-03-16
Payer: MEDICARE

## 2023-03-16 LAB — HLA-B27 QL FC: NEGATIVE

## 2023-03-16 PROCEDURE — 97110 THERAPEUTIC EXERCISES: CPT

## 2023-03-16 PROCEDURE — 97140 MANUAL THERAPY 1/> REGIONS: CPT

## 2023-03-16 PROCEDURE — 97150 GROUP THERAPEUTIC PROCEDURES: CPT

## 2023-03-20 ENCOUNTER — HOSPITAL ENCOUNTER (OUTPATIENT)
Dept: PHYSICAL THERAPY | Facility: HOSPITAL | Age: 78
Setting detail: THERAPIES SERIES
Discharge: HOME | End: 2023-03-20
Payer: MEDICARE

## 2023-03-20 PROCEDURE — 97140 MANUAL THERAPY 1/> REGIONS: CPT

## 2023-03-20 PROCEDURE — 97110 THERAPEUTIC EXERCISES: CPT

## 2023-03-20 NOTE — FLOWSHEET NOTE
Physical Therapy Daily Treatment Note   Date:  3/20/2023    TIme In:    0377                  Time Out: 4443    Patient Name:  Nimisha Emmanuel    :    MRN: [id number]    Restrictions/Precautions:    Pertinent Medical History:  Medical/Treatment Diagnosis Information:  Left shoulder pain [D40.281]     Insurance/Certification information:  Payor: MEDICARE / Plan: MEDICARE PART B / Product Type: *No Product type* /   Physician Information:  Ruperto Palomares MD  Plan of care signed (Y/N):    Visit# / total visits:     4 /    G-Code (if applicable):      Date / Visit # G-Code Applied:         Progress Note: []  Yes  [x]  No  Next due by: Visit #10       Pain level:   7-8/10  Subjective: Pt reports her sh feels more tight today but she is has been cold and achy since yesterday. She states she goes for CT scan of her head tomorrow. Objective:  Observation:   Test measurements:    Palpation:    Exercises:  Exercise Resistance/Repetitions Other comments   UBE:fwd/bwd 2'/2' 20   Pec st at wall 3x30\" 20   Pulleys: flex/abd X2' ea 20   Scap retraction 3x10x2\" 20   Mid rows 3x10 GTB 20   Sh ER with band 3x10 OTB 20   Wall slides: flex/abd 3x10 20   Supine sh flex with bar 2x10 2# 20   Supine sh protraction with bar 2x10 20                    Other Therapeutic Activities:      Manual Treatments:  PROM, ER/IR flex/abd x 10'. Biofreeze applied after. (No Biofreeze today)    Modalities:  MH to L shoulder x 12'      Timed Code Treatment Minutes:  75      Total Treatment Minutes:  81    Treatment/Activity Tolerance:  [x] Patient tolerated treatment well [] Patient limited by fatigue  [] Patient limited by pain  [] Patient limited by other medical complications  [x] Other: Pt had limited PROM today but did respond to aggressive stretching with good tolerance.     Pain after treatment:      \"not bad, I always have pain\"/10    Prognosis: [x] Good [] Fair  [] Poor    Patient Requires Follow-up: [x] Yes  []

## 2023-03-23 ENCOUNTER — APPOINTMENT (OUTPATIENT)
Dept: PHYSICAL THERAPY | Facility: HOSPITAL | Age: 78
End: 2023-03-23
Payer: MEDICARE

## 2023-03-28 ENCOUNTER — HOSPITAL ENCOUNTER (OUTPATIENT)
Dept: PHYSICAL THERAPY | Facility: HOSPITAL | Age: 78
Setting detail: THERAPIES SERIES
Discharge: HOME | End: 2023-03-28
Payer: MEDICARE

## 2023-03-28 PROCEDURE — 97110 THERAPEUTIC EXERCISES: CPT

## 2023-03-28 PROCEDURE — 97140 MANUAL THERAPY 1/> REGIONS: CPT

## 2023-03-28 NOTE — FLOWSHEET NOTE
comments)  [] Plan of care initiated [] Hold pending MD visit [] Discharge    Plan for Next Session:        Electronically signed by:  Leonor Snowden PTA

## 2023-03-30 ENCOUNTER — HOSPITAL ENCOUNTER (OUTPATIENT)
Dept: PHYSICAL THERAPY | Facility: HOSPITAL | Age: 78
Setting detail: THERAPIES SERIES
Discharge: HOME | End: 2023-03-30
Payer: MEDICARE

## 2023-03-30 PROCEDURE — 97140 MANUAL THERAPY 1/> REGIONS: CPT

## 2023-03-30 PROCEDURE — 97110 THERAPEUTIC EXERCISES: CPT

## 2023-03-30 NOTE — FLOWSHEET NOTE
complications  [x] Other: Pt reassessed this reporting period. Pt demonstrated improved L sh strength as well as improved ROM and is progressing well toward all goals. Currently pt is 3/5 STG and 0/5 LTG. Pt will benefit from continued PT services to further improve pt QOL and LOF by addressing impairments as listed in POC.        Pain after treatment:      0/10    Short Term Goals Completed by 4 weeks Goal Status   Pt to be I with HEP Met   Pt to have 1/10 or less pain with palpation to LUE shoulder Met   Pt to improve LUE ext rot PROM to 0-80 In supine Met   Pt to improve LUE ABD PROM to 0-150  in sitting Not met   Pt to improve LUE flex PROM to 0-150  in sitting Not met             Long Term Goals Completed by 8-12 weeks Goal Status   Pt to improve Quick Dash to 20% or less Not met   Pt to improve LUE gross str to 4/5 or greater Progressing   Pt to improve LUE AROM flexion to 0-120  in sitting Not met   Pt to improve LUE AROM abduction to 0-120 in sitting Not met              Prognosis: [x] Good [] Fair  [] Poor    Patient Requires Follow-up: [x] Yes  [] No    Plan:   [x] Continue per plan of care [] Alter current plan (see comments)  [] Plan of care initiated [] Hold pending MD visit [] Discharge    Plan for Next Session:        Electronically signed by:  Joe Snowden PTA

## 2023-04-03 ENCOUNTER — APPOINTMENT (OUTPATIENT)
Dept: PHYSICAL THERAPY | Facility: HOSPITAL | Age: 78
End: 2023-04-03
Payer: MEDICARE

## 2023-04-06 ENCOUNTER — HOSPITAL ENCOUNTER (OUTPATIENT)
Dept: PHYSICAL THERAPY | Facility: HOSPITAL | Age: 78
Setting detail: THERAPIES SERIES
Discharge: HOME | End: 2023-04-06
Payer: MEDICARE

## 2023-04-06 PROCEDURE — 97140 MANUAL THERAPY 1/> REGIONS: CPT

## 2023-04-06 PROCEDURE — 97110 THERAPEUTIC EXERCISES: CPT

## 2023-04-06 NOTE — FLOWSHEET NOTE
treatment:      0/10    Short Term Goals Completed by 4 weeks Goal Status   Pt to be I with HEP Met   Pt to have 1/10 or less pain with palpation to LUE shoulder Met   Pt to improve LUE ext rot PROM to 0-80 In supine Met   Pt to improve LUE ABD PROM to 0-150  in sitting Not met   Pt to improve LUE flex PROM to 0-150  in sitting Not met             Long Term Goals Completed by 8-12 weeks Goal Status   Pt to improve Quick Dash to 20% or less Not met   Pt to improve LUE gross str to 4/5 or greater Progressing   Pt to improve LUE AROM flexion to 0-120  in sitting Not met   Pt to improve LUE AROM abduction to 0-120 in sitting Not met              Prognosis: [x] Good [] Fair  [] Poor    Patient Requires Follow-up: [x] Yes  [] No    Plan:   [x] Continue per plan of care [] Alter current plan (see comments)  [] Plan of care initiated [] Hold pending MD visit [] Discharge    Plan for Next Session:        Electronically signed by:  Lula Snowden PTA

## 2023-04-17 ENCOUNTER — HOSPITAL ENCOUNTER (OUTPATIENT)
Dept: PHYSICAL THERAPY | Facility: HOSPITAL | Age: 78
Setting detail: THERAPIES SERIES
Discharge: HOME | End: 2023-04-17
Payer: MEDICARE

## 2023-04-17 PROCEDURE — 97110 THERAPEUTIC EXERCISES: CPT

## 2023-04-17 PROCEDURE — 97140 MANUAL THERAPY 1/> REGIONS: CPT

## 2023-04-17 NOTE — FLOWSHEET NOTE
Physical Therapy Daily Treatment Note  Date:  2023    TIme In:  1402              Time Out: 1522    Patient Name:  Allison Fabian    :    MRN: [id number]    Restrictions/Precautions:    Pertinent Medical History:  Medical/Treatment Diagnosis Information:  Left shoulder pain [R87.426]     Insurance/Certification information:  Payor: Abilio Halle / Plan: MEDICARE PART B / Product Type: *No Product type* /   Physician Information:  Rizwan Juares MD  Plan of care signed (Y/N):    Visit# / total visits:     10 /    G-Code (if applicable):      Date / Visit # G-Code Applied:         Progress Note: []  Yes  [x]  No  Next due by: 23       Pain level:   7/10    Subjective: Pt reports her sh does seem to be getting better but her arthritis is acting up today. Objective:  Observation:   Test measurements:  Quick DASH: 70%. L sh PROM: flex: 141 deg, abd: 115 deg, ER: 82 deg. L sh AROM: flex: 72 deg, abd: 78 deg. L sh MMT: flex: 3+/5, abd: 3+/5, ER: 4/5, Hor abd: 4+/5. Palpation: 0/10 pain with palpation. Exercises:  Exercise Resistance/Repetitions Other comments   UBE:fwd/bwd 2'/2' 17   Pec st at wall 3x30\" 17   Pulleys: flex/abd X2' ea 17   Scap retraction 3x10x2\" 17   Mid rows 3x10 GTB 16   Sh ER/IR with band 3x10 OTB 17   Wall slides: flex/abd with manual assist  3x10 17   Supine sh flex with bar 2x15 3# 17   Supine sh protraction with bar 2x15 3# 17                    Other Therapeutic Activities:      Manual Treatments:  PROM, ER/IR flex/abd, gentle STM to pec x 15'. Modalities:  MH to L shoulder x 12'      Timed Code Treatment Minutes:  75    Total Treatment Minutes:  80    Treatment/Activity Tolerance:  [x] Patient tolerated treatment well [] Patient limited by fatigue  [] Patient limited by pain  [] Patient limited by other medical complications  [x] Other: Pt completed tx with no pain and improving PROM in all planes.        Pain after treatment:      0/10    Short

## 2023-04-20 ENCOUNTER — APPOINTMENT (OUTPATIENT)
Dept: PHYSICAL THERAPY | Facility: HOSPITAL | Age: 78
End: 2023-04-20
Payer: MEDICARE

## 2023-04-25 ENCOUNTER — HOSPITAL ENCOUNTER (OUTPATIENT)
Dept: PHYSICAL THERAPY | Facility: HOSPITAL | Age: 78
Setting detail: THERAPIES SERIES
End: 2023-04-25
Payer: MEDICARE

## 2023-04-27 ENCOUNTER — HOSPITAL ENCOUNTER (OUTPATIENT)
Dept: PHYSICAL THERAPY | Facility: HOSPITAL | Age: 78
Setting detail: THERAPIES SERIES
Discharge: HOME | End: 2023-04-27
Payer: MEDICARE

## 2023-04-27 PROCEDURE — 97140 MANUAL THERAPY 1/> REGIONS: CPT

## 2023-04-27 PROCEDURE — 97110 THERAPEUTIC EXERCISES: CPT

## 2023-04-27 NOTE — FLOWSHEET NOTE
Physical Therapy Daily Treatment Note/Reassessment  Date:  2023    TIme In:  1358              Time Out: 1522    Patient Name:  Marlon Lin    :    MRN: [id number]    Restrictions/Precautions:    Pertinent Medical History:  Medical/Treatment Diagnosis Information:  Left shoulder pain [K79.724]     Insurance/Certification information:  Payor: [employer] / Plan: MEDICARE PART B / Product Type: *No Product type* /   Physician Information:  Faiza Mckeon MD  Plan of care signed (Y/N):    Visit# / total visits:     11 /    G-Code (if applicable):      Date / Visit # G-Code Applied:         Progress Note: [x]  Yes  []  No  Next due by: 23       Pain level: 6/10    Subjective: Pt reports her sh doesn't seem to be as stiff today but she still hurts even with the shots. She states that her doc is sending her to a rheumatologist.     Objective:  Observation:   Test measurements:  Quick DASH: 55%. L sh PROM: flex: 122deg, abd: 101 deg, ER: 82 deg. L sh AROM: flex: 72 deg, abd: 75 deg. L sh MMT: flex: 3+/5, abd: 4+/5, ER: 4/5, Hor abd: 4+/5. Palpation: 0/10 pain with palpation. Exercises:  Exercise Resistance/Repetitions Other comments   UBE:fwd/bwd 2'/2' 27   Pec st at wall 3x30\" 27   Pulleys: flex/abd X2' ea 27   Scap retraction 3x10x2\" 27   Mid rows 3x10 GTB 27   Sh ER/IR with band 3x10 OTB 27   Wall slides: flex/abd with manual assist  3x10 27   Supine sh flex with bar 2x15 3# 27   Supine sh protraction with bar 2x15 3# 27                    Other Therapeutic Activities:  Re-assess performed by Shea Chow DPT. Manual Treatments:  PROM, ER/IR flex/abd, gentle STM to pec x 15'.     Modalities:  MH to L shoulder x 12'      Timed Code Treatment Minutes:  65    Total Treatment Minutes:  84    Treatment/Activity Tolerance:  [x] Patient tolerated treatment well [] Patient limited by fatigue  [] Patient limited by pain  [] Patient limited by other medical complications  [x]

## 2023-05-02 ENCOUNTER — HOSPITAL ENCOUNTER (OUTPATIENT)
Dept: PHYSICAL THERAPY | Facility: HOSPITAL | Age: 78
Setting detail: THERAPIES SERIES
Discharge: HOME | End: 2023-05-02
Payer: MEDICARE

## 2023-05-02 PROCEDURE — 97110 THERAPEUTIC EXERCISES: CPT

## 2023-05-02 PROCEDURE — 97140 MANUAL THERAPY 1/> REGIONS: CPT

## 2023-05-02 NOTE — FLOWSHEET NOTE
Physical Therapy Daily Treatment Note  Date:  2023    TIme In:  3335              Time Out: 1543    Patient Name:  Farzaneh Vasquez    :    MRN: [id number]    Restrictions/Precautions:    Pertinent Medical History:  Medical/Treatment Diagnosis Information:  Left shoulder pain [G09.644]     Insurance/Certification information:  Payor: MEDICARE / Plan: MEDICARE PART B / Product Type: *No Product type* /   Physician Information:  Lefty Villagomez MD  Plan of care signed (Y/N):    Visit# / total visits:     12 /    G-Code (if applicable):      Date / Visit # G-Code Applied:         Progress Note: []  Yes  [x]  No  Next due by: 23       Pain level:  2/10    Subjective: Pt reports she had several shots in her back and hip yesterday but they seem to be helping and the biggest place she is hurting today is in her feet. Objective:  Observation:   Test measurements:  Quick DASH: 55%. L sh PROM: flex: 122deg, abd: 101 deg, ER: 82 deg. L sh AROM: flex: 72 deg, abd: 75 deg. L sh MMT: flex: 3+/5, abd: 4+/5, ER: 4/5, Hor abd: 4+/5. Palpation: 0/10 pain with palpation. Exercises:  Exercise Resistance/Repetitions Other comments   UBE:fwd/bwd 2'/2' 2   Pec st at wall 3x30\" 2   Pulleys: flex/abd X2' ea --   Scap retraction 3x10x2\" 2   Mid rows 3x10 GTB 2   Sh ER/IR with band 3x10 OTB 2   Wall slides: flex/abd with manual assist  3x10 2   Supine sh flex with bar 2x15 3# 2   Supine sh protraction with bar 2x15 3# 2                    Other Therapeutic Activities:     Manual Treatments:  PROM, ER/IR flex/abd, gentle STM to pec x 15'.     Modalities:  MH to L shoulder x 10'      Timed Code Treatment Minutes:  60    Total Treatment Minutes:  76    Treatment/Activity Tolerance:  [x] Patient tolerated treatment well [] Patient limited by fatigue  [] Patient limited by pain  [] Patient limited by other medical complications  [x] Other: Pt completed tx with improved PROM of left sh and improved

## 2023-05-04 ENCOUNTER — HOSPITAL ENCOUNTER (OUTPATIENT)
Dept: PHYSICAL THERAPY | Facility: HOSPITAL | Age: 78
Setting detail: THERAPIES SERIES
Discharge: HOME | End: 2023-05-04
Payer: MEDICARE

## 2023-05-04 PROCEDURE — 97110 THERAPEUTIC EXERCISES: CPT

## 2023-05-04 PROCEDURE — 97140 MANUAL THERAPY 1/> REGIONS: CPT

## 2023-05-04 NOTE — FLOWSHEET NOTE
Physical Therapy Daily Treatment Note  Date:  2023    TIme In:  1435              Time Out: Valerio Blevins    Patient Name:  Al Mccord    :    MRN: [id number]    Restrictions/Precautions:    Pertinent Medical History:  Medical/Treatment Diagnosis Information:  Left shoulder pain [V26.316]     Insurance/Certification information:  Payor: Kody Greene / Plan: MEDICARE PART B / Product Type: *No Product type* /   Physician Information:  Yennifer Villagomez MD  Plan of care signed (Y/N):    Visit# / total visits:     15 /    G-Code (if applicable):      Date / Visit # G-Code Applied:         Progress Note: []  Yes  [x]  No  Next due by: 23       Pain level:  4-10    Subjective: Pt reports her sh is moving better today but she still has numbness in her two fingers. She expressed that she doesn't think it will ever be 100%. Objective:  Observation:   Test measurements:  Quick DASH: 55%. L sh PROM: flex: 122deg, abd: 101 deg, ER: 82 deg. L sh AROM: flex: 72 deg, abd: 75 deg. L sh MMT: flex: 3+/5, abd: 4+/5, ER: 4/5, Hor abd: 4+/5. Palpation: 0/10 pain with palpation. Exercises:  Exercise Resistance/Repetitions Other comments   UBE:fwd/bwd 2'/2' 4   Pec st at wall 3x30\" 4   Pulleys: flex/abd X2' ea 4   Scap retraction 3x10x2\" 4   Mid rows 3x10 GTB 4   Sh ER/IR with band 3x10 OTB 4   Wall slides: flex/abd with manual assist  3x10 4   Supine sh flex with bar 2x15 3# 4   Supine sh protraction with bar 2x15 3# 4                    Other Therapeutic Activities:     Manual Treatments:  PROM, ER/IR flex/abd, gentle STM to pec x 15'.     Modalities:  MH to L shoulder x 10'      Timed Code Treatment Minutes:  48    Total Treatment Minutes:  60    Treatment/Activity Tolerance:  [x] Patient tolerated treatment well [] Patient limited by fatigue  [] Patient limited by pain  [] Patient limited by other medical complications  [x] Other: Pt completed tx with no pain and improving ROM in all planes of

## 2023-05-16 ENCOUNTER — APPOINTMENT (OUTPATIENT)
Dept: PHYSICAL THERAPY | Facility: HOSPITAL | Age: 78
End: 2023-05-16
Payer: MEDICARE

## 2023-05-18 ENCOUNTER — APPOINTMENT (OUTPATIENT)
Dept: PHYSICAL THERAPY | Facility: HOSPITAL | Age: 78
End: 2023-05-18
Payer: MEDICARE

## 2023-09-24 ENCOUNTER — HOSPITAL ENCOUNTER (INPATIENT)
Facility: HOSPITAL | Age: 78
LOS: 2 days | Discharge: NURSING, REHAB OR LONG-TERM CARE | DRG: 291 | End: 2023-09-26
Attending: HOSPITALIST | Admitting: INTERNAL MEDICINE
Payer: MEDICAID

## 2023-09-24 ENCOUNTER — APPOINTMENT (OUTPATIENT)
Dept: GENERAL RADIOLOGY | Facility: HOSPITAL | Age: 78
DRG: 291 | End: 2023-09-24
Attending: HOSPITALIST
Payer: MEDICAID

## 2023-09-24 DIAGNOSIS — R79.89 ELEVATED TROPONIN: ICD-10-CM

## 2023-09-24 DIAGNOSIS — J96.01 ACUTE RESPIRATORY FAILURE WITH HYPOXIA (HCC): Primary | ICD-10-CM

## 2023-09-24 DIAGNOSIS — J81.0 ACUTE PULMONARY EDEMA (HCC): ICD-10-CM

## 2023-09-24 PROBLEM — E83.42 HYPOMAGNESEMIA: Status: ACTIVE | Noted: 2023-09-24

## 2023-09-24 PROBLEM — J18.9 PNEUMONIA: Status: ACTIVE | Noted: 2023-09-24

## 2023-09-24 PROBLEM — I50.9 ACUTE HEART FAILURE, UNSPECIFIED HEART FAILURE TYPE (HCC): Status: ACTIVE | Noted: 2023-09-24

## 2023-09-24 LAB
ALBUMIN SERPL-MCNC: 3.4 G/DL (ref 3.4–4.8)
ALBUMIN/GLOB SERPL: 1.2 {RATIO} (ref 0.8–2)
ALP SERPL-CCNC: 98 U/L (ref 25–100)
ALT SERPL-CCNC: 9 U/L (ref 4–36)
ANION GAP SERPL CALCULATED.3IONS-SCNC: 10 MMOL/L (ref 3–16)
AST SERPL-CCNC: 12 U/L (ref 8–33)
BASOPHILS # BLD: 0.1 K/UL (ref 0–0.1)
BASOPHILS NFR BLD: 0.8 %
BILIRUB SERPL-MCNC: 0.6 MG/DL (ref 0.3–1.2)
BUN SERPL-MCNC: 20 MG/DL (ref 6–20)
CALCIUM SERPL-MCNC: 8.7 MG/DL (ref 8.5–10.5)
CHLORIDE SERPL-SCNC: 103 MMOL/L (ref 98–107)
CO2 SERPL-SCNC: 29 MMOL/L (ref 20–30)
CREAT SERPL-MCNC: 1.1 MG/DL (ref 0.4–1.2)
EOSINOPHIL # BLD: 0.1 K/UL (ref 0–0.4)
EOSINOPHIL NFR BLD: 1 %
ERYTHROCYTE [DISTWIDTH] IN BLOOD BY AUTOMATED COUNT: 17.1 % (ref 11–16)
GFR SERPLBLD CREATININE-BSD FMLA CKD-EPI: 52 ML/MIN/{1.73_M2}
GLOBULIN SER CALC-MCNC: 2.9 G/DL
GLUCOSE BLD-MCNC: 184 MG/DL (ref 74–106)
GLUCOSE BLD-MCNC: 201 MG/DL (ref 74–106)
GLUCOSE BLD-MCNC: 272 MG/DL (ref 74–106)
GLUCOSE SERPL-MCNC: 231 MG/DL (ref 74–106)
HBA1C MFR BLD: 9.1 %
HCT VFR BLD AUTO: 36.6 % (ref 37–47)
HGB BLD-MCNC: 11.3 G/DL (ref 11.5–16.5)
IMM GRANULOCYTES # BLD: 0.2 K/UL
IMM GRANULOCYTES NFR BLD: 1.4 % (ref 0–5)
LYMPHOCYTES # BLD: 2.2 K/UL (ref 1.5–4)
LYMPHOCYTES NFR BLD: 18.5 %
MAGNESIUM SERPL-MCNC: 1.4 MG/DL (ref 1.7–2.4)
MCH RBC QN AUTO: 27.4 PG (ref 27–32)
MCHC RBC AUTO-ENTMCNC: 30.9 G/DL (ref 31–35)
MCV RBC AUTO: 88.6 FL (ref 80–100)
MONOCYTES # BLD: 0.7 K/UL (ref 0.2–0.8)
MONOCYTES NFR BLD: 5.8 %
NEUTROPHILS # BLD: 8.6 K/UL (ref 2–7.5)
NEUTS SEG NFR BLD: 72.5 %
NT-PROBNP SERPL-MCNC: ABNORMAL PG/ML (ref 0–1800)
PERFORMED ON: ABNORMAL
PLATELET # BLD AUTO: 170 K/UL (ref 150–400)
PMV BLD AUTO: 10.3 FL (ref 6–10)
POTASSIUM SERPL-SCNC: 3.5 MMOL/L (ref 3.4–5.1)
PROCALCITONIN SERPL IA-MCNC: 0.08 NG/ML (ref 0–0.15)
PROT SERPL-MCNC: 6.3 G/DL (ref 6.4–8.3)
RBC # BLD AUTO: 4.13 M/UL (ref 3.8–5.8)
S PYO AG THROAT QL: NEGATIVE
SARS-COV-2 RDRP RESP QL NAA+PROBE: NOT DETECTED
SODIUM SERPL-SCNC: 142 MMOL/L (ref 136–145)
T4 FREE SERPL-MCNC: 1.21 NG/DL (ref 0.89–1.76)
TROPONIN, HIGH SENSITIVITY: 38 NG/L (ref 0–14)
TROPONIN, HIGH SENSITIVITY: 44 NG/L (ref 0–14)
TSH SERPL-MCNC: 20 UIU/ML (ref 0.27–4.2)
WBC # BLD AUTO: 11.8 K/UL (ref 4–11)

## 2023-09-24 PROCEDURE — 71045 X-RAY EXAM CHEST 1 VIEW: CPT

## 2023-09-24 PROCEDURE — 80053 COMPREHEN METABOLIC PANEL: CPT

## 2023-09-24 PROCEDURE — 96374 THER/PROPH/DIAG INJ IV PUSH: CPT

## 2023-09-24 PROCEDURE — 6360000002 HC RX W HCPCS: Performed by: PHYSICIAN ASSISTANT

## 2023-09-24 PROCEDURE — 84484 ASSAY OF TROPONIN QUANT: CPT

## 2023-09-24 PROCEDURE — 93005 ELECTROCARDIOGRAM TRACING: CPT

## 2023-09-24 PROCEDURE — 84443 ASSAY THYROID STIM HORMONE: CPT

## 2023-09-24 PROCEDURE — 83036 HEMOGLOBIN GLYCOSYLATED A1C: CPT

## 2023-09-24 PROCEDURE — 6370000000 HC RX 637 (ALT 250 FOR IP): Performed by: HOSPITALIST

## 2023-09-24 PROCEDURE — 6370000000 HC RX 637 (ALT 250 FOR IP): Performed by: PHYSICIAN ASSISTANT

## 2023-09-24 PROCEDURE — 94640 AIRWAY INHALATION TREATMENT: CPT

## 2023-09-24 PROCEDURE — 6360000002 HC RX W HCPCS: Performed by: HOSPITALIST

## 2023-09-24 PROCEDURE — 6360000002 HC RX W HCPCS: Performed by: INTERNAL MEDICINE

## 2023-09-24 PROCEDURE — 1200000000 HC SEMI PRIVATE

## 2023-09-24 PROCEDURE — 84439 ASSAY OF FREE THYROXINE: CPT

## 2023-09-24 PROCEDURE — 83735 ASSAY OF MAGNESIUM: CPT

## 2023-09-24 PROCEDURE — 96372 THER/PROPH/DIAG INJ SC/IM: CPT

## 2023-09-24 PROCEDURE — 83880 ASSAY OF NATRIURETIC PEPTIDE: CPT

## 2023-09-24 PROCEDURE — 84145 PROCALCITONIN (PCT): CPT

## 2023-09-24 PROCEDURE — 36415 COLL VENOUS BLD VENIPUNCTURE: CPT

## 2023-09-24 PROCEDURE — 85025 COMPLETE CBC W/AUTO DIFF WBC: CPT

## 2023-09-24 PROCEDURE — 87880 STREP A ASSAY W/OPTIC: CPT

## 2023-09-24 PROCEDURE — 87635 SARS-COV-2 COVID-19 AMP PRB: CPT

## 2023-09-24 PROCEDURE — 99285 EMERGENCY DEPT VISIT HI MDM: CPT

## 2023-09-24 RX ORDER — OXYCODONE HYDROCHLORIDE 5 MG/1
10 TABLET ORAL EVERY 6 HOURS PRN
Status: DISCONTINUED | OUTPATIENT
Start: 2023-09-24 | End: 2023-09-26 | Stop reason: HOSPADM

## 2023-09-24 RX ORDER — GUAIFENESIN/DEXTROMETHORPHAN 100-10MG/5
5 SYRUP ORAL EVERY 4 HOURS PRN
Status: DISCONTINUED | OUTPATIENT
Start: 2023-09-24 | End: 2023-09-26 | Stop reason: HOSPADM

## 2023-09-24 RX ORDER — ATENOLOL 50 MG/1
100 TABLET ORAL DAILY
Status: DISCONTINUED | OUTPATIENT
Start: 2023-09-24 | End: 2023-09-26 | Stop reason: HOSPADM

## 2023-09-24 RX ORDER — ALOGLIPTIN 12.5 MG/1
12.5 TABLET, FILM COATED ORAL DAILY
Status: DISCONTINUED | OUTPATIENT
Start: 2023-09-24 | End: 2023-09-26 | Stop reason: HOSPADM

## 2023-09-24 RX ORDER — TRIAMCINOLONE ACETONIDE 1 MG/G
CREAM TOPICAL 2 TIMES DAILY
Status: DISCONTINUED | OUTPATIENT
Start: 2023-09-24 | End: 2023-09-26 | Stop reason: HOSPADM

## 2023-09-24 RX ORDER — FUROSEMIDE 10 MG/ML
40 INJECTION INTRAMUSCULAR; INTRAVENOUS DAILY
Status: DISCONTINUED | OUTPATIENT
Start: 2023-09-25 | End: 2023-09-26

## 2023-09-24 RX ORDER — ONDANSETRON 2 MG/ML
4 INJECTION INTRAMUSCULAR; INTRAVENOUS EVERY 6 HOURS PRN
Status: DISCONTINUED | OUTPATIENT
Start: 2023-09-24 | End: 2023-09-26 | Stop reason: HOSPADM

## 2023-09-24 RX ORDER — INSULIN LISPRO 100 [IU]/ML
0-4 INJECTION, SOLUTION INTRAVENOUS; SUBCUTANEOUS NIGHTLY
Status: DISCONTINUED | OUTPATIENT
Start: 2023-09-24 | End: 2023-09-26 | Stop reason: HOSPADM

## 2023-09-24 RX ORDER — FUROSEMIDE 10 MG/ML
20 INJECTION INTRAMUSCULAR; INTRAVENOUS ONCE
Status: COMPLETED | OUTPATIENT
Start: 2023-09-24 | End: 2023-09-24

## 2023-09-24 RX ORDER — ATORVASTATIN CALCIUM 20 MG/1
20 TABLET, FILM COATED ORAL DAILY
Status: DISCONTINUED | OUTPATIENT
Start: 2023-09-24 | End: 2023-09-26 | Stop reason: HOSPADM

## 2023-09-24 RX ORDER — ONDANSETRON 4 MG/1
4 TABLET, ORALLY DISINTEGRATING ORAL EVERY 8 HOURS PRN
Status: DISCONTINUED | OUTPATIENT
Start: 2023-09-24 | End: 2023-09-26 | Stop reason: HOSPADM

## 2023-09-24 RX ORDER — DOXYCYCLINE 100 MG/1
100 CAPSULE ORAL EVERY 12 HOURS SCHEDULED
Status: DISCONTINUED | OUTPATIENT
Start: 2023-09-24 | End: 2023-09-26 | Stop reason: HOSPADM

## 2023-09-24 RX ORDER — INSULIN LISPRO 100 [IU]/ML
0-4 INJECTION, SOLUTION INTRAVENOUS; SUBCUTANEOUS
Status: DISCONTINUED | OUTPATIENT
Start: 2023-09-24 | End: 2023-09-26 | Stop reason: HOSPADM

## 2023-09-24 RX ORDER — PANTOPRAZOLE SODIUM 40 MG/1
40 TABLET, DELAYED RELEASE ORAL
Status: DISCONTINUED | OUTPATIENT
Start: 2023-09-25 | End: 2023-09-26 | Stop reason: HOSPADM

## 2023-09-24 RX ORDER — GLIPIZIDE 5 MG/1
5 TABLET ORAL
Status: DISCONTINUED | OUTPATIENT
Start: 2023-09-25 | End: 2023-09-26

## 2023-09-24 RX ORDER — MAGNESIUM SULFATE IN WATER 40 MG/ML
2000 INJECTION, SOLUTION INTRAVENOUS ONCE
Status: COMPLETED | OUTPATIENT
Start: 2023-09-24 | End: 2023-09-24

## 2023-09-24 RX ORDER — LISINOPRIL 20 MG/1
20 TABLET ORAL DAILY
Status: DISCONTINUED | OUTPATIENT
Start: 2023-09-24 | End: 2023-09-26 | Stop reason: HOSPADM

## 2023-09-24 RX ORDER — ENOXAPARIN SODIUM 100 MG/ML
40 INJECTION SUBCUTANEOUS EVERY 24 HOURS
Status: DISCONTINUED | OUTPATIENT
Start: 2023-09-24 | End: 2023-09-25

## 2023-09-24 RX ORDER — CYANOCOBALAMIN 1000 UG/ML
1000 INJECTION, SOLUTION INTRAMUSCULAR; SUBCUTANEOUS
Status: DISCONTINUED | OUTPATIENT
Start: 2023-09-24 | End: 2023-09-26 | Stop reason: HOSPADM

## 2023-09-24 RX ORDER — VITAMIN B COMPLEX
1000 TABLET ORAL NIGHTLY
Status: DISCONTINUED | OUTPATIENT
Start: 2023-09-24 | End: 2023-09-25

## 2023-09-24 RX ORDER — CITALOPRAM 20 MG/1
20 TABLET ORAL DAILY
Status: DISCONTINUED | OUTPATIENT
Start: 2023-09-24 | End: 2023-09-26 | Stop reason: HOSPADM

## 2023-09-24 RX ORDER — GUAIFENESIN 600 MG/1
600 TABLET, EXTENDED RELEASE ORAL 2 TIMES DAILY
Status: DISCONTINUED | OUTPATIENT
Start: 2023-09-24 | End: 2023-09-26 | Stop reason: HOSPADM

## 2023-09-24 RX ORDER — LEVOTHYROXINE SODIUM 0.07 MG/1
150 TABLET ORAL DAILY
Status: DISCONTINUED | OUTPATIENT
Start: 2023-09-24 | End: 2023-09-26 | Stop reason: HOSPADM

## 2023-09-24 RX ORDER — ACETAMINOPHEN 650 MG/1
650 SUPPOSITORY RECTAL EVERY 6 HOURS PRN
Status: DISCONTINUED | OUTPATIENT
Start: 2023-09-24 | End: 2023-09-26 | Stop reason: HOSPADM

## 2023-09-24 RX ORDER — POLYETHYLENE GLYCOL 3350 17 G/17G
17 POWDER, FOR SOLUTION ORAL DAILY PRN
Status: DISCONTINUED | OUTPATIENT
Start: 2023-09-24 | End: 2023-09-26 | Stop reason: HOSPADM

## 2023-09-24 RX ORDER — MAGNESIUM SULFATE 1 G/100ML
1000 INJECTION INTRAVENOUS ONCE
Status: COMPLETED | OUTPATIENT
Start: 2023-09-24 | End: 2023-09-24

## 2023-09-24 RX ORDER — ENOXAPARIN SODIUM 100 MG/ML
1 INJECTION SUBCUTANEOUS ONCE
Status: COMPLETED | OUTPATIENT
Start: 2023-09-24 | End: 2023-09-24

## 2023-09-24 RX ORDER — IPRATROPIUM BROMIDE AND ALBUTEROL SULFATE 2.5; .5 MG/3ML; MG/3ML
1 SOLUTION RESPIRATORY (INHALATION)
Status: DISCONTINUED | OUTPATIENT
Start: 2023-09-24 | End: 2023-09-26 | Stop reason: HOSPADM

## 2023-09-24 RX ORDER — ASPIRIN 81 MG/1
324 TABLET, CHEWABLE ORAL ONCE
Status: COMPLETED | OUTPATIENT
Start: 2023-09-24 | End: 2023-09-24

## 2023-09-24 RX ORDER — ACETAMINOPHEN 325 MG/1
650 TABLET ORAL EVERY 6 HOURS PRN
Status: DISCONTINUED | OUTPATIENT
Start: 2023-09-24 | End: 2023-09-26 | Stop reason: HOSPADM

## 2023-09-24 RX ADMIN — MAGNESIUM SULFATE HEPTAHYDRATE 2000 MG: 40 INJECTION, SOLUTION INTRAVENOUS at 12:09

## 2023-09-24 RX ADMIN — FUROSEMIDE 20 MG: 10 INJECTION, SOLUTION INTRAMUSCULAR; INTRAVENOUS at 09:11

## 2023-09-24 RX ADMIN — ATENOLOL 100 MG: 50 TABLET ORAL at 12:00

## 2023-09-24 RX ADMIN — LEVOTHYROXINE SODIUM 150 MCG: 0.07 TABLET ORAL at 12:00

## 2023-09-24 RX ADMIN — IPRATROPIUM BROMIDE AND ALBUTEROL SULFATE 1 DOSE: .5; 3 SOLUTION RESPIRATORY (INHALATION) at 15:14

## 2023-09-24 RX ADMIN — GUAIFENESIN 600 MG: 600 TABLET, EXTENDED RELEASE ORAL at 12:01

## 2023-09-24 RX ADMIN — IPRATROPIUM BROMIDE AND ALBUTEROL SULFATE 1 DOSE: .5; 3 SOLUTION RESPIRATORY (INHALATION) at 21:02

## 2023-09-24 RX ADMIN — ASPIRIN 81 MG 324 MG: 81 TABLET ORAL at 09:10

## 2023-09-24 RX ADMIN — DOXYCYCLINE 100 MG: 100 CAPSULE ORAL at 20:51

## 2023-09-24 RX ADMIN — GUAIFENESIN 600 MG: 600 TABLET, EXTENDED RELEASE ORAL at 20:51

## 2023-09-24 RX ADMIN — ENOXAPARIN SODIUM 90 MG: 100 INJECTION SUBCUTANEOUS at 09:13

## 2023-09-24 RX ADMIN — INSULIN LISPRO 2 UNITS: 100 INJECTION, SOLUTION INTRAVENOUS; SUBCUTANEOUS at 17:42

## 2023-09-24 RX ADMIN — ATORVASTATIN CALCIUM 20 MG: 20 TABLET, FILM COATED ORAL at 12:01

## 2023-09-24 RX ADMIN — DOXYCYCLINE 100 MG: 100 CAPSULE ORAL at 12:01

## 2023-09-24 RX ADMIN — LISINOPRIL 20 MG: 20 TABLET ORAL at 12:01

## 2023-09-24 RX ADMIN — ENOXAPARIN SODIUM 40 MG: 100 INJECTION SUBCUTANEOUS at 12:01

## 2023-09-24 RX ADMIN — IPRATROPIUM BROMIDE AND ALBUTEROL SULFATE 1 DOSE: .5; 3 SOLUTION RESPIRATORY (INHALATION) at 11:13

## 2023-09-24 RX ADMIN — CYANOCOBALAMIN 1000 MCG: 1000 INJECTION INTRAMUSCULAR; SUBCUTANEOUS at 12:01

## 2023-09-24 RX ADMIN — ALOGLIPTIN 12.5 MG: 12.5 TABLET, FILM COATED ORAL at 12:01

## 2023-09-24 RX ADMIN — CITALOPRAM HYDROBROMIDE 20 MG: 20 TABLET ORAL at 12:01

## 2023-09-24 RX ADMIN — OXYCODONE 10 MG: 5 TABLET ORAL at 16:12

## 2023-09-24 RX ADMIN — Medication 1000 UNITS: at 20:51

## 2023-09-24 RX ADMIN — MAGNESIUM SULFATE HEPTAHYDRATE 1000 MG: 1 INJECTION, SOLUTION INTRAVENOUS at 20:50

## 2023-09-24 ASSESSMENT — PATIENT HEALTH QUESTIONNAIRE - PHQ9
SUM OF ALL RESPONSES TO PHQ QUESTIONS 1-9: 0
1. LITTLE INTEREST OR PLEASURE IN DOING THINGS: 0
2. FEELING DOWN, DEPRESSED OR HOPELESS: 0
SUM OF ALL RESPONSES TO PHQ QUESTIONS 1-9: 0
SUM OF ALL RESPONSES TO PHQ9 QUESTIONS 1 & 2: 0
SUM OF ALL RESPONSES TO PHQ QUESTIONS 1-9: 0
SUM OF ALL RESPONSES TO PHQ QUESTIONS 1-9: 0

## 2023-09-24 ASSESSMENT — LIFESTYLE VARIABLES
HOW OFTEN DO YOU HAVE A DRINK CONTAINING ALCOHOL: NEVER
HOW MANY STANDARD DRINKS CONTAINING ALCOHOL DO YOU HAVE ON A TYPICAL DAY: PATIENT DOES NOT DRINK

## 2023-09-24 ASSESSMENT — PAIN DESCRIPTION - LOCATION
LOCATION: BACK
LOCATION: BACK

## 2023-09-24 ASSESSMENT — PAIN - FUNCTIONAL ASSESSMENT: PAIN_FUNCTIONAL_ASSESSMENT: 0-10

## 2023-09-24 ASSESSMENT — PAIN DESCRIPTION - PAIN TYPE: TYPE: CHRONIC PAIN;ACUTE PAIN

## 2023-09-24 ASSESSMENT — PAIN SCALES - GENERAL
PAINLEVEL_OUTOF10: 9
PAINLEVEL_OUTOF10: 8

## 2023-09-24 ASSESSMENT — PAIN DESCRIPTION - DESCRIPTORS: DESCRIPTORS: ACHING

## 2023-09-24 NOTE — ED NOTES
09/24/23 0715   Oxygen Therapy   SpO2 98 %   Pulse via Oximetry 84 beats per minute   O2 Device   (removed from [facility] at this time per Dr. Whitney Bansal.)        Nick Mcgovern RN  09/24/23 1002

## 2023-09-24 NOTE — ED PROVIDER NOTES
available admitted here since her troponin is trending down and she does have elevated BNP with findings on chest radiograph which is more concerning for fluid overload than it is for a NSTEMI versus pneumonia. Case discussed with Iris Marcano the on-call hospitalist is agreeable to accept the patient to the hospital here for further evaluation work-up for new onset CHF/fluid overload. CONSULTS: (Who and What was discussed)  IP CONSULT TO CASE MANAGEMENT    Discussion with Other Profesionals : Admitting Team Case discussed with Ava we discussed lab findings along with the radiographic findings my concern versus the way radiology read it which does seem to be more correlate towards CHF exacerbation it does for a pneumonia. We discussed her elevated enzymes. Advised and discussed that we already gave her aspirin, Lovenox because we had not received her BNP back yet initially but after it returned her symptoms do seem more consistent with a CHF exacerbation versus pneumonia or NSTEMI she is agreeable to admit the patient here for further evaluation work-up. She also had acute hypoxic respiratory failure upon presentation requiring the use of oxygen. Social Determinants : None    Chronic Conditions:  has a past medical history of Anxiety, Chronic back pain, Hiatal hernia, Hypertension, Inflammatory polyarthropathy ([address]), Obesity, Osteoarthritis, Osteopenia, Reflux esophagitis, Type II or unspecified type diabetes mellitus without mention of complication, not stated as uncontrolled, Urinary incontinence, and Vitamin B12 deficiency. Records Reviewed : None    Disposition Considerations (include 1 Tests not done, Shared Decision Making, Pt Expectation of Test or Tx.): Discussed with patient about SPO2 being low and being placed on oxygen she does meet criteria for acute hypoxic respiratory failure and that is an admission criteria by itself patient states her understanding of this.   Concern for possible fluid

## 2023-09-25 ENCOUNTER — OUTSIDE FACILITY SERVICE (OUTPATIENT)
Dept: CARDIOLOGY | Facility: CLINIC | Age: 78
End: 2023-09-25
Payer: MEDICARE

## 2023-09-25 PROBLEM — R53.1 GENERALIZED WEAKNESS: Status: ACTIVE | Noted: 2023-09-25

## 2023-09-25 PROBLEM — D50.9 IRON DEFICIENCY ANEMIA: Status: ACTIVE | Noted: 2023-09-25

## 2023-09-25 PROBLEM — E55.9 VITAMIN D DEFICIENCY: Status: ACTIVE | Noted: 2023-09-25

## 2023-09-25 PROBLEM — I48.0 PAROXYSMAL ATRIAL FIBRILLATION (HCC): Status: ACTIVE | Noted: 2023-09-25

## 2023-09-25 LAB
25(OH)D3 SERPL-MCNC: 20 NG/ML (ref 32–100)
ALBUMIN SERPL-MCNC: 2.9 G/DL (ref 3.4–4.8)
ALBUMIN/GLOB SERPL: 1.1 {RATIO} (ref 0.8–2)
ALP SERPL-CCNC: 86 U/L (ref 25–100)
ALT SERPL-CCNC: 8 U/L (ref 4–36)
ANION GAP SERPL CALCULATED.3IONS-SCNC: 7 MMOL/L (ref 3–16)
AST SERPL-CCNC: 10 U/L (ref 8–33)
BILIRUB SERPL-MCNC: 0.3 MG/DL (ref 0.3–1.2)
BUN SERPL-MCNC: 20 MG/DL (ref 6–20)
CALCIUM SERPL-MCNC: 8.4 MG/DL (ref 8.5–10.5)
CHLORIDE SERPL-SCNC: 103 MMOL/L (ref 98–107)
CHOLEST SERPL-MCNC: 142 MG/DL (ref 0–200)
CO2 SERPL-SCNC: 32 MMOL/L (ref 20–30)
CREAT SERPL-MCNC: 1.1 MG/DL (ref 0.4–1.2)
ERYTHROCYTE [DISTWIDTH] IN BLOOD BY AUTOMATED COUNT: 17.2 % (ref 11–16)
FERRITIN SERPL IA-MCNC: 61.4 NG/ML (ref 22–322)
FOLATE SERPL-MCNC: 11.93 NG/ML
GFR SERPLBLD CREATININE-BSD FMLA CKD-EPI: 52 ML/MIN/{1.73_M2}
GLOBULIN SER CALC-MCNC: 2.6 G/DL
GLUCOSE BLD-MCNC: 143 MG/DL (ref 74–106)
GLUCOSE BLD-MCNC: 192 MG/DL (ref 74–106)
GLUCOSE BLD-MCNC: 211 MG/DL (ref 74–106)
GLUCOSE BLD-MCNC: 225 MG/DL (ref 74–106)
GLUCOSE SERPL-MCNC: 187 MG/DL (ref 74–106)
HCT VFR BLD AUTO: 32.1 % (ref 37–47)
HDLC SERPL-MCNC: 41 MG/DL (ref 40–60)
HGB BLD-MCNC: 9.8 G/DL (ref 11.5–16.5)
IRON SATN MFR SERPL: 8 % (ref 15–50)
IRON SERPL-MCNC: 20 UG/DL (ref 37–145)
LDLC SERPL CALC-MCNC: 85 MG/DL
MAGNESIUM SERPL-MCNC: 2.1 MG/DL (ref 1.7–2.4)
MCH RBC QN AUTO: 27.3 PG (ref 27–32)
MCHC RBC AUTO-ENTMCNC: 30.5 G/DL (ref 31–35)
MCV RBC AUTO: 89.4 FL (ref 80–100)
PERFORMED ON: ABNORMAL
PLATELET # BLD AUTO: 151 K/UL (ref 150–400)
PMV BLD AUTO: 11.1 FL (ref 6–10)
POTASSIUM SERPL-SCNC: 3.8 MMOL/L (ref 3.4–5.1)
PROT SERPL-MCNC: 5.5 G/DL (ref 6.4–8.3)
RBC # BLD AUTO: 3.59 M/UL (ref 3.8–5.8)
SODIUM SERPL-SCNC: 142 MMOL/L (ref 136–145)
TIBC SERPL-MCNC: 241 UG/DL (ref 250–450)
TRIGL SERPL-MCNC: 80 MG/DL (ref 0–249)
VIT B12 SERPL-MCNC: >2000 PG/ML (ref 211–911)
VLDLC SERPL CALC-MCNC: 16 MG/DL
WBC # BLD AUTO: 11.3 K/UL (ref 4–11)

## 2023-09-25 PROCEDURE — 94640 AIRWAY INHALATION TREATMENT: CPT

## 2023-09-25 PROCEDURE — 93005 ELECTROCARDIOGRAM TRACING: CPT

## 2023-09-25 PROCEDURE — 6370000000 HC RX 637 (ALT 250 FOR IP): Performed by: INTERNAL MEDICINE

## 2023-09-25 PROCEDURE — 82306 VITAMIN D 25 HYDROXY: CPT

## 2023-09-25 PROCEDURE — 97535 SELF CARE MNGMENT TRAINING: CPT

## 2023-09-25 PROCEDURE — 82607 VITAMIN B-12: CPT

## 2023-09-25 PROCEDURE — 97161 PT EVAL LOW COMPLEX 20 MIN: CPT

## 2023-09-25 PROCEDURE — 94618 PULMONARY STRESS TESTING: CPT

## 2023-09-25 PROCEDURE — 97802 MEDICAL NUTRITION INDIV IN: CPT

## 2023-09-25 PROCEDURE — 82728 ASSAY OF FERRITIN: CPT

## 2023-09-25 PROCEDURE — 97165 OT EVAL LOW COMPLEX 30 MIN: CPT

## 2023-09-25 PROCEDURE — 94761 N-INVAS EAR/PLS OXIMETRY MLT: CPT

## 2023-09-25 PROCEDURE — 2580000003 HC RX 258: Performed by: INTERNAL MEDICINE

## 2023-09-25 PROCEDURE — 80053 COMPREHEN METABOLIC PANEL: CPT

## 2023-09-25 PROCEDURE — 6360000002 HC RX W HCPCS: Performed by: PHYSICIAN ASSISTANT

## 2023-09-25 PROCEDURE — 36415 COLL VENOUS BLD VENIPUNCTURE: CPT

## 2023-09-25 PROCEDURE — 83735 ASSAY OF MAGNESIUM: CPT

## 2023-09-25 PROCEDURE — 1200000000 HC SEMI PRIVATE

## 2023-09-25 PROCEDURE — 99223 1ST HOSP IP/OBS HIGH 75: CPT | Performed by: INTERNAL MEDICINE

## 2023-09-25 PROCEDURE — 6370000000 HC RX 637 (ALT 250 FOR IP): Performed by: PHYSICIAN ASSISTANT

## 2023-09-25 PROCEDURE — 93306 TTE W/DOPPLER COMPLETE: CPT

## 2023-09-25 PROCEDURE — 97116 GAIT TRAINING THERAPY: CPT

## 2023-09-25 PROCEDURE — 83550 IRON BINDING TEST: CPT

## 2023-09-25 PROCEDURE — 83540 ASSAY OF IRON: CPT

## 2023-09-25 PROCEDURE — 2700000000 HC OXYGEN THERAPY PER DAY

## 2023-09-25 PROCEDURE — 80061 LIPID PANEL: CPT

## 2023-09-25 PROCEDURE — 82746 ASSAY OF FOLIC ACID SERUM: CPT

## 2023-09-25 PROCEDURE — 85027 COMPLETE CBC AUTOMATED: CPT

## 2023-09-25 PROCEDURE — 6360000002 HC RX W HCPCS: Performed by: INTERNAL MEDICINE

## 2023-09-25 RX ORDER — IBUPROFEN 600 MG/1
1 TABLET ORAL PRN
Status: DISCONTINUED | OUTPATIENT
Start: 2023-09-25 | End: 2023-09-26 | Stop reason: HOSPADM

## 2023-09-25 RX ORDER — ERGOCALCIFEROL 1.25 MG/1
50000 CAPSULE ORAL WEEKLY
Status: DISCONTINUED | OUTPATIENT
Start: 2023-09-25 | End: 2023-09-26 | Stop reason: HOSPADM

## 2023-09-25 RX ORDER — POTASSIUM CHLORIDE 750 MG/1
40 CAPSULE, EXTENDED RELEASE ORAL ONCE
Status: COMPLETED | OUTPATIENT
Start: 2023-09-25 | End: 2023-09-25

## 2023-09-25 RX ORDER — DEXTROSE MONOHYDRATE 100 MG/ML
INJECTION, SOLUTION INTRAVENOUS CONTINUOUS PRN
Status: DISCONTINUED | OUTPATIENT
Start: 2023-09-25 | End: 2023-09-26 | Stop reason: HOSPADM

## 2023-09-25 RX ORDER — CYCLOBENZAPRINE HCL 10 MG
10 TABLET ORAL 3 TIMES DAILY PRN
COMMUNITY

## 2023-09-25 RX ORDER — VALSARTAN 80 MG/1
80 TABLET ORAL DAILY
COMMUNITY

## 2023-09-25 RX ORDER — ATORVASTATIN CALCIUM 10 MG/1
10 TABLET, FILM COATED ORAL DAILY
Status: ON HOLD | COMMUNITY
End: 2023-09-29 | Stop reason: HOSPADM

## 2023-09-25 RX ORDER — ASPIRIN 81 MG/1
81 TABLET, CHEWABLE ORAL DAILY
Status: DISCONTINUED | OUTPATIENT
Start: 2023-09-25 | End: 2023-09-26 | Stop reason: HOSPADM

## 2023-09-25 RX ORDER — LEVOCETIRIZINE DIHYDROCHLORIDE 5 MG/1
5 TABLET, FILM COATED ORAL NIGHTLY
COMMUNITY

## 2023-09-25 RX ORDER — MONTELUKAST SODIUM 10 MG/1
10 TABLET ORAL NIGHTLY
COMMUNITY

## 2023-09-25 RX ORDER — SITAGLIPTIN 50 MG/1
50 TABLET, FILM COATED ORAL DAILY
COMMUNITY
Start: 2023-08-03

## 2023-09-25 RX ADMIN — ENOXAPARIN SODIUM 40 MG: 100 INJECTION SUBCUTANEOUS at 12:48

## 2023-09-25 RX ADMIN — IPRATROPIUM BROMIDE AND ALBUTEROL SULFATE 1 DOSE: .5; 3 SOLUTION RESPIRATORY (INHALATION) at 05:09

## 2023-09-25 RX ADMIN — ATENOLOL 100 MG: 50 TABLET ORAL at 09:09

## 2023-09-25 RX ADMIN — IPRATROPIUM BROMIDE AND ALBUTEROL SULFATE 1 DOSE: .5; 3 SOLUTION RESPIRATORY (INHALATION) at 17:45

## 2023-09-25 RX ADMIN — OXYCODONE 10 MG: 5 TABLET ORAL at 02:30

## 2023-09-25 RX ADMIN — ERGOCALCIFEROL 50000 UNITS: 1.25 CAPSULE ORAL at 17:27

## 2023-09-25 RX ADMIN — IPRATROPIUM BROMIDE AND ALBUTEROL SULFATE 1 DOSE: .5; 3 SOLUTION RESPIRATORY (INHALATION) at 10:50

## 2023-09-25 RX ADMIN — FUROSEMIDE 40 MG: 10 INJECTION, SOLUTION INTRAMUSCULAR; INTRAVENOUS at 09:09

## 2023-09-25 RX ADMIN — ALOGLIPTIN 12.5 MG: 12.5 TABLET, FILM COATED ORAL at 09:09

## 2023-09-25 RX ADMIN — LISINOPRIL 20 MG: 20 TABLET ORAL at 09:09

## 2023-09-25 RX ADMIN — ASPIRIN 81 MG 81 MG: 81 TABLET ORAL at 12:48

## 2023-09-25 RX ADMIN — CITALOPRAM HYDROBROMIDE 20 MG: 20 TABLET ORAL at 09:09

## 2023-09-25 RX ADMIN — POTASSIUM CHLORIDE 40 MEQ: 750 CAPSULE, EXTENDED RELEASE ORAL at 17:27

## 2023-09-25 RX ADMIN — DOXYCYCLINE 100 MG: 100 CAPSULE ORAL at 22:28

## 2023-09-25 RX ADMIN — GLIPIZIDE 5 MG: 5 TABLET ORAL at 05:21

## 2023-09-25 RX ADMIN — INSULIN LISPRO 1 UNITS: 100 INJECTION, SOLUTION INTRAVENOUS; SUBCUTANEOUS at 11:22

## 2023-09-25 RX ADMIN — IPRATROPIUM BROMIDE AND ALBUTEROL SULFATE 1 DOSE: .5; 3 SOLUTION RESPIRATORY (INHALATION) at 14:12

## 2023-09-25 RX ADMIN — OXYCODONE 10 MG: 5 TABLET ORAL at 12:47

## 2023-09-25 RX ADMIN — GUAIFENESIN 600 MG: 600 TABLET, EXTENDED RELEASE ORAL at 09:09

## 2023-09-25 RX ADMIN — ATORVASTATIN CALCIUM 20 MG: 20 TABLET, FILM COATED ORAL at 09:09

## 2023-09-25 RX ADMIN — OXYCODONE 10 MG: 5 TABLET ORAL at 22:28

## 2023-09-25 RX ADMIN — PANTOPRAZOLE SODIUM 40 MG: 40 TABLET, DELAYED RELEASE ORAL at 05:21

## 2023-09-25 RX ADMIN — IRON SUCROSE 200 MG: 20 INJECTION, SOLUTION INTRAVENOUS at 17:42

## 2023-09-25 RX ADMIN — DOXYCYCLINE 100 MG: 100 CAPSULE ORAL at 09:09

## 2023-09-25 RX ADMIN — LEVOTHYROXINE SODIUM 150 MCG: 0.07 TABLET ORAL at 05:50

## 2023-09-25 RX ADMIN — EMPAGLIFLOZIN 10 MG: 10 TABLET, FILM COATED ORAL at 09:09

## 2023-09-25 RX ADMIN — GUAIFENESIN 600 MG: 600 TABLET, EXTENDED RELEASE ORAL at 22:28

## 2023-09-25 RX ADMIN — APIXABAN 5 MG: 5 TABLET, FILM COATED ORAL at 22:28

## 2023-09-25 SDOH — ECONOMIC STABILITY: INCOME INSECURITY: IN THE PAST 12 MONTHS, HAS THE ELECTRIC, GAS, OIL, OR WATER COMPANY THREATENED TO SHUT OFF SERVICE IN YOUR HOME?: NO

## 2023-09-25 SDOH — ECONOMIC STABILITY: FOOD INSECURITY: WITHIN THE PAST 12 MONTHS, YOU WORRIED THAT YOUR FOOD WOULD RUN OUT BEFORE YOU GOT MONEY TO BUY MORE.: NEVER TRUE

## 2023-09-25 SDOH — ECONOMIC STABILITY: INCOME INSECURITY: HOW HARD IS IT FOR YOU TO PAY FOR THE VERY BASICS LIKE FOOD, HOUSING, MEDICAL CARE, AND HEATING?: SOMEWHAT HARD

## 2023-09-25 ASSESSMENT — PATIENT HEALTH QUESTIONNAIRE - PHQ9
SUM OF ALL RESPONSES TO PHQ9 QUESTIONS 1 & 2: 0
SUM OF ALL RESPONSES TO PHQ QUESTIONS 1-9: 0
SUM OF ALL RESPONSES TO PHQ QUESTIONS 1-9: 0
2. FEELING DOWN, DEPRESSED OR HOPELESS: 0
SUM OF ALL RESPONSES TO PHQ QUESTIONS 1-9: 0
1. LITTLE INTEREST OR PLEASURE IN DOING THINGS: 0
SUM OF ALL RESPONSES TO PHQ QUESTIONS 1-9: 0

## 2023-09-25 ASSESSMENT — PAIN DESCRIPTION - ORIENTATION: ORIENTATION: LOWER

## 2023-09-25 ASSESSMENT — PAIN SCALES - GENERAL
PAINLEVEL_OUTOF10: 8
PAINLEVEL_OUTOF10: 8

## 2023-09-25 ASSESSMENT — PAIN DESCRIPTION - LOCATION
LOCATION: FOOT;BACK
LOCATION: BACK;LEG

## 2023-09-25 NOTE — ACP (ADVANCE CARE PLANNING)
Advance Care Planning     General Advance Care Planning (ACP) Conversation    Date of Conversation: 9/25/2023  Conducted with: Patient with Decision Making Capacity    Healthcare Decision Maker:    Primary Decision Maker: Jo Ann Chakraborty - [place] - [phone]  Click here to complete Healthcare Decision Makers including selection of the Healthcare Decision Maker Relationship (ie \"Primary\"). Today we documented Decision Maker(s) consistent with Legal Next of Kin hierarchy.     Content/Action Overview:  Has NO ACP documents/care preferences - information provided, considering goals and options  Reviewed DNR/DNI and patient elects Full Code (Attempt Resuscitation)  artificial nutrition, ventilation preferences, and resuscitation preferences      Length of Voluntary ACP Conversation in minutes:  <16 minutes (Non-Billable)    Genaro Galeazzi

## 2023-09-25 NOTE — H&P
09/25/2023    Vitamin D deficiency [E55.9]  Replace vitamin D and monitor level. 09/25/2023    Paroxysmal atrial fibrillation ([address]) [I48.0]  New diagnosis. Noted on telemetry/EKG. Treat with beta-blocker and start Eliquis. Echocardiogram was done. Final report still pending. Cardiology consult when available. 09/25/2023    Generalized weakness [R53.1]  Could be cardiac related. Treat as mentioned in A-fib/CHF. Replace electrolytes. PT/OT. Monitor hemoglobin and try to improve as able. 09/25/2023    Acute heart failure, unspecified heart failure type ([address]) [I50.9]  New diagnosis. Preliminary reading on the echo showed ejection fraction 35-40%. Clinically fit the criteria of CHF. Proceed with diuresis and make sure blood pressure under good control. Discussed CHF instruction including low-sodium diet, daily weight and fluid restriction. Cardiology consult when available. Make sure A-fib is rate controlled. 09/24/2023    Hypomagnesemia [E83.42]  Replace magnesium and monitor level. 09/24/2023    Acute respiratory failure with hypoxia ([address]) [J96.01]  Likely related to CHF exacerbation. Proceed with diuresis and monitor oxygen requirements. 09/24/2023    Chronic back pain [M54.9, G89.29]  Pain control. PT/OT. In the process to have MRI on an outpatient basis. Anxiety [F41.9]  Continue current regimen and monitor. Hypertension [I10]  Borderline elevated on arrival.  Monitor blood pressure closely. Titrate regimen if needed specially with her new diagnosis of CHF/A-fib. Obesity [B13.4]  Complicating factor specially with her other medical problems. Discussed the importance of appropriate diet, increase activity level as able and weight control. Type 2 diabetes mellitus (HCC) [E11.9]  Poorly controlled. A1c is elevated. Monitor fingersticks closely. Cover with/scale insulin if needed. Long conversation regarding appropriate diet and weight control.   Add

## 2023-09-25 NOTE — CARE COORDINATION
09/25/23 1238   Housing   What is your living situation today? I have a steady place to live. Utilities   In the past 12 months has the electric, gas, oil, or water company threatened to shut off service in your home? No   Employment   Do you want help finding or keeping work or a job? I do not need or want help   Income   How hard is it for you to pay for the very basics like food, housing, medical care, and heating? Somewhat  (increasing prices are making it harder for pt)   Physical Activity   In the last 30 days, other than the activities you did for work, on average, how many days per week did you engage in moderate exercise (like walking fast, running, jogging, dancing, swimming, biking, or other similar activities)? 0   Education   Do you speak a language other than English at home? No   Transportation   In the past 12 months, has lack of reliable transportation kept you from medical appointments, meetings, work or from getting things needed for daily living? No   Over the Past 2 Weeks, how often have you been bothered by any of the following problems? Little interest or pleasure in doing things 0   Feeling down, depressed, or hopeless 0   PHQ-9 Total Score 0   Substance Use   How many times in the past 12 months have you had 5 or more drinks a day (males) or 4 or more drinks in a day (females)? One drink is 12 ounces of beer, 5 ounces of wine, or 1.5 ounces of 80-proof spirits. Never   Safety   How often does anyone, including family and friends, physically hurt you? Never   Disabilities   Because of a physical, mental, or emotional condition, do you have serious difficulty concentrating, remembering, or making decisions? (5 years or older) No   Basic Daily Needs   Think about the place you live. Do you have problems with any of the following?  None of the above   Social Connections and Friendships   If for any reason you need help with day-to-day activities such as bathing, preparing meals, shopping,

## 2023-09-25 NOTE — PLAN OF CARE
Problem: Discharge Planning  Goal: Discharge to home or other facility with appropriate resources  9/25/2023 1139 by Delma Hurst RN  Outcome: Progressing  Flowsheets (Taken 9/25/2023 0800)  Discharge to home or other facility with appropriate resources: Identify barriers to discharge with patient and caregiver  9/25/2023 0119 by Lenora Manuel RN  Outcome: Progressing     Problem: Pain  Goal: Verbalizes/displays adequate comfort level or baseline comfort level  9/25/2023 1139 by Delma Hurst RN  Outcome: Progressing  9/25/2023 0119 by Lenora Manuel RN  Outcome: Progressing     Problem: Skin/Tissue Integrity  Goal: Absence of new skin breakdown  Description: 1. Monitor for areas of redness and/or skin breakdown  2. Assess vascular access sites hourly  3. Every 4-6 hours minimum:  Change oxygen saturation probe site  4. Every 4-6 hours:  If on nasal continuous positive airway pressure, respiratory therapy assess nares and determine need for appliance change or resting period.   9/25/2023 1139 by Delma Hurst RN  Outcome: Progressing  9/25/2023 0119 by Lenoar Manuel RN  Outcome: Progressing     Problem: Safety - Adult  Goal: Free from fall injury  9/25/2023 1139 by Delma Hurst RN  Outcome: Progressing  9/25/2023 0119 by Lenora Manuel RN  Outcome: Progressing     Problem: Chronic Conditions and Co-morbidities  Goal: Patient's chronic conditions and co-morbidity symptoms are monitored and maintained or improved  Outcome: Progressing  Flowsheets (Taken 9/25/2023 0800)  Care Plan - Patient's Chronic Conditions and Co-Morbidity Symptoms are Monitored and Maintained or Improved:   Monitor and assess patient's chronic conditions and comorbid symptoms for stability, deterioration, or improvement   Collaborate with multidisciplinary team to address chronic and comorbid conditions and prevent exacerbation or deterioration   Update acute care plan with appropriate goals if chronic or comorbid symptoms are

## 2023-09-25 NOTE — RT PROTOCOL NOTE
6 MINUTE WALK TEST    Exercise type:  walked with assistance of a walker in the hallway.       O2 sat RA/O2LPM RR NAKIA HR BP   Rest 96 RA  1 62    1 min 96 RA  2 80    2 min 95 RA  2 81    3 min 88 RA  3 98    4 min 86 1LPM  2 99    5 min 88 1LPM  2 106    6 min 96 1LPM  1 105    Recovery 95 RA  1 82      Distance walked: 20 feet    Breath sounds/patterns:  dimimished    Comments:  patient got physically tired and somewhat short of air during the exercise, she put forth good effort

## 2023-09-25 NOTE — CARE COORDINATION
Case Management Assessment  Initial Evaluation    Date/Time of Evaluation: 9/25/2023 12:28 PM  Assessment Completed by: Tata Westbrook RN    If patient is discharged prior to next notation, then this note serves as note for discharge by case management. Patient Name: Mini Verma                     YOB: 1945  Diagnosis: Acute pulmonary edema ([address]) [J81.0]  Elevated troponin [R77.8]  Acute respiratory failure with hypoxia ([address]) [J96.01]  Acute heart failure, unspecified heart failure type [facility]) [I50.9]                     Date / Time: 9/24/2023  6:53 AM    Patient Admission Status: Inpatient   Readmission Risk (Low < 19, Mod (19-27), High > 27): No data recorded  Current PCP: TING Zhang  PCP verified by CM? Yes    Chart Reviewed: Yes      History Provided by: Patient, Medical Record  Patient Orientation: Alert and Oriented    Patient Cognition: Alert    Hospitalization in the last 30 days (Readmission):  No    If yes, Readmission Assessment in  Navigator will be completed. Advance Directives:      Code Status: Full Code   Patient's Primary Decision Maker is: Patient Declined (Legal Next of Kin Remains as Decision Maker)    Primary Decision Maker: Jo Ann Chakraborty - Child - [phone]    Discharge Planning:    Patient lives with: Family Members (grandson) Type of Home: House  Primary Care Giver: Self  Patient Support Systems include: Family Members (grandson)   Current Financial resources: Medicaid  Current community resources: None  Current services prior to admission: Durable Medical Equipment            Current DME: Other (Comment), Walker, Cane (rollator, lift chair)            Type of Home Care services:  None    ADLS  Prior functional level: Independent in ADLs/IADLs  Current functional level: Independent in ADLs/IADLs    Family can provide assistance at DC:  Yes  Would you like Case Management to discuss the discharge plan with any other family members/significant others, and if so,

## 2023-09-25 NOTE — CONSULTS
Nutrition Education    Educated on low sodium, Heart Failure Nutrition Therapy. Learners: Patient and Family  Readiness: Acceptance  Method: Explanation, Demonstration, and Handout  Response: Verbalizes Understanding  Contact name and number provided.     Savita Herbert RD

## 2023-09-26 ENCOUNTER — HOSPITAL ENCOUNTER (INPATIENT)
Facility: HOSPITAL | Age: 78
LOS: 3 days | Discharge: HOME | End: 2023-09-29
Attending: INTERNAL MEDICINE | Admitting: INTERNAL MEDICINE
Payer: MEDICAID

## 2023-09-26 VITALS
DIASTOLIC BLOOD PRESSURE: 65 MMHG | WEIGHT: 200.38 LBS | RESPIRATION RATE: 18 BRPM | HEART RATE: 81 BPM | TEMPERATURE: 97.7 F | SYSTOLIC BLOOD PRESSURE: 127 MMHG | HEIGHT: 60 IN | BODY MASS INDEX: 39.34 KG/M2 | OXYGEN SATURATION: 96 %

## 2023-09-26 DIAGNOSIS — I48.0 PAROXYSMAL ATRIAL FIBRILLATION (HCC): ICD-10-CM

## 2023-09-26 DIAGNOSIS — D50.9 IRON DEFICIENCY ANEMIA, UNSPECIFIED IRON DEFICIENCY ANEMIA TYPE: ICD-10-CM

## 2023-09-26 DIAGNOSIS — R53.81 DECLINING FUNCTIONAL STATUS: Primary | ICD-10-CM

## 2023-09-26 PROBLEM — E03.9 HYPOTHYROIDISM: Status: ACTIVE | Noted: 2023-09-26

## 2023-09-26 LAB
ALBUMIN SERPL-MCNC: 3.1 G/DL (ref 3.4–4.8)
ALBUMIN/GLOB SERPL: 1.2 {RATIO} (ref 0.8–2)
ALP SERPL-CCNC: 99 U/L (ref 25–100)
ALT SERPL-CCNC: 9 U/L (ref 4–36)
ANION GAP SERPL CALCULATED.3IONS-SCNC: 9 MMOL/L (ref 3–16)
AST SERPL-CCNC: 13 U/L (ref 8–33)
BASOPHILS # BLD: 0.1 K/UL (ref 0–0.1)
BASOPHILS NFR BLD: 0.5 %
BILIRUB SERPL-MCNC: 0.3 MG/DL (ref 0.3–1.2)
BUN SERPL-MCNC: 22 MG/DL (ref 6–20)
CALCIUM SERPL-MCNC: 8.8 MG/DL (ref 8.5–10.5)
CHLORIDE SERPL-SCNC: 99 MMOL/L (ref 98–107)
CO2 SERPL-SCNC: 32 MMOL/L (ref 20–30)
CREAT SERPL-MCNC: 1.3 MG/DL (ref 0.4–1.2)
EKG ATRIAL RATE: 88 BPM
EKG DIAGNOSIS: NORMAL
EKG Q-T INTERVAL: 444 MS
EKG QRS DURATION: 152 MS
EKG QTC CALCULATION (BAZETT): 509 MS
EKG R AXIS: 7 DEGREES
EKG T AXIS: 177 DEGREES
EKG VENTRICULAR RATE: 79 BPM
EOSINOPHIL # BLD: 0.1 K/UL (ref 0–0.4)
EOSINOPHIL NFR BLD: 0.9 %
ERYTHROCYTE [DISTWIDTH] IN BLOOD BY AUTOMATED COUNT: 17.2 % (ref 11–16)
GFR SERPLBLD CREATININE-BSD FMLA CKD-EPI: 43 ML/MIN/{1.73_M2}
GLOBULIN SER CALC-MCNC: 2.6 G/DL
GLUCOSE BLD-MCNC: 137 MG/DL (ref 74–106)
GLUCOSE BLD-MCNC: 205 MG/DL (ref 74–106)
GLUCOSE BLD-MCNC: 231 MG/DL (ref 74–106)
GLUCOSE BLD-MCNC: 248 MG/DL (ref 74–106)
GLUCOSE SERPL-MCNC: 232 MG/DL (ref 74–106)
HCT VFR BLD AUTO: 32.1 % (ref 37–47)
HGB BLD-MCNC: 9.7 G/DL (ref 11.5–16.5)
IMM GRANULOCYTES # BLD: 0.2 K/UL
IMM GRANULOCYTES NFR BLD: 1.3 % (ref 0–5)
LYMPHOCYTES # BLD: 2.2 K/UL (ref 1.5–4)
LYMPHOCYTES NFR BLD: 17.5 %
MAGNESIUM SERPL-MCNC: 1.8 MG/DL (ref 1.7–2.4)
MCH RBC QN AUTO: 27.2 PG (ref 27–32)
MCHC RBC AUTO-ENTMCNC: 30.2 G/DL (ref 31–35)
MCV RBC AUTO: 90.2 FL (ref 80–100)
MONOCYTES # BLD: 0.8 K/UL (ref 0.2–0.8)
MONOCYTES NFR BLD: 6.4 %
NEUTROPHILS # BLD: 9.1 K/UL (ref 2–7.5)
NEUTS SEG NFR BLD: 73.4 %
PERFORMED ON: ABNORMAL
PLATELET # BLD AUTO: 146 K/UL (ref 150–400)
PMV BLD AUTO: 12.2 FL (ref 6–10)
POTASSIUM SERPL-SCNC: 4 MMOL/L (ref 3.4–5.1)
PROT SERPL-MCNC: 5.7 G/DL (ref 6.4–8.3)
RBC # BLD AUTO: 3.56 M/UL (ref 3.8–5.8)
SODIUM SERPL-SCNC: 140 MMOL/L (ref 136–145)
WBC # BLD AUTO: 12.3 K/UL (ref 4–11)

## 2023-09-26 PROCEDURE — 97116 GAIT TRAINING THERAPY: CPT

## 2023-09-26 PROCEDURE — 2700000000 HC OXYGEN THERAPY PER DAY

## 2023-09-26 PROCEDURE — 80053 COMPREHEN METABOLIC PANEL: CPT

## 2023-09-26 PROCEDURE — 6370000000 HC RX 637 (ALT 250 FOR IP): Performed by: PHYSICIAN ASSISTANT

## 2023-09-26 PROCEDURE — 97535 SELF CARE MNGMENT TRAINING: CPT

## 2023-09-26 PROCEDURE — 99239 HOSP IP/OBS DSCHRG MGMT >30: CPT | Performed by: INTERNAL MEDICINE

## 2023-09-26 PROCEDURE — 2580000003 HC RX 258: Performed by: INTERNAL MEDICINE

## 2023-09-26 PROCEDURE — 6360000002 HC RX W HCPCS: Performed by: INTERNAL MEDICINE

## 2023-09-26 PROCEDURE — 94761 N-INVAS EAR/PLS OXIMETRY MLT: CPT

## 2023-09-26 PROCEDURE — 94640 AIRWAY INHALATION TREATMENT: CPT

## 2023-09-26 PROCEDURE — 97530 THERAPEUTIC ACTIVITIES: CPT

## 2023-09-26 PROCEDURE — 85025 COMPLETE CBC W/AUTO DIFF WBC: CPT

## 2023-09-26 PROCEDURE — 1200000002 HC SEMI PRIVATE SWING BED

## 2023-09-26 PROCEDURE — 6370000000 HC RX 637 (ALT 250 FOR IP): Performed by: INTERNAL MEDICINE

## 2023-09-26 PROCEDURE — 83735 ASSAY OF MAGNESIUM: CPT

## 2023-09-26 PROCEDURE — 36415 COLL VENOUS BLD VENIPUNCTURE: CPT

## 2023-09-26 PROCEDURE — 6360000002 HC RX W HCPCS: Performed by: PHYSICIAN ASSISTANT

## 2023-09-26 PROCEDURE — 97165 OT EVAL LOW COMPLEX 30 MIN: CPT

## 2023-09-26 RX ORDER — MONTELUKAST SODIUM 10 MG/1
10 TABLET ORAL NIGHTLY
Status: CANCELLED | OUTPATIENT
Start: 2023-09-26

## 2023-09-26 RX ORDER — GLIPIZIDE 5 MG/1
5 TABLET ORAL
Status: DISCONTINUED | OUTPATIENT
Start: 2023-09-26 | End: 2023-09-26 | Stop reason: HOSPADM

## 2023-09-26 RX ORDER — TRIAMCINOLONE ACETONIDE 1 MG/G
CREAM TOPICAL 2 TIMES DAILY
Status: CANCELLED | OUTPATIENT
Start: 2023-09-26

## 2023-09-26 RX ORDER — ATENOLOL 50 MG/1
100 TABLET ORAL DAILY
Status: CANCELLED | OUTPATIENT
Start: 2023-09-27

## 2023-09-26 RX ORDER — LISINOPRIL 20 MG/1
20 TABLET ORAL DAILY
Status: CANCELLED | OUTPATIENT
Start: 2023-09-27

## 2023-09-26 RX ORDER — ONDANSETRON 4 MG/1
4 TABLET, ORALLY DISINTEGRATING ORAL EVERY 8 HOURS PRN
Status: CANCELLED | OUTPATIENT
Start: 2023-09-26

## 2023-09-26 RX ORDER — POLYETHYLENE GLYCOL 3350 17 G/17G
17 POWDER, FOR SOLUTION ORAL DAILY PRN
Status: CANCELLED | OUTPATIENT
Start: 2023-09-26

## 2023-09-26 RX ORDER — CYANOCOBALAMIN 1000 UG/ML
1000 INJECTION, SOLUTION INTRAMUSCULAR; SUBCUTANEOUS
Status: CANCELLED | OUTPATIENT
Start: 2023-10-08

## 2023-09-26 RX ORDER — CITALOPRAM 20 MG/1
20 TABLET ORAL DAILY
Status: CANCELLED | OUTPATIENT
Start: 2023-09-27

## 2023-09-26 RX ORDER — VALSARTAN 80 MG/1
80 TABLET ORAL DAILY
Status: DISCONTINUED | OUTPATIENT
Start: 2023-09-27 | End: 2023-09-29 | Stop reason: HOSPADM

## 2023-09-26 RX ORDER — INSULIN LISPRO 100 [IU]/ML
0-4 INJECTION, SOLUTION INTRAVENOUS; SUBCUTANEOUS NIGHTLY
Status: CANCELLED | OUTPATIENT
Start: 2023-09-26

## 2023-09-26 RX ORDER — POLYETHYLENE GLYCOL 3350 17 G/17G
17 POWDER, FOR SOLUTION ORAL DAILY PRN
Status: DISCONTINUED | OUTPATIENT
Start: 2023-09-26 | End: 2023-09-27

## 2023-09-26 RX ORDER — ACETAMINOPHEN 325 MG/1
650 TABLET ORAL EVERY 6 HOURS PRN
Status: DISCONTINUED | OUTPATIENT
Start: 2023-09-26 | End: 2023-09-29 | Stop reason: HOSPADM

## 2023-09-26 RX ORDER — DOXYCYCLINE 100 MG/1
100 CAPSULE ORAL EVERY 12 HOURS SCHEDULED
Status: DISCONTINUED | OUTPATIENT
Start: 2023-09-26 | End: 2023-09-29 | Stop reason: HOSPADM

## 2023-09-26 RX ORDER — ONDANSETRON 2 MG/ML
4 INJECTION INTRAMUSCULAR; INTRAVENOUS EVERY 6 HOURS PRN
Status: DISCONTINUED | OUTPATIENT
Start: 2023-09-26 | End: 2023-09-29 | Stop reason: HOSPADM

## 2023-09-26 RX ORDER — INSULIN LISPRO 100 [IU]/ML
0-4 INJECTION, SOLUTION INTRAVENOUS; SUBCUTANEOUS
Status: CANCELLED | OUTPATIENT
Start: 2023-09-26

## 2023-09-26 RX ORDER — FUROSEMIDE 20 MG/1
20 TABLET ORAL DAILY
Status: DISCONTINUED | OUTPATIENT
Start: 2023-09-27 | End: 2023-09-29 | Stop reason: HOSPADM

## 2023-09-26 RX ORDER — POLYETHYLENE GLYCOL 3350 17 G/17G
17 POWDER, FOR SOLUTION ORAL DAILY PRN
Status: DISCONTINUED | OUTPATIENT
Start: 2023-09-26 | End: 2023-09-29 | Stop reason: HOSPADM

## 2023-09-26 RX ORDER — ACETAMINOPHEN 650 MG/1
650 SUPPOSITORY RECTAL EVERY 6 HOURS PRN
Status: CANCELLED | OUTPATIENT
Start: 2023-09-26

## 2023-09-26 RX ORDER — ATENOLOL 50 MG/1
100 TABLET ORAL DAILY
Status: DISCONTINUED | OUTPATIENT
Start: 2023-09-27 | End: 2023-09-28

## 2023-09-26 RX ORDER — IBUPROFEN 600 MG/1
1 TABLET ORAL PRN
Status: CANCELLED | OUTPATIENT
Start: 2023-09-26

## 2023-09-26 RX ORDER — INSULIN LISPRO 100 [IU]/ML
0-4 INJECTION, SOLUTION INTRAVENOUS; SUBCUTANEOUS NIGHTLY
Status: DISCONTINUED | OUTPATIENT
Start: 2023-09-26 | End: 2023-09-29 | Stop reason: HOSPADM

## 2023-09-26 RX ORDER — ALOGLIPTIN 12.5 MG/1
12.5 TABLET, FILM COATED ORAL DAILY
Status: DISCONTINUED | OUTPATIENT
Start: 2023-09-27 | End: 2023-09-29 | Stop reason: HOSPADM

## 2023-09-26 RX ORDER — ACETAMINOPHEN 325 MG/1
650 TABLET ORAL EVERY 6 HOURS PRN
Status: CANCELLED | OUTPATIENT
Start: 2023-09-26

## 2023-09-26 RX ORDER — TRIAMCINOLONE ACETONIDE 1 MG/G
CREAM TOPICAL 2 TIMES DAILY
Status: DISCONTINUED | OUTPATIENT
Start: 2023-09-26 | End: 2023-09-26

## 2023-09-26 RX ORDER — IPRATROPIUM BROMIDE AND ALBUTEROL SULFATE 2.5; .5 MG/3ML; MG/3ML
1 SOLUTION RESPIRATORY (INHALATION)
Status: CANCELLED | OUTPATIENT
Start: 2023-09-26

## 2023-09-26 RX ORDER — GUAIFENESIN/DEXTROMETHORPHAN 100-10MG/5
5 SYRUP ORAL EVERY 4 HOURS PRN
Status: CANCELLED | OUTPATIENT
Start: 2023-09-26

## 2023-09-26 RX ORDER — ATORVASTATIN CALCIUM 20 MG/1
20 TABLET, FILM COATED ORAL DAILY
Status: CANCELLED | OUTPATIENT
Start: 2023-09-27

## 2023-09-26 RX ORDER — ACETAMINOPHEN 650 MG/1
650 SUPPOSITORY RECTAL EVERY 6 HOURS PRN
Status: DISCONTINUED | OUTPATIENT
Start: 2023-09-26 | End: 2023-09-29 | Stop reason: HOSPADM

## 2023-09-26 RX ORDER — ALOGLIPTIN 12.5 MG/1
12.5 TABLET, FILM COATED ORAL DAILY
Status: CANCELLED | OUTPATIENT
Start: 2023-09-27

## 2023-09-26 RX ORDER — INSULIN LISPRO 100 [IU]/ML
0-4 INJECTION, SOLUTION INTRAVENOUS; SUBCUTANEOUS
Status: DISCONTINUED | OUTPATIENT
Start: 2023-09-26 | End: 2023-09-29 | Stop reason: HOSPADM

## 2023-09-26 RX ORDER — MONTELUKAST SODIUM 10 MG/1
10 TABLET ORAL NIGHTLY
Status: DISCONTINUED | OUTPATIENT
Start: 2023-09-26 | End: 2023-09-26 | Stop reason: HOSPADM

## 2023-09-26 RX ORDER — ONDANSETRON 4 MG/1
4 TABLET, ORALLY DISINTEGRATING ORAL EVERY 8 HOURS PRN
Status: DISCONTINUED | OUTPATIENT
Start: 2023-09-26 | End: 2023-09-29 | Stop reason: HOSPADM

## 2023-09-26 RX ORDER — GUAIFENESIN 600 MG/1
600 TABLET, EXTENDED RELEASE ORAL 2 TIMES DAILY
Status: CANCELLED | OUTPATIENT
Start: 2023-09-26

## 2023-09-26 RX ORDER — DEXTROSE MONOHYDRATE 100 MG/ML
INJECTION, SOLUTION INTRAVENOUS CONTINUOUS PRN
Status: DISCONTINUED | OUTPATIENT
Start: 2023-09-26 | End: 2023-09-29 | Stop reason: HOSPADM

## 2023-09-26 RX ORDER — OXYCODONE HYDROCHLORIDE 5 MG/1
10 TABLET ORAL EVERY 6 HOURS PRN
Status: CANCELLED | OUTPATIENT
Start: 2023-09-26

## 2023-09-26 RX ORDER — ERGOCALCIFEROL 1.25 MG/1
50000 CAPSULE ORAL WEEKLY
Status: CANCELLED | OUTPATIENT
Start: 2023-10-02

## 2023-09-26 RX ORDER — ONDANSETRON 2 MG/ML
4 INJECTION INTRAMUSCULAR; INTRAVENOUS EVERY 6 HOURS PRN
Status: CANCELLED | OUTPATIENT
Start: 2023-09-26

## 2023-09-26 RX ORDER — MONTELUKAST SODIUM 10 MG/1
10 TABLET ORAL NIGHTLY
Status: DISCONTINUED | OUTPATIENT
Start: 2023-09-26 | End: 2023-09-29 | Stop reason: HOSPADM

## 2023-09-26 RX ORDER — GLIPIZIDE 5 MG/1
5 TABLET ORAL
Status: CANCELLED | OUTPATIENT
Start: 2023-09-26

## 2023-09-26 RX ORDER — CITALOPRAM 20 MG/1
20 TABLET ORAL DAILY
Status: DISCONTINUED | OUTPATIENT
Start: 2023-09-27 | End: 2023-09-29 | Stop reason: HOSPADM

## 2023-09-26 RX ORDER — CETIRIZINE HYDROCHLORIDE 5 MG/1
5 TABLET ORAL DAILY
Status: DISCONTINUED | OUTPATIENT
Start: 2023-09-26 | End: 2023-09-26 | Stop reason: HOSPADM

## 2023-09-26 RX ORDER — CETIRIZINE HYDROCHLORIDE 5 MG/1
5 TABLET ORAL DAILY
Status: DISCONTINUED | OUTPATIENT
Start: 2023-09-27 | End: 2023-09-29 | Stop reason: HOSPADM

## 2023-09-26 RX ORDER — FUROSEMIDE 10 MG/ML
20 INJECTION INTRAMUSCULAR; INTRAVENOUS ONCE
Status: COMPLETED | OUTPATIENT
Start: 2023-09-26 | End: 2023-09-26

## 2023-09-26 RX ORDER — PANTOPRAZOLE SODIUM 40 MG/1
40 TABLET, DELAYED RELEASE ORAL
Status: CANCELLED | OUTPATIENT
Start: 2023-09-27

## 2023-09-26 RX ORDER — ERGOCALCIFEROL 1.25 MG/1
50000 CAPSULE ORAL WEEKLY
Status: DISCONTINUED | OUTPATIENT
Start: 2023-10-02 | End: 2023-09-29 | Stop reason: HOSPADM

## 2023-09-26 RX ORDER — IBUPROFEN 600 MG/1
1 TABLET ORAL PRN
Status: DISCONTINUED | OUTPATIENT
Start: 2023-09-26 | End: 2023-09-29 | Stop reason: HOSPADM

## 2023-09-26 RX ORDER — IPRATROPIUM BROMIDE AND ALBUTEROL SULFATE 2.5; .5 MG/3ML; MG/3ML
1 SOLUTION RESPIRATORY (INHALATION)
Status: DISCONTINUED | OUTPATIENT
Start: 2023-09-26 | End: 2023-09-27

## 2023-09-26 RX ORDER — LEVOTHYROXINE SODIUM 0.07 MG/1
150 TABLET ORAL DAILY
Status: CANCELLED | OUTPATIENT
Start: 2023-09-27

## 2023-09-26 RX ORDER — GUAIFENESIN 600 MG/1
600 TABLET, EXTENDED RELEASE ORAL 2 TIMES DAILY
Status: DISCONTINUED | OUTPATIENT
Start: 2023-09-26 | End: 2023-09-29 | Stop reason: HOSPADM

## 2023-09-26 RX ORDER — ATORVASTATIN CALCIUM 20 MG/1
20 TABLET, FILM COATED ORAL DAILY
Status: DISCONTINUED | OUTPATIENT
Start: 2023-09-27 | End: 2023-09-29 | Stop reason: HOSPADM

## 2023-09-26 RX ORDER — PANTOPRAZOLE SODIUM 40 MG/1
40 TABLET, DELAYED RELEASE ORAL DAILY
Status: DISCONTINUED | OUTPATIENT
Start: 2023-09-27 | End: 2023-09-29 | Stop reason: HOSPADM

## 2023-09-26 RX ORDER — DOXYCYCLINE 100 MG/1
100 CAPSULE ORAL EVERY 12 HOURS SCHEDULED
Status: CANCELLED | OUTPATIENT
Start: 2023-09-26 | End: 2023-10-01

## 2023-09-26 RX ORDER — LEVOTHYROXINE SODIUM 0.07 MG/1
150 TABLET ORAL DAILY
Status: DISCONTINUED | OUTPATIENT
Start: 2023-09-27 | End: 2023-09-29 | Stop reason: HOSPADM

## 2023-09-26 RX ORDER — CETIRIZINE HYDROCHLORIDE 5 MG/1
5 TABLET ORAL DAILY
Status: CANCELLED | OUTPATIENT
Start: 2023-09-27

## 2023-09-26 RX ORDER — CYANOCOBALAMIN 1000 UG/ML
1000 INJECTION, SOLUTION INTRAMUSCULAR; SUBCUTANEOUS
Status: DISCONTINUED | OUTPATIENT
Start: 2023-10-08 | End: 2023-09-29 | Stop reason: HOSPADM

## 2023-09-26 RX ORDER — OXYCODONE HYDROCHLORIDE 5 MG/1
10 TABLET ORAL EVERY 6 HOURS PRN
Status: DISCONTINUED | OUTPATIENT
Start: 2023-09-26 | End: 2023-09-29 | Stop reason: HOSPADM

## 2023-09-26 RX ORDER — LISINOPRIL 20 MG/1
20 TABLET ORAL DAILY
Status: DISCONTINUED | OUTPATIENT
Start: 2023-09-27 | End: 2023-09-26

## 2023-09-26 RX ORDER — DEXTROSE MONOHYDRATE 100 MG/ML
INJECTION, SOLUTION INTRAVENOUS CONTINUOUS PRN
Status: CANCELLED | OUTPATIENT
Start: 2023-09-26

## 2023-09-26 RX ORDER — GUAIFENESIN/DEXTROMETHORPHAN 100-10MG/5
5 SYRUP ORAL EVERY 4 HOURS PRN
Status: DISCONTINUED | OUTPATIENT
Start: 2023-09-26 | End: 2023-09-29 | Stop reason: HOSPADM

## 2023-09-26 RX ORDER — GLIPIZIDE 5 MG/1
5 TABLET ORAL
Status: DISCONTINUED | OUTPATIENT
Start: 2023-09-26 | End: 2023-09-29 | Stop reason: HOSPADM

## 2023-09-26 RX ADMIN — GLIPIZIDE 5 MG: 5 TABLET ORAL at 17:26

## 2023-09-26 RX ADMIN — OXYCODONE 10 MG: 5 TABLET ORAL at 17:26

## 2023-09-26 RX ADMIN — ATENOLOL 100 MG: 50 TABLET ORAL at 08:33

## 2023-09-26 RX ADMIN — FUROSEMIDE 20 MG: 10 INJECTION, SOLUTION INTRAMUSCULAR; INTRAVENOUS at 11:16

## 2023-09-26 RX ADMIN — ALOGLIPTIN 12.5 MG: 12.5 TABLET, FILM COATED ORAL at 08:35

## 2023-09-26 RX ADMIN — IRON SUCROSE 200 MG: 20 INJECTION, SOLUTION INTRAVENOUS at 11:23

## 2023-09-26 RX ADMIN — INSULIN LISPRO 1 UNITS: 100 INJECTION, SOLUTION INTRAVENOUS; SUBCUTANEOUS at 08:46

## 2023-09-26 RX ADMIN — IPRATROPIUM BROMIDE AND ALBUTEROL SULFATE 1 DOSE: .5; 3 SOLUTION RESPIRATORY (INHALATION) at 16:38

## 2023-09-26 RX ADMIN — INSULIN LISPRO 1 UNITS: 100 INJECTION, SOLUTION INTRAVENOUS; SUBCUTANEOUS at 11:47

## 2023-09-26 RX ADMIN — ATORVASTATIN CALCIUM 20 MG: 20 TABLET, FILM COATED ORAL at 08:34

## 2023-09-26 RX ADMIN — IPRATROPIUM BROMIDE AND ALBUTEROL SULFATE 1 DOSE: .5; 3 SOLUTION RESPIRATORY (INHALATION) at 19:47

## 2023-09-26 RX ADMIN — APIXABAN 5 MG: 5 TABLET, FILM COATED ORAL at 08:35

## 2023-09-26 RX ADMIN — IPRATROPIUM BROMIDE AND ALBUTEROL SULFATE 1 DOSE: .5; 3 SOLUTION RESPIRATORY (INHALATION) at 04:49

## 2023-09-26 RX ADMIN — GUAIFENESIN 600 MG: 600 TABLET, EXTENDED RELEASE ORAL at 08:46

## 2023-09-26 RX ADMIN — GLIPIZIDE 5 MG: 5 TABLET ORAL at 06:14

## 2023-09-26 RX ADMIN — IPRATROPIUM BROMIDE AND ALBUTEROL SULFATE 1 DOSE: .5; 3 SOLUTION RESPIRATORY (INHALATION) at 08:42

## 2023-09-26 RX ADMIN — GUAIFENESIN 600 MG: 600 TABLET, EXTENDED RELEASE ORAL at 22:29

## 2023-09-26 RX ADMIN — APIXABAN 5 MG: 5 TABLET, FILM COATED ORAL at 22:29

## 2023-09-26 RX ADMIN — ASPIRIN 81 MG 81 MG: 81 TABLET ORAL at 08:34

## 2023-09-26 RX ADMIN — LEVOTHYROXINE SODIUM 150 MCG: 0.07 TABLET ORAL at 06:14

## 2023-09-26 RX ADMIN — DOXYCYCLINE 100 MG: 100 CAPSULE ORAL at 22:29

## 2023-09-26 RX ADMIN — DOXYCYCLINE 100 MG: 100 CAPSULE ORAL at 08:32

## 2023-09-26 RX ADMIN — EMPAGLIFLOZIN 10 MG: 10 TABLET, FILM COATED ORAL at 08:46

## 2023-09-26 RX ADMIN — PANTOPRAZOLE SODIUM 40 MG: 40 TABLET, DELAYED RELEASE ORAL at 06:14

## 2023-09-26 RX ADMIN — MONTELUKAST SODIUM 10 MG: 10 TABLET, COATED ORAL at 22:29

## 2023-09-26 RX ADMIN — LISINOPRIL 20 MG: 20 TABLET ORAL at 08:35

## 2023-09-26 RX ADMIN — CETIRIZINE HYDROCHLORIDE 5 MG: 5 TABLET ORAL at 08:46

## 2023-09-26 SDOH — ECONOMIC STABILITY: INCOME INSECURITY: HOW HARD IS IT FOR YOU TO PAY FOR THE VERY BASICS LIKE FOOD, HOUSING, MEDICAL CARE, AND HEATING?: SOMEWHAT HARD

## 2023-09-26 SDOH — ECONOMIC STABILITY: INCOME INSECURITY: IN THE PAST 12 MONTHS, HAS THE ELECTRIC, GAS, OIL, OR WATER COMPANY THREATENED TO SHUT OFF SERVICE IN YOUR HOME?: NO

## 2023-09-26 SDOH — ECONOMIC STABILITY: FOOD INSECURITY: WITHIN THE PAST 12 MONTHS, YOU WORRIED THAT YOUR FOOD WOULD RUN OUT BEFORE YOU GOT MONEY TO BUY MORE.: NEVER TRUE

## 2023-09-26 ASSESSMENT — PATIENT HEALTH QUESTIONNAIRE - PHQ9
2. FEELING DOWN, DEPRESSED OR HOPELESS: 0
SUM OF ALL RESPONSES TO PHQ QUESTIONS 1-9: 0
SUM OF ALL RESPONSES TO PHQ QUESTIONS 1-9: 0
SUM OF ALL RESPONSES TO PHQ9 QUESTIONS 1 & 2: 0
1. LITTLE INTEREST OR PLEASURE IN DOING THINGS: 0
SUM OF ALL RESPONSES TO PHQ QUESTIONS 1-9: 0
SUM OF ALL RESPONSES TO PHQ QUESTIONS 1-9: 0

## 2023-09-26 ASSESSMENT — PAIN DESCRIPTION - LOCATION: LOCATION: BACK

## 2023-09-26 ASSESSMENT — PAIN SCALES - GENERAL: PAINLEVEL_OUTOF10: 7

## 2023-09-26 NOTE — PLAN OF CARE
Problem: Discharge Planning  Goal: Discharge to home or other facility with appropriate resources  Outcome: Progressing  Flowsheets (Taken 9/26/2023 1834)  Discharge to home or other facility with appropriate resources: Identify barriers to discharge with patient and caregiver     Problem: Pain  Goal: Verbalizes/displays adequate comfort level or baseline comfort level  Outcome: Progressing     Problem: Skin/Tissue Integrity  Goal: Absence of new skin breakdown  Description: 1. Monitor for areas of redness and/or skin breakdown  2. Assess vascular access sites hourly  3. Every 4-6 hours minimum:  Change oxygen saturation probe site  4. Every 4-6 hours:  If on nasal continuous positive airway pressure, respiratory therapy assess nares and determine need for appliance change or resting period.   9/26/2023 1026 by Amy Giraldo LPN  Outcome: Progressing  9/26/2023 0445 by Cary Us RN  Outcome: Progressing     Problem: Safety - Adult  Goal: Free from fall injury  9/26/2023 1026 by Amy Giraldo LPN  Outcome: Progressing  9/26/2023 0445 by Cary Us RN  Outcome: Progressing     Problem: Chronic Conditions and Co-morbidities  Goal: Patient's chronic conditions and co-morbidity symptoms are monitored and maintained or improved  Outcome: Progressing  Flowsheets (Taken 9/26/2023 0938)  Care Plan - Patient's Chronic Conditions and Co-Morbidity Symptoms are Monitored and Maintained or Improved: Monitor and assess patient's chronic conditions and comorbid symptoms for stability, deterioration, or improvement     Problem: ABCDS Injury Assessment  Goal: Absence of physical injury  9/26/2023 1026 by Amy Giraldo LPN  Outcome: Progressing  9/26/2023 0445 by Cary Us RN  Outcome: Progressing

## 2023-09-26 NOTE — PROGRESS NOTES
Occupational Therapy  Facility/Department: [facility] MED [facility]  Occupational Therapy Initial Assessment    Name: Mone Batres  :   MRN: [id number]  Date of Service: 2023    Discharge Recommendations:  Continue to assess pending progress  OT Equipment Recommendations  Equipment Needed: No       Patient Diagnosis(es): There were no encounter diagnoses. Past Medical History:  has a past medical history of Anxiety, Chronic back pain, Hiatal hernia, Hypertension, Inflammatory polyarthropathy ([address]), Obesity, Osteoarthritis, Osteopenia, Reflux esophagitis, Type II or unspecified type diabetes mellitus without mention of complication, not stated as uncontrolled, Urinary incontinence, and Vitamin B12 deficiency. Past Surgical History:  has a past surgical history that includes fracture surgery ([id number]); Hemorrhoid surgery; Cholecystectomy; Carpal tunnel release; pr open repair of rotator cuff acute (2014); Total Thyroidectomy (2015); and shoulder surgery. Assessment   Performance deficits / Impairments: Decreased ROM; Decreased endurance;Decreased ADL status; Decreased strength;Decreased functional mobility ; Decreased high-level IADLs;Decreased balance  Assessment: This 70year old female was referred to OT services upon admission following onset of acute heart failure. Pt presents in bed on 2L 02. Pt is not on 02 at baseline. Pt is alert and oriented x3. Pt states she has been having increased number of falls recently and with onset of weakness. Pt presents with AROM deficits in LUE shoulder from previous injury, RUE WFL. Pt presents with general weakness. Pt completed bed mobility with MOD I. Pt sat at EOB unsupported. Pt come to stand with SBA and ambulated 100 feet with RW CGA had seated rest break then ambulated 100 feet back to room with WC follow.  Pt  ambulated to bathroom with RW and completed toileting transfer with CGA and completed toileting tasks with MOD I. Pt stood at sink and washed hands without support or LOB. Pt returned to chair transferring with SBA. Pt tolerated well. 02 sats maintained >90% throughout activity. Pt will benefit from skilled OT services while IP. Recommend using walker for increased stability and decreased risk of falls at home. Prognosis: Good  Decision Making: Low Complexity  REQUIRES OT FOLLOW-UP: Yes  Activity Tolerance  Activity Tolerance: Patient Tolerated treatment well        Plan   Occupational Therapy Plan  Times Per Week: 3-5  Times Per Day: Once a day  Days Per Week: 5 Days  Therapy Duration: 10 Days  Current Treatment Recommendations: Strengthening, Balance training, Functional mobility training, Patient/Caregiver education & training, Endurance training, Self-Care / ADL     Restrictions  Restrictions/Precautions  Restrictions/Precautions: General Precautions, Fall Risk  Required Braces or Orthoses?: No    Subjective   General  Chart Reviewed: Yes  Patient assessed for rehabilitation services?: Yes  Family / Caregiver Present: No  Referring Practitioner: Joshua Green PA-C  Diagnosis: Functional Decline  Subjective  Subjective: I feel better today.      Social/Functional History  Social/Functional History  Lives With: Other (comment) (Grandson)  Type of Home: House  Home Layout: One level  Home Access: Stairs to enter with rails  Entrance Stairs - Number of Steps: 3 BRONWYN  Bathroom Shower/Tub: Walk-in shower  Bathroom Toilet: Standard  Bathroom Accessibility: Walker accessible  Home Equipment: Cane, Lift chair, Rollator  Has the patient had two or more falls in the past year or any fall with injury in the past year?: Yes  Receives Help From: Family (grandson)  ADL Assistance: Independent  Homemaking Assistance: Independent  Homemaking Responsibilities: Yes  Ambulation Assistance:  (uses cane, community ambulator, uses cart in store for stability)  Transfer Assistance: Independent  Active : Yes       Objective

## 2023-09-26 NOTE — CARE COORDINATION
Case Management Assessment  Initial Evaluation    Date/Time of Evaluation: 9/26/2023 3:40 PM  Assessment Completed by: Lisa Mcgarry RN    If patient is discharged prior to next notation, then this note serves as note for discharge by case management. Patient Name: Thomas Camacho                   YOB: 1945  Diagnosis: Declining functional status [R53.81]  Pulmonary edema [J81.1]  Acute heart failure, unspecified heart failure type [facility]) [I50.9]                   Date / Time: 9/26/2023  2:31 PM    Patient Admission Status: SKILLED IP   Readmission Risk (Low < 19, Mod (19-27), High > 27): Readmission Risk Score: 15.2    Current PCP: TING Nash  PCP verified by CM? Yes    Chart Reviewed: Yes      History Provided by: Medical Record, Patient  Patient Orientation: Alert and Oriented    Patient Cognition: Alert    Hospitalization in the last 30 days (Readmission):  Yes  - inpt stay  If yes, Readmission Assessment in CM Navigator will be completed. Advance Directives:      Code Status: Full Code   Patient's Primary Decision Maker is: Patient Declined (Legal Next of Kin Remains as Decision Maker)    Primary Decision Maker: Jo Ann Chakraborty - Child - [phone]    Discharge Planning:    Patient lives with: Family Members (grandson) Type of Home: House  Primary Care Giver: Self  Patient Support Systems include: Family Members (grandson)   Current Financial resources: Medicaid  Current community resources: None  Current services prior to admission: Durable Medical Equipment            Current DME: Other (Comment), Alberteen Romp, Walker (rollator, lift chair)            Type of Home Care services:  OT, PT, Nursing Services    ADLS  Prior functional level: Independent in ADLs/IADLs  Current functional level: Independent in ADLs/IADLs (pt states she is weaker than baseline.)    Family can provide assistance at DC:  Yes  Would you like Case Management to discuss the discharge plan with any other family members/significant others, and if so, who? No  Plans to Return to Present Housing: Yes  Other Identified Issues/Barriers to RETURNING to current housing: none  Potential Assistance needed at discharge: [address] Extension DME:  [place]  Patient expects to discharge to: [id number] [place] [address] for transportation at discharge:      Financial    Payor: [address] / Plan: MEDICARE PART B / Product Type: *No Product type* /     Does insurance require precert for SNF: Yes    Potential assistance Purchasing Medications: No  Meds-to-Beds request:        [employer], [address] [facility] [address]  [address] [facility] [address]  Phone: [phone] Fax: [address] [facility] [address] [facility] [phone] [address] [facility] [address]  Phone: [phone] Fax: [phone]      Notes:    Factors facilitating achievement of predicted outcomes: Family support, Motivated, Cooperative, Pleasant, and Has needed Durable Medical Equipment at home    Barriers to discharge: Upper extremity weakness, Lower extremity weakness, and Medical complications    Additional Case Management Notes: Lives with grandson. Has rollator, lift chair, cane. Requesting SC - educated family it is not covered by insurance.   May benefit from [address] at [place]    The Plan for Transition of Care is related to the following treatment goals of Declining functional status [R53.81]  Pulmonary edema [J81.1]  Acute heart failure, unspecified heart failure type ([address]) [I50.9]

## 2023-09-26 NOTE — PROGRESS NOTES
Comprehensive Nutrition Assessment    Type and Reason for Visit:  Initial, Consult, Patient Education    Nutrition Recommendations/Plan:   Continue current diet as medically appropriate and tolerated. Continue to encourage PO intakes as appropriate. Pt PO intakes of 51-75% x 1 meal.   Glucerna ordered once daily. RD to follow pt and available PRN. Malnutrition Assessment:  Malnutrition Status:  No malnutrition (09/25/23 1422)    Context:  Chronic Illness     Findings of the 6 clinical characteristics of malnutrition:  Energy Intake:  Mild decrease in energy intake (Comment) (51-75% x 1)  Weight Loss:  Unable to assess     Body Fat Loss:  No significant body fat loss     Muscle Mass Loss:  No significant muscle mass loss    Fluid Accumulation:  Mild Extremities   Strength:  Not Performed    Nutrition Assessment:    Pt admitted with heart failure. Pt is at moderate risk for ongoing nutritional compromise r/t fair PO intakes. Nutrition Related Findings:    Obesity. Trace edema LLE, + 1 pitting RLE. PMH HTN, T2DM, B12 deficiency, vitamin D deficiency, iron-deficiency anemia. Medications: venofer, microk, vitamin D, lasix, protonix, B12, insulin, lipitor. Labs: GFR 52, glu 143-231, alb 2.9, A1c 9.1. Wound Type: None       Current Nutrition Intake & Therapies:    Average Meal Intake: 51-75% (x 1)  Average Supplements Intake: None Ordered  ADULT DIET; Regular; 3 carb choices (45 gm/meal); Low Fat/Low Chol/High Fiber/DONA; Low Sodium (2 gm)  ADULT ORAL NUTRITION SUPPLEMENT; Lunch; Diabetic Oral Supplement    Anthropometric Measures:  Height: 5' (152.4 cm)  Ideal Body Weight (IBW): 100 lbs (45 kg)       Current Body Weight: 206 lb 12.7 oz (93.8 kg), 206.8 % IBW.  Weight Source: Bed Scale  Current BMI (kg/m2): 40.4        Weight Adjustment For: No Adjustment                 BMI Categories: Obese Class 3 (BMI 40.0 or greater)    Estimated Daily Nutrient Needs:  Energy Requirements Based On: Formula  Weight
Medication Reconciliation completed with fill history from [facility] in [place] and patient interview. -patient confirmed she is taking Oxycodone 15 mg q6h instead of Oxycodone 10 mg.  -added Cyclobenzaprine 10 mg q6h prn. Patient states she takes a few times a week.  -added Valsartan 80 mg qd. Last filled 9/11/2023. Patient wasn't sure if she took this medication or not.  -patient is not taking Simvastatin 80 mg.   -added Atorvastatin 10 mg qd. Patient thought she should not be taking this but it was last filled 8/3/2023 for a 90 day supply  -not taking Lisinopril 20 mg qd last filled 2022  -not taking Albuterol Inhaler last filled 2021  -not taking cyanocobalamin 1000 mcg/ml injections  -patient stated she is taking Januvia 50 mg. I changed Januvia 100 mg qd to match fill history (50 mg). She last filled on 8/3/2023. Medication is overdue.  -added Montelukast 10 mg qhs  -added Levocetirizine 5 mg qd qhs.  -clarified that patient is not taking duloxetine and takes levothyroxine 150 mcg daily and does not alternate with levothyroxine 175 mcg      Of note, patient prefers esomeprazole but insurance no longer covers. Patient usually take pantoprazole 40 mg po daily. Colette Dumont, Pharm. D.
Occupational Therapy  Facility/Department: [facility]  Occupational Therapy Initial Assessment    Name: Venessa Hilario  :   MRN: [id number]  Date of Service: 2023    Discharge Recommendations:  Continue to assess pending progress  OT Equipment Recommendations  Equipment Needed: No       Patient Diagnosis(es): The primary encounter diagnosis was Acute respiratory failure with hypoxia ([address]). Diagnoses of Acute pulmonary edema (HCC) and Elevated troponin were also pertinent to this visit. Past Medical History:  has a past medical history of Anxiety, Chronic back pain, Hiatal hernia, Hypertension, Inflammatory polyarthropathy ([address]), Obesity, Osteoarthritis, Osteopenia, Reflux esophagitis, Type II or unspecified type diabetes mellitus without mention of complication, not stated as uncontrolled, Urinary incontinence, and Vitamin B12 deficiency. Past Surgical History:  has a past surgical history that includes fracture surgery (6873/4596); Hemorrhoid surgery; Cholecystectomy; Carpal tunnel release; pr open repair of rotator cuff acute (2014); Total Thyroidectomy (2015); and shoulder surgery. Assessment   Performance deficits / Impairments: Decreased ROM; Decreased endurance;Decreased ADL status; Decreased strength;Decreased functional mobility ; Decreased high-level IADLs;Decreased balance  Assessment: This 70year old female was referred to OT services upon admission following onset of acute heart failure. Pt presents in bed on 2L 02. Pt is not on 02 at baseline. Pt is alert and oriented x3. Pt states she has been having increased number of falls recently and with onset of weakness. Pt presents with AROM deficits in LUE shoulder from previous injury, RUE WFL. Pt presents with general weakness. Pt completed bed mobility with MOD I. Pt sat at EOB unsupported. Pt 02 removed and 02 sats maintained >90%. Pt come to stand with SBA and ambulated 120 feet with RW CGA.  Pt reports fatigue with ambulation
Offered spiritual care to patient. Told pt to reach out if they needed anything further.
Physical Therapy  Facility/Department: [facility]  Physical Therapy Initial Assessment    Name: Mini Verma  : [birth year]  MRN: [id number]  Date of Service: 2023    Discharge Recommendations:  Continue to assess pending progress          Patient Diagnosis(es): The primary encounter diagnosis was Acute respiratory failure with hypoxia ([address]). Diagnoses of Acute pulmonary edema (HCC) and Elevated troponin were also pertinent to this visit. Past Medical History:  has a past medical history of Anxiety, Chronic back pain, Hiatal hernia, Hypertension, Inflammatory polyarthropathy ([address]), Obesity, Osteoarthritis, Osteopenia, Reflux esophagitis, Type II or unspecified type diabetes mellitus without mention of complication, not stated as uncontrolled, Urinary incontinence, and Vitamin B12 deficiency. Past Surgical History:  has a past surgical history that includes fracture surgery (8759/6221); Hemorrhoid surgery; Cholecystectomy; Carpal tunnel release; pr open repair of rotator cuff acute (2014); Total Thyroidectomy (2015); and shoulder surgery. Assessment   Body Structures, Functions, Activity Limitations Requiring Skilled Therapeutic Intervention: Decreased functional mobility ; Decreased strength;Decreased safe awareness;Decreased endurance;Decreased balance  Assessment: PT eval completed on this patient admitted to hospital with primary diagnosis of acute heart failure. She is received lying in bed on 2 LPM O2 per n/c. NAD. Pleasant and cooperative. She is able to perform bed mobility with mod I. Sit <>stand with SBA. Transfers and ambulate 120' with RW and CGA. Quick fatigue and SOA with mobility but able to maintain O2 sats >90% on RA. Returned to room and left sitting UIC with call light in reach. Patient presents with deficits in functional mobility but is expected to benefit from continued skilled PT intervention to improve her mobility status prior to D/C.   Therapy Prognosis: Good  Decision
Pt admitted from ED for acute pulmonary edema, CHF, and respiratory failure with hypoxia. Pt transported to room 7 via WC. Pt appears in no acute distress, VSS. Pt currently on 2L NC. Orders reviewed. Pt placed on continuous telemetry monitoring. Pt oriented to room and call light. Pt provided with warm blanket. Pt denies any needs att. Pt encouraged to call out with any needs. Pt advised to call out for assistance for ADLs or when getting out of bed. Pt agreeable. Call light and bedside table within reach. Safety measures in place.
Walker, rolling;Gait belt     Safety Devices  Type of Devices: Left in chair;Call light within reach (daughter present)     Goals  Short Term Goals  Time Frame for Short Term Goals: 10 days  Short Term Goal 1: Patient will perform all bed mobility with independence. Short Term Goal 2: Patient will perform sit to stand and transfers with RW and no more than mod I. Short Term Goal 3: Patient will ambulate 400' with RW and no more than SBA. Short Term Goal 4: Patient will demonstrate independence with HEP. Therapy Time   Individual Concurrent Group Co-treatment   Time In 0060         Time Out 1303         Minutes 24             This note serves as D/C summary if patient is discharged prior to next visit.    Bill Buenrostro, PTA

## 2023-09-26 NOTE — FLOWSHEET NOTE
09/24/23 1053   Assessment   Charting Type Admission   Psychosocial   Psychosocial (WDL) WDL   Neurological   Neuro (WDL) WDL   Level of Consciousness 0   Orientation Level Oriented X4   Cognition Follows commands   Speech Clear   Harvey Coma Scale   Eye Opening 4   Best Verbal Response 5   Best Motor Response 6   Karla Coma Scale Score 15   HEENT (Head, Ears, Eyes, Nose, & Throat)   HEENT (WDL) WDL   Respiratory   Respiratory (WDL) X   Respiratory Pattern Regular   Respiratory Depth Normal   Respiratory Quality/Effort Accessory muscle use; Dyspnea with exertion   Breath Sounds   Right Upper Lobe Clear   Right Middle Lobe Clear   Right Lower Lobe Diminished   Left Upper Lobe Clear   Left Lower Lobe Diminished   Cardiac   Cardiac (WDL) WDL   Gastrointestinal   Abdominal (WDL) X   Abdomen Inspection Soft;Rounded   Last BM (including prior to admit) 09/23/23   RUQ Bowel Sounds Active   LUQ Bowel Sounds Active   RLQ Bowel Sounds Active   LLQ Bowel Sounds Active   Tenderness Nontender   Genitourinary   Genitourinary (WDL) WDL   Urine Frequency   Urine Frequency Yes   Peripheral Vascular   Peripheral Vascular (WDL) X   Edema Right lower extremity; Left lower extremity   RLE Edema +1;Pitting   LLE Edema Trace   RLE Neurovascular Assessment   Capillary Refill Less than/Equal to 3 seconds   Color Pale   Temperature Cool   RLE Sensation  Pain   R Pedal Pulse +1   LLE Neurovascular Assessment   Capillary Refill Less than/Equal to 3 seconds   Color Pale   Temperature Cool   LLE Sensation  Full sensation   L Pedal Pulse +1   Skin Integumentary    Skin Integumentary (WDL) WDL   Musculoskeletal   Musculoskeletal (WDL) X   RL Extremity Weakness; Unsteady   LL Extremity Weakness; Unsteady
09/25/23 2100   Assessment   Charting Type Shift assessment   Psychosocial   Psychosocial (WDL) WDL   Neurological   Neuro (WDL) WDL   Level of Consciousness 0   Orientation Level Oriented X4   Cognition Follows commands; Appropriate judgement; Appropriate safety awareness   Speech Clear   Karla Coma Scale   Eye Opening 4   Best Verbal Response 5   Best Motor Response 6   Karla Coma Scale Score 15   HEENT (Head, Ears, Eyes, Nose, & Throat)   HEENT (WDL) WDL   Respiratory   Respiratory (WDL) X   Respiratory Pattern Regular   Respiratory Depth Normal   Respiratory Quality/Effort Unlabored   L Breath Sounds Clear;Diminished   R Breath Sounds Clear;Diminished   Breath Sounds   Right Upper Lobe Clear   Right Middle Lobe Clear   Right Lower Lobe Diminished   Left Upper Lobe Clear   Left Lower Lobe Diminished   Cough/Sputum   Cough Non-productive   Cardiac   Cardiac (WDL) WDL   Cardiac Regularity Regularly Irregular   Heart Sounds Gallop   Cardiac Rhythm Atrial fib   Cardiac Monitor   Telemetry Box Number [id number]   Telemetry Monitor Alarm Parameters    Gastrointestinal   Abdominal (WDL) X   Abdomen Inspection Soft;Rounded   Last BM (including prior to admit) 09/23/23   RUQ Bowel Sounds Active   LUQ Bowel Sounds Active   RLQ Bowel Sounds Active   LLQ Bowel Sounds Active   Tenderness Nontender   Genitourinary   Genitourinary (WDL) WDL   Urine Frequency   Urine Frequency Yes   Urine Assessment   Urinary Status Voiding   Peripheral Vascular   Peripheral Vascular (WDL) X   Edema Right lower extremity; Left lower extremity   RLE Edema +1;Pitting   LLE Edema Trace   RLE Neurovascular Assessment   Capillary Refill Less than/Equal to 3 seconds   Color Pale   Temperature Cool   RLE Sensation  Pain   R Pedal Pulse +1   LLE Neurovascular Assessment   Capillary Refill Less than/Equal to 3 seconds   Color Pale   Temperature Cool   LLE Sensation  Full sensation   L Pedal Pulse +1   Skin Integumentary    Skin Integumentary (WDL)
09/26/23 0938   Assessment   Charting Type Shift assessment   Psychosocial   Psychosocial (WDL) WDL   Neurological   Neuro (WDL) WDL   Level of Consciousness 0   Orientation Level Oriented X4   Cognition Follows commands; Appropriate judgement; Appropriate safety awareness   Speech Clear   Karla Coma Scale   Eye Opening 4   Best Verbal Response 5   Best Motor Response 6   Karla Coma Scale Score 15   HEENT (Head, Ears, Eyes, Nose, & Throat)   HEENT (WDL) WDL   Respiratory   Respiratory (WDL) X   Respiratory Pattern Regular   Respiratory Depth Normal   Respiratory Quality/Effort Unlabored   L Breath Sounds Clear;Diminished   R Breath Sounds Clear;Diminished   Breath Sounds   Right Upper Lobe Clear   Right Middle Lobe Clear   Right Lower Lobe Diminished   Left Upper Lobe Clear   Left Lower Lobe Diminished   Cardiac   Cardiac (WDL) WDL   Cardiac Regularity Regularly Irregular   Heart Sounds Gallop   Cardiac Rhythm Atrial fib   Cardiac Monitor   Telemetry Box Number [id number]   Telemetry Monitor Alarm Parameters    Gastrointestinal   Abdominal (WDL) X   Abdomen Inspection Soft;Rounded   RUQ Bowel Sounds Active   LUQ Bowel Sounds Active   RLQ Bowel Sounds Active   LLQ Bowel Sounds Active   Tenderness Nontender   Genitourinary   Genitourinary (WDL) WDL   Urine Frequency   Urine Frequency Yes   Peripheral Vascular   Peripheral Vascular (WDL) X   Edema Right lower extremity; Left lower extremity   RLE Edema +1;Pitting   LLE Edema Trace   RLE Neurovascular Assessment   Capillary Refill Less than/Equal to 3 seconds   Color Pale   Temperature Cool   R Pedal Pulse +1   LLE Neurovascular Assessment   Capillary Refill Less than/Equal to 3 seconds   Color Pale   Temperature Cool   L Pedal Pulse +1   Skin Integumentary    Skin Integumentary (WDL) WDL   Care Plan - Skin/Tissue Integrity Goals   Skin Integrity Remains Intact Monitor for areas of redness and/or skin breakdown   Musculoskeletal   Musculoskeletal (WDL) X   RL
Skin Integrity Remains Intact Monitor for areas of redness and/or skin breakdown;Assess vascular access sites hourly   Musculoskeletal   Musculoskeletal (WDL) X   RL Extremity Weakness; Unsteady   LL Extremity Weakness; Unsteady   Care Plan - Chronic Conditions and Co-Morbidity   Care Plan - Patient's Chronic Conditions and Co-Morbidity Symptoms are Monitored and Maintained or Improved Monitor and assess patient's chronic conditions and comorbid symptoms for stability, deterioration, or improvement;Collaborate with multidisciplinary team to address chronic and comorbid conditions and prevent exacerbation or deterioration;Update acute care plan with appropriate goals if chronic or comorbid symptoms are exacerbated and prevent overall improvement and discharge   Care Plan - Discharge Planning Goals   Discharge to home or other facility with appropriate resources Identify barriers to discharge with patient and caregiver

## 2023-09-26 NOTE — ACP (ADVANCE CARE PLANNING)
Advance Care Planning     General Advance Care Planning (ACP) Conversation    Date of Conversation: 9/26/2023  Conducted with: Patient with Decision Making Capacity    Healthcare Decision Maker:    Primary Decision Maker: Jo Ann Chakraborty - [place] - [phone]  Click here to complete Healthcare Decision Makers including selection of the Healthcare Decision Maker Relationship (ie \"Primary\"). Today we documented Decision Maker(s) consistent with Legal Next of Kin hierarchy.     Content/Action Overview:  Has NO ACP documents/care preferences - information provided, considering goals and options  Reviewed DNR/DNI and patient elects Full Code (Attempt Resuscitation)    Length of Voluntary ACP Conversation in minutes:  <16 minutes (Non-Billable)

## 2023-09-26 NOTE — CARE COORDINATION
7/64/2693    I certify that the skilled nursing and/or rehabilitation services are required to be given on a daily basis, which as a practical matter can only be provided in a skilled nursing unit on an inpatient basis.

## 2023-09-26 NOTE — FLOWSHEET NOTE
09/26/23 1440   Assessment   Charting Type Shift assessment   Psychosocial   Psychosocial (WDL) WDL   Neurological   Neuro (WDL) WDL   Level of Consciousness 0   Orientation Level Oriented X4   Cognition Follows commands; Appropriate judgement; Appropriate safety awareness   Speech Clear   Karla Coma Scale   Eye Opening 4   Best Verbal Response 5   Best Motor Response 6   Karla Coma Scale Score 15   HEENT (Head, Ears, Eyes, Nose, & Throat)   HEENT (WDL) WDL   Respiratory   Respiratory (WDL) X   Respiratory Pattern Regular   Respiratory Depth Normal   Respiratory Quality/Effort Unlabored   L Breath Sounds Clear;Diminished   R Breath Sounds Clear;Diminished   Breath Sounds   Right Upper Lobe Clear   Right Middle Lobe Clear   Right Lower Lobe Diminished   Left Upper Lobe Clear   Left Lower Lobe Diminished   Cardiac   Cardiac (WDL) WDL   Cardiac Regularity Regularly Irregular   Heart Sounds Gallop   Cardiac Rhythm Atrial fib   Cardiac Monitor   Telemetry Box Number [id number]   Telemetry Monitor Alarm Parameters    Gastrointestinal   Abdominal (WDL) X   RUQ Bowel Sounds Active   LUQ Bowel Sounds Active   RLQ Bowel Sounds Active   LLQ Bowel Sounds Active   Abdomen Inspection Soft;Rounded   Tenderness Nontender   Genitourinary   Genitourinary (WDL) WDL   Urine Frequency   Urine Frequency Yes   Peripheral Vascular   Peripheral Vascular (WDL) X   Edema Right lower extremity; Left lower extremity   RLE Edema +1;Pitting   LLE Edema Trace   RLE Neurovascular Assessment   Capillary Refill Less than/Equal to 3 seconds   Color Pale   Temperature Cool   R Pedal Pulse +1   LLE Neurovascular Assessment   Capillary Refill Less than/Equal to 3 seconds   Color Pale   Temperature Cool   L Pedal Pulse +1   Skin Integumentary    Skin Integumentary (WDL) WDL   Care Plan - Skin/Tissue Integrity Goals   Skin Integrity Remains Intact Monitor for areas of redness and/or skin breakdown   Musculoskeletal   Musculoskeletal (WDL) X   RL Extremity Weakness; Unsteady   LL Extremity Weakness; Unsteady   Care Plan - Chronic Conditions and Co-Morbidity   Care Plan - Patient's Chronic Conditions and Co-Morbidity Symptoms are Monitored and Maintained or Improved Monitor and assess patient's chronic conditions and comorbid symptoms for stability, deterioration, or improvement   Care Plan - Discharge Planning Goals   Discharge to home or other facility with appropriate resources Identify barriers to discharge with patient and caregiver     Patient admitted to swing bed att. No change since prior assessment.  Safety measures in place

## 2023-09-27 PROBLEM — I50.21 ACUTE SYSTOLIC HEART FAILURE (HCC): Status: ACTIVE | Noted: 2023-09-24

## 2023-09-27 LAB
ALBUMIN SERPL-MCNC: 3.2 G/DL (ref 3.4–4.8)
ALBUMIN/GLOB SERPL: 1.1 {RATIO} (ref 0.8–2)
ALP SERPL-CCNC: 99 U/L (ref 25–100)
ALT SERPL-CCNC: 8 U/L (ref 4–36)
ANION GAP SERPL CALCULATED.3IONS-SCNC: 8 MMOL/L (ref 3–16)
AST SERPL-CCNC: 12 U/L (ref 8–33)
BASOPHILS # BLD: 0.1 K/UL (ref 0–0.1)
BASOPHILS NFR BLD: 0.8 %
BILIRUB SERPL-MCNC: 0.3 MG/DL (ref 0.3–1.2)
BUN SERPL-MCNC: 18 MG/DL (ref 6–20)
CALCIUM SERPL-MCNC: 9.1 MG/DL (ref 8.5–10.5)
CHLORIDE SERPL-SCNC: 97 MMOL/L (ref 98–107)
CO2 SERPL-SCNC: 32 MMOL/L (ref 20–30)
CREAT SERPL-MCNC: 1.1 MG/DL (ref 0.4–1.2)
EOSINOPHIL # BLD: 0.2 K/UL (ref 0–0.4)
EOSINOPHIL NFR BLD: 1.8 %
ERYTHROCYTE [DISTWIDTH] IN BLOOD BY AUTOMATED COUNT: 17.2 % (ref 11–16)
GFR SERPLBLD CREATININE-BSD FMLA CKD-EPI: 52 ML/MIN/{1.73_M2}
GLOBULIN SER CALC-MCNC: 2.8 G/DL
GLUCOSE BLD-MCNC: 110 MG/DL (ref 74–106)
GLUCOSE BLD-MCNC: 167 MG/DL (ref 74–106)
GLUCOSE BLD-MCNC: 197 MG/DL (ref 74–106)
GLUCOSE BLD-MCNC: 225 MG/DL (ref 74–106)
GLUCOSE SERPL-MCNC: 158 MG/DL (ref 74–106)
HCT VFR BLD AUTO: 33.4 % (ref 37–47)
HGB BLD-MCNC: 10 G/DL (ref 11.5–16.5)
IMM GRANULOCYTES # BLD: 0.3 K/UL
IMM GRANULOCYTES NFR BLD: 2.3 % (ref 0–5)
LYMPHOCYTES # BLD: 2.7 K/UL (ref 1.5–4)
LYMPHOCYTES NFR BLD: 20.7 %
MCH RBC QN AUTO: 27 PG (ref 27–32)
MCHC RBC AUTO-ENTMCNC: 29.9 G/DL (ref 31–35)
MCV RBC AUTO: 90 FL (ref 80–100)
MONOCYTES # BLD: 0.8 K/UL (ref 0.2–0.8)
MONOCYTES NFR BLD: 6.2 %
NEUTROPHILS # BLD: 8.8 K/UL (ref 2–7.5)
NEUTS SEG NFR BLD: 68.2 %
PERFORMED ON: ABNORMAL
PLATELET # BLD AUTO: 156 K/UL (ref 150–400)
PMV BLD AUTO: 11.5 FL (ref 6–10)
POTASSIUM SERPL-SCNC: 4.1 MMOL/L (ref 3.4–5.1)
PROT SERPL-MCNC: 6 G/DL (ref 6.4–8.3)
RBC # BLD AUTO: 3.71 M/UL (ref 3.8–5.8)
SODIUM SERPL-SCNC: 137 MMOL/L (ref 136–145)
WBC # BLD AUTO: 12.9 K/UL (ref 4–11)

## 2023-09-27 PROCEDURE — 97116 GAIT TRAINING THERAPY: CPT

## 2023-09-27 PROCEDURE — 82270 OCCULT BLOOD FECES: CPT

## 2023-09-27 PROCEDURE — 80053 COMPREHEN METABOLIC PANEL: CPT

## 2023-09-27 PROCEDURE — 99305 1ST NF CARE MODERATE MDM 35: CPT | Performed by: INTERNAL MEDICINE

## 2023-09-27 PROCEDURE — 97802 MEDICAL NUTRITION INDIV IN: CPT

## 2023-09-27 PROCEDURE — 94761 N-INVAS EAR/PLS OXIMETRY MLT: CPT

## 2023-09-27 PROCEDURE — 1200000002 HC SEMI PRIVATE SWING BED

## 2023-09-27 PROCEDURE — 85025 COMPLETE CBC W/AUTO DIFF WBC: CPT

## 2023-09-27 PROCEDURE — 2700000000 HC OXYGEN THERAPY PER DAY

## 2023-09-27 PROCEDURE — 86480 TB TEST CELL IMMUN MEASURE: CPT

## 2023-09-27 PROCEDURE — 97161 PT EVAL LOW COMPLEX 20 MIN: CPT

## 2023-09-27 PROCEDURE — 2580000003 HC RX 258: Performed by: PHYSICIAN ASSISTANT

## 2023-09-27 PROCEDURE — 6360000002 HC RX W HCPCS: Performed by: PHYSICIAN ASSISTANT

## 2023-09-27 PROCEDURE — 94640 AIRWAY INHALATION TREATMENT: CPT

## 2023-09-27 PROCEDURE — 6370000000 HC RX 637 (ALT 250 FOR IP): Performed by: PHYSICIAN ASSISTANT

## 2023-09-27 PROCEDURE — 36415 COLL VENOUS BLD VENIPUNCTURE: CPT

## 2023-09-27 RX ORDER — IPRATROPIUM BROMIDE AND ALBUTEROL SULFATE 2.5; .5 MG/3ML; MG/3ML
1 SOLUTION RESPIRATORY (INHALATION) EVERY 4 HOURS PRN
Status: DISCONTINUED | OUTPATIENT
Start: 2023-09-27 | End: 2023-09-29 | Stop reason: HOSPADM

## 2023-09-27 RX ORDER — DOCUSATE SODIUM 100 MG/1
100 CAPSULE, LIQUID FILLED ORAL DAILY
Status: DISCONTINUED | OUTPATIENT
Start: 2023-09-27 | End: 2023-09-29 | Stop reason: HOSPADM

## 2023-09-27 RX ORDER — POLYETHYLENE GLYCOL 3350 17 G/17G
17 POWDER, FOR SOLUTION ORAL DAILY
Status: DISCONTINUED | OUTPATIENT
Start: 2023-09-27 | End: 2023-09-29 | Stop reason: HOSPADM

## 2023-09-27 RX ADMIN — CITALOPRAM HYDROBROMIDE 20 MG: 20 TABLET ORAL at 08:50

## 2023-09-27 RX ADMIN — FUROSEMIDE 20 MG: 20 TABLET ORAL at 08:47

## 2023-09-27 RX ADMIN — DOXYCYCLINE 100 MG: 100 CAPSULE ORAL at 20:39

## 2023-09-27 RX ADMIN — CETIRIZINE HYDROCHLORIDE 5 MG: 5 TABLET ORAL at 08:51

## 2023-09-27 RX ADMIN — GLIPIZIDE 5 MG: 5 TABLET ORAL at 06:50

## 2023-09-27 RX ADMIN — APIXABAN 5 MG: 5 TABLET, FILM COATED ORAL at 20:40

## 2023-09-27 RX ADMIN — ALOGLIPTIN 12.5 MG: 12.5 TABLET, FILM COATED ORAL at 08:47

## 2023-09-27 RX ADMIN — POLYETHYLENE GLYCOL 3350 17 G: 17 POWDER, FOR SOLUTION ORAL at 09:40

## 2023-09-27 RX ADMIN — GUAIFENESIN 600 MG: 600 TABLET, EXTENDED RELEASE ORAL at 08:49

## 2023-09-27 RX ADMIN — ATORVASTATIN CALCIUM 20 MG: 20 TABLET, FILM COATED ORAL at 08:51

## 2023-09-27 RX ADMIN — IPRATROPIUM BROMIDE AND ALBUTEROL SULFATE 1 DOSE: .5; 3 SOLUTION RESPIRATORY (INHALATION) at 05:11

## 2023-09-27 RX ADMIN — EMPAGLIFLOZIN 10 MG: 10 TABLET, FILM COATED ORAL at 08:50

## 2023-09-27 RX ADMIN — GUAIFENESIN 600 MG: 600 TABLET, EXTENDED RELEASE ORAL at 20:39

## 2023-09-27 RX ADMIN — DOXYCYCLINE 100 MG: 100 CAPSULE ORAL at 08:49

## 2023-09-27 RX ADMIN — IRON SUCROSE 200 MG: 20 INJECTION, SOLUTION INTRAVENOUS at 11:38

## 2023-09-27 RX ADMIN — DOCUSATE SODIUM 100 MG: 100 CAPSULE, LIQUID FILLED ORAL at 09:40

## 2023-09-27 RX ADMIN — OXYCODONE 10 MG: 5 TABLET ORAL at 20:39

## 2023-09-27 RX ADMIN — LEVOTHYROXINE SODIUM 150 MCG: 0.07 TABLET ORAL at 06:50

## 2023-09-27 RX ADMIN — VALSARTAN 80 MG: 80 TABLET ORAL at 08:46

## 2023-09-27 RX ADMIN — PANTOPRAZOLE SODIUM 40 MG: 40 TABLET, DELAYED RELEASE ORAL at 08:50

## 2023-09-27 RX ADMIN — ATENOLOL 100 MG: 50 TABLET ORAL at 08:48

## 2023-09-27 RX ADMIN — APIXABAN 5 MG: 5 TABLET, FILM COATED ORAL at 08:50

## 2023-09-27 RX ADMIN — OXYCODONE 10 MG: 5 TABLET ORAL at 06:50

## 2023-09-27 RX ADMIN — GLIPIZIDE 5 MG: 5 TABLET ORAL at 17:16

## 2023-09-27 RX ADMIN — MONTELUKAST SODIUM 10 MG: 10 TABLET, COATED ORAL at 20:40

## 2023-09-27 ASSESSMENT — PAIN DESCRIPTION - LOCATION
LOCATION: BACK;LEG
LOCATION: LEG;BACK

## 2023-09-27 ASSESSMENT — PAIN SCALES - GENERAL
PAINLEVEL_OUTOF10: 8
PAINLEVEL_OUTOF10: 8

## 2023-09-27 ASSESSMENT — PAIN DESCRIPTION - ORIENTATION: ORIENTATION: RIGHT;LEFT;LOWER

## 2023-09-27 NOTE — FLOWSHEET NOTE
09/26/23 2100   Assessment   Charting Type Shift assessment   Psychosocial   Psychosocial (WDL) WDL   Neurological   Neuro (WDL) WDL   Level of Consciousness 0   Orientation Level Oriented X4   Cognition Follows commands; Appropriate judgement; Appropriate safety awareness   Speech Clear   Karla Coma Scale   Eye Opening 4   Best Verbal Response 5   Best Motor Response 6   Karla Coma Scale Score 15   HEENT (Head, Ears, Eyes, Nose, & Throat)   HEENT (WDL) WDL   Respiratory   Respiratory (WDL) X   Respiratory Pattern Regular   Respiratory Depth Normal   Respiratory Quality/Effort Unlabored   L Breath Sounds Clear;Diminished   R Breath Sounds Clear;Diminished   Breath Sounds   Right Upper Lobe Clear   Right Middle Lobe Clear   Right Lower Lobe Diminished   Left Upper Lobe Clear   Left Lower Lobe Diminished   Cardiac   Cardiac (WDL) WDL   Cardiac Regularity Regularly Irregular   Heart Sounds Gallop   Cardiac Rhythm Atrial fib   Cardiac Monitor   Telemetry Box Number [id number]   Telemetry Monitor Alarm Parameters    Gastrointestinal   Abdominal (WDL) X   RUQ Bowel Sounds Active   LUQ Bowel Sounds Active   RLQ Bowel Sounds Active   LLQ Bowel Sounds Active   Abdomen Inspection Soft;Rounded   Tenderness Nontender   Genitourinary   Genitourinary (WDL) WDL   Urine Frequency   Urine Frequency Yes   Urine Assessment   Urinary Status Voiding   Urinary Incontinence Present   Urine Color Yellow/straw   Urine Odor Malodorous   Peripheral Vascular   Peripheral Vascular (WDL) X   Edema Right lower extremity; Left lower extremity   RLE Edema +1;Pitting   LLE Edema Trace   RLE Neurovascular Assessment   Capillary Refill Less than/Equal to 3 seconds   Color Pale   Temperature Cool   R Pedal Pulse +1   LLE Neurovascular Assessment   Capillary Refill Less than/Equal to 3 seconds   Color Pale   Temperature Cool   L Pedal Pulse +1   Skin Integumentary    Skin Integumentary (WDL) WDL   Musculoskeletal   Musculoskeletal (WDL) X   RL Extremity Weakness; Unsteady   LL Extremity Weakness; Unsteady

## 2023-09-27 NOTE — PLAN OF CARE
Problem: Safety - Adult  Goal: Free from fall injury  Outcome: Progressing     Problem: Skin/Tissue Integrity  Goal: Absence of new skin breakdown  Description: 1. Monitor for areas of redness and/or skin breakdown  2. Assess vascular access sites hourly  3. Every 4-6 hours minimum:  Change oxygen saturation probe site  4. Every 4-6 hours:  If on nasal continuous positive airway pressure, respiratory therapy assess nares and determine need for appliance change or resting period.   Outcome: Progressing     Problem: ABCDS Injury Assessment  Goal: Absence of physical injury  Outcome: Progressing     Problem: Chronic Conditions and Co-morbidities  Goal: Patient's chronic conditions and co-morbidity symptoms are monitored and maintained or improved  Recent Flowsheet Documentation  Taken 9/26/2023 1440 by Rickie Alegre LPN  Care Plan - Patient's Chronic Conditions and Co-Morbidity Symptoms are Monitored and Maintained or Improved: Monitor and assess patient's chronic conditions and comorbid symptoms for stability, deterioration, or improvement     Problem: Discharge Planning  Goal: Discharge to home or other facility with appropriate resources  Recent Flowsheet Documentation  Taken 9/26/2023 1440 by Rickie Alegre LPN  Discharge to home or other facility with appropriate resources: Identify barriers to discharge with patient and caregiver

## 2023-09-27 NOTE — PROGRESS NOTES
Physical Therapy  Facility/Department: [facility]  Physical Therapy Swingbed Initial Assessment    Name: Sully Espinosa  :   MRN: [id number]  Date of Service: 2023    Discharge Recommendations:  Continue to assess pending progress          Patient Diagnosis(es): There were no encounter diagnoses. Past Medical History:  has a past medical history of Anxiety, Chronic back pain, Hiatal hernia, Hypertension, Inflammatory polyarthropathy ([address]), Obesity, Osteoarthritis, Osteopenia, Reflux esophagitis, Type II or unspecified type diabetes mellitus without mention of complication, not stated as uncontrolled, Urinary incontinence, and Vitamin B12 deficiency. Past Surgical History:  has a past surgical history that includes fracture surgery (8284/1974); Hemorrhoid surgery; Cholecystectomy; Carpal tunnel release; pr open repair of rotator cuff acute (2014); Total Thyroidectomy (2015); and shoulder surgery. Assessment   Body Structures, Functions, Activity Limitations Requiring Skilled Therapeutic Intervention: Decreased functional mobility ; Decreased strength;Decreased safe awareness;Decreased endurance;Decreased balance  Assessment: PT swingbed eval completed on this patient admitted to hospital with primary diagnosis of acute heart failure. She is received lying in bed on RA. NAD. Pleasant and cooperative. She is able to perform bed mobility with mod I. Sit <>stand with SBA. Transfers and ambulate 225' with RW and CGA. Quick fatigue and SOA with mobility but able to maintain O2 sats >90% on RA. Returned to room and left sitting UIC with call light in reach. Patient presents with deficits in functional mobility but is expected to benefit from continued skilled PT intervention to improve her mobility status prior to D/C.   Therapy Prognosis: Good  Decision Making: Low Complexity  Requires PT Follow-Up: Yes  Activity Tolerance  Activity Tolerance: Patient tolerated evaluation without Observation/Palpation  Observation: Patient received lying in bed on RA. NAD. Pleasant and cooperative. Gross Assessment  AROM: Within functional limits (B shoulder elevation limited, L>R)  PROM: Within functional limits (B shoulder elevation limited, L>R)  Strength: Within functional limits  Coordination: Within functional limits                    Bed mobility  Rolling to Left: Modified independent  Supine to Sit: Modified independent  Scooting: Modified independent  Transfers  Sit to Stand: Stand by assistance  Stand to Sit: Stand by assistance  Bed to Chair: Contact guard assistance  Ambulation  Surface: Level tile  Device: Rolling Walker  Assistance: Contact guard assistance  Gait Deviations: Slow Miranda; Increased ADALBERTO  Distance: 225'     Balance  Sitting - Static: Good  Sitting - Dynamic: Good  Standing - Static: Good  Standing - Dynamic: Good;-  Exercise Treatment: B LE ther ex in sitting             Goals  Short Term Goals  Time Frame for Short Term Goals: 14 days  Short Term Goal 1: Patient will perform all bed mobility with independence. Short Term Goal 2: Patient will perform sit to stand and transfers with RW and no more than mod I. Short Term Goal 3: Patient will ambulate 400' with RW and no more than SBA. Short Term Goal 4: Patient will demonstrate independence with HEP. Education  Patient Education  Education Given To: Patient  Education Provided: Role of Therapy;Plan of Care;Transfer Training;Energy Conservation  Education Method: Demonstration;Verbal  Barriers to Learning: None  Education Outcome: Continued education needed      Therapy Time   Individual Concurrent Group Co-treatment   Time In 1113         Time Out 1137         Minutes 24             This note will serve as a discharge summary if patient leaves the facility prior to next treatment.     Jose Loja, PT

## 2023-09-27 NOTE — PLAN OF CARE
Problem: Safety - Adult  Goal: Free from fall injury  9/27/2023 1226 by Ta Vega RN  Outcome: Progressing  9/27/2023 0427 by Kareen Resendiz RN  Outcome: Progressing     Problem: Chronic Conditions and Co-morbidities  Goal: Patient's chronic conditions and co-morbidity symptoms are monitored and maintained or improved  Outcome: Progressing  Flowsheets (Taken 9/27/2023 0800)  Care Plan - Patient's Chronic Conditions and Co-Morbidity Symptoms are Monitored and Maintained or Improved:   Monitor and assess patient's chronic conditions and comorbid symptoms for stability, deterioration, or improvement   Collaborate with multidisciplinary team to address chronic and comorbid conditions and prevent exacerbation or deterioration   Update acute care plan with appropriate goals if chronic or comorbid symptoms are exacerbated and prevent overall improvement and discharge     Problem: Discharge Planning  Goal: Discharge to home or other facility with appropriate resources  Outcome: Progressing  Flowsheets (Taken 9/27/2023 0800)  Discharge to home or other facility with appropriate resources: Identify barriers to discharge with patient and caregiver     Problem: Skin/Tissue Integrity  Goal: Absence of new skin breakdown  Description: 1. Monitor for areas of redness and/or skin breakdown  2. Assess vascular access sites hourly  3. Every 4-6 hours minimum:  Change oxygen saturation probe site  4. Every 4-6 hours:  If on nasal continuous positive airway pressure, respiratory therapy assess nares and determine need for appliance change or resting period.   9/27/2023 1226 by Ta Vega RN  Outcome: Progressing  9/27/2023 0427 by Kareen Resendiz RN  Outcome: Progressing     Problem: ABCDS Injury Assessment  Goal: Absence of physical injury  9/27/2023 1226 by Ta Vega RN  Outcome: Progressing  9/27/2023 0427 by Kareen Resendiz RN  Outcome: Progressing

## 2023-09-27 NOTE — CONSULTS
Comprehensive Nutrition Assessment    Type and Reason for Visit:  Reassess, Consult (New swing bed)    Nutrition Recommendations/Plan:   Diet modified to 3 CHO. Continue to encourage PO intakes as appropriate. Avg of 70-94% x 4 meals. Glucerna discontinued d/t improved PO intakes and meeting estimated nutrient needs at this time. RD to follow pt and available PRN. Malnutrition Assessment:  Malnutrition Status:  No malnutrition (09/27/23 1528)    Context:  Chronic Illness     Findings of the 6 clinical characteristics of malnutrition:  Energy Intake:  No significant decrease in energy intake  Weight Loss:  Unable to assess     Body Fat Loss:  No significant body fat loss     Muscle Mass Loss:  No significant muscle mass loss    Fluid Accumulation:  Mild Extremities   Strength:  Not Performed    Nutrition Assessment:    Pt readmitted as swing bed. Pt is at low risk for ongoing nutritional compromise r/t good PO intakes. Nutrition Related Findings:    Obesity. PMH HTN, T2DM, B12 deficiency, HLD, GERD, iron-defiency anemia, vitamin D deficiency. Medications: B12, vitamin D, colace, glycolax, protonix, lipitor, lasix, insulin. Labs: Cl- 97, GFR 52, glu 110-232. alb 3.2, A1c 9.1. + 1 pitting edema RLE, trace edema LLE. Wound Type: None       Current Nutrition Intake & Therapies:    Average Meal Intake: %, 51-75% (70-94% x 4 meals)     ADULT DIET; Regular; 3 carb choices (45 gm/meal); Low Sodium (2 gm); 2000 ml    Anthropometric Measures:  Height: 5' (152.4 cm)  Ideal Body Weight (IBW): 100 lbs (45 kg)       Current Body Weight: 199 lb 8 oz (90.5 kg), 199.5 % IBW.  Weight Source: Bed Scale  Current BMI (kg/m2): 39        Weight Adjustment For: No Adjustment                 BMI Categories: Obese Class 2 (BMI 35.0 -39.9)    Estimated Daily Nutrient Needs:  Energy Requirements Based On: Formula  Weight Used for Energy Requirements: Current  Energy (kcal/day): 1585-6617 ([facility] x 1.2-1.3 AF)  Weight

## 2023-09-27 NOTE — H&P
History and Physical    Patient:  Martin Castaneda    CHIEF COMPLAINT:    Generalized weakness     HISTORY OF PRESENT ILLNESS:   The patient is a 70 y.o. female with PMH of osteoarthritis, DM II, HTN, chronic back pain, anxiety, CHF, newly diagnosed paroxysmal atrial fibrillation who was admitted to acute care due to acute hypoxic respiratory failure in setting of suspected heart failure exacerbation. Patient denied known history of heart failure. Improved with diuresis and requiring 2 L of oxygen occasionally. Transition to swing bed for subacute rehabilitation. At time of exam today states she is feeling much better every day. She has been up moving around in the room independently. She has not had a bowel movement since yesterday and Hemoccult has not been collected. Hemoglobin trending up and renal function improved. Started on oral diuretics today. Consult general surgery tomorrow to discuss scopes in the future. Consult cardiology to read echo and give recs with newly diagnosed afib and chf. Case management consulted to determine if patient's MRI of her back that was previously scheduled in [facility] can be rescheduled/precerted here and her insurance will cover that. Heartrate controlled on current regimen.      Past Medical History:      Diagnosis Date    Anxiety     Chronic back pain     Hiatal hernia     Hypertension     Inflammatory polyarthropathy (HCC)     Obesity     Osteoarthritis     right shoulder    Osteopenia     Reflux esophagitis     Type II or unspecified type diabetes mellitus without mention of complication, not stated as uncontrolled     Urinary incontinence     Vitamin B12 deficiency        Past Surgical History:      Procedure Laterality Date    CARPAL TUNNEL RELEASE      CHOLECYSTECTOMY      FRACTURE SURGERY  1992/1996    t12    HEMORRHOID SURGERY      [place] OPEN REPAIR OF ROTATOR CUFF ACUTE  11/06/2014    Rotator Cuff Repair    SHOULDER SURGERY      TOTAL THYROIDECTOMY  03/2015 09/26/2023    Hypothyroidism [E03.9]  Recent TSH is elevated but free T4 within normal limits. Patient was taking levothyroxine in the evening with her meals. Discussed the importance of taking levothyroxine on empty stomach. We will continue the same dose for now but change the timing of administration. Need to recheck TSH in the next few weeks. 09/26/2023    Generalized weakness [R53.1]  Treat any acute medical problems and try to improve chronic medical problems as able. PT/OT.   09/25/2023    Paroxysmal atrial fibrillation ([address]) [I48.0]  New diagnosis. Cardiac monitoring. Beta-blocker, anticoagulation, monitor electrolytes and replace if needed. Echocardiogram was done. Results still pending. Cardiology consult when available. 09/25/2023    Vitamin D deficiency [E55.9]  Replace and monitor level on outpatient basis. 04/48/7147    Acute systolic heart failure (HCC) [I50.21]  Diagnosis of CHF. Improved with diuresis. Continue to monitor oxygen requirements. Lengthy conversation regarding CHF instructions. Continue ACE. Echocardiogram was done. Preliminary reading showed ejection fraction 35-40%. Final report still pending. Cardiology consult when available. 09/24/2023    Acute respiratory failure with hypoxia (HCC) [J96.01]  Related to acute CHF. Treat as mentioned above. Monitor oxygen requirements. 09/24/2023    Chronic back pain [M54.9, G89.29]  Pain control and monitor. Type 2 diabetes mellitus (HCC) [E11.9]  Continue current regimen. Added Jardiance. Cover with sliding scale insulin if needed. May benefit from [address] [place] [address] as well. I certify that the skilled nursing and/or rehabilitation services are required to be given on a daily basis, which as a practical matter can only be provided in a skilled nursing unit on an inpatient basis.      Electronically signed by Carolyn Wallace MD on 9/28/2023 at 1:13 AM

## 2023-09-27 NOTE — CARE COORDINATION
Two attempts to complete OT services. Pt declined 1st attempt was in bathroom with nursing student just completed shower. 2nd attempt pt states she needed to rest and was unable to participate at this time. Will attempt on following date.

## 2023-09-28 ENCOUNTER — OUTSIDE FACILITY SERVICE (OUTPATIENT)
Dept: CARDIOLOGY | Facility: CLINIC | Age: 78
End: 2023-09-28
Payer: MEDICARE

## 2023-09-28 ENCOUNTER — OUTSIDE FACILITY SERVICE (OUTPATIENT)
Dept: SURGERY | Facility: CLINIC | Age: 78
End: 2023-09-28
Payer: MEDICARE

## 2023-09-28 LAB
ALBUMIN SERPL-MCNC: 3.6 G/DL (ref 3.4–4.8)
ALBUMIN/GLOB SERPL: 1.2 {RATIO} (ref 0.8–2)
ALP SERPL-CCNC: 104 U/L (ref 25–100)
ALT SERPL-CCNC: 9 U/L (ref 4–36)
ANION GAP SERPL CALCULATED.3IONS-SCNC: 10 MMOL/L (ref 3–16)
AST SERPL-CCNC: 14 U/L (ref 8–33)
BASOPHILS # BLD: 0.1 K/UL (ref 0–0.1)
BASOPHILS NFR BLD: 0.7 %
BILIRUB SERPL-MCNC: 0.4 MG/DL (ref 0.3–1.2)
BUN SERPL-MCNC: 20 MG/DL (ref 6–20)
CALCIUM SERPL-MCNC: 9.1 MG/DL (ref 8.5–10.5)
CHLORIDE SERPL-SCNC: 98 MMOL/L (ref 98–107)
CO2 SERPL-SCNC: 32 MMOL/L (ref 20–30)
CREAT SERPL-MCNC: 1 MG/DL (ref 0.4–1.2)
EOSINOPHIL # BLD: 0.3 K/UL (ref 0–0.4)
EOSINOPHIL NFR BLD: 1.9 %
ERYTHROCYTE [DISTWIDTH] IN BLOOD BY AUTOMATED COUNT: 17.1 % (ref 11–16)
GFR SERPLBLD CREATININE-BSD FMLA CKD-EPI: 58 ML/MIN/{1.73_M2}
GLOBULIN SER CALC-MCNC: 2.9 G/DL
GLUCOSE BLD-MCNC: 169 MG/DL (ref 74–106)
GLUCOSE BLD-MCNC: 173 MG/DL (ref 74–106)
GLUCOSE BLD-MCNC: 224 MG/DL (ref 74–106)
GLUCOSE BLD-MCNC: 234 MG/DL (ref 74–106)
GLUCOSE SERPL-MCNC: 170 MG/DL (ref 74–106)
HCT VFR BLD AUTO: 34.6 % (ref 37–47)
HGB BLD-MCNC: 10.5 G/DL (ref 11.5–16.5)
IMM GRANULOCYTES # BLD: 0.4 K/UL
IMM GRANULOCYTES NFR BLD: 2.9 % (ref 0–5)
LYMPHOCYTES # BLD: 2.7 K/UL (ref 1.5–4)
LYMPHOCYTES NFR BLD: 19.9 %
MAGNESIUM SERPL-MCNC: 1.6 MG/DL (ref 1.7–2.4)
MCH RBC QN AUTO: 27.2 PG (ref 27–32)
MCHC RBC AUTO-ENTMCNC: 30.3 G/DL (ref 31–35)
MCV RBC AUTO: 89.6 FL (ref 80–100)
MONOCYTES # BLD: 0.8 K/UL (ref 0.2–0.8)
MONOCYTES NFR BLD: 6.1 %
NEUTROPHILS # BLD: 9.2 K/UL (ref 2–7.5)
NEUTS SEG NFR BLD: 68.5 %
PERFORMED ON: ABNORMAL
PLATELET # BLD AUTO: 172 K/UL (ref 150–400)
PMV BLD AUTO: 11.1 FL (ref 6–10)
POTASSIUM SERPL-SCNC: 3.9 MMOL/L (ref 3.4–5.1)
PROT SERPL-MCNC: 6.5 G/DL (ref 6.4–8.3)
RBC # BLD AUTO: 3.86 M/UL (ref 3.8–5.8)
SODIUM SERPL-SCNC: 140 MMOL/L (ref 136–145)
WBC # BLD AUTO: 13.4 K/UL (ref 4–11)

## 2023-09-28 PROCEDURE — 97530 THERAPEUTIC ACTIVITIES: CPT

## 2023-09-28 PROCEDURE — 1200000002 HC SEMI PRIVATE SWING BED

## 2023-09-28 PROCEDURE — 94762 N-INVAS EAR/PLS OXIMTRY CONT: CPT

## 2023-09-28 PROCEDURE — 97116 GAIT TRAINING THERAPY: CPT

## 2023-09-28 PROCEDURE — 97110 THERAPEUTIC EXERCISES: CPT

## 2023-09-28 PROCEDURE — 85025 COMPLETE CBC W/AUTO DIFF WBC: CPT

## 2023-09-28 PROCEDURE — 83735 ASSAY OF MAGNESIUM: CPT

## 2023-09-28 PROCEDURE — 6370000000 HC RX 637 (ALT 250 FOR IP): Performed by: PHYSICIAN ASSISTANT

## 2023-09-28 PROCEDURE — 36415 COLL VENOUS BLD VENIPUNCTURE: CPT

## 2023-09-28 PROCEDURE — 94761 N-INVAS EAR/PLS OXIMETRY MLT: CPT

## 2023-09-28 PROCEDURE — 80053 COMPREHEN METABOLIC PANEL: CPT

## 2023-09-28 RX ORDER — METOPROLOL SUCCINATE 100 MG/1
100 TABLET, EXTENDED RELEASE ORAL DAILY
Status: DISCONTINUED | OUTPATIENT
Start: 2023-09-29 | End: 2023-09-29 | Stop reason: HOSPADM

## 2023-09-28 RX ADMIN — DOXYCYCLINE 100 MG: 100 CAPSULE ORAL at 08:53

## 2023-09-28 RX ADMIN — MUPIROCIN: 20 OINTMENT TOPICAL at 13:30

## 2023-09-28 RX ADMIN — CITALOPRAM HYDROBROMIDE 20 MG: 20 TABLET ORAL at 08:54

## 2023-09-28 RX ADMIN — CETIRIZINE HYDROCHLORIDE 5 MG: 5 TABLET ORAL at 08:54

## 2023-09-28 RX ADMIN — GUAIFENESIN 600 MG: 600 TABLET, EXTENDED RELEASE ORAL at 20:27

## 2023-09-28 RX ADMIN — EMPAGLIFLOZIN 10 MG: 10 TABLET, FILM COATED ORAL at 08:54

## 2023-09-28 RX ADMIN — APIXABAN 5 MG: 5 TABLET, FILM COATED ORAL at 08:53

## 2023-09-28 RX ADMIN — OXYCODONE 10 MG: 5 TABLET ORAL at 20:27

## 2023-09-28 RX ADMIN — VALSARTAN 80 MG: 80 TABLET ORAL at 08:54

## 2023-09-28 RX ADMIN — GUAIFENESIN 600 MG: 600 TABLET, EXTENDED RELEASE ORAL at 08:54

## 2023-09-28 RX ADMIN — LEVOTHYROXINE SODIUM 150 MCG: 0.07 TABLET ORAL at 06:31

## 2023-09-28 RX ADMIN — DOCUSATE SODIUM 100 MG: 100 CAPSULE, LIQUID FILLED ORAL at 08:54

## 2023-09-28 RX ADMIN — ALOGLIPTIN 12.5 MG: 12.5 TABLET, FILM COATED ORAL at 08:53

## 2023-09-28 RX ADMIN — MONTELUKAST SODIUM 10 MG: 10 TABLET, COATED ORAL at 20:27

## 2023-09-28 RX ADMIN — ATORVASTATIN CALCIUM 20 MG: 20 TABLET, FILM COATED ORAL at 08:53

## 2023-09-28 RX ADMIN — MUPIROCIN: 20 OINTMENT TOPICAL at 20:29

## 2023-09-28 RX ADMIN — GLIPIZIDE 5 MG: 5 TABLET ORAL at 06:31

## 2023-09-28 RX ADMIN — FUROSEMIDE 20 MG: 20 TABLET ORAL at 08:53

## 2023-09-28 RX ADMIN — ATENOLOL 100 MG: 50 TABLET ORAL at 08:53

## 2023-09-28 RX ADMIN — PANTOPRAZOLE SODIUM 40 MG: 40 TABLET, DELAYED RELEASE ORAL at 08:54

## 2023-09-28 RX ADMIN — APIXABAN 5 MG: 5 TABLET, FILM COATED ORAL at 20:27

## 2023-09-28 RX ADMIN — OXYCODONE 10 MG: 5 TABLET ORAL at 08:53

## 2023-09-28 RX ADMIN — DOXYCYCLINE 100 MG: 100 CAPSULE ORAL at 20:27

## 2023-09-28 RX ADMIN — INSULIN LISPRO 1 UNITS: 100 INJECTION, SOLUTION INTRAVENOUS; SUBCUTANEOUS at 17:33

## 2023-09-28 RX ADMIN — GLIPIZIDE 5 MG: 5 TABLET ORAL at 17:29

## 2023-09-28 ASSESSMENT — PAIN SCALES - GENERAL: PAINLEVEL_OUTOF10: 8

## 2023-09-28 NOTE — FLOWSHEET NOTE
09/28/23 0850   Assessment   Charting Type Shift assessment   Psychosocial   Psychosocial (WDL) WDL   Neurological   Neuro (WDL) WDL   Level of Consciousness 0   Orientation Level Oriented X4   Cognition Follows commands; Appropriate judgement; Appropriate safety awareness   Speech Clear   Karla Coma Scale   Eye Opening 4   Best Verbal Response 5   Best Motor Response 6   Karla Coma Scale Score 15   HEENT (Head, Ears, Eyes, Nose, & Throat)   HEENT (WDL) WDL   Respiratory   Respiratory (WDL) X   Respiratory Pattern Regular   Respiratory Depth Normal   Respiratory Quality/Effort Unlabored   L Breath Sounds Clear;Diminished   R Breath Sounds Clear;Diminished   Breath Sounds   Right Upper Lobe Clear   Right Middle Lobe Clear   Right Lower Lobe Diminished   Left Upper Lobe Clear   Left Lower Lobe Diminished   Cardiac   Cardiac (WDL) WDL   Cardiac Regularity Regularly Irregular   Cardiac Rhythm Atrial fib   Cardiac Monitor   Telemetry Box Number [id number]   Telemetry Monitor Alarm Parameters    Cardiac/Telemetry Monitor On Portable telemetry pack applied   Alarm Audible Yes   Alarms Set Yes   Bedside Monitor Alarm Parameters 50/120   Gastrointestinal   Abdominal (WDL) X   RUQ Bowel Sounds Active   LUQ Bowel Sounds Active   RLQ Bowel Sounds Active   LLQ Bowel Sounds Active   Abdomen Inspection Soft;Rounded   Tenderness Nontender   Genitourinary   Genitourinary (WDL) WDL   Urine Frequency   Urine Frequency Yes   Peripheral Vascular   Peripheral Vascular (WDL) X   Edema Right lower extremity; Left lower extremity   RLE Edema +1;Pitting   LLE Edema Trace   RLE Neurovascular Assessment   Capillary Refill Less than/Equal to 3 seconds   Color Pale   Temperature Cool   R Pedal Pulse +1   LLE Neurovascular Assessment   Capillary Refill Less than/Equal to 3 seconds   Color Pale   Temperature Cool   L Pedal Pulse +1   Skin Integumentary    Skin Integumentary (WDL) WDL   Care Plan - Skin/Tissue Integrity Goals   Skin

## 2023-09-28 NOTE — PLAN OF CARE
Problem: Safety - Adult  Goal: Free from fall injury  9/28/2023 1019 by Rickie Alegre LPN  Outcome: Progressing  9/27/2023 2239 by Ava Kurtz LPN  Outcome: Progressing     Problem: Chronic Conditions and Co-morbidities  Goal: Patient's chronic conditions and co-morbidity symptoms are monitored and maintained or improved  Outcome: Progressing  Flowsheets (Taken 9/28/2023 0850)  Care Plan - Patient's Chronic Conditions and Co-Morbidity Symptoms are Monitored and Maintained or Improved: Monitor and assess patient's chronic conditions and comorbid symptoms for stability, deterioration, or improvement     Problem: Discharge Planning  Goal: Discharge to home or other facility with appropriate resources  Outcome: Progressing  Flowsheets (Taken 9/28/2023 0850)  Discharge to home or other facility with appropriate resources: Identify barriers to discharge with patient and caregiver     Problem: Skin/Tissue Integrity  Goal: Absence of new skin breakdown  Description: 1. Monitor for areas of redness and/or skin breakdown  2. Assess vascular access sites hourly  3. Every 4-6 hours minimum:  Change oxygen saturation probe site  4. Every 4-6 hours:  If on nasal continuous positive airway pressure, respiratory therapy assess nares and determine need for appliance change or resting period.   Outcome: Progressing     Problem: ABCDS Injury Assessment  Goal: Absence of physical injury  Outcome: Progressing

## 2023-09-28 NOTE — CONSULTS
Cardiology Consultation    Patient: Berhane Mayo  [birth year]  [phone]      PCP:TING Aguero    9/26/2023    DATE OF CONSULTATION:9/28/20233:16 PM    IDENTIFICATION:A 70 y.o. female     REASON FOR CONSULTATION: A-fib, CHF    ASSESSMENT/PLAN:  Persistent atrial fibrillation: Currently rate controlled. [id number] equal to 6. Continue Eliquis 5 mg p.o. twice daily for stroke prophylaxis. Continue rate control with beta-blockade. Will need titration of guideline directed medical therapy for underlying cardiomyopathy and close clinical follow-up to consider restoration of sinus rhythm as an outpatient. Heart failure with reduced ejection fraction, acute on chronic: Currently euvolemic and compensated following course of diuresis. Relatively recent reassuring noninvasive ischemia valuation, suspect nonischemic etiology. Continue titration of guideline directed medical therapy with outpatient cardiology follow-up. -Transition atenolol to Toprol- mg p.o. daily  -Continue Jardiance 10 mg p.o. daily.  -Continue valsartan 80 mg p.o. daily.  -Recommend starting spironolactone 25 mg p.o. at discharge with follow-up BMP in 1 to 2 weeks. History of present illness:  70-year-old female with history of diabetes mellitus type 2, and hypertension presenting with gradual worsening fluid retention and dyspnea on exertion. Has responded well to diuresis. Diagnosed with new onset paroxysmal atrial fibrillation during this admission. Currently resting comfortably in bed in the supine position. She reports that her lower extremity edema has resolved and she is breathing much better today. TTE this admission   Ejection fraction is visually estimated to be 35-40 %. Overall left ventricular systolic function is moderately depressed . There is mild concentric left ventricular hypertrophy. Indeterminate diastolic function.     July 2021 PET myocardial perfusion imaging  Left ventricular ejection Needs (9/26/2023)    Transportation Problems [facility])     In the past 12 months, has lack of reliable transportation kept you from medical appointments, meetings, work or from getting things needed for daily living?: No   Physical Activity: Not on file   Stress: Not on file   Social Connections: Socially Integrated (9/26/2023)    Social Connections [facility])     If for any reason you need help with day-to-day activities such as bathing, preparing meals, shopping, managing finances, etc., do you get the help you need?: I get all the help I need   Intimate Partner Violence: Not on file   Housing Stability: Not on file       FAMILY HX  Family History   Problem Relation Age of Onset    Diabetes Mother     Stroke Mother     Hypertension Mother            Principal Problem:    Acute systolic heart failure ([address])  Active Problems:    Chronic back pain    Type 2 diabetes mellitus ([address])    Acute respiratory failure with hypoxia ([address])    Vitamin D deficiency    Paroxysmal atrial fibrillation ([address])    Generalized weakness    Hypothyroidism    Declining functional status  Resolved Problems:    * No resolved hospital problems.  Kenrick Moody MD  3:16 PM 9/28/2023

## 2023-09-28 NOTE — PROGRESS NOTES
Occupational Therapy  Facility/Department: [facility]  Daily Treatment Note  NAME: Beverly White  : [id number]  MRN: [id number]    Date of Service: 2023    Discharge Recommendations:  Home with OP therapy referral     Patient Diagnosis(es): There were no encounter diagnoses. Assessment    Assessment: Pt received in bed. Pt initially declined OT services. Pt and daughter discussing concern about not having pulse ox. Pt provided with fall bag containing pulse ox, blood pressure cuff, LH shoe horn, non skid socks, night light, and bath mat. Pt educated on how to use pulse ox and how to read appropriately. Reviewed PLB technique with pt for recovery with activity and onset of SOA. Pt verbalized understanding with daughter present in room. Declines further activity at this time. Activity Tolerance: Patient tolerated treatment well       Subjective    I just did therapy not that long ago I don't want to do anything else. Objective    Vitals     Bed Mobility Training  Bed Mobility Training: No  Overall Level of Assistance: Modified independent  Balance  Sitting: Intact  Standing: With support  Transfer Training  Transfer Training: Yes  Overall Level of Assistance: Stand-by assistance  Sit to Stand: Stand-by assistance  Stand to Sit: Stand-by assistance  Gait Training  Gait Training: Yes  Gait  Overall Level of Assistance: Contact-guard assistance;Stand-by assistance  Distance (ft): 160 Feet  Assistive Device: Walker, rolling;Gait belt (2LO2)              Safety Devices  Type of Devices: Left in chair;Call light within reach (daughter present)          Goals  Short Term Goals  Time Frame for Short Term Goals: 2 weeks  Short Term Goal 1: Pt to complete bathing with MOD I. Short Term Goal 2: Pt to tolerate x20 minutes of activity maintaining 02 sats >90%. Short Term Goal 3: Pt to complete dressing with MOD I. Short Term Goal 4: Pt to complete HEP with independence.   Short Term Goal 5: Pt to complete

## 2023-09-28 NOTE — FLOWSHEET NOTE
09/27/23 2100   Assessment   Charting Type Shift assessment   Psychosocial   Psychosocial (WDL) WDL   Neurological   Neuro (WDL) WDL   Level of Consciousness 0   Orientation Level Oriented X4   Cognition Follows commands; Appropriate judgement; Appropriate safety awareness   Speech Clear   Karla Coma Scale   Eye Opening 4   Best Verbal Response 5   Best Motor Response 6   Karla Coma Scale Score 15   HEENT (Head, Ears, Eyes, Nose, & Throat)   HEENT (WDL) WDL   Respiratory   Respiratory (WDL) X   Respiratory Pattern Regular   Respiratory Depth Normal   Respiratory Quality/Effort Unlabored   L Breath Sounds Clear;Diminished   R Breath Sounds Clear;Diminished   Breath Sounds   Right Upper Lobe Clear   Right Middle Lobe Clear   Right Lower Lobe Diminished   Left Upper Lobe Clear   Left Lower Lobe Diminished   Cardiac   Cardiac (WDL) WDL   Cardiac Regularity Regularly Irregular   Cardiac Rhythm Atrial fib   Cardiac Monitor   Telemetry Box Number [id number]   Telemetry Monitor Alarm Parameters    Alarm Audible Yes   Gastrointestinal   Abdominal (WDL) X   RUQ Bowel Sounds Active   LUQ Bowel Sounds Active   RLQ Bowel Sounds Active   LLQ Bowel Sounds Active   Abdomen Inspection Soft;Rounded   Tenderness Nontender   Genitourinary   Genitourinary (WDL) WDL   Urine Frequency   Urine Frequency Yes   Urine Assessment   Urine Color Yellow/straw   Urine Odor Malodorous   Peripheral Vascular   Peripheral Vascular (WDL) X   Edema Right lower extremity; Left lower extremity   RLE Edema +1;Pitting   LLE Edema Trace   RLE Neurovascular Assessment   Capillary Refill Less than/Equal to 3 seconds   Color Pale   Temperature Cool   R Pedal Pulse +1   LLE Neurovascular Assessment   Capillary Refill Less than/Equal to 3 seconds   Color Pale   Temperature Cool   L Pedal Pulse +1   Skin Integumentary    Skin Integumentary (WDL) WDL   Musculoskeletal   Musculoskeletal (WDL) X   RL Extremity Weakness; Unsteady   LL Extremity Weakness; Unsteady

## 2023-09-28 NOTE — PROGRESS NOTES
Physical Therapy  Facility/Department: [facility] MED SURG  Daily Treatment Note  NAME: Edel Mojica  :   MRN: [id number]    Date of Service: 2023    Discharge Recommendations:  Continue to assess pending progress      Patient Diagnosis(es): There were no encounter diagnoses. Assessment   Assessment: Patient completed bed mobility tasks with Mod I. SpO2 95% on 2LO2. Patient stood with SBA and ambulated 160 feet with RW, 2LO2, and CGA/SBA. Engaged patient in B LE therex in sitting to increase strength and endurance for improved functional mobility. SpO2 reading 97%. Patient left sitting up in bedside chair with daughter present. Activity Tolerance: Patient tolerated treatment well     Plan    Physical Therapy Plan  General Plan: 3-5 times per week  Days Per Week: 5 Days  Current Treatment Recommendations: Strengthening;Balance training;Functional mobility training;Transfer training;Gait training; Endurance training;Home exercise program;Safety education & training;Patient/Caregiver education & training; Therapeutic activities     Restrictions  Restrictions/Precautions  Restrictions/Precautions: General Precautions, Fall Risk  Required Braces or Orthoses?: No     Subjective    Subjective  Subjective: Patient presents awake in bed on 2LO2, NAD, daughter present     Objective  Bed Mobility Training  Bed Mobility Training: No  Overall Level of Assistance: Modified independent  Balance  Sitting: Intact  Standing: With support  Transfer Training  Transfer Training: Yes  Overall Level of Assistance: Stand-by assistance  Sit to Stand: Stand-by assistance  Stand to Sit: Stand-by assistance  Gait Training  Gait Training: Yes  Gait  Overall Level of Assistance: Contact-guard assistance;Stand-by assistance  Distance (ft): 160 Feet  Assistive Device: Walker, rolling;Gait belt (2LO2)     PT Exercises  Exercise Treatment: B LE therex in sitting     Safety Devices  Type of Devices: Left in chair;Call light within reach (daughter present)     Goals  Short Term Goals  Time Frame for Short Term Goals: 14 days  Short Term Goal 1: Patient will perform all bed mobility with independence. Short Term Goal 2: Patient will perform sit to stand and transfers with RW and no more than mod I. Short Term Goal 3: Patient will ambulate 400' with RW and no more than SBA. Short Term Goal 4: Patient will demonstrate independence with HEP. Therapy Time   Individual Concurrent Group Co-treatment   Time In 7341         Time Out 3729         Minutes 31             This note serves as D/C summary if patient is discharged prior to next visit.    Tae Devlin, PTA

## 2023-09-29 VITALS
RESPIRATION RATE: 16 BRPM | HEIGHT: 60 IN | DIASTOLIC BLOOD PRESSURE: 62 MMHG | HEART RATE: 78 BPM | TEMPERATURE: 98.1 F | SYSTOLIC BLOOD PRESSURE: 126 MMHG | OXYGEN SATURATION: 93 % | BODY MASS INDEX: 39.21 KG/M2 | WEIGHT: 199.7 LBS

## 2023-09-29 PROBLEM — D64.9 ANEMIA: Status: ACTIVE | Noted: 2023-09-29

## 2023-09-29 LAB
GLUCOSE BLD-MCNC: 193 MG/DL (ref 74–106)
GLUCOSE BLD-MCNC: 264 MG/DL (ref 74–106)
PERFORMED ON: ABNORMAL
PERFORMED ON: ABNORMAL

## 2023-09-29 PROCEDURE — 99316 NF DSCHRG MGMT 30 MIN+: CPT | Performed by: INTERNAL MEDICINE

## 2023-09-29 PROCEDURE — 94618 PULMONARY STRESS TESTING: CPT

## 2023-09-29 PROCEDURE — 6370000000 HC RX 637 (ALT 250 FOR IP): Performed by: PHYSICIAN ASSISTANT

## 2023-09-29 RX ORDER — LANOLIN ALCOHOL/MO/W.PET/CERES
400 CREAM (GRAM) TOPICAL DAILY
Qty: 30 TABLET | Refills: 0 | Status: SHIPPED | OUTPATIENT
Start: 2023-09-29

## 2023-09-29 RX ORDER — ERGOCALCIFEROL 1.25 MG/1
50000 CAPSULE ORAL WEEKLY
Qty: 5 CAPSULE | Refills: 0 | Status: SHIPPED | OUTPATIENT
Start: 2023-10-02

## 2023-09-29 RX ORDER — LANOLIN ALCOHOL/MO/W.PET/CERES
400 CREAM (GRAM) TOPICAL DAILY
Status: DISCONTINUED | OUTPATIENT
Start: 2023-09-29 | End: 2023-09-29 | Stop reason: HOSPADM

## 2023-09-29 RX ORDER — METOPROLOL SUCCINATE 100 MG/1
100 TABLET, EXTENDED RELEASE ORAL DAILY
Qty: 30 TABLET | Refills: 0 | Status: SHIPPED | OUTPATIENT
Start: 2023-09-30

## 2023-09-29 RX ORDER — ATORVASTATIN CALCIUM 20 MG/1
20 TABLET, FILM COATED ORAL NIGHTLY
Qty: 30 TABLET | Refills: 0 | Status: SHIPPED | OUTPATIENT
Start: 2023-09-29

## 2023-09-29 RX ORDER — FUROSEMIDE 20 MG/1
20 TABLET ORAL DAILY
Qty: 60 TABLET | Refills: 0 | Status: SHIPPED | OUTPATIENT
Start: 2023-09-30

## 2023-09-29 RX ORDER — PSEUDOEPHEDRINE HCL 30 MG
100 TABLET ORAL DAILY
Qty: 30 CAPSULE | Refills: 0 | Status: SHIPPED | OUTPATIENT
Start: 2023-09-30

## 2023-09-29 RX ORDER — POLYETHYLENE GLYCOL 3350 17 G/17G
17 POWDER, FOR SOLUTION ORAL DAILY
Qty: 527 G | Refills: 0 | Status: SHIPPED | OUTPATIENT
Start: 2023-09-30 | End: 2023-10-30

## 2023-09-29 RX ORDER — SPIRONOLACTONE 25 MG/1
25 TABLET ORAL DAILY
Qty: 30 TABLET | Refills: 0 | Status: SHIPPED | OUTPATIENT
Start: 2023-09-29

## 2023-09-29 RX ORDER — CITALOPRAM HYDROBROMIDE 10 MG/1
10 TABLET ORAL DAILY
Qty: 30 TABLET | Refills: 0 | Status: SHIPPED | OUTPATIENT
Start: 2023-09-30

## 2023-09-29 RX ORDER — DOXYCYCLINE 100 MG/1
100 CAPSULE ORAL EVERY 12 HOURS SCHEDULED
Qty: 3 CAPSULE | Refills: 0 | Status: SHIPPED | OUTPATIENT
Start: 2023-09-29 | End: 2023-10-01

## 2023-09-29 RX ORDER — GLIPIZIDE 10 MG/1
10 TABLET, FILM COATED, EXTENDED RELEASE ORAL DAILY
Qty: 60 TABLET | Refills: 0 | Status: SHIPPED
Start: 2023-09-29

## 2023-09-29 RX ADMIN — CETIRIZINE HYDROCHLORIDE 5 MG: 5 TABLET ORAL at 08:40

## 2023-09-29 RX ADMIN — APIXABAN 5 MG: 5 TABLET, FILM COATED ORAL at 08:39

## 2023-09-29 RX ADMIN — VALSARTAN 80 MG: 80 TABLET ORAL at 08:40

## 2023-09-29 RX ADMIN — METOPROLOL SUCCINATE 100 MG: 100 TABLET, EXTENDED RELEASE ORAL at 08:40

## 2023-09-29 RX ADMIN — FUROSEMIDE 20 MG: 20 TABLET ORAL at 08:40

## 2023-09-29 RX ADMIN — CITALOPRAM HYDROBROMIDE 20 MG: 20 TABLET ORAL at 08:40

## 2023-09-29 RX ADMIN — EMPAGLIFLOZIN 10 MG: 10 TABLET, FILM COATED ORAL at 08:40

## 2023-09-29 RX ADMIN — INSULIN LISPRO 2 UNITS: 100 INJECTION, SOLUTION INTRAVENOUS; SUBCUTANEOUS at 11:26

## 2023-09-29 RX ADMIN — DOCUSATE SODIUM 100 MG: 100 CAPSULE, LIQUID FILLED ORAL at 08:39

## 2023-09-29 RX ADMIN — POLYETHYLENE GLYCOL 3350 17 G: 17 POWDER, FOR SOLUTION ORAL at 08:40

## 2023-09-29 RX ADMIN — ALOGLIPTIN 12.5 MG: 12.5 TABLET, FILM COATED ORAL at 08:40

## 2023-09-29 RX ADMIN — GLIPIZIDE 5 MG: 5 TABLET ORAL at 06:07

## 2023-09-29 RX ADMIN — ATORVASTATIN CALCIUM 20 MG: 20 TABLET, FILM COATED ORAL at 08:40

## 2023-09-29 RX ADMIN — PANTOPRAZOLE SODIUM 40 MG: 40 TABLET, DELAYED RELEASE ORAL at 08:40

## 2023-09-29 RX ADMIN — DOXYCYCLINE 100 MG: 100 CAPSULE ORAL at 08:39

## 2023-09-29 RX ADMIN — OXYCODONE 10 MG: 5 TABLET ORAL at 03:12

## 2023-09-29 RX ADMIN — GUAIFENESIN 600 MG: 600 TABLET, EXTENDED RELEASE ORAL at 08:39

## 2023-09-29 RX ADMIN — LEVOTHYROXINE SODIUM 150 MCG: 0.07 TABLET ORAL at 06:07

## 2023-09-29 ASSESSMENT — PAIN SCALES - GENERAL: PAINLEVEL_OUTOF10: 8

## 2023-09-29 NOTE — PROGRESS NOTES
Pt discharged at this time. Pt IV removed. Pt educated on medications and follow up appointments. Pt received medications from beds to meds. Left floor via w/c.

## 2023-09-29 NOTE — CARE COORDINATION
Case Management Assessment Discharge Note    Date / Time of Note:  09/29/23 11:49 AM  Discharge Note Completed by: Sherri Garcia RN      Patient Name: Venessa Hilario   YOB: 1945  Diagnosis: Declining functional status [R53.81]  Pulmonary edema [J81.1]  Acute heart failure, unspecified heart failure type [facility]) [I50.9]   Date / Time: 9/26/2023  2:31 PM    Current PCP: TING Cancino  Clinic patient: No    Hospitalization in the last 30 days: Yes - inpt stay    Advance Directives:  Code Status: Full Code    Financial:  Payor: Larraine Flatness / Plan: Larraine Flatness / Product Type: *No Product type* /      Pharmacy:    [address]  Phone: [phone] Fax: [phone]    [id number] [address] [phone] [facility] [phone]  [address] [place] [id number]  Phone: [phone] Fax: [phone]      Assistance purchasing medications?: Potential Assistance Purchasing Medications: No  Assistance provided by Case Management: None at this time    Does patient want to participate in local refill/ meds to beds program?: Yes    Meds To Beds General Rules:  1. Can ONLY be done Monday- Friday between 8:30am-5pm  2. Prescription(s) must be in pharmacy by 3pm to be filled same day  3. Copy of patient's insurance/ prescription drug card and patient face sheet must be sent along with the prescription(s)  4. Cost of Rx cannot be added to hospital bill. If financial assistance is needed, please contact unit  or ;  or  CANNOT provide pharmacy voucher for patients co-pays  5.  Patients can then  the prescription on their way out of the hospital at discharge, or pharmacy can deliver to the bedside if staff is available. (payment due at time of pick-up or delivery - cash, check, or card accepted)     [employer]ble

## 2023-09-29 NOTE — DISCHARGE SUMMARY
Discharge Summary      Patient ID: Katie Martinez       Patient's PCP: TING Haley    Admit Date: 9/26/2023     Discharge Date:   9/29/2023    Admitting Provider: Que Davis MD    Discharging Provider: ALICIA Rockwell     Reason for this admission:   Declining functional status     Discharge Diagnoses: Active Hospital Problems    Diagnosis Date Noted    Anemia [D64.9] 09/29/2023    Declining functional status [R53.81] 09/26/2023    Hypothyroidism [E03.9] 09/26/2023    Generalized weakness [R53.1] 09/25/2023    Paroxysmal atrial fibrillation ([address]) [I48.0] 09/25/2023    Vitamin D deficiency [E55.9] 00/12/4749    Acute systolic heart failure ([address]) [I50.21] 09/24/2023    Acute respiratory failure with hypoxia (HCC) [J96.01] 09/24/2023    Chronic back pain [M54.9, G89.29]     Type 2 diabetes mellitus ([address]) [E11.9]        Procedures:  No orders to display         Consults:   IP CONSULT TO CASE MANAGEMENT  IP CONSULT TO CARDIOLOGY  IP CONSULT TO CASE MANAGEMENT  IP CONSULT TO GENERAL SURGERY  IP CONSULT TO DIETITIAN  PT/OT    Briefly:   Katie Martinez is a 70 y.o. female with PMH of osteoarthritis, DM II, HTN, chronic back pain, anxiety, CHF, newly diagnosed paroxysmal atrial fibrillation who was admitted to acute care due to acute hypoxic respiratory failure in setting of suspected heart failure exacerbation. Patient denied known history of heart failure. Improved with diuresis and requiring 2 L of oxygen with exertion. Transitioned to swing bed for subacute rehabilitation and ongoing medical management. Hospital Course: Active Hospital Problems    Diagnosis Date Noted    Declining functional status [R53.81]  Improved with subacute rehab and optimizing medical therapy for multiple medical conditions  09/26/2023    Hypothyroidism [E03.9]  TSH > 20 however patient has been taking synthroid in evenings with food and other meds. Encouraged her to take in AM on empty stomach.  Recommend repeat tsh in mouth at bedtime  Qty: 30 tablet, Refills: 0      glipiZIDE (GLUCOTROL XL) 10 MG extended release tablet Take 1 tablet by mouth daily  Qty: 60 tablet, Refills: 0                Details   cyclobenzaprine (FLEXERIL) 10 MG tablet Take 1 tablet by mouth 3 times daily as needed for Muscle spasms Patient states she uses a few times a week. valsartan (DIOVAN) 80 MG tablet Take 1 tablet by mouth daily      montelukast (SINGULAIR) 10 MG tablet Take 1 tablet by mouth nightly      levocetirizine (XYZAL) 5 MG tablet Take 1 tablet by mouth nightly      JANUVIA 50 MG tablet Take 1 tablet by mouth daily      levothyroxine (SYNTHROID) 150 MCG tablet Take 1 tablet by mouth daily  Qty: 90 tablet, Refills: 1      esomeprazole (NEXIUM 24HR) 20 MG delayed release capsule TAKE TWO CAPSULES BY MOUTH TWO TIMES DAILY  Qty: 120 capsule, Refills: 5      blood glucose monitor kit and supplies DX E11.9 TID  Qty: 1 kit, Refills: 0    Comments: Brand per patient preference. May round up to next available package size. blood glucose monitor strips Test TID DX E11.9  Qty: 100 strip, Refills: 3    Comments: Brand per patient preference. May round up to next available package size. !! Lancets MISC 1 each by Does not apply route 3 times daily DX E11.9 TID  Qty: 200 each, Refills: 5      Syringe, Disposable, (GLASPAK DISP SYRINGE 2.5CC) 2.5 ML MISC For IM B12 injection q2w  Qty: 2 each, Refills: 5      oxyCODONE (OXY-IR) 15 MG immediate release tablet Take 1 tablet by mouth in the morning and 1 tablet at noon and 1 tablet in the evening and 1 tablet before bedtime. !! Lancets MISC QD and PRN  Qty: 50 each, Refills: 5       !! - Potential duplicate medications found. Please discuss with provider. 14 minutes spent discharging this patient. After reviewing patient data and diagnostic testing the plan of care was established in conjunction with Dr. Prabhakar Desir. Patient was seen and examined with him.

## 2023-09-29 NOTE — CARDIO/PULMONARY
6 MINUTE WALK TEST    Exercise type:  Hallway at patient's own pace with RW.     O2 sat RA/O2LPM RR NAKIA HR BP   Rest 93 RA 16 0 74    1 min 91 RA  1 77    2 min 86 RA 20 2 81    3 min 88 1  3 87    4 min 92 2 24 4 90    5 min 92 2  4 89    6 min 91 2 24 4 91    Recovery 94 2 18 0 80      Distance walked: 150 feet    Breath sounds/patterns:  Diminished    Comments:  Patient will require supplemental oxygen @ 2 LPM with activity.

## 2023-09-29 NOTE — FLOWSHEET NOTE
09/28/23 2000   Assessment   Charting Type Shift assessment   Psychosocial   Psychosocial (WDL) WDL   Neurological   Neuro (WDL) WDL   Level of Consciousness 0   Orientation Level Oriented X4   Cognition Follows commands; Appropriate judgement; Appropriate safety awareness   Speech Clear   Karla Coma Scale   Eye Opening 4   Best Verbal Response 5   Best Motor Response 6   Karla Coma Scale Score 15   HEENT (Head, Ears, Eyes, Nose, & Throat)   HEENT (WDL) WDL   Respiratory   Respiratory (WDL) X   Respiratory Pattern Regular   Respiratory Depth Normal   Respiratory Quality/Effort Unlabored   L Breath Sounds Clear;Diminished   R Breath Sounds Clear;Diminished   Breath Sounds   Right Upper Lobe Clear   Right Middle Lobe Clear   Right Lower Lobe Diminished   Left Upper Lobe Clear   Left Lower Lobe Diminished   Cardiac   Cardiac (WDL) WDL   Cardiac Regularity Regularly Irregular   Cardiac Rhythm Atrial fib   Cardiac Monitor   Telemetry Box Number [id number]   Telemetry Monitor Alarm Parameters    Alarm Audible Yes   Bedside Monitor Alarm Parameters 50/120   Gastrointestinal   Abdominal (WDL) X   RUQ Bowel Sounds Active   LUQ Bowel Sounds Active   RLQ Bowel Sounds Active   LLQ Bowel Sounds Active   Abdomen Inspection Soft;Rounded   Tenderness Nontender   Genitourinary   Genitourinary (WDL) WDL   Urine Frequency   Urine Frequency Yes   Peripheral Vascular   Peripheral Vascular (WDL) X   Edema Right lower extremity; Left lower extremity   RLE Edema +1;Pitting   LLE Edema Trace   RLE Neurovascular Assessment   Capillary Refill Less than/Equal to 3 seconds   Color Pale   Temperature Cool   R Pedal Pulse +1   LLE Neurovascular Assessment   Capillary Refill Less than/Equal to 3 seconds   Color Pale   Temperature Cool   L Pedal Pulse +1   Skin Integumentary    Skin Integumentary (WDL) WDL   Musculoskeletal   Musculoskeletal (WDL) X   RL Extremity Weakness; Unsteady   LL Extremity Weakness; Unsteady

## 2023-09-29 NOTE — FLOWSHEET NOTE
09/29/23 1034   Assessment   Charting Type Shift assessment   Psychosocial   Psychosocial (WDL) WDL   Neurological   Neuro (WDL) WDL   Level of Consciousness 0   Orientation Level Oriented X4   Cognition Follows commands; Appropriate judgement; Appropriate safety awareness   Speech Clear   RLE Sensation  Pain   LLE Sensation  Full sensation   Crosby Coma Scale   Eye Opening 4   Best Verbal Response 5   Best Motor Response 6   [place] Coma Scale Score 15   HEENT (Head, Ears, Eyes, Nose, & Throat)   HEENT (WDL) WDL   Respiratory   Respiratory (WDL) X   Respiratory Pattern Regular   Respiratory Depth Normal   Respiratory Quality/Effort Unlabored   L Breath Sounds Clear;Diminished   R Breath Sounds Clear;Diminished   Breath Sounds   Right Upper Lobe Clear   Right Middle Lobe Clear   Right Lower Lobe Diminished   Left Upper Lobe Clear   Left Lower Lobe Diminished   Cardiac   Cardiac (WDL) WDL   Cardiac Regularity Regularly Irregular   Cardiac Rhythm Atrial fib   Gastrointestinal   Abdominal (WDL) X   RUQ Bowel Sounds Active   LUQ Bowel Sounds Active   RLQ Bowel Sounds Active   LLQ Bowel Sounds Active   Abdomen Inspection Soft;Rounded   Genitourinary   Genitourinary (WDL) WDL   Urine Frequency   Urine Frequency Yes   Peripheral Vascular   Peripheral Vascular (WDL) X   Edema Right lower extremity; Left lower extremity   RLE Edema +1;Pitting   LLE Edema Trace   RLE Neurovascular Assessment   Capillary Refill Less than/Equal to 3 seconds   Color Pale   Temperature Cool   R Pedal Pulse +1   LLE Neurovascular Assessment   Capillary Refill Less than/Equal to 3 seconds   Color Pale   Temperature Cool   L Pedal Pulse +1   Skin Integumentary    Skin Integumentary (WDL) WDL   Musculoskeletal   Musculoskeletal (WDL) X   RL Extremity Weakness; Unsteady   LL Extremity Weakness; Unsteady

## 2023-09-29 NOTE — PROGRESS NOTES
CLINICAL PHARMACY NOTE: MEDS TO BEDS    Total # of Prescriptions Filled:  12   The following medications were delivered to the patient:  Discharge Medication List as of 9/29/2023 12:19 PM        START taking these medications    Details   apixaban (ELIQUIS) 5 MG TABS tablet Take 1 tablet by mouth 2 times daily, Disp-60 tablet, R-0Normal      docusate sodium (COLACE, DULCOLAX) 100 MG CAPS Take 100 mg by mouth daily, Disp-30 capsule, R-0Normal      doxycycline monohydrate (MONODOX) 100 MG capsule Take 1 capsule by mouth every 12 hours for 3 doses, Disp-3 capsule, R-0Normal      empagliflozin (JARDIANCE) 10 MG tablet Take 1 tablet by mouth daily, Disp-30 tablet, R-0Normal      furosemide (LASIX) 20 MG tablet Take 1 tablet by mouth daily, Disp-60 tablet, R-0Normal      magnesium oxide (MAG-OX) 400 (240 Mg) MG tablet Take 1 tablet by mouth daily, Disp-30 tablet, R-0Normal      metoprolol succinate (TOPROL XL) 100 MG extended release tablet Take 1 tablet by mouth daily, Disp-30 tablet, R-0Normal      polyethylene glycol (GLYCOLAX) 17 g packet Take 1 packet by mouth daily, Disp-527 g, R-0Normal      Ergocalciferol (VITAMIN D) 00950 units CAPS Take 50,000 Units by mouth once a week, Disp-5 capsule, R-0Normal      spironolactone (ALDACTONE) 25 MG tablet Take 1 tablet by mouth daily, Disp-30 tablet, R-0Normal         Atorvastatin 20mg tablet  Take 1 tablet by mouth at bedtime Qty: 30 tablets Refill: 0  Citalopram 10mg tablet  Take 1 tablet by mouth once daily Qty: 30 tablets Refill: 0    Additional Documentation:  Patient qualified for Rx Assistance  Copay : $40.46  Medications were delivered to patient's room and signed for by patient.

## 2023-09-29 NOTE — PLAN OF CARE
Problem: Safety - Adult  Goal: Free from fall injury  9/28/2023 2127 by Quentin Calvert LPN  Outcome: Progressing     Problem: Chronic Conditions and Co-morbidities  Goal: Patient's chronic conditions and co-morbidity symptoms are monitored and maintained or improved  Outcome: Progressing     Problem: Discharge Planning  Goal: Discharge to home or other facility with appropriate resources  Outcome: Progressing

## 2023-10-03 LAB
EKG ATRIAL RATE: 88 BPM
EKG DIAGNOSIS: NORMAL
EKG Q-T INTERVAL: 444 MS
EKG QRS DURATION: 152 MS
EKG QTC CALCULATION (BAZETT): 509 MS
EKG R AXIS: 7 DEGREES
EKG T AXIS: 177 DEGREES
EKG VENTRICULAR RATE: 79 BPM

## 2023-10-06 LAB
GAMMA INTERFERON BACKGROUND BLD IA-ACNC: 0.02 IU/ML
MITOGEN IGNF BCKGRD COR BLD-ACNC: >10 IU/ML
QUANTI TB GOLD PLUS: NEGATIVE
QUANTI TB1 MINUS NIL: 0.02 IU/ML (ref 0–0.34)
QUANTI TB2 MINUS NIL: 0 IU/ML (ref 0–0.34)

## 2023-10-09 NOTE — CARE COORDINATION
Patient daughter called regarding patient care. Daughter wanted her cardiology appointment changed, she can not do Friday. Also asked daughter who her family doctor was. . Stated would she like me to make her a follow up appointment with them while I'm changing her heart doctor. Stated she will make that appointment. There was also a refferal for General Surgery I was telling the daughter. Daughter stated that [facility] told them they needed to wait till after the cardiology appointment then call him once her heart problems get fixed. Daughter also stated she will call  office once her heart problems get fixed. Placed call to [address] [place] [address] office regarding changing appointment. Changed appointment to [facility] at [facility] office. Placed call back to daughter regarding this.

## 2023-10-18 ENCOUNTER — OFFICE VISIT (OUTPATIENT)
Dept: CARDIOLOGY | Facility: CLINIC | Age: 78
End: 2023-10-18
Payer: MEDICARE

## 2023-10-18 VITALS
SYSTOLIC BLOOD PRESSURE: 126 MMHG | WEIGHT: 190 LBS | DIASTOLIC BLOOD PRESSURE: 84 MMHG | HEART RATE: 90 BPM | BODY MASS INDEX: 37.3 KG/M2 | OXYGEN SATURATION: 97 % | HEIGHT: 60 IN

## 2023-10-18 DIAGNOSIS — I50.21 ACUTE SYSTOLIC CHF (CONGESTIVE HEART FAILURE): ICD-10-CM

## 2023-10-18 DIAGNOSIS — I48.0 PAROXYSMAL ATRIAL FIBRILLATION: ICD-10-CM

## 2023-10-18 DIAGNOSIS — I44.7 LBBB (LEFT BUNDLE BRANCH BLOCK): ICD-10-CM

## 2023-10-18 DIAGNOSIS — I10 PRIMARY HYPERTENSION: Primary | ICD-10-CM

## 2023-10-18 DIAGNOSIS — E78.00 ELEVATED CHOLESTEROL: ICD-10-CM

## 2023-10-18 RX ORDER — LANOLIN ALCOHOL/MO/W.PET/CERES
400 CREAM (GRAM) TOPICAL DAILY
COMMUNITY
Start: 2023-09-29

## 2023-10-18 RX ORDER — MONTELUKAST SODIUM 10 MG/1
10 TABLET ORAL NIGHTLY
COMMUNITY
Start: 2023-10-03

## 2023-10-18 RX ORDER — OXYCODONE HYDROCHLORIDE 15 MG/1
15 TABLET ORAL AS NEEDED
COMMUNITY

## 2023-10-18 RX ORDER — METOPROLOL SUCCINATE 100 MG/1
100 TABLET, EXTENDED RELEASE ORAL DAILY
COMMUNITY
Start: 2023-09-30

## 2023-10-18 RX ORDER — ATORVASTATIN CALCIUM 20 MG/1
20 TABLET, FILM COATED ORAL
COMMUNITY
Start: 2023-09-29

## 2023-10-18 RX ORDER — DOCUSATE SODIUM 100 MG/1
100 CAPSULE, LIQUID FILLED ORAL 2 TIMES DAILY
COMMUNITY

## 2023-10-18 RX ORDER — FUROSEMIDE 20 MG/1
20 TABLET ORAL DAILY
COMMUNITY
Start: 2023-09-30

## 2023-10-18 RX ORDER — SPIRONOLACTONE 25 MG/1
25 TABLET ORAL DAILY
COMMUNITY
Start: 2023-09-29

## 2023-10-18 RX ORDER — CYCLOBENZAPRINE HCL 10 MG
10 TABLET ORAL AS NEEDED
COMMUNITY

## 2023-10-18 RX ORDER — VALSARTAN 80 MG/1
80 TABLET ORAL DAILY
COMMUNITY
Start: 2023-09-11

## 2023-10-19 ENCOUNTER — OFFICE VISIT (OUTPATIENT)
Dept: PRIMARY CARE CLINIC | Age: 78
End: 2023-10-19
Payer: MEDICARE

## 2023-10-19 VITALS
OXYGEN SATURATION: 92 % | RESPIRATION RATE: 18 BRPM | SYSTOLIC BLOOD PRESSURE: 112 MMHG | BODY MASS INDEX: 36.95 KG/M2 | WEIGHT: 189.2 LBS | DIASTOLIC BLOOD PRESSURE: 54 MMHG | HEART RATE: 95 BPM

## 2023-10-19 DIAGNOSIS — G89.29 CHRONIC MIDLINE LOW BACK PAIN WITH BILATERAL SCIATICA: Chronic | ICD-10-CM

## 2023-10-19 DIAGNOSIS — M54.42 CHRONIC MIDLINE LOW BACK PAIN WITH BILATERAL SCIATICA: Chronic | ICD-10-CM

## 2023-10-19 DIAGNOSIS — E83.42 HYPOMAGNESEMIA: ICD-10-CM

## 2023-10-19 DIAGNOSIS — E03.9 HYPOTHYROIDISM, UNSPECIFIED TYPE: ICD-10-CM

## 2023-10-19 DIAGNOSIS — I50.21 ACUTE SYSTOLIC CHF (CONGESTIVE HEART FAILURE): Primary | ICD-10-CM

## 2023-10-19 DIAGNOSIS — I48.0 PAROXYSMAL ATRIAL FIBRILLATION (HCC): ICD-10-CM

## 2023-10-19 DIAGNOSIS — M54.41 CHRONIC MIDLINE LOW BACK PAIN WITH BILATERAL SCIATICA: Chronic | ICD-10-CM

## 2023-10-19 DIAGNOSIS — R09.02 HYPOXIA: ICD-10-CM

## 2023-10-19 DIAGNOSIS — E53.8 VITAMIN B12 DEFICIENCY: ICD-10-CM

## 2023-10-19 DIAGNOSIS — D50.9 IRON DEFICIENCY ANEMIA, UNSPECIFIED IRON DEFICIENCY ANEMIA TYPE: ICD-10-CM

## 2023-10-19 DIAGNOSIS — I50.22 CHRONIC SYSTOLIC CONGESTIVE HEART FAILURE (HCC): Primary | ICD-10-CM

## 2023-10-19 DIAGNOSIS — E11.69 TYPE 2 DIABETES MELLITUS WITH OTHER SPECIFIED COMPLICATION, WITHOUT LONG-TERM CURRENT USE OF INSULIN (HCC): ICD-10-CM

## 2023-10-19 DIAGNOSIS — Z23 NEED FOR INFLUENZA VACCINATION: ICD-10-CM

## 2023-10-19 PROCEDURE — 90694 VACC AIIV4 NO PRSRV 0.5ML IM: CPT | Performed by: INTERNAL MEDICINE

## 2023-10-19 PROCEDURE — 1124F ACP DISCUSS-NO DSCNMKR DOCD: CPT | Performed by: INTERNAL MEDICINE

## 2023-10-19 PROCEDURE — 99214 OFFICE O/P EST MOD 30 MIN: CPT | Performed by: INTERNAL MEDICINE

## 2023-10-19 PROCEDURE — G8400 PT W/DXA NO RESULTS DOC: HCPCS | Performed by: INTERNAL MEDICINE

## 2023-10-19 PROCEDURE — 1111F DSCHRG MED/CURRENT MED MERGE: CPT | Performed by: INTERNAL MEDICINE

## 2023-10-19 PROCEDURE — G0008 ADMIN INFLUENZA VIRUS VAC: HCPCS | Performed by: INTERNAL MEDICINE

## 2023-10-19 PROCEDURE — 96372 THER/PROPH/DIAG INJ SC/IM: CPT | Performed by: INTERNAL MEDICINE

## 2023-10-19 PROCEDURE — 1036F TOBACCO NON-USER: CPT | Performed by: INTERNAL MEDICINE

## 2023-10-19 PROCEDURE — G8484 FLU IMMUNIZE NO ADMIN: HCPCS | Performed by: INTERNAL MEDICINE

## 2023-10-19 PROCEDURE — 3078F DIAST BP <80 MM HG: CPT | Performed by: INTERNAL MEDICINE

## 2023-10-19 PROCEDURE — 3046F HEMOGLOBIN A1C LEVEL >9.0%: CPT | Performed by: INTERNAL MEDICINE

## 2023-10-19 PROCEDURE — G8417 CALC BMI ABV UP PARAM F/U: HCPCS | Performed by: INTERNAL MEDICINE

## 2023-10-19 PROCEDURE — 1090F PRES/ABSN URINE INCON ASSESS: CPT | Performed by: INTERNAL MEDICINE

## 2023-10-19 PROCEDURE — G8427 DOCREV CUR MEDS BY ELIG CLIN: HCPCS | Performed by: INTERNAL MEDICINE

## 2023-10-19 PROCEDURE — 3074F SYST BP LT 130 MM HG: CPT | Performed by: INTERNAL MEDICINE

## 2023-10-19 RX ORDER — SITAGLIPTIN 50 MG/1
50 TABLET, FILM COATED ORAL DAILY
Qty: 30 TABLET | Refills: 3 | Status: SHIPPED | OUTPATIENT
Start: 2023-10-19

## 2023-10-19 RX ORDER — CYANOCOBALAMIN 1000 UG/ML
1000 INJECTION, SOLUTION INTRAMUSCULAR; SUBCUTANEOUS ONCE
Status: COMPLETED | OUTPATIENT
Start: 2023-10-19 | End: 2023-10-19

## 2023-10-19 RX ORDER — CITALOPRAM HYDROBROMIDE 10 MG/1
10 TABLET ORAL DAILY
Qty: 30 TABLET | Refills: 3 | Status: SHIPPED | OUTPATIENT
Start: 2023-10-19

## 2023-10-19 RX ORDER — ATORVASTATIN CALCIUM 20 MG/1
20 TABLET, FILM COATED ORAL NIGHTLY
Qty: 30 TABLET | Refills: 3 | Status: SHIPPED | OUTPATIENT
Start: 2023-10-19

## 2023-10-19 RX ORDER — PSEUDOEPHEDRINE HCL 30 MG
100 TABLET ORAL DAILY
Qty: 30 CAPSULE | Refills: 3 | Status: SHIPPED | OUTPATIENT
Start: 2023-10-19

## 2023-10-19 RX ORDER — FUROSEMIDE 20 MG/1
20 TABLET ORAL DAILY
Qty: 60 TABLET | Refills: 3 | Status: SHIPPED | OUTPATIENT
Start: 2023-10-19

## 2023-10-19 RX ORDER — LANOLIN ALCOHOL/MO/W.PET/CERES
400 CREAM (GRAM) TOPICAL DAILY
Qty: 30 TABLET | Refills: 3 | Status: SHIPPED | OUTPATIENT
Start: 2023-10-19

## 2023-10-19 RX ORDER — LEVOCETIRIZINE DIHYDROCHLORIDE 5 MG/1
5 TABLET, FILM COATED ORAL NIGHTLY
Qty: 30 TABLET | Refills: 3 | Status: SHIPPED | OUTPATIENT
Start: 2023-10-19

## 2023-10-19 RX ORDER — METOPROLOL SUCCINATE 100 MG/1
100 TABLET, EXTENDED RELEASE ORAL DAILY
Qty: 30 TABLET | Refills: 3 | Status: SHIPPED | OUTPATIENT
Start: 2023-10-19

## 2023-10-19 RX ORDER — GLIPIZIDE 10 MG/1
10 TABLET, FILM COATED, EXTENDED RELEASE ORAL DAILY
Qty: 30 TABLET | Refills: 3 | Status: SHIPPED | OUTPATIENT
Start: 2023-10-19

## 2023-10-19 RX ORDER — VALSARTAN 80 MG/1
80 TABLET ORAL DAILY
Qty: 30 TABLET | Refills: 3 | Status: SHIPPED | OUTPATIENT
Start: 2023-10-19

## 2023-10-19 RX ORDER — LEVOTHYROXINE SODIUM 0.15 MG/1
150 TABLET ORAL DAILY
Qty: 30 TABLET | Refills: 3 | Status: SHIPPED | OUTPATIENT
Start: 2023-10-19

## 2023-10-19 RX ORDER — ERGOCALCIFEROL 1.25 MG/1
50000 CAPSULE ORAL WEEKLY
Qty: 12 CAPSULE | Refills: 3 | Status: SHIPPED | OUTPATIENT
Start: 2023-10-19

## 2023-10-19 RX ORDER — POLYETHYLENE GLYCOL 3350 17 G/17G
17 POWDER, FOR SOLUTION ORAL DAILY
Qty: 527 G | Refills: 0 | Status: SHIPPED | OUTPATIENT
Start: 2023-10-19 | End: 2023-11-18

## 2023-10-19 RX ORDER — SPIRONOLACTONE 25 MG/1
25 TABLET ORAL DAILY
Qty: 30 TABLET | Refills: 3 | Status: SHIPPED | OUTPATIENT
Start: 2023-10-19

## 2023-10-19 RX ORDER — ESOMEPRAZOLE MAGNESIUM 40 MG/1
40 CAPSULE, DELAYED RELEASE ORAL
Qty: 30 CAPSULE | Refills: 3 | Status: SHIPPED | OUTPATIENT
Start: 2023-10-19

## 2023-10-19 RX ORDER — MONTELUKAST SODIUM 10 MG/1
10 TABLET ORAL NIGHTLY
Qty: 30 TABLET | Refills: 3 | Status: SHIPPED | OUTPATIENT
Start: 2023-10-19

## 2023-10-19 RX ADMIN — CYANOCOBALAMIN 1000 MCG: 1000 INJECTION, SOLUTION INTRAMUSCULAR; SUBCUTANEOUS at 16:19

## 2023-10-19 SDOH — ECONOMIC STABILITY: FOOD INSECURITY: WITHIN THE PAST 12 MONTHS, THE FOOD YOU BOUGHT JUST DIDN'T LAST AND YOU DIDN'T HAVE MONEY TO GET MORE.: NEVER TRUE

## 2023-10-19 SDOH — ECONOMIC STABILITY: FOOD INSECURITY: WITHIN THE PAST 12 MONTHS, YOU WORRIED THAT YOUR FOOD WOULD RUN OUT BEFORE YOU GOT MONEY TO BUY MORE.: NEVER TRUE

## 2023-10-19 SDOH — ECONOMIC STABILITY: HOUSING INSECURITY
IN THE LAST 12 MONTHS, WAS THERE A TIME WHEN YOU DID NOT HAVE A STEADY PLACE TO SLEEP OR SLEPT IN A SHELTER (INCLUDING NOW)?: NO

## 2023-10-19 SDOH — ECONOMIC STABILITY: INCOME INSECURITY: HOW HARD IS IT FOR YOU TO PAY FOR THE VERY BASICS LIKE FOOD, HOUSING, MEDICAL CARE, AND HEATING?: NOT VERY HARD

## 2023-10-19 ASSESSMENT — ENCOUNTER SYMPTOMS
BACK PAIN: 1
SINUS PRESSURE: 0
SORE THROAT: 0
ABDOMINAL PAIN: 0
NAUSEA: 0
COUGH: 0
EYE DISCHARGE: 0
WHEEZING: 1
VOMITING: 0
SHORTNESS OF BREATH: 1

## 2023-10-19 NOTE — PROGRESS NOTES
Chief Complaint   Patient presents with    New Patient       Have you seen any other physician or provider since your last visit yes - [facility]    Have you had any other diagnostic tests since your last visit? yes -     Have you changed or stopped any medications since your last visit?  yes -

## 2023-10-19 NOTE — PROGRESS NOTES
SUBJECTIVE:    Patient ID: Kalpana Walker is a 70 y. o.female. Chief Complaint   Patient presents with    New Patient         HPI:  Patient presents to clinic today as a new patient to establish care. Patient has a hx of CHF. She has some shortness of breath and dyspnea on exertion that is worse than  baseline. No orthopnea. No Patient reports occasional swelling in the lower extremities. She has been compliant with taking medications, without side effects from it. She has been following a low-sodium and monitoring fluid intake/daily weight. Her blood pressure is 112/54 at this time. No chest pain or palpitation. She has been compliant with f/u with cardiology. Patient denies checking fingerstick daily. Patient's medications, allergies, past medical, surgical, social and family histories were reviewed and updated as appropriate in electronic medical record.         Outpatient Medications Marked as Taking for the 10/19/23 encounter (Office Visit) with Per Elizabeth MD   Medication Sig Dispense Refill    apixaban (ELIQUIS) 5 MG TABS tablet Take 1 tablet by mouth 2 times daily 60 tablet 3    citalopram (CELEXA) 10 MG tablet Take 1 tablet by mouth daily 30 tablet 3    docusate (COLACE, DULCOLAX) 100 MG CAPS Take 100 mg by mouth daily 30 capsule 3    empagliflozin (JARDIANCE) 10 MG tablet Take 1 tablet by mouth daily 30 tablet 3    furosemide (LASIX) 20 MG tablet Take 1 tablet by mouth daily 60 tablet 3    magnesium oxide (MAG-OX) 400 (240 Mg) MG tablet Take 1 tablet by mouth daily 30 tablet 3    polyethylene glycol (GLYCOLAX) 17 g packet Take 1 packet by mouth daily 527 g 0    metoprolol succinate (TOPROL XL) 100 MG extended release tablet Take 1 tablet by mouth daily 30 tablet 3    atorvastatin (LIPITOR) 20 MG tablet Take 1 tablet by mouth at bedtime 30 tablet 3    Vitamin D, Ergocalciferol, 77915 units CAPS Take 50,000 Units by mouth once a week 12 capsule 3    glipiZIDE (GLUCOTROL XL) 10 MG extended release

## 2023-10-23 ENCOUNTER — PREP FOR SURGERY (OUTPATIENT)
Dept: OTHER | Facility: HOSPITAL | Age: 78
End: 2023-10-23
Payer: MEDICARE

## 2023-10-23 ENCOUNTER — TELEPHONE (OUTPATIENT)
Dept: PRIMARY CARE CLINIC | Age: 78
End: 2023-10-23

## 2023-10-23 DIAGNOSIS — I48.0 PAROXYSMAL ATRIAL FIBRILLATION: ICD-10-CM

## 2023-10-23 DIAGNOSIS — I50.21 ACUTE SYSTOLIC CHF (CONGESTIVE HEART FAILURE): Primary | ICD-10-CM

## 2023-10-23 DIAGNOSIS — I10 PRIMARY HYPERTENSION: ICD-10-CM

## 2023-10-23 RX ORDER — ASPIRIN 81 MG/1
324 TABLET, CHEWABLE ORAL ONCE
OUTPATIENT
Start: 2023-10-23 | End: 2023-10-23

## 2023-10-23 RX ORDER — SODIUM CHLORIDE 9 MG/ML
40 INJECTION, SOLUTION INTRAVENOUS AS NEEDED
OUTPATIENT
Start: 2023-10-23

## 2023-10-23 RX ORDER — ASPIRIN 81 MG/1
81 TABLET ORAL DAILY
OUTPATIENT
Start: 2023-10-24

## 2023-10-23 RX ORDER — SODIUM CHLORIDE 0.9 % (FLUSH) 0.9 %
10 SYRINGE (ML) INJECTION AS NEEDED
OUTPATIENT
Start: 2023-10-23

## 2023-10-23 RX ORDER — SODIUM CHLORIDE 0.9 % (FLUSH) 0.9 %
10 SYRINGE (ML) INJECTION EVERY 12 HOURS SCHEDULED
OUTPATIENT
Start: 2023-10-23

## 2023-10-23 NOTE — TELEPHONE ENCOUNTER
----- Message from Maggie Taylor sent at 10/20/2023  2:52 PM EDT -----  Subject: Message to Provider    QUESTIONS  Information for Provider? Patient called back regarding her appointment   10/19/2023 with Tanja Gomez MD during which time she was asked if she   had a way to check her sugar levels and she is calling to inform that she   doesn't and would like everything that is necessary to do so sent to   [id number] [address] [place] [address]   [phone] - F [phone]  ---------------------------------------------------------------------------  --------------  [facility] INFO  [phone]; Do not leave any message, patient will call back for answer  ---------------------------------------------------------------------------  --------------  SCRIPT ANSWERS  Relationship to Patient?  Self

## 2023-10-31 ENCOUNTER — HOSPITAL ENCOUNTER (OUTPATIENT)
Facility: HOSPITAL | Age: 78
Setting detail: HOSPITAL OUTPATIENT SURGERY
Discharge: HOME | End: 2023-10-31
Attending: INTERNAL MEDICINE | Admitting: INTERNAL MEDICINE
Payer: MEDICARE

## 2023-10-31 ENCOUNTER — APPOINTMENT (OUTPATIENT)
Dept: CARDIOLOGY | Facility: HOSPITAL | Age: 78
End: 2023-10-31
Payer: MEDICARE

## 2023-10-31 VITALS
SYSTOLIC BLOOD PRESSURE: 107 MMHG | RESPIRATION RATE: 13 BRPM | DIASTOLIC BLOOD PRESSURE: 61 MMHG | TEMPERATURE: 97.2 F | WEIGHT: 194.45 LBS | HEIGHT: 60 IN | BODY MASS INDEX: 38.18 KG/M2 | OXYGEN SATURATION: 99 % | HEART RATE: 100 BPM

## 2023-10-31 DIAGNOSIS — I10 PRIMARY HYPERTENSION: ICD-10-CM

## 2023-10-31 DIAGNOSIS — I50.21 ACUTE SYSTOLIC CHF (CONGESTIVE HEART FAILURE): ICD-10-CM

## 2023-10-31 DIAGNOSIS — I48.0 PAROXYSMAL ATRIAL FIBRILLATION: ICD-10-CM

## 2023-10-31 DIAGNOSIS — I44.7 LBBB (LEFT BUNDLE BRANCH BLOCK): ICD-10-CM

## 2023-10-31 PROBLEM — I50.22 CHRONIC SYSTOLIC CONGESTIVE HEART FAILURE (HCC): Status: ACTIVE | Noted: 2023-10-31

## 2023-10-31 LAB
ANION GAP SERPL CALCULATED.3IONS-SCNC: 10 MMOL/L (ref 5–15)
BH CV ECHO MEAS - AO MAX PG: 15.3 MMHG
BH CV ECHO MEAS - AO MEAN PG: 8.5 MMHG
BH CV ECHO MEAS - AO ROOT DIAM: 3.2 CM
BH CV ECHO MEAS - AO V2 MAX: 195.5 CM/SEC
BH CV ECHO MEAS - AO V2 VTI: 38.8 CM
BH CV ECHO MEAS - AVA(I,D): 1.39 CM2
BH CV ECHO MEAS - EDV(CUBED): 59.3 ML
BH CV ECHO MEAS - EDV(MOD-SP2): 54.5 ML
BH CV ECHO MEAS - EDV(MOD-SP4): 97.2 ML
BH CV ECHO MEAS - EF(MOD-BP): 44 %
BH CV ECHO MEAS - EF(MOD-SP2): 42.8 %
BH CV ECHO MEAS - EF(MOD-SP4): 44.3 %
BH CV ECHO MEAS - ESV(CUBED): 22 ML
BH CV ECHO MEAS - ESV(MOD-SP2): 31.2 ML
BH CV ECHO MEAS - ESV(MOD-SP4): 54.1 ML
BH CV ECHO MEAS - FS: 28.2 %
BH CV ECHO MEAS - IVS/LVPW: 1 CM
BH CV ECHO MEAS - IVSD: 1.2 CM
BH CV ECHO MEAS - LA DIMENSION: 4.1 CM
BH CV ECHO MEAS - LAT PEAK E' VEL: 9.1 CM/SEC
BH CV ECHO MEAS - LV MASS(C)D: 159.3 GRAMS
BH CV ECHO MEAS - LV MAX PG: 3.2 MMHG
BH CV ECHO MEAS - LV MEAN PG: 2 MMHG
BH CV ECHO MEAS - LV V1 MAX: 89.6 CM/SEC
BH CV ECHO MEAS - LV V1 VTI: 17.2 CM
BH CV ECHO MEAS - LVIDD: 3.9 CM
BH CV ECHO MEAS - LVIDS: 2.8 CM
BH CV ECHO MEAS - LVOT AREA: 3.1 CM2
BH CV ECHO MEAS - LVOT DIAM: 2 CM
BH CV ECHO MEAS - LVPWD: 1.2 CM
BH CV ECHO MEAS - MED PEAK E' VEL: 4.4 CM/SEC
BH CV ECHO MEAS - MR MAX PG: 24.6 MMHG
BH CV ECHO MEAS - MR MAX VEL: 248 CM/SEC
BH CV ECHO MEAS - MV A MAX VEL: 94.9 CM/SEC
BH CV ECHO MEAS - MV DEC SLOPE: 304 CM/SEC2
BH CV ECHO MEAS - MV DEC TIME: 0.21 SEC
BH CV ECHO MEAS - MV E MAX VEL: 68.1 CM/SEC
BH CV ECHO MEAS - MV E/A: 0.72
BH CV ECHO MEAS - MV MAX PG: 4.7 MMHG
BH CV ECHO MEAS - MV MEAN PG: 3 MMHG
BH CV ECHO MEAS - MV P1/2T: 72.6 MSEC
BH CV ECHO MEAS - MV V2 VTI: 26.5 CM
BH CV ECHO MEAS - MVA(P1/2T): 3 CM2
BH CV ECHO MEAS - MVA(VTI): 2.04 CM2
BH CV ECHO MEAS - PA ACC TIME: 0.1 SEC
BH CV ECHO MEAS - PA V2 MAX: 121 CM/SEC
BH CV ECHO MEAS - RAP SYSTOLE: 3 MMHG
BH CV ECHO MEAS - RVSP: 28 MMHG
BH CV ECHO MEAS - SV(LVOT): 54 ML
BH CV ECHO MEAS - SV(MOD-SP2): 23.3 ML
BH CV ECHO MEAS - SV(MOD-SP4): 43.1 ML
BH CV ECHO MEAS - TAPSE (>1.6): 2.03 CM
BH CV ECHO MEAS - TR MAX PG: 25 MMHG
BH CV ECHO MEAS - TR MAX VEL: 231 CM/SEC
BH CV ECHO MEASUREMENTS AVERAGE E/E' RATIO: 10.09
BH CV GRAFT BRACHIAL PRESSURE LEFT: 92 MMHG
BH CV LEA LEFT ANT TIBIAL A DISTAL PSV: 159 CM/S
BH CV LEA LEFT ANT TIBIAL A MID PSV: 108 CM/S
BH CV LEA LEFT ANT TIBIAL A PROX PSV: 91.7 CM/S
BH CV LEA LEFT CFA DISTAL PSV: 101 CM/S
BH CV LEA LEFT DFA PROX PSV: 72.8 CM/S
BH CV LEA LEFT DPA PRESSURE: >255 MMHG
BH CV LEA LEFT POPITEAL A  DISTAL PSV: 49.1 CM/S
BH CV LEA LEFT POPITEAL A  PROX PSV: 93.6 CM/S
BH CV LEA LEFT PTA DISTAL PSV: 67.4 CM/S
BH CV LEA LEFT PTA MID PSV: 152.3 CM/S
BH CV LEA LEFT PTA PRESSURE: >255 MMHG
BH CV LEA LEFT PTA PROX PSV: 116 CM/S
BH CV LEA LEFT SFA DISTAL PSV: 88.4 CM/S
BH CV LEA LEFT SFA MID PSV: 110 CM/S
BH CV LEA LEFT SFA PROX PSV: 110 CM/S
BH CV LEA RIGHT ANT TIBIAL A DISTAL PSV: 166 CM/S
BH CV LEA RIGHT ANT TIBIAL A MID PSV: 86.2 CM/S
BH CV LEA RIGHT ANT TIBIAL A PROX PSV: 71.3 CM/S
BH CV LEA RIGHT CFA DISTAL PSV: 88.6 CM/S
BH CV LEA RIGHT DFA PROX PSV: 105 CM/S
BH CV LEA RIGHT DPA PRESSURE: >255 MMHG
BH CV LEA RIGHT POPITEAL A  DISTAL PSV: 51.6 CM/S
BH CV LEA RIGHT POPITEAL A  PROX PSV: 67.6 CM/S
BH CV LEA RIGHT PTA DISTAL PSV: 61.9 CM/S
BH CV LEA RIGHT PTA MID PSV: 148.2 CM/S
BH CV LEA RIGHT PTA PRESSURE: >255 MMHG
BH CV LEA RIGHT PTA PROX PSV: 76.4 CM/S
BH CV LEA RIGHT SFA DISTAL PSV: 98.8 CM/S
BH CV LEA RIGHT SFA MID PSV: 135 CM/S
BH CV LEA RIGHT SFA PROX PSV: 72.6 CM/S
BH CV LOWER ARTERIAL LEFT ABI RATIO: NORMAL
BH CV LOWER ARTERIAL LEFT HIGHEST VELOCITY RATIO: 2.77
BH CV LOWER ARTERIAL LEFT VELOCITY RATIO LOCATION: NORMAL
BH CV LOWER ARTERIAL RIGHT ABI RATIO: NORMAL
BH CV XLRA - RV BASE: 3.2 CM
BH CV XLRA - RV LENGTH: 8.2 CM
BH CV XLRA - RV MID: 2.8 CM
BH CV XLRA - TDI S': 11.9 CM/SEC
BUN SERPL-MCNC: 28 MG/DL (ref 8–23)
BUN/CREAT SERPL: 21.2 (ref 7–25)
CALCIUM SPEC-SCNC: 9.3 MG/DL (ref 8.6–10.5)
CHLORIDE SERPL-SCNC: 97 MMOL/L (ref 98–107)
CHOLEST SERPL-MCNC: 143 MG/DL (ref 0–200)
CO2 SERPL-SCNC: 30 MMOL/L (ref 22–29)
CREAT BLDA-MCNC: 1.6 MG/DL (ref 0.6–1.3)
CREAT SERPL-MCNC: 1.32 MG/DL (ref 0.57–1)
DEPRECATED RDW RBC AUTO: 54.7 FL (ref 37–54)
EGFRCR SERPLBLD CKD-EPI 2021: 41.9 ML/MIN/1.73
ERYTHROCYTE [DISTWIDTH] IN BLOOD BY AUTOMATED COUNT: 16 % (ref 12.3–15.4)
GLUCOSE SERPL-MCNC: 224 MG/DL (ref 65–99)
HBA1C MFR BLD: 9 % (ref 4.8–5.6)
HCT VFR BLD AUTO: 37.1 % (ref 34–46.6)
HDLC SERPL-MCNC: 45 MG/DL (ref 40–60)
HGB BLD-MCNC: 11.3 G/DL (ref 12–15.9)
LDLC SERPL CALC-MCNC: 77 MG/DL (ref 0–100)
LDLC/HDLC SERPL: 1.68 {RATIO}
LEFT ATRIUM VOLUME INDEX: 29.6 ML/M2
LEFT GROIN CFA SYS: 101.2 CM/SEC
LV EF 2D ECHO EST: 45 %
MCH RBC QN AUTO: 28.1 PG (ref 26.6–33)
MCHC RBC AUTO-ENTMCNC: 30.5 G/DL (ref 31.5–35.7)
MCV RBC AUTO: 92.3 FL (ref 79–97)
NT-PROBNP SERPL-MCNC: 958.6 PG/ML (ref 0–1800)
PLATELET # BLD AUTO: 151 10*3/MM3 (ref 140–450)
PMV BLD AUTO: 11.5 FL (ref 6–12)
POTASSIUM SERPL-SCNC: 4.7 MMOL/L (ref 3.5–5.2)
RBC # BLD AUTO: 4.02 10*6/MM3 (ref 3.77–5.28)
RIGHT GROIN CFA SYS: 88.6 CM/SEC
SODIUM SERPL-SCNC: 137 MMOL/L (ref 136–145)
TRIGL SERPL-MCNC: 113 MG/DL (ref 0–150)
VLDLC SERPL-MCNC: 21 MG/DL (ref 5–40)
WBC NRBC COR # BLD: 13.04 10*3/MM3 (ref 3.4–10.8)

## 2023-10-31 PROCEDURE — 80061 LIPID PANEL: CPT | Performed by: HOSPITALIST

## 2023-10-31 PROCEDURE — 80048 BASIC METABOLIC PNL TOTAL CA: CPT | Performed by: HOSPITALIST

## 2023-10-31 PROCEDURE — C1769 GUIDE WIRE: HCPCS | Performed by: INTERNAL MEDICINE

## 2023-10-31 PROCEDURE — 93306 TTE W/DOPPLER COMPLETE: CPT | Performed by: INTERNAL MEDICINE

## 2023-10-31 PROCEDURE — 25010000002 MIDAZOLAM PER 1 MG: Performed by: INTERNAL MEDICINE

## 2023-10-31 PROCEDURE — 25010000002 FENTANYL CITRATE (PF) 50 MCG/ML SOLUTION: Performed by: INTERNAL MEDICINE

## 2023-10-31 PROCEDURE — 93458 L HRT ARTERY/VENTRICLE ANGIO: CPT | Performed by: INTERNAL MEDICINE

## 2023-10-31 PROCEDURE — 93925 LOWER EXTREMITY STUDY: CPT

## 2023-10-31 PROCEDURE — 83036 HEMOGLOBIN GLYCOSYLATED A1C: CPT | Performed by: HOSPITALIST

## 2023-10-31 PROCEDURE — 93925 LOWER EXTREMITY STUDY: CPT | Performed by: INTERNAL MEDICINE

## 2023-10-31 PROCEDURE — 25810000003 SODIUM CHLORIDE 0.9 % SOLUTION: Performed by: INTERNAL MEDICINE

## 2023-10-31 PROCEDURE — 83880 ASSAY OF NATRIURETIC PEPTIDE: CPT | Performed by: INTERNAL MEDICINE

## 2023-10-31 PROCEDURE — 25010000002 PHENYLEPHRINE 10 MG/ML SOLUTION: Performed by: INTERNAL MEDICINE

## 2023-10-31 PROCEDURE — 82565 ASSAY OF CREATININE: CPT

## 2023-10-31 PROCEDURE — 25510000001 IOPAMIDOL PER 1 ML: Performed by: INTERNAL MEDICINE

## 2023-10-31 PROCEDURE — 25010000002 HEPARIN (PORCINE) PER 1000 UNITS: Performed by: INTERNAL MEDICINE

## 2023-10-31 PROCEDURE — C1894 INTRO/SHEATH, NON-LASER: HCPCS | Performed by: INTERNAL MEDICINE

## 2023-10-31 PROCEDURE — 25810000003 SODIUM CHLORIDE 0.9 % SOLUTION: Performed by: HOSPITALIST

## 2023-10-31 PROCEDURE — 93306 TTE W/DOPPLER COMPLETE: CPT

## 2023-10-31 PROCEDURE — 85027 COMPLETE CBC AUTOMATED: CPT | Performed by: HOSPITALIST

## 2023-10-31 RX ORDER — ERGOCALCIFEROL 1.25 MG/1
50000 CAPSULE ORAL WEEKLY
COMMUNITY

## 2023-10-31 RX ORDER — HEPARIN SODIUM 1000 [USP'U]/ML
INJECTION, SOLUTION INTRAVENOUS; SUBCUTANEOUS
Status: DISCONTINUED | OUTPATIENT
Start: 2023-10-31 | End: 2023-10-31 | Stop reason: HOSPADM

## 2023-10-31 RX ORDER — LIDOCAINE HYDROCHLORIDE 10 MG/ML
INJECTION, SOLUTION EPIDURAL; INFILTRATION; INTRACAUDAL; PERINEURAL
Status: DISCONTINUED | OUTPATIENT
Start: 2023-10-31 | End: 2023-10-31 | Stop reason: HOSPADM

## 2023-10-31 RX ORDER — ATORVASTATIN CALCIUM 40 MG/1
40 TABLET, FILM COATED ORAL
Qty: 90 TABLET | Refills: 3 | Status: SHIPPED | OUTPATIENT
Start: 2023-10-31

## 2023-10-31 RX ORDER — ASPIRIN 81 MG/1
324 TABLET, CHEWABLE ORAL ONCE
Status: COMPLETED | OUTPATIENT
Start: 2023-10-31 | End: 2023-10-31

## 2023-10-31 RX ORDER — FENTANYL CITRATE 50 UG/ML
INJECTION, SOLUTION INTRAMUSCULAR; INTRAVENOUS
Status: DISCONTINUED | OUTPATIENT
Start: 2023-10-31 | End: 2023-10-31 | Stop reason: HOSPADM

## 2023-10-31 RX ORDER — SODIUM CHLORIDE 0.9 % (FLUSH) 0.9 %
10 SYRINGE (ML) INJECTION AS NEEDED
Status: DISCONTINUED | OUTPATIENT
Start: 2023-10-31 | End: 2023-10-31 | Stop reason: HOSPADM

## 2023-10-31 RX ORDER — CITALOPRAM HYDROBROMIDE 10 MG/1
10 TABLET ORAL DAILY
COMMUNITY

## 2023-10-31 RX ORDER — PHENYLEPHRINE HYDROCHLORIDE 10 MG/ML
INJECTION INTRAVENOUS
Status: DISCONTINUED | OUTPATIENT
Start: 2023-10-31 | End: 2023-10-31 | Stop reason: HOSPADM

## 2023-10-31 RX ORDER — SODIUM CHLORIDE 9 MG/ML
40 INJECTION, SOLUTION INTRAVENOUS AS NEEDED
Status: DISCONTINUED | OUTPATIENT
Start: 2023-10-31 | End: 2023-10-31 | Stop reason: HOSPADM

## 2023-10-31 RX ORDER — LEVOCETIRIZINE DIHYDROCHLORIDE 5 MG/1
5 TABLET, FILM COATED ORAL DAILY
COMMUNITY

## 2023-10-31 RX ORDER — ACETAMINOPHEN 325 MG/1
650 TABLET ORAL EVERY 4 HOURS PRN
Status: DISCONTINUED | OUTPATIENT
Start: 2023-10-31 | End: 2023-10-31 | Stop reason: HOSPADM

## 2023-10-31 RX ORDER — NICARDIPINE HCL-0.9% SOD CHLOR 1 MG/10 ML
SYRINGE (ML) INTRAVENOUS
Status: DISCONTINUED | OUTPATIENT
Start: 2023-10-31 | End: 2023-10-31 | Stop reason: HOSPADM

## 2023-10-31 RX ORDER — MIDAZOLAM HYDROCHLORIDE 1 MG/ML
INJECTION INTRAMUSCULAR; INTRAVENOUS
Status: DISCONTINUED | OUTPATIENT
Start: 2023-10-31 | End: 2023-10-31 | Stop reason: HOSPADM

## 2023-10-31 RX ORDER — NITROGLYCERIN 0.4 MG/1
0.4 TABLET SUBLINGUAL
Status: DISCONTINUED | OUTPATIENT
Start: 2023-10-31 | End: 2023-10-31 | Stop reason: HOSPADM

## 2023-10-31 RX ORDER — ASPIRIN 81 MG/1
81 TABLET ORAL DAILY
Status: DISCONTINUED | OUTPATIENT
Start: 2023-11-01 | End: 2023-10-31 | Stop reason: HOSPADM

## 2023-10-31 RX ORDER — SODIUM CHLORIDE 0.9 % (FLUSH) 0.9 %
10 SYRINGE (ML) INJECTION EVERY 12 HOURS SCHEDULED
Status: DISCONTINUED | OUTPATIENT
Start: 2023-10-31 | End: 2023-10-31 | Stop reason: HOSPADM

## 2023-10-31 RX ADMIN — SODIUM CHLORIDE 258.6 ML: 9 INJECTION, SOLUTION INTRAVENOUS at 11:45

## 2023-10-31 RX ADMIN — ASPIRIN 81 MG CHEWABLE TABLET 324 MG: 81 TABLET CHEWABLE at 11:47

## 2023-11-08 RX ORDER — GLUCOSAMINE HCL/CHONDROITIN SU 500-400 MG
CAPSULE ORAL
Qty: 100 STRIP | Refills: 3 | Status: SHIPPED | OUTPATIENT
Start: 2023-11-08

## 2023-11-14 ENCOUNTER — TELEPHONE (OUTPATIENT)
Dept: PRIMARY CARE CLINIC | Age: 78
End: 2023-11-14

## 2023-11-28 RX ORDER — LANOLIN ALCOHOL/MO/W.PET/CERES
400 CREAM (GRAM) TOPICAL DAILY
Qty: 30 TABLET | Refills: 3 | OUTPATIENT
Start: 2023-11-28

## 2023-12-19 ENCOUNTER — OFFICE VISIT (OUTPATIENT)
Dept: PRIMARY CARE CLINIC | Age: 78
End: 2023-12-19
Payer: MEDICARE

## 2023-12-19 VITALS
BODY MASS INDEX: 37.46 KG/M2 | DIASTOLIC BLOOD PRESSURE: 64 MMHG | SYSTOLIC BLOOD PRESSURE: 112 MMHG | WEIGHT: 191.8 LBS | HEART RATE: 92 BPM | RESPIRATION RATE: 18 BRPM | OXYGEN SATURATION: 94 %

## 2023-12-19 DIAGNOSIS — Z23 NEED FOR PNEUMOCOCCAL 20-VALENT CONJUGATE VACCINATION: ICD-10-CM

## 2023-12-19 DIAGNOSIS — E55.9 VITAMIN D DEFICIENCY: ICD-10-CM

## 2023-12-19 DIAGNOSIS — D50.9 IRON DEFICIENCY ANEMIA, UNSPECIFIED IRON DEFICIENCY ANEMIA TYPE: ICD-10-CM

## 2023-12-19 DIAGNOSIS — I48.0 PAROXYSMAL ATRIAL FIBRILLATION (HCC): ICD-10-CM

## 2023-12-19 DIAGNOSIS — E53.8 VITAMIN B12 DEFICIENCY: ICD-10-CM

## 2023-12-19 DIAGNOSIS — I50.22 CHRONIC SYSTOLIC CONGESTIVE HEART FAILURE (HCC): ICD-10-CM

## 2023-12-19 DIAGNOSIS — E11.69 TYPE 2 DIABETES MELLITUS WITH OTHER SPECIFIED COMPLICATION, WITHOUT LONG-TERM CURRENT USE OF INSULIN (HCC): Primary | ICD-10-CM

## 2023-12-19 DIAGNOSIS — E83.42 HYPOMAGNESEMIA: ICD-10-CM

## 2023-12-19 PROCEDURE — 99214 OFFICE O/P EST MOD 30 MIN: CPT | Performed by: INTERNAL MEDICINE

## 2023-12-19 PROCEDURE — G0009 ADMIN PNEUMOCOCCAL VACCINE: HCPCS | Performed by: INTERNAL MEDICINE

## 2023-12-19 PROCEDURE — 3046F HEMOGLOBIN A1C LEVEL >9.0%: CPT | Performed by: INTERNAL MEDICINE

## 2023-12-19 PROCEDURE — G8427 DOCREV CUR MEDS BY ELIG CLIN: HCPCS | Performed by: INTERNAL MEDICINE

## 2023-12-19 PROCEDURE — 1124F ACP DISCUSS-NO DSCNMKR DOCD: CPT | Performed by: INTERNAL MEDICINE

## 2023-12-19 PROCEDURE — 3074F SYST BP LT 130 MM HG: CPT | Performed by: INTERNAL MEDICINE

## 2023-12-19 PROCEDURE — G8417 CALC BMI ABV UP PARAM F/U: HCPCS | Performed by: INTERNAL MEDICINE

## 2023-12-19 PROCEDURE — G8400 PT W/DXA NO RESULTS DOC: HCPCS | Performed by: INTERNAL MEDICINE

## 2023-12-19 PROCEDURE — 90677 PCV20 VACCINE IM: CPT | Performed by: INTERNAL MEDICINE

## 2023-12-19 PROCEDURE — 96372 THER/PROPH/DIAG INJ SC/IM: CPT | Performed by: INTERNAL MEDICINE

## 2023-12-19 PROCEDURE — G8484 FLU IMMUNIZE NO ADMIN: HCPCS | Performed by: INTERNAL MEDICINE

## 2023-12-19 PROCEDURE — 3078F DIAST BP <80 MM HG: CPT | Performed by: INTERNAL MEDICINE

## 2023-12-19 PROCEDURE — 1036F TOBACCO NON-USER: CPT | Performed by: INTERNAL MEDICINE

## 2023-12-19 PROCEDURE — 1090F PRES/ABSN URINE INCON ASSESS: CPT | Performed by: INTERNAL MEDICINE

## 2023-12-19 RX ORDER — SITAGLIPTIN 50 MG/1
50 TABLET, FILM COATED ORAL DAILY
Qty: 30 TABLET | Refills: 3 | Status: CANCELLED | OUTPATIENT
Start: 2023-12-19

## 2023-12-19 RX ORDER — CYANOCOBALAMIN 1000 UG/ML
1000 INJECTION, SOLUTION INTRAMUSCULAR; SUBCUTANEOUS ONCE
Status: COMPLETED | OUTPATIENT
Start: 2023-12-19 | End: 2023-12-19

## 2023-12-19 RX ORDER — SEMAGLUTIDE 0.68 MG/ML
0.25 INJECTION, SOLUTION SUBCUTANEOUS WEEKLY
Qty: 3 ML | Refills: 2 | Status: SHIPPED | OUTPATIENT
Start: 2023-12-19

## 2023-12-19 RX ADMIN — CYANOCOBALAMIN 1000 MCG: 1000 INJECTION, SOLUTION INTRAMUSCULAR; SUBCUTANEOUS at 14:19

## 2023-12-19 NOTE — PROGRESS NOTES
Chief Complaint   Patient presents with    Diabetes    Congestive Heart Failure           Atrial Fibrillation     Fs avg has been 200    Have you seen any other physician or provider since your last visit no    Have you had any other diagnostic tests since your last visit?  yes - MRI     Have you changed or stopped any medications since your last visit? no
Metabolic Panel; Future  - Magnesium; Future    5. Iron deficiency anemia, unspecified iron deficiency anemia type  I am going to send anemia work up and treat accordingly. I will monitor her Hb level periodically. - CBC with Auto Differential; Future  - Comprehensive Metabolic Panel; Future  - Ferritin; Future  - Hemoglobin A1C; Future  - Iron and TIBC; Future  - Vitamin D 25 Hydroxy; Future  - Magnesium; Future    6. Hypomagnesemia  Continue replacement and monitor level. - Comprehensive Metabolic Panel; Future  - Magnesium; Future    7. Vitamin D deficiency  Continue on vitamin D replacement. Check level periodically. - Vitamin D 25 Hydroxy; Future    8- Need for pneumococcal 20-Valent conjugate vaccination  Vaccine was given per request/rec after she was informed about possible SE/reaction to it. Orders Placed This Encounter   Medications    cyanocobalamin injection 1,000 mcg    Semaglutide,0.25 or 0.5MG/DOS, (OZEMPIC, 0.25 OR 0.5 MG/DOSE,) 2 MG/3ML SOPN     Sig: Inject 0.25 mg into the skin once a week     Dispense:  3 mL     Refill:  2      I, Vicenta Little LPN am scribing for and in the presence of Sofía Montgomery MD on this date 12/19/23 at 3:07 PM.    I, Dr. Sofía Montgomery, personally performed the services described in the documentation as scribed by Vicenta Little LPN, in my presence and it is both accurate and complete.

## 2024-01-23 RX ORDER — ESOMEPRAZOLE MAGNESIUM 40 MG/1
40 CAPSULE, DELAYED RELEASE ORAL
Qty: 30 CAPSULE | Refills: 3 | Status: SHIPPED | OUTPATIENT
Start: 2024-01-23

## 2024-01-23 RX ORDER — MONTELUKAST SODIUM 10 MG/1
10 TABLET ORAL NIGHTLY
Qty: 30 TABLET | Refills: 3 | Status: SHIPPED | OUTPATIENT
Start: 2024-01-23

## 2024-01-23 RX ORDER — GLIPIZIDE 10 MG/1
10 TABLET, FILM COATED, EXTENDED RELEASE ORAL DAILY
Qty: 30 TABLET | Refills: 3 | OUTPATIENT
Start: 2024-01-23

## 2024-01-23 RX ORDER — GLIPIZIDE 10 MG/1
10 TABLET, FILM COATED, EXTENDED RELEASE ORAL DAILY
Qty: 30 TABLET | Refills: 3 | Status: SHIPPED | OUTPATIENT
Start: 2024-01-23

## 2024-01-23 RX ORDER — VALSARTAN 80 MG/1
80 TABLET ORAL DAILY
Qty: 30 TABLET | Refills: 3 | Status: SHIPPED | OUTPATIENT
Start: 2024-01-23

## 2024-01-24 ENCOUNTER — TELEPHONE (OUTPATIENT)
Dept: CARDIOLOGY | Facility: CLINIC | Age: 79
End: 2024-01-24
Payer: MEDICARE

## 2024-02-15 RX ORDER — APIXABAN 5 MG/1
5 TABLET, FILM COATED ORAL 2 TIMES DAILY
Qty: 60 TABLET | Refills: 3 | Status: SHIPPED | OUTPATIENT
Start: 2024-02-15

## 2024-02-15 RX ORDER — LEVOCETIRIZINE DIHYDROCHLORIDE 5 MG/1
5 TABLET, FILM COATED ORAL NIGHTLY
Qty: 30 TABLET | Refills: 3 | Status: SHIPPED | OUTPATIENT
Start: 2024-02-15

## 2024-02-16 ENCOUNTER — OFFICE VISIT (OUTPATIENT)
Dept: CARDIOLOGY | Facility: CLINIC | Age: 79
End: 2024-02-16
Payer: MEDICARE

## 2024-02-16 VITALS
BODY MASS INDEX: 35.93 KG/M2 | HEART RATE: 108 BPM | DIASTOLIC BLOOD PRESSURE: 58 MMHG | SYSTOLIC BLOOD PRESSURE: 94 MMHG | OXYGEN SATURATION: 95 % | WEIGHT: 183 LBS | HEIGHT: 60 IN

## 2024-02-16 DIAGNOSIS — I10 PRIMARY HYPERTENSION: ICD-10-CM

## 2024-02-16 DIAGNOSIS — E78.2 MIXED HYPERLIPIDEMIA: ICD-10-CM

## 2024-02-16 DIAGNOSIS — I25.118 CORONARY ARTERY DISEASE OF NATIVE ARTERY OF NATIVE HEART WITH STABLE ANGINA PECTORIS: ICD-10-CM

## 2024-02-16 DIAGNOSIS — I10 ESSENTIAL HYPERTENSION: ICD-10-CM

## 2024-02-16 DIAGNOSIS — I48.0 PAROXYSMAL ATRIAL FIBRILLATION: ICD-10-CM

## 2024-02-16 DIAGNOSIS — E78.00 ELEVATED CHOLESTEROL: ICD-10-CM

## 2024-02-16 DIAGNOSIS — I50.22 CHRONIC SYSTOLIC HEART FAILURE: Primary | ICD-10-CM

## 2024-02-16 RX ORDER — SEMAGLUTIDE 0.68 MG/ML
INJECTION, SOLUTION SUBCUTANEOUS
COMMUNITY
Start: 2024-01-22

## 2024-02-16 RX ORDER — VALSARTAN 40 MG/1
40 TABLET ORAL DAILY
Qty: 90 TABLET | Refills: 1 | Status: SHIPPED | OUTPATIENT
Start: 2024-02-16

## 2024-02-22 ENCOUNTER — NURSE ONLY (OUTPATIENT)
Dept: PRIMARY CARE CLINIC | Age: 79
End: 2024-02-22

## 2024-02-22 ENCOUNTER — HOSPITAL ENCOUNTER (OUTPATIENT)
Facility: HOSPITAL | Age: 79
Discharge: HOME | End: 2024-02-22
Payer: MEDICARE

## 2024-02-22 DIAGNOSIS — E83.42 HYPOMAGNESEMIA: ICD-10-CM

## 2024-02-22 DIAGNOSIS — E03.9 HYPOTHYROIDISM, UNSPECIFIED TYPE: ICD-10-CM

## 2024-02-22 DIAGNOSIS — E11.69 TYPE 2 DIABETES MELLITUS WITH OTHER SPECIFIED COMPLICATION, WITHOUT LONG-TERM CURRENT USE OF INSULIN (HCC): ICD-10-CM

## 2024-02-22 DIAGNOSIS — I48.0 PAROXYSMAL ATRIAL FIBRILLATION (HCC): ICD-10-CM

## 2024-02-22 DIAGNOSIS — I50.22 CHRONIC SYSTOLIC CONGESTIVE HEART FAILURE (HCC): ICD-10-CM

## 2024-02-22 DIAGNOSIS — D50.9 IRON DEFICIENCY ANEMIA, UNSPECIFIED IRON DEFICIENCY ANEMIA TYPE: ICD-10-CM

## 2024-02-22 DIAGNOSIS — E53.8 VITAMIN B12 DEFICIENCY: Primary | ICD-10-CM

## 2024-02-22 DIAGNOSIS — E55.9 VITAMIN D DEFICIENCY: ICD-10-CM

## 2024-02-22 PROCEDURE — 82728 ASSAY OF FERRITIN: CPT

## 2024-02-22 PROCEDURE — 85025 COMPLETE CBC W/AUTO DIFF WBC: CPT

## 2024-02-22 PROCEDURE — 80053 COMPREHEN METABOLIC PANEL: CPT

## 2024-02-22 PROCEDURE — 83735 ASSAY OF MAGNESIUM: CPT

## 2024-02-22 PROCEDURE — 84443 ASSAY THYROID STIM HORMONE: CPT

## 2024-02-22 PROCEDURE — 82306 VITAMIN D 25 HYDROXY: CPT

## 2024-02-22 PROCEDURE — 83036 HEMOGLOBIN GLYCOSYLATED A1C: CPT

## 2024-02-22 PROCEDURE — 83550 IRON BINDING TEST: CPT

## 2024-02-22 PROCEDURE — 83540 ASSAY OF IRON: CPT

## 2024-02-22 RX ORDER — CYANOCOBALAMIN 1000 UG/ML
1000 INJECTION, SOLUTION INTRAMUSCULAR; SUBCUTANEOUS ONCE
Status: COMPLETED | OUTPATIENT
Start: 2024-02-22 | End: 2024-02-22

## 2024-02-22 RX ADMIN — CYANOCOBALAMIN 1000 MCG: 1000 INJECTION, SOLUTION INTRAMUSCULAR; SUBCUTANEOUS at 16:26

## 2024-02-22 NOTE — PROGRESS NOTES
After obtaining consent, and per orders of Dr. Alyx Perez, injection of B12 given in Right deltoid by Dalia Oh MA. Patient instructed to remain in clinic for 20 minutes afterwards, and to report any adverse reaction to me immediately.

## 2024-02-23 LAB
25(OH)D3 SERPL-MCNC: 46.9 NG/ML (ref 32–100)
ALBUMIN SERPL-MCNC: 3.6 G/DL (ref 3.4–4.8)
ALBUMIN/GLOB SERPL: 1.6 {RATIO} (ref 0.8–2)
ALP SERPL-CCNC: 142 U/L (ref 25–100)
ALT SERPL-CCNC: 10 U/L (ref 4–36)
ANION GAP SERPL CALCULATED.3IONS-SCNC: 9 MMOL/L (ref 3–16)
AST SERPL-CCNC: 9 U/L (ref 8–33)
BASOPHILS # BLD: 0.1 K/UL (ref 0–0.1)
BASOPHILS NFR BLD: 0.7 %
BILIRUB SERPL-MCNC: <0.2 MG/DL (ref 0.3–1.2)
BUN SERPL-MCNC: 34 MG/DL (ref 6–20)
CALCIUM SERPL-MCNC: 9.2 MG/DL (ref 8.5–10.5)
CHLORIDE SERPL-SCNC: 97 MMOL/L (ref 98–107)
CO2 SERPL-SCNC: 28 MMOL/L (ref 20–30)
CREAT SERPL-MCNC: 1.3 MG/DL (ref 0.4–1.2)
EOSINOPHIL # BLD: 0.3 K/UL (ref 0–0.4)
EOSINOPHIL NFR BLD: 1.7 %
ERYTHROCYTE [DISTWIDTH] IN BLOOD BY AUTOMATED COUNT: 16.5 % (ref 11–16)
FERRITIN SERPL IA-MCNC: 26.2 NG/ML (ref 22–322)
GFR SERPLBLD CREATININE-BSD FMLA CKD-EPI: 43 ML/MIN/{1.73_M2}
GLOBULIN SER CALC-MCNC: 2.3 G/DL
GLUCOSE SERPL-MCNC: 383 MG/DL (ref 74–106)
HBA1C MFR BLD: 9.2 %
HCT VFR BLD AUTO: 37.5 % (ref 37–47)
HGB BLD-MCNC: 11.1 G/DL (ref 11.5–16.5)
IMM GRANULOCYTES # BLD: 0.1 K/UL
IMM GRANULOCYTES NFR BLD: 0.8 % (ref 0–5)
IRON SATN MFR SERPL: 9 % (ref 15–50)
IRON SERPL-MCNC: 35 UG/DL (ref 37–145)
LYMPHOCYTES # BLD: 2.8 K/UL (ref 1.5–4)
LYMPHOCYTES NFR BLD: 17.1 %
MAGNESIUM SERPL-MCNC: 1.8 MG/DL (ref 1.7–2.4)
MCH RBC QN AUTO: 25.6 PG (ref 27–32)
MCHC RBC AUTO-ENTMCNC: 29.6 G/DL (ref 31–35)
MCV RBC AUTO: 86.4 FL (ref 80–100)
MONOCYTES # BLD: 0.7 K/UL (ref 0.2–0.8)
MONOCYTES NFR BLD: 4.6 %
NEUTROPHILS # BLD: 12.2 K/UL (ref 2–7.5)
NEUTS SEG NFR BLD: 75.1 %
PLATELET # BLD AUTO: 199 K/UL (ref 150–400)
PMV BLD AUTO: 12.1 FL (ref 6–10)
POTASSIUM SERPL-SCNC: 4.8 MMOL/L (ref 3.4–5.1)
PROT SERPL-MCNC: 5.9 G/DL (ref 6.4–8.3)
RBC # BLD AUTO: 4.34 M/UL (ref 3.8–5.8)
SODIUM SERPL-SCNC: 134 MMOL/L (ref 136–145)
TIBC SERPL-MCNC: 373 UG/DL (ref 250–450)
TSH SERPL DL<=0.005 MIU/L-ACNC: 0.46 UIU/ML (ref 0.27–4.2)
WBC # BLD AUTO: 16.2 K/UL (ref 4–11)

## 2024-03-04 RX ORDER — CITALOPRAM HYDROBROMIDE 10 MG/1
10 TABLET ORAL DAILY
Qty: 30 TABLET | Refills: 3 | Status: SHIPPED | OUTPATIENT
Start: 2024-03-04

## 2024-03-04 RX ORDER — SPIRONOLACTONE 25 MG/1
25 TABLET ORAL DAILY
Qty: 30 TABLET | Refills: 3 | Status: SHIPPED | OUTPATIENT
Start: 2024-03-04

## 2024-03-04 RX ORDER — ESOMEPRAZOLE MAGNESIUM 40 MG/1
40 CAPSULE, DELAYED RELEASE ORAL
Qty: 30 CAPSULE | Refills: 3 | Status: SHIPPED | OUTPATIENT
Start: 2024-03-04

## 2024-03-04 RX ORDER — ATORVASTATIN CALCIUM 20 MG/1
20 TABLET, FILM COATED ORAL NIGHTLY
Qty: 30 TABLET | Refills: 3 | Status: SHIPPED | OUTPATIENT
Start: 2024-03-04

## 2024-03-10 ENCOUNTER — HOSPITAL ENCOUNTER (EMERGENCY)
Facility: HOSPITAL | Age: 79
Discharge: HOME | End: 2024-03-11
Attending: EMERGENCY MEDICINE
Payer: MEDICARE

## 2024-03-10 ENCOUNTER — APPOINTMENT (OUTPATIENT)
Dept: GENERAL RADIOLOGY | Facility: HOSPITAL | Age: 79
End: 2024-03-10
Attending: EMERGENCY MEDICINE
Payer: MEDICARE

## 2024-03-10 DIAGNOSIS — D64.9 SEVERE ANEMIA: Primary | ICD-10-CM

## 2024-03-10 LAB
ALBUMIN SERPL-MCNC: 3.3 G/DL (ref 3.4–4.8)
ALBUMIN/GLOB SERPL: 1.3 {RATIO} (ref 0.8–2)
ALP SERPL-CCNC: 89 U/L (ref 25–100)
ALT SERPL-CCNC: 14 U/L (ref 4–36)
ANION GAP SERPL CALCULATED.3IONS-SCNC: 15 MMOL/L (ref 3–16)
AST SERPL-CCNC: 9 U/L (ref 8–33)
BASOPHILS # BLD: 0 K/UL (ref 0–0.1)
BASOPHILS NFR BLD: 0.2 %
BILIRUB SERPL-MCNC: 0.3 MG/DL (ref 0.3–1.2)
BUN SERPL-MCNC: 42 MG/DL (ref 6–20)
CALCIUM SERPL-MCNC: 8.4 MG/DL (ref 8.5–10.5)
CHLORIDE SERPL-SCNC: 97 MMOL/L (ref 98–107)
CO2 SERPL-SCNC: 20 MMOL/L (ref 20–30)
CREAT SERPL-MCNC: 1.3 MG/DL (ref 0.4–1.2)
EOSINOPHIL # BLD: 0.1 K/UL (ref 0–0.4)
EOSINOPHIL NFR BLD: 0.5 %
ERYTHROCYTE [DISTWIDTH] IN BLOOD BY AUTOMATED COUNT: 17.4 % (ref 11–16)
FLUAV AG NPH QL: NEGATIVE
FLUBV AG NPH QL: NEGATIVE
GFR SERPLBLD CREATININE-BSD FMLA CKD-EPI: 42 ML/MIN/{1.73_M2}
GLOBULIN SER CALC-MCNC: 2.5 G/DL
GLUCOSE SERPL-MCNC: 384 MG/DL (ref 74–106)
HCT VFR BLD AUTO: 24.6 % (ref 37–47)
HGB BLD-MCNC: 7.4 G/DL (ref 11.5–16.5)
IMM GRANULOCYTES # BLD: 0.7 K/UL
IMM GRANULOCYTES NFR BLD: 3.8 % (ref 0–5)
LYMPHOCYTES # BLD: 3.4 K/UL (ref 1.5–4)
LYMPHOCYTES NFR BLD: 18.8 %
MAGNESIUM SERPL-MCNC: 1.7 MG/DL (ref 1.7–2.4)
MCH RBC QN AUTO: 25.5 PG (ref 27–32)
MCHC RBC AUTO-ENTMCNC: 30.1 G/DL (ref 31–35)
MCV RBC AUTO: 84.8 FL (ref 80–100)
MONOCYTES # BLD: 1.1 K/UL (ref 0.2–0.8)
MONOCYTES NFR BLD: 6 %
NEUTROPHILS # BLD: 12.6 K/UL (ref 2–7.5)
NEUTS SEG NFR BLD: 70.7 %
NT-PROBNP SERPL-MCNC: 1731 PG/ML (ref 0–1800)
PLATELET # BLD AUTO: 260 K/UL (ref 150–400)
PMV BLD AUTO: 11.6 FL (ref 6–10)
POTASSIUM SERPL-SCNC: 4.5 MMOL/L (ref 3.4–5.1)
PROT SERPL-MCNC: 5.8 G/DL (ref 6.4–8.3)
RBC # BLD AUTO: 2.9 M/UL (ref 3.8–5.8)
SARS-COV-2 RDRP RESP QL NAA+PROBE: NOT DETECTED
SODIUM SERPL-SCNC: 132 MMOL/L (ref 136–145)
TROPONIN, HIGH SENSITIVITY: 28 NG/L (ref 0–14)
TROPONIN, HIGH SENSITIVITY: 30 NG/L (ref 0–14)
WBC # BLD AUTO: 17.9 K/UL (ref 4–11)

## 2024-03-10 PROCEDURE — 99285 EMERGENCY DEPT VISIT HI MDM: CPT

## 2024-03-10 PROCEDURE — 80053 COMPREHEN METABOLIC PANEL: CPT

## 2024-03-10 PROCEDURE — 36415 COLL VENOUS BLD VENIPUNCTURE: CPT

## 2024-03-10 PROCEDURE — 71045 X-RAY EXAM CHEST 1 VIEW: CPT

## 2024-03-10 PROCEDURE — 83880 ASSAY OF NATRIURETIC PEPTIDE: CPT

## 2024-03-10 PROCEDURE — 84484 ASSAY OF TROPONIN QUANT: CPT

## 2024-03-10 PROCEDURE — 87804 INFLUENZA ASSAY W/OPTIC: CPT

## 2024-03-10 PROCEDURE — 87635 SARS-COV-2 COVID-19 AMP PRB: CPT

## 2024-03-10 PROCEDURE — 83735 ASSAY OF MAGNESIUM: CPT

## 2024-03-10 PROCEDURE — 85025 COMPLETE CBC W/AUTO DIFF WBC: CPT

## 2024-03-10 RX ORDER — 0.9 % SODIUM CHLORIDE 0.9 %
1000 INTRAVENOUS SOLUTION INTRAVENOUS ONCE
Status: DISCONTINUED | OUTPATIENT
Start: 2024-03-10 | End: 2024-03-11

## 2024-03-10 ASSESSMENT — PAIN DESCRIPTION - LOCATION: LOCATION: CHEST

## 2024-03-10 ASSESSMENT — PAIN DESCRIPTION - DESCRIPTORS: DESCRIPTORS: PRESSURE

## 2024-03-11 VITALS
DIASTOLIC BLOOD PRESSURE: 73 MMHG | OXYGEN SATURATION: 99 % | WEIGHT: 181 LBS | HEART RATE: 89 BPM | RESPIRATION RATE: 16 BRPM | TEMPERATURE: 97.9 F | BODY MASS INDEX: 35.53 KG/M2 | SYSTOLIC BLOOD PRESSURE: 103 MMHG | HEIGHT: 60 IN

## 2024-03-11 NOTE — ED TRIAGE NOTES
Patient presents today with shortness of breath since this AM. She reports that it has worsened throughout the day. She reports that while she is sitting she does not feel bad, but has increased shortness of breath and weakness with ambulation. She denies chest pain. She was diagnosed with CHF in October 2023. Dr. Crain is her cardiologist. She has had some nausea today.

## 2024-03-11 NOTE — ED NOTES
IV removed without complication. Discharge instructions discussed with patient, verbalizes understanding. Patient alert and oriented x 4, no further questions or concerns. Patient discharged home via POV. Family present to transport home. RN transported to POV via wheelchair.

## 2024-03-11 NOTE — ED PROVIDER NOTES
diabetes mellitus without mention of complication, not stated as uncontrolled, Urinary incontinence, and Vitamin B12 deficiency.     CC/HPI Summary, DDx, ED Course, and Reassessment: 0023    Patient is resting comfortably O2 sats have been 9899% her troponin was slightly elevated 30s repeat 28 BMP is negative chest x-ray is clear she does have an elevated BUN/creatinine of 42/1.3 with a glucose of 384 however most significance her H&H is dropped to 7.4 24.6 over the last couple weeks she states that she has had to have a transfusion of 4 units a few months ago because of severe anemia at this time as I discussed with her I think this was causing her shortness of breath on exertion we will discharge her to home and have her follow-up with Dr. Anders with her already scheduled appointment in 3 days.    CONSULTS: (Who and What was discussed)  None            Chronic Conditions: Osteoarthritis        Disposition Considerations (include 1 Tests not done, Shared Decision Making, Pt Expectation of Test or Tx.):         I am the Primary Clinician of Record.    FINAL IMPRESSION      1. Severe anemia          DISPOSITION/PLAN     DISPOSITION Decision To Discharge 03/11/2024 12:25:00 AM  Improved discharge to home    PATIENT REFERRED TO:  Alyx Perez MD  [address] [place] [address]  [phone]      Keep scheduled appointment      DISCHARGE MEDICATIONS:  New Prescriptions    No medications on file       DISCONTINUED MEDICATIONS:  Discontinued Medications    No medications on file              (Please note that portions of this note were completed with a voice recognition program.  Efforts were made to edit the dictations but occasionally words are mis-transcribed.)    Amy Hidalgo MD (electronically signed)           Amy Hidalgo MD  03/11/24 0026

## 2024-03-12 RX ORDER — EMPAGLIFLOZIN 10 MG/1
10 TABLET, FILM COATED ORAL DAILY
Qty: 30 TABLET | Refills: 3 | Status: ON HOLD | OUTPATIENT
Start: 2024-03-12

## 2024-03-13 ENCOUNTER — HOSPITAL ENCOUNTER (INPATIENT)
Facility: HOSPITAL | Age: 79
LOS: 5 days | Discharge: HOME | DRG: 812 | End: 2024-03-18
Attending: INTERNAL MEDICINE | Admitting: INTERNAL MEDICINE
Payer: MEDICAID

## 2024-03-13 ENCOUNTER — OFFICE VISIT (OUTPATIENT)
Dept: PRIMARY CARE CLINIC | Age: 79
End: 2024-03-13

## 2024-03-13 ENCOUNTER — APPOINTMENT (OUTPATIENT)
Dept: GENERAL RADIOLOGY | Facility: HOSPITAL | Age: 79
DRG: 812 | End: 2024-03-13
Attending: INTERNAL MEDICINE
Payer: MEDICAID

## 2024-03-13 VITALS
RESPIRATION RATE: 18 BRPM | WEIGHT: 182 LBS | BODY MASS INDEX: 35.54 KG/M2 | OXYGEN SATURATION: 100 % | HEART RATE: 91 BPM | SYSTOLIC BLOOD PRESSURE: 112 MMHG | DIASTOLIC BLOOD PRESSURE: 60 MMHG

## 2024-03-13 DIAGNOSIS — I50.22 CHRONIC SYSTOLIC CONGESTIVE HEART FAILURE (HCC): ICD-10-CM

## 2024-03-13 DIAGNOSIS — N30.00 ACUTE CYSTITIS WITHOUT HEMATURIA: Primary | ICD-10-CM

## 2024-03-13 DIAGNOSIS — D72.829 LEUKOCYTOSIS, UNSPECIFIED TYPE: ICD-10-CM

## 2024-03-13 DIAGNOSIS — I48.0 PAROXYSMAL ATRIAL FIBRILLATION (HCC): ICD-10-CM

## 2024-03-13 DIAGNOSIS — L84 CALLUS OF FOOT: ICD-10-CM

## 2024-03-13 DIAGNOSIS — E11.69 TYPE 2 DIABETES MELLITUS WITH OTHER SPECIFIED COMPLICATION, WITHOUT LONG-TERM CURRENT USE OF INSULIN (HCC): ICD-10-CM

## 2024-03-13 DIAGNOSIS — K31.89 GASTRIC NODULE: ICD-10-CM

## 2024-03-13 DIAGNOSIS — D50.9 IRON DEFICIENCY ANEMIA, UNSPECIFIED IRON DEFICIENCY ANEMIA TYPE: Primary | ICD-10-CM

## 2024-03-13 DIAGNOSIS — R63.4 WEIGHT LOSS: ICD-10-CM

## 2024-03-13 PROBLEM — D64.9 SYMPTOMATIC ANEMIA: Status: ACTIVE | Noted: 2024-03-13

## 2024-03-13 LAB
ABO + RH BLD: NORMAL
ALBUMIN SERPL-MCNC: 3.1 G/DL (ref 3.4–4.8)
ALBUMIN/GLOB SERPL: 1.3 {RATIO} (ref 0.8–2)
ALP SERPL-CCNC: 82 U/L (ref 25–100)
ALT SERPL-CCNC: 11 U/L (ref 4–36)
ANION GAP SERPL CALCULATED.3IONS-SCNC: 9 MMOL/L (ref 3–16)
AST SERPL-CCNC: 9 U/L (ref 8–33)
BACTERIA URNS QL MICRO: ABNORMAL /HPF
BASOPHILS # BLD: 0 K/UL (ref 0–0.1)
BASOPHILS NFR BLD: 0.2 %
BILIRUB SERPL-MCNC: <0.2 MG/DL (ref 0.3–1.2)
BILIRUB UR QL STRIP.AUTO: NEGATIVE
BLD GP AB SCN SERPL QL: NORMAL
BUN SERPL-MCNC: 37 MG/DL (ref 6–20)
CALCIUM SERPL-MCNC: 8.5 MG/DL (ref 8.5–10.5)
CHLORIDE SERPL-SCNC: 100 MMOL/L (ref 98–107)
CLARITY UR: CLEAR
CO2 SERPL-SCNC: 24 MMOL/L (ref 20–30)
COLOR UR: YELLOW
CREAT SERPL-MCNC: 1.2 MG/DL (ref 0.4–1.2)
CRYSTALS URNS MICRO: ABNORMAL /HPF
EOSINOPHIL # BLD: 0.1 K/UL (ref 0–0.4)
EOSINOPHIL NFR BLD: 0.7 %
EPI CELLS #/AREA URNS HPF: ABNORMAL /HPF (ref 0–5)
ERYTHROCYTE [DISTWIDTH] IN BLOOD BY AUTOMATED COUNT: 17.4 % (ref 11–16)
FERRITIN SERPL IA-MCNC: 16.9 NG/ML (ref 22–322)
FOLATE SERPL-MCNC: 9.13 NG/ML
GFR SERPLBLD CREATININE-BSD FMLA CKD-EPI: 47 ML/MIN/{1.73_M2}
GLOBULIN SER CALC-MCNC: 2.3 G/DL
GLUCOSE BLD-MCNC: 155 MG/DL (ref 74–106)
GLUCOSE BLD-MCNC: 214 MG/DL (ref 74–106)
GLUCOSE BLD-MCNC: 303 MG/DL (ref 74–106)
GLUCOSE SERPL-MCNC: 270 MG/DL (ref 74–106)
GLUCOSE UR STRIP.AUTO-MCNC: 500 MG/DL
HCT VFR BLD AUTO: 21.4 % (ref 37–47)
HCT VFR BLD AUTO: 24.8 % (ref 37–47)
HEMOCCULT SP1 STL QL: NORMAL
HGB BLD-MCNC: 6.3 G/DL (ref 11.5–16.5)
HGB BLD-MCNC: 7.5 G/DL (ref 11.5–16.5)
HGB UR QL STRIP.AUTO: ABNORMAL
IMM GRANULOCYTES # BLD: 0.4 K/UL
IMM GRANULOCYTES NFR BLD: 2.3 % (ref 0–5)
IRON SATN MFR SERPL: 5 % (ref 15–50)
IRON SERPL-MCNC: 16 UG/DL (ref 37–145)
KETONES UR STRIP.AUTO-MCNC: NEGATIVE MG/DL
LACTATE BLDV-SCNC: 1.8 MMOL/L (ref 0.4–1.9)
LACTATE BLDV-SCNC: 2.5 MMOL/L (ref 0.4–1.9)
LEUKOCYTE ESTERASE UR QL STRIP.AUTO: ABNORMAL
LYMPHOCYTES # BLD: 2.5 K/UL (ref 1.5–4)
LYMPHOCYTES NFR BLD: 13.3 %
MCH RBC QN AUTO: 25.2 PG (ref 27–32)
MCHC RBC AUTO-ENTMCNC: 29.4 G/DL (ref 31–35)
MCV RBC AUTO: 85.6 FL (ref 80–100)
MONOCYTES # BLD: 1 K/UL (ref 0.2–0.8)
MONOCYTES NFR BLD: 5.4 %
NEUTROPHILS # BLD: 14.4 K/UL (ref 2–7.5)
NEUTS SEG NFR BLD: 78.1 %
NITRITE UR QL STRIP.AUTO: NEGATIVE
PERFORMED ON: ABNORMAL
PH UR STRIP.AUTO: 5 [PH] (ref 5–8)
PLATELET # BLD AUTO: 249 K/UL (ref 150–400)
PMV BLD AUTO: 10.8 FL (ref 6–10)
POTASSIUM SERPL-SCNC: 4.3 MMOL/L (ref 3.4–5.1)
PROCALCITONIN SERPL IA-MCNC: 0.12 NG/ML (ref 0–0.15)
PROT SERPL-MCNC: 5.4 G/DL (ref 6.4–8.3)
PROT UR STRIP.AUTO-MCNC: NEGATIVE MG/DL
RBC # BLD AUTO: 2.5 M/UL (ref 3.8–5.8)
RBC #/AREA URNS HPF: ABNORMAL /HPF (ref 0–4)
SARS-COV-2 RDRP RESP QL NAA+PROBE: NOT DETECTED
SODIUM SERPL-SCNC: 133 MMOL/L (ref 136–145)
SP GR UR STRIP.AUTO: 1.02 (ref 1–1.03)
TIBC SERPL-MCNC: 331 UG/DL (ref 250–450)
UA COMPLETE W REFLEX CULTURE PNL UR: YES
UA DIPSTICK W REFLEX MICRO PNL UR: YES
URN SPEC COLLECT METH UR: ABNORMAL
UROBILINOGEN UR STRIP-ACNC: 0.2 E.U./DL
VIT B12 SERPL-MCNC: 1897 PG/ML (ref 211–911)
WBC # BLD AUTO: 18.5 K/UL (ref 4–11)
WBC #/AREA URNS HPF: ABNORMAL /HPF (ref 0–5)

## 2024-03-13 PROCEDURE — 71045 X-RAY EXAM CHEST 1 VIEW: CPT

## 2024-03-13 PROCEDURE — 87088 URINE BACTERIA CULTURE: CPT

## 2024-03-13 PROCEDURE — 2580000003 HC RX 258: Performed by: PHYSICIAN ASSISTANT

## 2024-03-13 PROCEDURE — 83550 IRON BINDING TEST: CPT

## 2024-03-13 PROCEDURE — 84145 PROCALCITONIN (PCT): CPT

## 2024-03-13 PROCEDURE — 99223 1ST HOSP IP/OBS HIGH 75: CPT | Performed by: INTERNAL MEDICINE

## 2024-03-13 PROCEDURE — 86850 RBC ANTIBODY SCREEN: CPT

## 2024-03-13 PROCEDURE — A4216 STERILE WATER/SALINE, 10 ML: HCPCS | Performed by: PHYSICIAN ASSISTANT

## 2024-03-13 PROCEDURE — 83540 ASSAY OF IRON: CPT

## 2024-03-13 PROCEDURE — 82728 ASSAY OF FERRITIN: CPT

## 2024-03-13 PROCEDURE — 6360000002 HC RX W HCPCS: Performed by: PHYSICIAN ASSISTANT

## 2024-03-13 PROCEDURE — 86920 COMPATIBILITY TEST SPIN: CPT

## 2024-03-13 PROCEDURE — 82746 ASSAY OF FOLIC ACID SERUM: CPT

## 2024-03-13 PROCEDURE — 86901 BLOOD TYPING SEROLOGIC RH(D): CPT

## 2024-03-13 PROCEDURE — 85025 COMPLETE CBC W/AUTO DIFF WBC: CPT

## 2024-03-13 PROCEDURE — 36430 TRANSFUSION BLD/BLD COMPNT: CPT

## 2024-03-13 PROCEDURE — 6370000000 HC RX 637 (ALT 250 FOR IP): Performed by: PHYSICIAN ASSISTANT

## 2024-03-13 PROCEDURE — 83605 ASSAY OF LACTIC ACID: CPT

## 2024-03-13 PROCEDURE — 86900 BLOOD TYPING SEROLOGIC ABO: CPT

## 2024-03-13 PROCEDURE — 85014 HEMATOCRIT: CPT

## 2024-03-13 PROCEDURE — NBSRV NON-BILLABLE SERVICE: Performed by: INTERNAL MEDICINE

## 2024-03-13 PROCEDURE — P9016 RBC LEUKOCYTES REDUCED: HCPCS

## 2024-03-13 PROCEDURE — 82607 VITAMIN B-12: CPT

## 2024-03-13 PROCEDURE — 36415 COLL VENOUS BLD VENIPUNCTURE: CPT

## 2024-03-13 PROCEDURE — 30233N1 TRANSFUSION OF NONAUTOLOGOUS RED BLOOD CELLS INTO PERIPHERAL VEIN, PERCUTANEOUS APPROACH: ICD-10-PCS | Performed by: INTERNAL MEDICINE

## 2024-03-13 PROCEDURE — 1200000000 HC SEMI PRIVATE

## 2024-03-13 PROCEDURE — 80053 COMPREHEN METABOLIC PANEL: CPT

## 2024-03-13 PROCEDURE — 87086 URINE CULTURE/COLONY COUNT: CPT

## 2024-03-13 PROCEDURE — 85018 HEMOGLOBIN: CPT

## 2024-03-13 PROCEDURE — 81001 URINALYSIS AUTO W/SCOPE: CPT

## 2024-03-13 PROCEDURE — 87635 SARS-COV-2 COVID-19 AMP PRB: CPT

## 2024-03-13 PROCEDURE — 87186 SC STD MICRODIL/AGAR DIL: CPT

## 2024-03-13 PROCEDURE — C9113 INJ PANTOPRAZOLE SODIUM, VIA: HCPCS | Performed by: PHYSICIAN ASSISTANT

## 2024-03-13 PROCEDURE — 82270 OCCULT BLOOD FECES: CPT

## 2024-03-13 RX ORDER — MONTELUKAST SODIUM 10 MG/1
10 TABLET ORAL NIGHTLY
Qty: 90 TABLET | Refills: 2 | Status: CANCELLED | OUTPATIENT
Start: 2024-03-13

## 2024-03-13 RX ORDER — PSEUDOEPHEDRINE HCL 30 MG
100 TABLET ORAL DAILY
Qty: 90 CAPSULE | Refills: 2 | Status: CANCELLED | OUTPATIENT
Start: 2024-03-13

## 2024-03-13 RX ORDER — ONDANSETRON 4 MG/1
4 TABLET, ORALLY DISINTEGRATING ORAL EVERY 8 HOURS PRN
Status: DISCONTINUED | OUTPATIENT
Start: 2024-03-13 | End: 2024-03-18 | Stop reason: HOSPADM

## 2024-03-13 RX ORDER — ATORVASTATIN CALCIUM 40 MG/1
40 TABLET, FILM COATED ORAL DAILY
Status: ON HOLD | COMMUNITY

## 2024-03-13 RX ORDER — CETIRIZINE HYDROCHLORIDE 5 MG/1
5 TABLET ORAL DAILY
Status: DISCONTINUED | OUTPATIENT
Start: 2024-03-14 | End: 2024-03-13

## 2024-03-13 RX ORDER — LEVOTHYROXINE SODIUM 0.07 MG/1
150 TABLET ORAL
Status: DISCONTINUED | OUTPATIENT
Start: 2024-03-13 | End: 2024-03-18 | Stop reason: HOSPADM

## 2024-03-13 RX ORDER — FUROSEMIDE 20 MG/1
20 TABLET ORAL DAILY
Qty: 120 TABLET | Refills: 3 | Status: CANCELLED | OUTPATIENT
Start: 2024-03-13

## 2024-03-13 RX ORDER — LANOLIN ALCOHOL/MO/W.PET/CERES
400 CREAM (GRAM) TOPICAL DAILY
Status: DISCONTINUED | OUTPATIENT
Start: 2024-03-14 | End: 2024-03-13

## 2024-03-13 RX ORDER — CITALOPRAM HYDROBROMIDE 10 MG/1
10 TABLET ORAL DAILY
Qty: 90 TABLET | Refills: 2 | Status: CANCELLED | OUTPATIENT
Start: 2024-03-13

## 2024-03-13 RX ORDER — LEVOCETIRIZINE DIHYDROCHLORIDE 5 MG/1
5 TABLET, FILM COATED ORAL NIGHTLY
Qty: 90 TABLET | Refills: 2 | Status: CANCELLED | OUTPATIENT
Start: 2024-03-13

## 2024-03-13 RX ORDER — CITALOPRAM 20 MG/1
10 TABLET ORAL DAILY
Status: DISCONTINUED | OUTPATIENT
Start: 2024-03-13 | End: 2024-03-18 | Stop reason: HOSPADM

## 2024-03-13 RX ORDER — VALSARTAN 40 MG/1
40 TABLET ORAL DAILY
Status: ON HOLD | COMMUNITY
Start: 2024-02-19

## 2024-03-13 RX ORDER — METOPROLOL SUCCINATE 100 MG/1
100 TABLET, EXTENDED RELEASE ORAL DAILY
Status: DISCONTINUED | OUTPATIENT
Start: 2024-03-13 | End: 2024-03-14

## 2024-03-13 RX ORDER — SPIRONOLACTONE 25 MG/1
25 TABLET ORAL DAILY
Status: DISCONTINUED | OUTPATIENT
Start: 2024-03-13 | End: 2024-03-18 | Stop reason: HOSPADM

## 2024-03-13 RX ORDER — SPIRONOLACTONE 25 MG/1
25 TABLET ORAL DAILY
Status: DISCONTINUED | OUTPATIENT
Start: 2024-03-14 | End: 2024-03-13

## 2024-03-13 RX ORDER — LEVOTHYROXINE SODIUM 0.07 MG/1
150 TABLET ORAL
Status: DISCONTINUED | OUTPATIENT
Start: 2024-03-14 | End: 2024-03-13

## 2024-03-13 RX ORDER — ATORVASTATIN CALCIUM 40 MG/1
40 TABLET, FILM COATED ORAL DAILY
Qty: 90 TABLET | Refills: 3 | Status: CANCELLED | OUTPATIENT
Start: 2024-03-13

## 2024-03-13 RX ORDER — ESOMEPRAZOLE MAGNESIUM 40 MG/1
40 CAPSULE, DELAYED RELEASE ORAL
Qty: 90 CAPSULE | Refills: 2 | Status: CANCELLED | OUTPATIENT
Start: 2024-03-13

## 2024-03-13 RX ORDER — SODIUM CHLORIDE 9 MG/ML
INJECTION, SOLUTION INTRAVENOUS PRN
Status: DISCONTINUED | OUTPATIENT
Start: 2024-03-13 | End: 2024-03-14

## 2024-03-13 RX ORDER — DEXTROSE MONOHYDRATE 100 MG/ML
INJECTION, SOLUTION INTRAVENOUS CONTINUOUS PRN
Status: DISCONTINUED | OUTPATIENT
Start: 2024-03-13 | End: 2024-03-18 | Stop reason: HOSPADM

## 2024-03-13 RX ORDER — IBUPROFEN 600 MG/1
1 TABLET ORAL PRN
Status: DISCONTINUED | OUTPATIENT
Start: 2024-03-13 | End: 2024-03-18 | Stop reason: HOSPADM

## 2024-03-13 RX ORDER — MONTELUKAST SODIUM 10 MG/1
10 TABLET ORAL NIGHTLY
Status: DISCONTINUED | OUTPATIENT
Start: 2024-03-13 | End: 2024-03-18 | Stop reason: HOSPADM

## 2024-03-13 RX ORDER — DIPHENHYDRAMINE HCL 25 MG
25 TABLET ORAL ONCE
Status: COMPLETED | OUTPATIENT
Start: 2024-03-13 | End: 2024-03-13

## 2024-03-13 RX ORDER — ACETAMINOPHEN 650 MG/1
650 SUPPOSITORY RECTAL EVERY 6 HOURS PRN
Status: DISCONTINUED | OUTPATIENT
Start: 2024-03-13 | End: 2024-03-18 | Stop reason: HOSPADM

## 2024-03-13 RX ORDER — BISACODYL 5 MG/1
10 TABLET, DELAYED RELEASE ORAL EVERY 4 HOURS
Status: COMPLETED | OUTPATIENT
Start: 2024-03-13 | End: 2024-03-13

## 2024-03-13 RX ORDER — SEMAGLUTIDE 0.68 MG/ML
0.25 INJECTION, SOLUTION SUBCUTANEOUS WEEKLY
Qty: 9 ML | Refills: 2 | Status: CANCELLED | OUTPATIENT
Start: 2024-03-13

## 2024-03-13 RX ORDER — ERGOCALCIFEROL 1.25 MG/1
50000 CAPSULE ORAL WEEKLY
Qty: 12 CAPSULE | Refills: 2 | Status: CANCELLED | OUTPATIENT
Start: 2024-03-13

## 2024-03-13 RX ORDER — LEVOTHYROXINE SODIUM 0.15 MG/1
150 TABLET ORAL DAILY
Qty: 90 TABLET | Refills: 2 | Status: CANCELLED | OUTPATIENT
Start: 2024-03-13

## 2024-03-13 RX ORDER — ATORVASTATIN CALCIUM 40 MG/1
40 TABLET, FILM COATED ORAL NIGHTLY
Status: DISCONTINUED | OUTPATIENT
Start: 2024-03-13 | End: 2024-03-18 | Stop reason: HOSPADM

## 2024-03-13 RX ORDER — DOCUSATE SODIUM 100 MG/1
100 CAPSULE, LIQUID FILLED ORAL DAILY
Status: DISCONTINUED | OUTPATIENT
Start: 2024-03-16 | End: 2024-03-17

## 2024-03-13 RX ORDER — INSULIN LISPRO 100 [IU]/ML
0-4 INJECTION, SOLUTION INTRAVENOUS; SUBCUTANEOUS NIGHTLY
Status: DISCONTINUED | OUTPATIENT
Start: 2024-03-13 | End: 2024-03-18 | Stop reason: HOSPADM

## 2024-03-13 RX ORDER — 0.9 % SODIUM CHLORIDE 0.9 %
500 INTRAVENOUS SOLUTION INTRAVENOUS ONCE
Status: COMPLETED | OUTPATIENT
Start: 2024-03-13 | End: 2024-03-13

## 2024-03-13 RX ORDER — LANOLIN ALCOHOL/MO/W.PET/CERES
400 CREAM (GRAM) TOPICAL DAILY
Qty: 90 TABLET | Refills: 2 | Status: CANCELLED | OUTPATIENT
Start: 2024-03-13

## 2024-03-13 RX ORDER — INSULIN LISPRO 100 [IU]/ML
0-4 INJECTION, SOLUTION INTRAVENOUS; SUBCUTANEOUS
Status: DISCONTINUED | OUTPATIENT
Start: 2024-03-13 | End: 2024-03-18 | Stop reason: HOSPADM

## 2024-03-13 RX ORDER — OXYCODONE HYDROCHLORIDE 15 MG/1
15 TABLET ORAL EVERY 6 HOURS
Status: DISCONTINUED | OUTPATIENT
Start: 2024-03-13 | End: 2024-03-18 | Stop reason: HOSPADM

## 2024-03-13 RX ORDER — CITALOPRAM 20 MG/1
10 TABLET ORAL DAILY
Status: DISCONTINUED | OUTPATIENT
Start: 2024-03-14 | End: 2024-03-13

## 2024-03-13 RX ORDER — GLIPIZIDE 10 MG/1
10 TABLET, FILM COATED, EXTENDED RELEASE ORAL DAILY
Qty: 90 TABLET | Refills: 2 | Status: CANCELLED | OUTPATIENT
Start: 2024-03-13

## 2024-03-13 RX ORDER — POLYETHYLENE GLYCOL 3350 17 G/17G
17 POWDER, FOR SOLUTION ORAL DAILY PRN
Status: DISCONTINUED | OUTPATIENT
Start: 2024-03-13 | End: 2024-03-18 | Stop reason: HOSPADM

## 2024-03-13 RX ORDER — FERROUS SULFATE 324(65)MG
324 TABLET, DELAYED RELEASE (ENTERIC COATED) ORAL 2 TIMES DAILY WITH MEALS
Status: DISCONTINUED | OUTPATIENT
Start: 2024-03-15 | End: 2024-03-18 | Stop reason: HOSPADM

## 2024-03-13 RX ORDER — VALSARTAN 40 MG/1
40 TABLET ORAL DAILY
Qty: 90 TABLET | Refills: 2 | Status: CANCELLED | OUTPATIENT
Start: 2024-03-13

## 2024-03-13 RX ORDER — SPIRONOLACTONE 25 MG/1
25 TABLET ORAL DAILY
Qty: 90 TABLET | Refills: 2 | Status: CANCELLED | OUTPATIENT
Start: 2024-03-13

## 2024-03-13 RX ORDER — POLYETHYLENE GLYCOL 3350 17 G/17G
238 POWDER, FOR SOLUTION ORAL ONCE
Status: COMPLETED | OUTPATIENT
Start: 2024-03-13 | End: 2024-03-13

## 2024-03-13 RX ORDER — ACETAMINOPHEN 325 MG/1
650 TABLET ORAL EVERY 6 HOURS PRN
Status: DISCONTINUED | OUTPATIENT
Start: 2024-03-13 | End: 2024-03-18 | Stop reason: HOSPADM

## 2024-03-13 RX ORDER — ATORVASTATIN CALCIUM 40 MG/1
40 TABLET, FILM COATED ORAL NIGHTLY
Status: DISCONTINUED | OUTPATIENT
Start: 2024-03-14 | End: 2024-03-13

## 2024-03-13 RX ORDER — LANOLIN ALCOHOL/MO/W.PET/CERES
400 CREAM (GRAM) TOPICAL DAILY
Status: DISCONTINUED | OUTPATIENT
Start: 2024-03-13 | End: 2024-03-18 | Stop reason: HOSPADM

## 2024-03-13 RX ORDER — ONDANSETRON 2 MG/ML
4 INJECTION INTRAMUSCULAR; INTRAVENOUS EVERY 6 HOURS PRN
Status: DISCONTINUED | OUTPATIENT
Start: 2024-03-13 | End: 2024-03-18 | Stop reason: HOSPADM

## 2024-03-13 RX ORDER — METOPROLOL SUCCINATE 100 MG/1
100 TABLET, EXTENDED RELEASE ORAL DAILY
Qty: 90 TABLET | Refills: 2 | Status: CANCELLED | OUTPATIENT
Start: 2024-03-13

## 2024-03-13 RX ORDER — CETIRIZINE HYDROCHLORIDE 5 MG/1
5 TABLET ORAL DAILY
Status: DISCONTINUED | OUTPATIENT
Start: 2024-03-13 | End: 2024-03-18 | Stop reason: HOSPADM

## 2024-03-13 RX ADMIN — SODIUM CHLORIDE 40 MG: 9 INJECTION INTRAMUSCULAR; INTRAVENOUS; SUBCUTANEOUS at 21:51

## 2024-03-13 RX ADMIN — DIPHENHYDRAMINE HYDROCHLORIDE 25 MG: 25 TABLET ORAL at 15:41

## 2024-03-13 RX ADMIN — CITALOPRAM HYDROBROMIDE 10 MG: 20 TABLET ORAL at 17:45

## 2024-03-13 RX ADMIN — SODIUM CHLORIDE 40 MG: 9 INJECTION INTRAMUSCULAR; INTRAVENOUS; SUBCUTANEOUS at 13:03

## 2024-03-13 RX ADMIN — IRON SUCROSE 200 MG: 20 INJECTION, SOLUTION INTRAVENOUS at 21:48

## 2024-03-13 RX ADMIN — ACETAMINOPHEN 650 MG: 325 TABLET, FILM COATED ORAL at 15:41

## 2024-03-13 RX ADMIN — INSULIN LISPRO 3 UNITS: 100 INJECTION, SOLUTION INTRAVENOUS; SUBCUTANEOUS at 14:22

## 2024-03-13 RX ADMIN — ATORVASTATIN CALCIUM 40 MG: 40 TABLET, FILM COATED ORAL at 21:51

## 2024-03-13 RX ADMIN — INSULIN LISPRO 2 UNITS: 100 INJECTION, SOLUTION INTRAVENOUS; SUBCUTANEOUS at 17:53

## 2024-03-13 RX ADMIN — Medication 400 MG: at 17:45

## 2024-03-13 RX ADMIN — MONTELUKAST 10 MG: 10 TABLET, FILM COATED ORAL at 21:51

## 2024-03-13 RX ADMIN — LEVOTHYROXINE SODIUM 150 MCG: 75 TABLET ORAL at 17:45

## 2024-03-13 RX ADMIN — OXYCODONE HYDROCHLORIDE 15 MG: 15 TABLET ORAL at 18:00

## 2024-03-13 RX ADMIN — BISACODYL 10 MG: 5 TABLET, COATED ORAL at 15:26

## 2024-03-13 RX ADMIN — POLYETHYLENE GLYCOL 3350 238 G: 17 POWDER, FOR SOLUTION ORAL at 18:14

## 2024-03-13 RX ADMIN — CETIRIZINE HYDROCHLORIDE 5 MG: 5 TABLET ORAL at 19:28

## 2024-03-13 RX ADMIN — OXYCODONE HYDROCHLORIDE 15 MG: 15 TABLET ORAL at 21:51

## 2024-03-13 RX ADMIN — BISACODYL 10 MG: 5 TABLET, COATED ORAL at 21:51

## 2024-03-13 RX ADMIN — SODIUM CHLORIDE 500 ML: 9 INJECTION, SOLUTION INTRAVENOUS at 17:25

## 2024-03-13 RX ADMIN — CEFTRIAXONE 1000 MG: 1 INJECTION, POWDER, FOR SOLUTION INTRAMUSCULAR; INTRAVENOUS at 23:06

## 2024-03-13 RX ADMIN — EMPAGLIFLOZIN 10 MG: 10 TABLET, FILM COATED ORAL at 17:45

## 2024-03-13 SDOH — ECONOMIC STABILITY: INCOME INSECURITY: IN THE PAST 12 MONTHS, HAS THE ELECTRIC, GAS, OIL, OR WATER COMPANY THREATENED TO SHUT OFF SERVICE IN YOUR HOME?: NO

## 2024-03-13 SDOH — ECONOMIC STABILITY: INCOME INSECURITY: HOW HARD IS IT FOR YOU TO PAY FOR THE VERY BASICS LIKE FOOD, HOUSING, MEDICAL CARE, AND HEATING?: NOT HARD AT ALL

## 2024-03-13 SDOH — ECONOMIC STABILITY: FOOD INSECURITY: WITHIN THE PAST 12 MONTHS, YOU WORRIED THAT YOUR FOOD WOULD RUN OUT BEFORE YOU GOT MONEY TO BUY MORE.: NEVER TRUE

## 2024-03-13 ASSESSMENT — PATIENT HEALTH QUESTIONNAIRE - PHQ9
SUM OF ALL RESPONSES TO PHQ9 QUESTIONS 1 & 2: 0
SUM OF ALL RESPONSES TO PHQ QUESTIONS 1-9: 0
1. LITTLE INTEREST OR PLEASURE IN DOING THINGS: NOT AT ALL
2. FEELING DOWN, DEPRESSED OR HOPELESS: NOT AT ALL
SUM OF ALL RESPONSES TO PHQ QUESTIONS 1-9: 0

## 2024-03-13 ASSESSMENT — ENCOUNTER SYMPTOMS
COUGH: 0
SINUS PRESSURE: 0
NAUSEA: 1
WHEEZING: 0
BACK PAIN: 1
EYE DISCHARGE: 0
ABDOMINAL PAIN: 0
SORE THROAT: 0
SHORTNESS OF BREATH: 0
VOMITING: 0

## 2024-03-13 ASSESSMENT — PAIN DESCRIPTION - LOCATION: LOCATION: BACK

## 2024-03-13 ASSESSMENT — PAIN DESCRIPTION - ORIENTATION: ORIENTATION: LOWER

## 2024-03-13 ASSESSMENT — PAIN SCALES - GENERAL: PAINLEVEL_OUTOF10: 6

## 2024-03-13 NOTE — PROGRESS NOTES
Chief Complaint   Patient presents with    Diabetes    Congestive Heart Failure    Atrial Fibrillation    Nausea     Weakness      Fs  before prednisone shots     Have you seen any other physician or provider since your last visit yes - ED- weakness, nausea, card, steroid shots     Have you had any other diagnostic tests since your last visit? yes -     Have you changed or stopped any medications since your last visit? Valsartan lowered to 40mg , atorvastatin increased to 40mg      
complication, without long-term current use of insulin (HCC)  3. Chronic systolic congestive heart failure (HCC)  4. Paroxysmal atrial fibrillation (HCC)  5. Weight loss  Patient is very pale on exam.  Profound anemia on blood work in the emergency room.  Patient is very weak and on anticoagulation.  I am going to proceed with direct admission to the hospital for further evaluation with potential iron infusion with her recent iron deficiency and possible need for blood transfusion as well.  Will need to hold anticoagulation.  Continue B12 replacement.  May need additional workup for anemia especially with the need to be on anticoagulation.    Discussed with hospitalist team patient will be admitted directly for further evaluation.  Patient was agreeable.    No billing was done for this visit since patient was seen after this at the hospital and charges dropped at the hospital visit.    I, Justina Quiroz LPN am scribing for and in the presence of Alyx Perez MD on this date 3/13/2024 at 11:15 AM.    I, Dr. Alyx Perez, personally performed the services described in the documentation as scribed by Justina Quiroz LPN, in my presence and it is both accurate and complete.

## 2024-03-13 NOTE — H&P
History and Physical    Patient:  Jolanta Montenegro    CHIEF COMPLAINT:    Fatigue, generalized weakness, dizziness, anemia    HISTORY OF PRESENT ILLNESS:   The patient is a 78 y.o. female with PMH of paroxysmal afib on eliquis, HTN, GERD, DM II, chronic back pain, crohn's disease, iron deficiency anemia and others who presents due to fatigue, dizziness, generalized weakness, and anemia. She was directly admitted by her PCP. Patient states for the last couple of weeks she has felt more weak and fatigued than usual. States it takes a lot of effort just to stand and walk across the room. Admits to associated dyspnea on exertion. No chest pain. No shortness of breath at rest. No fever, sweats, chills. Due to profound weakness she presented to this ER on 3/10 and hemoglobin was 7.4. She was discharged home to follow up with her PCP which she did today and then she was ultimately directly admitted. She denies hematochezia. She says sometimes her stool is dark but she thinks she takes iron pills that cause it so she doesn't worry about it much. Also admits to unintentional weight loss of 30 lbs in the last 6 months. She states last colonoscopy was many years ago and she's \"definitely due.\" Multiple people in her family had colon cancer including her brother who  of colon cancer in his 60s. She is amendable to scopes if indicated while she's here. She's also agreeable to blood transfusion.     Past Medical History:      Diagnosis Date    Anxiety     Chronic back pain     Crohn's disease (HCC) 1995    Hiatal hernia     Hypertension     Inflammatory polyarthropathy (HCC)     Obesity     Osteoarthritis     right shoulder    Osteopenia     Reflux esophagitis     Type II or unspecified type diabetes mellitus without mention of complication, not stated as uncontrolled     Urinary incontinence     Vitamin B12 deficiency        Past Surgical History:      Procedure Laterality Date    CARPAL TUNNEL RELEASE

## 2024-03-13 NOTE — CONSENT
Informed Consent for Blood Component Transfusion Note    I have discussed with the patient the rationale for blood component transfusion; its benefits in treating or preventing fatigue, organ damage, or death; and its risk which includes mild transfusion reactions, rare risk of blood borne infection, or more serious but rare reactions. I have discussed the alternatives to transfusion, including the risk and consequences of not receiving transfusion. The patient had an opportunity to ask questions and had agreed to proceed with transfusion of blood components.    Electronically signed by ALICIA Carvalho on 3/13/24 at 1:45 PM EDT

## 2024-03-13 NOTE — CARE COORDINATION
Hold OT evaluation on this date. Pt pending blood transfusion. Will attempt on following date barring any complications/concerns.

## 2024-03-13 NOTE — ACP (ADVANCE CARE PLANNING)
Advance Care Planning     General Advance Care Planning (ACP) Conversation    Date of Conversation: 3/13/2024  Conducted with: Patient with Decision Making Capacity    Healthcare Decision Maker:    Primary Decision Maker: Jo Ann Chakraborty - [place] - [phone]  Click here to complete Healthcare Decision Makers including selection of the Healthcare Decision Maker Relationship (ie \"Primary\").   Today we documented Decision Maker(s) consistent with Legal Next of Kin hierarchy.    Content/Action Overview:  Has NO ACP documents-Information provided  Reviewed DNR/DNI and patient elects Full Code (Attempt Resuscitation)  artificial nutrition, ventilation preferences, and resuscitation preferences  Pt stated they did not want to be on any machine long term. I advised pt to let their NOK know their wishes  and asked if she wanted to remain FULL code at this time and pt was agreeable.     Length of Voluntary ACP Conversation in minutes:  <16 minutes (Non-Billable)    Guido Rice

## 2024-03-14 ENCOUNTER — PREP FOR SURGERY (OUTPATIENT)
Dept: OTHER | Facility: HOSPITAL | Age: 79
End: 2024-03-14
Payer: MEDICARE

## 2024-03-14 ENCOUNTER — APPOINTMENT (OUTPATIENT)
Dept: GENERAL RADIOLOGY | Facility: HOSPITAL | Age: 79
DRG: 812 | End: 2024-03-14
Attending: INTERNAL MEDICINE
Payer: MEDICAID

## 2024-03-14 ENCOUNTER — TELEPHONE (OUTPATIENT)
Dept: SURGERY | Facility: CLINIC | Age: 79
End: 2024-03-14

## 2024-03-14 ENCOUNTER — ANESTHESIA (OUTPATIENT)
Dept: ENDOSCOPY | Facility: HOSPITAL | Age: 79
End: 2024-03-14
Payer: MEDICAID

## 2024-03-14 ENCOUNTER — ANESTHESIA EVENT (OUTPATIENT)
Dept: ENDOSCOPY | Facility: HOSPITAL | Age: 79
End: 2024-03-14
Payer: MEDICAID

## 2024-03-14 DIAGNOSIS — K29.31 CHRONIC SUPERFICIAL GASTRITIS WITH BLEEDING: Primary | ICD-10-CM

## 2024-03-14 DIAGNOSIS — D50.0 IRON DEFICIENCY ANEMIA DUE TO CHRONIC BLOOD LOSS: Primary | ICD-10-CM

## 2024-03-14 PROBLEM — N30.00 ACUTE CYSTITIS WITHOUT HEMATURIA: Status: ACTIVE | Noted: 2024-03-14

## 2024-03-14 PROBLEM — K31.89 GASTRIC NODULE: Status: ACTIVE | Noted: 2024-03-14

## 2024-03-14 LAB
ALBUMIN SERPL-MCNC: 3.1 G/DL (ref 3.4–4.8)
ALBUMIN/GLOB SERPL: 1.1 {RATIO} (ref 0.8–2)
ALP SERPL-CCNC: 89 U/L (ref 25–100)
ALT SERPL-CCNC: 15 U/L (ref 4–36)
ANION GAP SERPL CALCULATED.3IONS-SCNC: 10 MMOL/L (ref 3–16)
AST SERPL-CCNC: 16 U/L (ref 8–33)
BASOPHILS # BLD: 0 K/UL (ref 0–0.1)
BASOPHILS NFR BLD: 0.3 %
BILIRUB SERPL-MCNC: 0.3 MG/DL (ref 0.3–1.2)
BUN SERPL-MCNC: 25 MG/DL (ref 6–20)
CALCIUM SERPL-MCNC: 8.3 MG/DL (ref 8.5–10.5)
CHLORIDE SERPL-SCNC: 107 MMOL/L (ref 98–107)
CO2 SERPL-SCNC: 21 MMOL/L (ref 20–30)
CREAT SERPL-MCNC: 1.1 MG/DL (ref 0.4–1.2)
EOSINOPHIL # BLD: 0.3 K/UL (ref 0–0.4)
EOSINOPHIL NFR BLD: 1.8 %
ERYTHROCYTE [DISTWIDTH] IN BLOOD BY AUTOMATED COUNT: 16.9 % (ref 11–16)
GFR SERPLBLD CREATININE-BSD FMLA CKD-EPI: 52 ML/MIN/{1.73_M2}
GLOBULIN SER CALC-MCNC: 2.7 G/DL
GLUCOSE BLD-MCNC: 141 MG/DL (ref 74–106)
GLUCOSE BLD-MCNC: 170 MG/DL (ref 74–106)
GLUCOSE BLD-MCNC: 223 MG/DL (ref 74–106)
GLUCOSE BLD-MCNC: 234 MG/DL (ref 74–106)
GLUCOSE BLD-MCNC: 364 MG/DL (ref 74–106)
GLUCOSE BLD-MCNC: 365 MG/DL (ref 74–106)
GLUCOSE BLD-MCNC: 374 MG/DL (ref 74–106)
GLUCOSE SERPL-MCNC: 153 MG/DL (ref 74–106)
HCT VFR BLD AUTO: 27.2 % (ref 37–47)
HGB BLD-MCNC: 8.1 G/DL (ref 11.5–16.5)
IMM GRANULOCYTES # BLD: 0.3 K/UL
IMM GRANULOCYTES NFR BLD: 1.6 % (ref 0–5)
LYMPHOCYTES # BLD: 2.5 K/UL (ref 1.5–4)
LYMPHOCYTES NFR BLD: 16.5 %
MCH RBC QN AUTO: 25.3 PG (ref 27–32)
MCHC RBC AUTO-ENTMCNC: 29.8 G/DL (ref 31–35)
MCV RBC AUTO: 85 FL (ref 80–100)
MONOCYTES # BLD: 1.1 K/UL (ref 0.2–0.8)
MONOCYTES NFR BLD: 7.4 %
NEUTROPHILS # BLD: 11.1 K/UL (ref 2–7.5)
NEUTS SEG NFR BLD: 72.4 %
PERFORMED ON: ABNORMAL
PLATELET # BLD AUTO: 139 K/UL (ref 150–400)
PMV BLD AUTO: 12.4 FL (ref 6–10)
POTASSIUM SERPL-SCNC: 4.3 MMOL/L (ref 3.4–5.1)
PROT SERPL-MCNC: 5.8 G/DL (ref 6.4–8.3)
RBC # BLD AUTO: 3.2 M/UL (ref 3.8–5.8)
SODIUM SERPL-SCNC: 138 MMOL/L (ref 136–145)
WBC # BLD AUTO: 15.3 K/UL (ref 4–11)

## 2024-03-14 PROCEDURE — 3700000000 HC ANESTHESIA ATTENDED CARE: Performed by: SURGERY

## 2024-03-14 PROCEDURE — A4216 STERILE WATER/SALINE, 10 ML: HCPCS | Performed by: PHYSICIAN ASSISTANT

## 2024-03-14 PROCEDURE — 99232 SBSQ HOSP IP/OBS MODERATE 35: CPT | Performed by: INTERNAL MEDICINE

## 2024-03-14 PROCEDURE — 87040 BLOOD CULTURE FOR BACTERIA: CPT

## 2024-03-14 PROCEDURE — 2580000003 HC RX 258: Performed by: PHYSICIAN ASSISTANT

## 2024-03-14 PROCEDURE — 93005 ELECTROCARDIOGRAM TRACING: CPT

## 2024-03-14 PROCEDURE — 71045 X-RAY EXAM CHEST 1 VIEW: CPT

## 2024-03-14 PROCEDURE — 3609027000 HC COLONOSCOPY: Performed by: SURGERY

## 2024-03-14 PROCEDURE — 80053 COMPREHEN METABOLIC PANEL: CPT

## 2024-03-14 PROCEDURE — 85025 COMPLETE CBC W/AUTO DIFF WBC: CPT

## 2024-03-14 PROCEDURE — 7100000011 HC PHASE II RECOVERY - ADDTL 15 MIN: Performed by: SURGERY

## 2024-03-14 PROCEDURE — 0DJD8ZZ INSPECTION OF LOWER INTESTINAL TRACT, VIA NATURAL OR ARTIFICIAL OPENING ENDOSCOPIC: ICD-10-PCS | Performed by: SURGERY

## 2024-03-14 PROCEDURE — 3609012400 HC EGD TRANSORAL BIOPSY SINGLE/MULTIPLE: Performed by: SURGERY

## 2024-03-14 PROCEDURE — 2500000003 HC RX 250 WO HCPCS: Performed by: NURSE ANESTHETIST, CERTIFIED REGISTERED

## 2024-03-14 PROCEDURE — 2580000003 HC RX 258: Performed by: SURGERY

## 2024-03-14 PROCEDURE — 0DB78ZX EXCISION OF STOMACH, PYLORUS, VIA NATURAL OR ARTIFICIAL OPENING ENDOSCOPIC, DIAGNOSTIC: ICD-10-PCS | Performed by: SURGERY

## 2024-03-14 PROCEDURE — C9113 INJ PANTOPRAZOLE SODIUM, VIA: HCPCS | Performed by: PHYSICIAN ASSISTANT

## 2024-03-14 PROCEDURE — 94762 N-INVAS EAR/PLS OXIMTRY CONT: CPT

## 2024-03-14 PROCEDURE — 97165 OT EVAL LOW COMPLEX 30 MIN: CPT

## 2024-03-14 PROCEDURE — 6360000002 HC RX W HCPCS: Performed by: NURSE ANESTHETIST, CERTIFIED REGISTERED

## 2024-03-14 PROCEDURE — 2709999900 HC NON-CHARGEABLE SUPPLY: Performed by: SURGERY

## 2024-03-14 PROCEDURE — 1200000000 HC SEMI PRIVATE

## 2024-03-14 PROCEDURE — 6360000002 HC RX W HCPCS: Performed by: PHYSICIAN ASSISTANT

## 2024-03-14 PROCEDURE — 3700000001 HC ADD 15 MINUTES (ANESTHESIA): Performed by: SURGERY

## 2024-03-14 PROCEDURE — 6370000000 HC RX 637 (ALT 250 FOR IP): Performed by: PHYSICIAN ASSISTANT

## 2024-03-14 PROCEDURE — 7100000010 HC PHASE II RECOVERY - FIRST 15 MIN: Performed by: SURGERY

## 2024-03-14 PROCEDURE — 36415 COLL VENOUS BLD VENIPUNCTURE: CPT

## 2024-03-14 RX ORDER — METOPROLOL SUCCINATE 100 MG/1
100 TABLET, EXTENDED RELEASE ORAL DAILY
Status: DISCONTINUED | OUTPATIENT
Start: 2024-03-14 | End: 2024-03-18 | Stop reason: HOSPADM

## 2024-03-14 RX ORDER — PROPOFOL 10 MG/ML
INJECTION, EMULSION INTRAVENOUS PRN
Status: DISCONTINUED | OUTPATIENT
Start: 2024-03-14 | End: 2024-03-14 | Stop reason: SDUPTHER

## 2024-03-14 RX ORDER — INSULIN LISPRO 100 [IU]/ML
6 INJECTION, SOLUTION INTRAVENOUS; SUBCUTANEOUS ONCE
Status: COMPLETED | OUTPATIENT
Start: 2024-03-14 | End: 2024-03-14

## 2024-03-14 RX ORDER — PANTOPRAZOLE SODIUM 40 MG/1
40 TABLET, DELAYED RELEASE ORAL
Status: DISCONTINUED | OUTPATIENT
Start: 2024-03-14 | End: 2024-03-18 | Stop reason: HOSPADM

## 2024-03-14 RX ORDER — SODIUM CHLORIDE, SODIUM LACTATE, POTASSIUM CHLORIDE, CALCIUM CHLORIDE 600; 310; 30; 20 MG/100ML; MG/100ML; MG/100ML; MG/100ML
INJECTION, SOLUTION INTRAVENOUS CONTINUOUS
Status: DISCONTINUED | OUTPATIENT
Start: 2024-03-14 | End: 2024-03-14

## 2024-03-14 RX ADMIN — PANTOPRAZOLE SODIUM 40 MG: 40 TABLET, DELAYED RELEASE ORAL at 15:37

## 2024-03-14 RX ADMIN — ATORVASTATIN CALCIUM 40 MG: 40 TABLET, FILM COATED ORAL at 21:23

## 2024-03-14 RX ADMIN — PROPOFOL 50 MG: 10 INJECTION, EMULSION INTRAVENOUS at 11:14

## 2024-03-14 RX ADMIN — OXYCODONE HYDROCHLORIDE 15 MG: 15 TABLET ORAL at 12:14

## 2024-03-14 RX ADMIN — CEFTRIAXONE 1000 MG: 1 INJECTION, POWDER, FOR SOLUTION INTRAMUSCULAR; INTRAVENOUS at 21:25

## 2024-03-14 RX ADMIN — LIDOCAINE HYDROCHLORIDE 30 MG: 10 INJECTION, SOLUTION INFILTRATION; PERINEURAL at 10:56

## 2024-03-14 RX ADMIN — PROPOFOL 40 MG: 10 INJECTION, EMULSION INTRAVENOUS at 11:05

## 2024-03-14 RX ADMIN — LEVOTHYROXINE SODIUM 150 MCG: 75 TABLET ORAL at 06:14

## 2024-03-14 RX ADMIN — SODIUM CHLORIDE 40 MG: 9 INJECTION INTRAMUSCULAR; INTRAVENOUS; SUBCUTANEOUS at 07:36

## 2024-03-14 RX ADMIN — METOPROLOL SUCCINATE 100 MG: 100 TABLET, EXTENDED RELEASE ORAL at 15:37

## 2024-03-14 RX ADMIN — PROPOFOL 40 MG: 10 INJECTION, EMULSION INTRAVENOUS at 10:56

## 2024-03-14 RX ADMIN — IRON SUCROSE 200 MG: 20 INJECTION, SOLUTION INTRAVENOUS at 12:04

## 2024-03-14 RX ADMIN — SODIUM CHLORIDE, POTASSIUM CHLORIDE, SODIUM LACTATE AND CALCIUM CHLORIDE: 600; 310; 30; 20 INJECTION, SOLUTION INTRAVENOUS at 09:55

## 2024-03-14 RX ADMIN — EMPAGLIFLOZIN 10 MG: 10 TABLET, FILM COATED ORAL at 07:29

## 2024-03-14 RX ADMIN — CITALOPRAM HYDROBROMIDE 10 MG: 20 TABLET ORAL at 07:29

## 2024-03-14 RX ADMIN — Medication 400 MG: at 07:29

## 2024-03-14 RX ADMIN — OXYCODONE HYDROCHLORIDE 15 MG: 15 TABLET ORAL at 04:18

## 2024-03-14 RX ADMIN — ONDANSETRON 4 MG: 4 TABLET, ORALLY DISINTEGRATING ORAL at 02:31

## 2024-03-14 RX ADMIN — PROPOFOL 40 MG: 10 INJECTION, EMULSION INTRAVENOUS at 11:09

## 2024-03-14 RX ADMIN — MONTELUKAST 10 MG: 10 TABLET, FILM COATED ORAL at 21:24

## 2024-03-14 RX ADMIN — PROPOFOL 30 MG: 10 INJECTION, EMULSION INTRAVENOUS at 11:00

## 2024-03-14 RX ADMIN — INSULIN LISPRO 6 UNITS: 100 INJECTION, SOLUTION INTRAVENOUS; SUBCUTANEOUS at 17:50

## 2024-03-14 RX ADMIN — CETIRIZINE HYDROCHLORIDE 5 MG: 5 TABLET ORAL at 07:29

## 2024-03-14 RX ADMIN — OXYCODONE HYDROCHLORIDE 15 MG: 15 TABLET ORAL at 21:24

## 2024-03-14 RX ADMIN — INSULIN LISPRO 4 UNITS: 100 INJECTION, SOLUTION INTRAVENOUS; SUBCUTANEOUS at 17:32

## 2024-03-14 ASSESSMENT — PAIN SCALES - GENERAL
PAINLEVEL_OUTOF10: 8
PAINLEVEL_OUTOF10: 7

## 2024-03-14 ASSESSMENT — ENCOUNTER SYMPTOMS
SHORTNESS OF BREATH: 1
DYSPNEA ACTIVITY LEVEL: AFTER AMBULATING 1 FLIGHT OF STAIRS

## 2024-03-14 NOTE — CONSULTS
PATIENT:    Jolanta Montenegro     PHYSICIAN:    Catrachito Otto MD, MD, FACS    YOB: 1945    DATE: 03/14/24      Reason for Consultation: Anemia, family history of colon cancer      Subjective .     History of present illness: 78-year-old female with no previous colonoscopy comes in for anemia.  No visible bleeding.  Subtle drop in hemoglobin of 11-6.5.    Review of Systems:  Constitutional:  Negative for chills, fever, and unexpected weight change.  HENT: Negative for trouble swallowing and voice change.  Eyes:  Negative for visual disturbance.  Respiratory:  Negative for apnea, cough, chest tightness, shortness of breath, and wheezing.  Cardiovascular:  Negative for chest pain, palpitations, and leg swelling.  Gastrointestinal:  Negative for abdominal distention, abdominal pain, anal bleeding, blood in stool, constipation, diarrhea, nausea, rectal pain, and vomiting.  Musculoskeletal:  Negative for back pain, gait problem, and joint swelling.  Skin:  Negative for color change, rash, and wound  Neurological:  Negative for dizziness, syncope, speech difficulty, weakness, numbness, and headaches.  Hematological:  Negative for adenopathy.  Does not bruise/bleed easily.  Psychiatric/Behavioral:  Negative for confusion.  The patient is not nervous/anxious    History:    Past Medical History:   Diagnosis Date    Anxiety     Atrial fibrillation (HCC)     Chronic back pain     Crohn's disease (HCC) 03/01/1995    Heart failure (HCC)     Hiatal hernia     Hypertension     Inflammatory polyarthropathy (HCC)     Obesity     Osteoarthritis     right shoulder    Osteopenia     Reflux esophagitis     Type II or unspecified type diabetes mellitus without mention of complication, not stated as uncontrolled     Urinary incontinence     Vitamin B12 deficiency        Past Surgical History:   Procedure Laterality Date    CARPAL TUNNEL RELEASE      CHOLECYSTECTOMY      FRACTURE SURGERY  1992/1996    t12    HEMORRHOID SURGERY

## 2024-03-14 NOTE — ANESTHESIA PRE PROCEDURE
Department of Anesthesiology  Preprocedure Note       Name:  Jolanta Montenegro   Age:  78 y.o.  :  1945                                          MRN:  [id number]         Date:  3/14/2024      Surgeon: Surgeon(s):  Catrachito Otto MD    Procedure: Procedure(s):  ESOPHAGOGASTRODUODENOSCOPY BIOPSY  COLORECTAL CANCER SCREENING, NOT HIGH RISK    Medications prior to admission:   Prior to Admission medications    Medication Sig Start Date End Date Taking? Authorizing Provider   valsartan (DIOVAN) 40 MG tablet Take 1 tablet by mouth daily 24   Bassam Reza MD   atorvastatin (LIPITOR) 40 MG tablet Take 1 tablet by mouth daily    Bassam Reza MD   JARDIANCE 10 MG tablet TAKE 1 TABLET BY MOUTH DAILY 3/12/24   Alyx Perez MD   spironolactone (ALDACTONE) 25 MG tablet TAKE 1 TABLET BY MOUTH DAILY 3/4/24   Alyx Perez MD   citalopram (CELEXA) 10 MG tablet TAKE 1 TABLET BY MOUTH DAILY 3/4/24   Alyx Perez MD   esomeprazole (NEXIUM) 40 MG delayed release capsule Take 1 capsule by mouth every morning (before breakfast) 3/4/24   Alyx Perez MD   levocetirizine (XYZAL) 5 MG tablet TAKE 1 TABLET BY MOUTH EVERY NIGHT 2/15/24   Alyx Perez MD   ELIQUIS 5 MG TABS tablet TAKE 1 TABLET BY MOUTH TWICE DAILY 2/15/24   Alyx Perez MD   glipiZIDE (GLUCOTROL XL) 10 MG extended release tablet TAKE 1 TABLET BY MOUTH DAILY 24   Alyx Perez MD   montelukast (SINGULAIR) 10 MG tablet TAKE 1 TABLET BY MOUTH EVERY NIGHT 24   Alyx Perez MD   Semaglutide,0.25 or 0.5MG/DOS, (OZEMPIC, 0.25 OR 0.5 MG/DOSE,) 2 MG/3ML SOPN Inject 0.25 mg into the skin once a week 23   Alyx Perez MD   blood glucose monitor strips Test TID DX E11.9 23   Alyx Perez MD   docusate (COLACE, DULCOLAX) 100 MG CAPS Take 100 mg by mouth daily 10/19/23   Alyx Perez MD   furosemide (LASIX) 20 MG tablet Take 1 tablet by mouth daily 10/19/23   Alyx Perez MD   magnesium oxide (MAG-OX) 400 (240 Mg) MG tablet

## 2024-03-14 NOTE — ANESTHESIA POSTPROCEDURE EVALUATION
Department of Anesthesiology  Postprocedure Note    Patient: Jolanta Montenegro  MRN: [id number]  YOB: 1945  Date of evaluation: 3/14/2024    Procedure Summary       Date: 03/14/24 Room / Location: [address]    Anesthesia Start: 1052 Anesthesia Stop: 1125    Procedures:       ESOPHAGOGASTRODUODENOSCOPY BIOPSY      COLORECTAL CANCER SCREENING, NOT HIGH RISK Diagnosis:       Iron deficiency anemia due to chronic blood loss      (Iron deficiency anemia due to chronic blood loss [D50.0])    Surgeons: Catrachiot Otto MD Responsible Provider: Gabriel Villarreal APRN - CRNA    Anesthesia Type: MAC ASA Status: 4            Anesthesia Type: MAC    Angelina Phase I: Angelina Score: 10    Angelina Phase II:      Anesthesia Post Evaluation    Patient location during evaluation: bedside  Patient participation: complete - patient participated  Level of consciousness: awake and alert  Airway patency: patent  Nausea & Vomiting: no nausea and no vomiting  Cardiovascular status: blood pressure returned to baseline  Respiratory status: acceptable  Hydration status: stable  Pain management: adequate    No notable events documented.

## 2024-03-14 NOTE — CARE COORDINATION
Unable to complete OT evaluation this morning as patient is going down for procedure. Will attempt at a later time barring any complications/concerns.

## 2024-03-14 NOTE — OP NOTE
Colonoscopy & Esophagogastroduodenoscopy Note    Patient:   Jolanta Montenegro      YOB: 1945      Facility:   [facility]      Referring/PCP: Alyx Perez MD    Procedure:   Colonoscopy ;     Esophagogastroduodenoscopy with Biopsies     Procedure Date:   03/14/24     Endoscopist:  Catrachito Otto MD, MD, FACS    Preoperative Diagnosis: Anemia, blood thinners    Postoperative Diagnosis: Area of inflammation or bleeding in the gastric cardia.  Irritated gastric nodule of the antrum.:  Negative, poor prep    Anesthesia: Sedation      Estimated blood loss: None    Complications: None    Description of Procedure:  Informed consent was obtained from the patient after explanation of the procedure including indications, description of the procedure,  benefits and possible risks and complications of the procedure, and alternatives. Questions were answered.  The patient's history was reviewed and a directed physical examination was performed prior to the procedure.    Patient was monitored throughout the procedure with pulse oximetry and periodic assessment of vital signs. Patient was sedated as noted above. With the patient initially in the left lateral decubitus position, a digital rectal examination was performed and revealed no significant hemorrhoids.  The Olympus video colonoscope was placed in the patient's rectum and advanced without difficulty  to the cecum. The prep was poor.  Examination of the mucosa was performed during both introduction and withdrawal of the colonoscope. Retroflexed view of the rectum was performed. Withdrawal time was 10 minutes.     Findings:     1.  No mass lesion or large polyp seen.  Polyps or flat lesions could have been missed.  Prep was suboptimal.  Scattered diverticuli.  No evidence of active bleeding.    With the patient in the left lateral decubitus position, the Olympus videoendoscope was placed in the patient's mouth and  percutaneous

## 2024-03-15 LAB
ALBUMIN SERPL-MCNC: 3.1 G/DL (ref 3.4–4.8)
ALBUMIN/GLOB SERPL: 1.6 {RATIO} (ref 0.8–2)
ALP SERPL-CCNC: 104 U/L (ref 25–100)
ALT SERPL-CCNC: 13 U/L (ref 4–36)
ANION GAP SERPL CALCULATED.3IONS-SCNC: 7 MMOL/L (ref 3–16)
AST SERPL-CCNC: 10 U/L (ref 8–33)
BASOPHILS # BLD: 0.1 K/UL (ref 0–0.1)
BASOPHILS NFR BLD: 0.5 %
BILIRUB SERPL-MCNC: <0.2 MG/DL (ref 0.3–1.2)
BUN SERPL-MCNC: 19 MG/DL (ref 6–20)
CALCIUM SERPL-MCNC: 8.7 MG/DL (ref 8.5–10.5)
CHLORIDE SERPL-SCNC: 106 MMOL/L (ref 98–107)
CO2 SERPL-SCNC: 24 MMOL/L (ref 20–30)
CREAT SERPL-MCNC: 1.1 MG/DL (ref 0.4–1.2)
EKG ATRIAL RATE: 111 BPM
EKG DIAGNOSIS: NORMAL
EKG P AXIS: 36 DEGREES
EKG P-R INTERVAL: 160 MS
EKG Q-T INTERVAL: 404 MS
EKG QRS DURATION: 140 MS
EKG QTC CALCULATION (BAZETT): 526 MS
EKG R AXIS: -32 DEGREES
EKG T AXIS: 113 DEGREES
EKG VENTRICULAR RATE: 102 BPM
EOSINOPHIL # BLD: 0.3 K/UL (ref 0–0.4)
EOSINOPHIL NFR BLD: 1.8 %
ERYTHROCYTE [DISTWIDTH] IN BLOOD BY AUTOMATED COUNT: 17.1 % (ref 11–16)
GFR SERPLBLD CREATININE-BSD FMLA CKD-EPI: 52 ML/MIN/{1.73_M2}
GLOBULIN SER CALC-MCNC: 2 G/DL
GLUCOSE BLD-MCNC: 131 MG/DL (ref 74–106)
GLUCOSE BLD-MCNC: 149 MG/DL (ref 74–106)
GLUCOSE BLD-MCNC: 193 MG/DL (ref 74–106)
GLUCOSE BLD-MCNC: 257 MG/DL (ref 74–106)
GLUCOSE SERPL-MCNC: 182 MG/DL (ref 74–106)
HCT VFR BLD AUTO: 24.8 % (ref 37–47)
HGB BLD-MCNC: 7.3 G/DL (ref 11.5–16.5)
IMM GRANULOCYTES # BLD: 0.7 K/UL
IMM GRANULOCYTES NFR BLD: 4.4 % (ref 0–5)
LYMPHOCYTES # BLD: 2.3 K/UL (ref 1.5–4)
LYMPHOCYTES NFR BLD: 14.9 %
MCH RBC QN AUTO: 25.3 PG (ref 27–32)
MCHC RBC AUTO-ENTMCNC: 29.4 G/DL (ref 31–35)
MCV RBC AUTO: 86.1 FL (ref 80–100)
MONOCYTES # BLD: 1.1 K/UL (ref 0.2–0.8)
MONOCYTES NFR BLD: 6.8 %
NEUTROPHILS # BLD: 11 K/UL (ref 2–7.5)
NEUTS SEG NFR BLD: 71.6 %
PERFORMED ON: ABNORMAL
PLATELET # BLD AUTO: 229 K/UL (ref 150–400)
PMV BLD AUTO: 10.6 FL (ref 6–10)
POTASSIUM SERPL-SCNC: 4.5 MMOL/L (ref 3.4–5.1)
PROT SERPL-MCNC: 5.1 G/DL (ref 6.4–8.3)
RBC # BLD AUTO: 2.88 M/UL (ref 3.8–5.8)
SODIUM SERPL-SCNC: 137 MMOL/L (ref 136–145)
WBC # BLD AUTO: 15.4 K/UL (ref 4–11)

## 2024-03-15 PROCEDURE — 36415 COLL VENOUS BLD VENIPUNCTURE: CPT

## 2024-03-15 PROCEDURE — 6360000002 HC RX W HCPCS: Performed by: PHYSICIAN ASSISTANT

## 2024-03-15 PROCEDURE — 97535 SELF CARE MNGMENT TRAINING: CPT

## 2024-03-15 PROCEDURE — 6370000000 HC RX 637 (ALT 250 FOR IP): Performed by: INTERNAL MEDICINE

## 2024-03-15 PROCEDURE — 80053 COMPREHEN METABOLIC PANEL: CPT

## 2024-03-15 PROCEDURE — 92610 EVALUATE SWALLOWING FUNCTION: CPT

## 2024-03-15 PROCEDURE — 99232 SBSQ HOSP IP/OBS MODERATE 35: CPT | Performed by: PHYSICIAN ASSISTANT

## 2024-03-15 PROCEDURE — 1200000000 HC SEMI PRIVATE

## 2024-03-15 PROCEDURE — 85025 COMPLETE CBC W/AUTO DIFF WBC: CPT

## 2024-03-15 PROCEDURE — 2580000003 HC RX 258: Performed by: PHYSICIAN ASSISTANT

## 2024-03-15 PROCEDURE — 97161 PT EVAL LOW COMPLEX 20 MIN: CPT

## 2024-03-15 PROCEDURE — 6370000000 HC RX 637 (ALT 250 FOR IP): Performed by: PHYSICIAN ASSISTANT

## 2024-03-15 RX ORDER — SUCRALFATE 1 G/1
1 TABLET ORAL
Status: DISCONTINUED | OUTPATIENT
Start: 2024-03-15 | End: 2024-03-18 | Stop reason: HOSPADM

## 2024-03-15 RX ORDER — GLIPIZIDE 5 MG/1
5 TABLET ORAL
Status: DISCONTINUED | OUTPATIENT
Start: 2024-03-15 | End: 2024-03-17

## 2024-03-15 RX ADMIN — SUCRALFATE 1 G: 1 TABLET ORAL at 16:58

## 2024-03-15 RX ADMIN — SUCRALFATE 1 G: 1 TABLET ORAL at 20:53

## 2024-03-15 RX ADMIN — ATORVASTATIN CALCIUM 40 MG: 40 TABLET, FILM COATED ORAL at 20:53

## 2024-03-15 RX ADMIN — OXYCODONE HYDROCHLORIDE 15 MG: 15 TABLET ORAL at 16:59

## 2024-03-15 RX ADMIN — MONTELUKAST 10 MG: 10 TABLET, FILM COATED ORAL at 20:53

## 2024-03-15 RX ADMIN — FERROUS SULFATE TAB EC 324 MG (65 MG FE EQUIVALENT) 324 MG: 324 (65 FE) TABLET DELAYED RESPONSE at 16:59

## 2024-03-15 RX ADMIN — FERROUS SULFATE TAB EC 324 MG (65 MG FE EQUIVALENT) 324 MG: 324 (65 FE) TABLET DELAYED RESPONSE at 08:22

## 2024-03-15 RX ADMIN — PANTOPRAZOLE SODIUM 40 MG: 40 TABLET, DELAYED RELEASE ORAL at 16:57

## 2024-03-15 RX ADMIN — LEVOTHYROXINE SODIUM 150 MCG: 75 TABLET ORAL at 06:49

## 2024-03-15 RX ADMIN — ONDANSETRON 4 MG: 4 TABLET, ORALLY DISINTEGRATING ORAL at 12:29

## 2024-03-15 RX ADMIN — ALUMINUM HYDROXIDE, MAGNESIUM HYDROXIDE, AND SIMETHICONE: 200; 200; 20 SUSPENSION ORAL at 15:06

## 2024-03-15 RX ADMIN — SPIRONOLACTONE 25 MG: 25 TABLET ORAL at 10:15

## 2024-03-15 RX ADMIN — CEFTRIAXONE 1000 MG: 1 INJECTION, POWDER, FOR SOLUTION INTRAMUSCULAR; INTRAVENOUS at 21:00

## 2024-03-15 RX ADMIN — OXYCODONE HYDROCHLORIDE 15 MG: 15 TABLET ORAL at 20:53

## 2024-03-15 RX ADMIN — OXYCODONE HYDROCHLORIDE 15 MG: 15 TABLET ORAL at 10:15

## 2024-03-15 RX ADMIN — PANTOPRAZOLE SODIUM 40 MG: 40 TABLET, DELAYED RELEASE ORAL at 06:49

## 2024-03-15 RX ADMIN — METOPROLOL SUCCINATE 100 MG: 100 TABLET, EXTENDED RELEASE ORAL at 08:22

## 2024-03-15 RX ADMIN — CITALOPRAM HYDROBROMIDE 10 MG: 20 TABLET ORAL at 08:22

## 2024-03-15 RX ADMIN — SUCRALFATE 1 G: 1 TABLET ORAL at 11:20

## 2024-03-15 RX ADMIN — ONDANSETRON 4 MG: 4 TABLET, ORALLY DISINTEGRATING ORAL at 20:53

## 2024-03-15 RX ADMIN — EMPAGLIFLOZIN 10 MG: 10 TABLET, FILM COATED ORAL at 08:22

## 2024-03-15 RX ADMIN — Medication 400 MG: at 08:22

## 2024-03-15 RX ADMIN — CETIRIZINE HYDROCHLORIDE 5 MG: 5 TABLET ORAL at 08:22

## 2024-03-15 RX ADMIN — IRON SUCROSE 200 MG: 20 INJECTION, SOLUTION INTRAVENOUS at 11:57

## 2024-03-15 RX ADMIN — GLIPIZIDE 5 MG: 5 TABLET ORAL at 11:42

## 2024-03-15 RX ADMIN — OXYCODONE HYDROCHLORIDE 15 MG: 15 TABLET ORAL at 03:02

## 2024-03-15 RX ADMIN — INSULIN LISPRO 2 UNITS: 100 INJECTION, SOLUTION INTRAVENOUS; SUBCUTANEOUS at 11:19

## 2024-03-15 ASSESSMENT — PAIN DESCRIPTION - ORIENTATION: ORIENTATION: LOWER;RIGHT;LEFT

## 2024-03-15 ASSESSMENT — PAIN SCALES - GENERAL
PAINLEVEL_OUTOF10: 7
PAINLEVEL_OUTOF10: 6

## 2024-03-15 ASSESSMENT — PAIN DESCRIPTION - LOCATION: LOCATION: BACK;LEG

## 2024-03-15 NOTE — CARE COORDINATION
Lives With: Alone (Grandson stays at times with her.)  Type of Home: House  Home Layout: Two level, Performs ADL's on one level, Able to Live on Main level with bedroom/bathroom  Home Access: Ramped entrance  Bathroom Shower/Tub: Walk-in shower  Bathroom Toilet: Standard  Bathroom Equipment: 3-in-1 commode  Bathroom Accessibility: Walker accessible  Home Equipment: Rollator, Cane, Lift chair, Oxygen  Has the patient had two or more falls in the past year or any fall with injury in the past year?: Yes  Receives Help From: Family (Grandson)  ADL Assistance: Independent  Homemaking Assistance: Independent  Homemaking Responsibilities: Yes  Ambulation Assistance: Independent (independent with household mobility, uses cane for community mobility.)  Transfer Assistance: Independent  Active : Yes     Lives alone.  Has family support.  Has BSC, cane, rollator, lift chair, O2.  I at baseline.  HH recs at NE.

## 2024-03-15 NOTE — CARE COORDINATION
Pt requires O2 qhs at 2LNC - failed overnight.  Will need home nocturnal O2 ordered and HH prior to DC.

## 2024-03-15 NOTE — ADT AUTH CERT
Utilization Reviews       3/14 Additional Clinical by Jennifer Smith RN   Last Updated by Jennifer Smith RN on 3/15/2024 0739     Review Status Created By   In Primary Jennifer Smith RN       Review Type   --      Criteria Review   3/14/24        INTERNAL MEDICINE PROGRESS NOTE:  Subjective:   Chief complaint: fatigue, anemia      Interval History:   Sitting up in bed this morning. Has scopes with Dr. Otto this morning. She is tired today. Hgb improved to 8.1 after receiving 1 unit of blood. Also has uti and on rocephin. Wbc improving and bun improving.           Assessment and Plan      Patient was seen and examined with ALICIA Carvalho.  Agree with note as above.  Assessment and plan was done by me as below.             Active Hospital Problems     Diagnosis Date Noted    Gastric nodule [K31.89]  Evidence of recent bleeding.  Will need repeat esaphegogastroendoscopy in few weeks per general surgery.  Continue PPI and monitor hemoglobin level closely.  Eliquis on hold.    03/14/2024    Acute cystitis without hematuria [N30.00]  Continue antibiotic regimen follow culture results.  Adjust antibiotics according to culture result.    03/14/2024    Symptomatic anemia [D64.9]  Hemoglobin level improved after blood transfusion and iron infusion.  Esaphegogastroendoscopy was done and will need repeat esaphegogastroendoscopy for further evaluation and biopsy of the lesion.  May benefit from repeating colonoscopy since was poor prep.  Eliquis on hold.    03/13/2024    Unintentional weight loss [R63.4]  Repeat TSH in near future with borderline low was normal on recent lab.  Noted to have gastric lesion for which repeat esaphegogastroendoscopy is needed in the next 2 weeks for further evaluation and for possible biopsy.  Colonoscopy with poor prep.  Manage repeat colonoscopy for further evaluation specially with her positive family history of colon cancer.    03/13/2024    Leukocytosis [D72.829]  WBC somewhat better.  Recent

## 2024-03-16 PROBLEM — G47.33 OSA (OBSTRUCTIVE SLEEP APNEA): Status: ACTIVE | Noted: 2024-03-16

## 2024-03-16 LAB
ALBUMIN SERPL-MCNC: 3 G/DL (ref 3.4–4.8)
ALBUMIN/GLOB SERPL: 1.2 {RATIO} (ref 0.8–2)
ALP SERPL-CCNC: 106 U/L (ref 25–100)
ALT SERPL-CCNC: 11 U/L (ref 4–36)
ANION GAP SERPL CALCULATED.3IONS-SCNC: 8 MMOL/L (ref 3–16)
AST SERPL-CCNC: 11 U/L (ref 8–33)
BACTERIA UR CULT: ABNORMAL
BASOPHILS # BLD: 0.1 K/UL (ref 0–0.1)
BASOPHILS NFR BLD: 0.5 %
BILIRUB SERPL-MCNC: <0.2 MG/DL (ref 0.3–1.2)
BUN SERPL-MCNC: 16 MG/DL (ref 6–20)
CALCIUM SERPL-MCNC: 8.5 MG/DL (ref 8.5–10.5)
CHLORIDE SERPL-SCNC: 106 MMOL/L (ref 98–107)
CO2 SERPL-SCNC: 23 MMOL/L (ref 20–30)
CREAT SERPL-MCNC: 1.1 MG/DL (ref 0.4–1.2)
EOSINOPHIL # BLD: 0.3 K/UL (ref 0–0.4)
EOSINOPHIL NFR BLD: 1.6 %
ERYTHROCYTE [DISTWIDTH] IN BLOOD BY AUTOMATED COUNT: 17.5 % (ref 11–16)
GFR SERPLBLD CREATININE-BSD FMLA CKD-EPI: 52 ML/MIN/{1.73_M2}
GLOBULIN SER CALC-MCNC: 2.5 G/DL
GLUCOSE BLD-MCNC: 183 MG/DL (ref 74–106)
GLUCOSE BLD-MCNC: 229 MG/DL (ref 74–106)
GLUCOSE BLD-MCNC: 261 MG/DL (ref 74–106)
GLUCOSE BLD-MCNC: 264 MG/DL (ref 74–106)
GLUCOSE SERPL-MCNC: 162 MG/DL (ref 74–106)
HCT VFR BLD AUTO: 25.9 % (ref 37–47)
HGB BLD-MCNC: 7.7 G/DL (ref 11.5–16.5)
IMM GRANULOCYTES # BLD: 0.8 K/UL
IMM GRANULOCYTES NFR BLD: 4.7 % (ref 0–5)
LYMPHOCYTES # BLD: 2.7 K/UL (ref 1.5–4)
LYMPHOCYTES NFR BLD: 16.1 %
MAGNESIUM SERPL-MCNC: 2.1 MG/DL (ref 1.7–2.4)
MCH RBC QN AUTO: 25.9 PG (ref 27–32)
MCHC RBC AUTO-ENTMCNC: 29.7 G/DL (ref 31–35)
MCV RBC AUTO: 87.2 FL (ref 80–100)
MONOCYTES # BLD: 1.1 K/UL (ref 0.2–0.8)
MONOCYTES NFR BLD: 6.9 %
NEUTROPHILS # BLD: 11.6 K/UL (ref 2–7.5)
NEUTS SEG NFR BLD: 70.2 %
ORGANISM: ABNORMAL
PERFORMED ON: ABNORMAL
PLATELET # BLD AUTO: 219 K/UL (ref 150–400)
PMV BLD AUTO: 10.4 FL (ref 6–10)
POTASSIUM SERPL-SCNC: 4.7 MMOL/L (ref 3.4–5.1)
PROT SERPL-MCNC: 5.5 G/DL (ref 6.4–8.3)
RBC # BLD AUTO: 2.97 M/UL (ref 3.8–5.8)
SODIUM SERPL-SCNC: 137 MMOL/L (ref 136–145)
WBC # BLD AUTO: 16.5 K/UL (ref 4–11)

## 2024-03-16 PROCEDURE — 83735 ASSAY OF MAGNESIUM: CPT

## 2024-03-16 PROCEDURE — 6370000000 HC RX 637 (ALT 250 FOR IP): Performed by: INTERNAL MEDICINE

## 2024-03-16 PROCEDURE — 93005 ELECTROCARDIOGRAM TRACING: CPT

## 2024-03-16 PROCEDURE — 6370000000 HC RX 637 (ALT 250 FOR IP): Performed by: PHYSICIAN ASSISTANT

## 2024-03-16 PROCEDURE — 1200000000 HC SEMI PRIVATE

## 2024-03-16 PROCEDURE — 36415 COLL VENOUS BLD VENIPUNCTURE: CPT

## 2024-03-16 PROCEDURE — 80053 COMPREHEN METABOLIC PANEL: CPT

## 2024-03-16 PROCEDURE — 99232 SBSQ HOSP IP/OBS MODERATE 35: CPT | Performed by: PHYSICIAN ASSISTANT

## 2024-03-16 PROCEDURE — 2700000000 HC OXYGEN THERAPY PER DAY

## 2024-03-16 PROCEDURE — 85025 COMPLETE CBC W/AUTO DIFF WBC: CPT

## 2024-03-16 RX ADMIN — INSULIN LISPRO 1 UNITS: 100 INJECTION, SOLUTION INTRAVENOUS; SUBCUTANEOUS at 17:21

## 2024-03-16 RX ADMIN — SUCRALFATE 1 G: 1 TABLET ORAL at 10:32

## 2024-03-16 RX ADMIN — METOPROLOL SUCCINATE 100 MG: 100 TABLET, EXTENDED RELEASE ORAL at 08:27

## 2024-03-16 RX ADMIN — OXYCODONE HYDROCHLORIDE 15 MG: 15 TABLET ORAL at 16:04

## 2024-03-16 RX ADMIN — FERROUS SULFATE TAB EC 324 MG (65 MG FE EQUIVALENT) 324 MG: 324 (65 FE) TABLET DELAYED RESPONSE at 08:27

## 2024-03-16 RX ADMIN — INSULIN LISPRO 2 UNITS: 100 INJECTION, SOLUTION INTRAVENOUS; SUBCUTANEOUS at 10:57

## 2024-03-16 RX ADMIN — CITALOPRAM HYDROBROMIDE 10 MG: 20 TABLET ORAL at 08:27

## 2024-03-16 RX ADMIN — OXYCODONE HYDROCHLORIDE 15 MG: 15 TABLET ORAL at 04:28

## 2024-03-16 RX ADMIN — OXYCODONE HYDROCHLORIDE 15 MG: 15 TABLET ORAL at 21:36

## 2024-03-16 RX ADMIN — OXYCODONE HYDROCHLORIDE 15 MG: 15 TABLET ORAL at 10:13

## 2024-03-16 RX ADMIN — Medication 400 MG: at 08:27

## 2024-03-16 RX ADMIN — FERROUS SULFATE TAB EC 324 MG (65 MG FE EQUIVALENT) 324 MG: 324 (65 FE) TABLET DELAYED RESPONSE at 17:19

## 2024-03-16 RX ADMIN — MONTELUKAST 10 MG: 10 TABLET, FILM COATED ORAL at 21:36

## 2024-03-16 RX ADMIN — ATORVASTATIN CALCIUM 40 MG: 40 TABLET, FILM COATED ORAL at 21:36

## 2024-03-16 RX ADMIN — SUCRALFATE 1 G: 1 TABLET ORAL at 17:19

## 2024-03-16 RX ADMIN — EMPAGLIFLOZIN 10 MG: 10 TABLET, FILM COATED ORAL at 08:27

## 2024-03-16 RX ADMIN — LEVOTHYROXINE SODIUM 150 MCG: 75 TABLET ORAL at 06:44

## 2024-03-16 RX ADMIN — SUCRALFATE 1 G: 1 TABLET ORAL at 21:37

## 2024-03-16 RX ADMIN — DOCUSATE SODIUM 100 MG: 100 CAPSULE, LIQUID FILLED ORAL at 08:27

## 2024-03-16 RX ADMIN — PANTOPRAZOLE SODIUM 40 MG: 40 TABLET, DELAYED RELEASE ORAL at 17:19

## 2024-03-16 RX ADMIN — CETIRIZINE HYDROCHLORIDE 5 MG: 5 TABLET ORAL at 08:27

## 2024-03-16 RX ADMIN — PANTOPRAZOLE SODIUM 40 MG: 40 TABLET, DELAYED RELEASE ORAL at 06:45

## 2024-03-16 RX ADMIN — SPIRONOLACTONE 25 MG: 25 TABLET ORAL at 08:27

## 2024-03-16 RX ADMIN — SUCRALFATE 1 G: 1 TABLET ORAL at 06:45

## 2024-03-16 RX ADMIN — GLIPIZIDE 5 MG: 5 TABLET ORAL at 06:44

## 2024-03-16 ASSESSMENT — PAIN SCALES - GENERAL
PAINLEVEL_OUTOF10: 8
PAINLEVEL_OUTOF10: 5
PAINLEVEL_OUTOF10: 7
PAINLEVEL_OUTOF10: 8

## 2024-03-16 ASSESSMENT — PAIN DESCRIPTION - LOCATION
LOCATION: BACK
LOCATION: BACK;LEG
LOCATION: BACK;LEG

## 2024-03-16 ASSESSMENT — PAIN DESCRIPTION - ORIENTATION
ORIENTATION: LOWER;RIGHT;LEFT
ORIENTATION: RIGHT;LEFT;LOWER

## 2024-03-16 ASSESSMENT — PAIN DESCRIPTION - DESCRIPTORS: DESCRIPTORS: THROBBING

## 2024-03-17 LAB
ALBUMIN SERPL-MCNC: 3 G/DL (ref 3.4–4.8)
ALBUMIN/GLOB SERPL: 1.4 {RATIO} (ref 0.8–2)
ALP SERPL-CCNC: 106 U/L (ref 25–100)
ALT SERPL-CCNC: 9 U/L (ref 4–36)
ANION GAP SERPL CALCULATED.3IONS-SCNC: 8 MMOL/L (ref 3–16)
AST SERPL-CCNC: 9 U/L (ref 8–33)
BASOPHILS # BLD: 0.1 K/UL (ref 0–0.1)
BASOPHILS NFR BLD: 0.6 %
BILIRUB SERPL-MCNC: <0.2 MG/DL (ref 0.3–1.2)
BUN SERPL-MCNC: 17 MG/DL (ref 6–20)
CALCIUM SERPL-MCNC: 8.3 MG/DL (ref 8.5–10.5)
CHLORIDE SERPL-SCNC: 105 MMOL/L (ref 98–107)
CO2 SERPL-SCNC: 25 MMOL/L (ref 20–30)
CREAT SERPL-MCNC: 1.1 MG/DL (ref 0.4–1.2)
EOSINOPHIL # BLD: 0.2 K/UL (ref 0–0.4)
EOSINOPHIL NFR BLD: 1.9 %
ERYTHROCYTE [DISTWIDTH] IN BLOOD BY AUTOMATED COUNT: 18.4 % (ref 11–16)
GFR SERPLBLD CREATININE-BSD FMLA CKD-EPI: 52 ML/MIN/{1.73_M2}
GLOBULIN SER CALC-MCNC: 2.2 G/DL
GLUCOSE BLD-MCNC: 175 MG/DL (ref 74–106)
GLUCOSE BLD-MCNC: 181 MG/DL (ref 74–106)
GLUCOSE BLD-MCNC: 203 MG/DL (ref 74–106)
GLUCOSE BLD-MCNC: 290 MG/DL (ref 74–106)
GLUCOSE SERPL-MCNC: 260 MG/DL (ref 74–106)
HCT VFR BLD AUTO: 25.2 % (ref 37–47)
HGB BLD-MCNC: 7.4 G/DL (ref 11.5–16.5)
IMM GRANULOCYTES # BLD: 0.4 K/UL
IMM GRANULOCYTES NFR BLD: 2.8 % (ref 0–5)
LYMPHOCYTES # BLD: 2.2 K/UL (ref 1.5–4)
LYMPHOCYTES NFR BLD: 17.2 %
MAGNESIUM SERPL-MCNC: 2 MG/DL (ref 1.7–2.4)
MCH RBC QN AUTO: 25.9 PG (ref 27–32)
MCHC RBC AUTO-ENTMCNC: 29.4 G/DL (ref 31–35)
MCV RBC AUTO: 88.1 FL (ref 80–100)
MONOCYTES # BLD: 0.8 K/UL (ref 0.2–0.8)
MONOCYTES NFR BLD: 6.5 %
NEUTROPHILS # BLD: 9 K/UL (ref 2–7.5)
NEUTS SEG NFR BLD: 71 %
PERFORMED ON: ABNORMAL
PLATELET # BLD AUTO: 194 K/UL (ref 150–400)
PMV BLD AUTO: 10.9 FL (ref 6–10)
POTASSIUM SERPL-SCNC: 4.5 MMOL/L (ref 3.4–5.1)
PROT SERPL-MCNC: 5.2 G/DL (ref 6.4–8.3)
RBC # BLD AUTO: 2.86 M/UL (ref 3.8–5.8)
SODIUM SERPL-SCNC: 138 MMOL/L (ref 136–145)
WBC # BLD AUTO: 12.7 K/UL (ref 4–11)

## 2024-03-17 PROCEDURE — 6370000000 HC RX 637 (ALT 250 FOR IP): Performed by: PHYSICIAN ASSISTANT

## 2024-03-17 PROCEDURE — 6370000000 HC RX 637 (ALT 250 FOR IP): Performed by: INTERNAL MEDICINE

## 2024-03-17 PROCEDURE — 85025 COMPLETE CBC W/AUTO DIFF WBC: CPT

## 2024-03-17 PROCEDURE — 80053 COMPREHEN METABOLIC PANEL: CPT

## 2024-03-17 PROCEDURE — 99232 SBSQ HOSP IP/OBS MODERATE 35: CPT | Performed by: PHYSICIAN ASSISTANT

## 2024-03-17 PROCEDURE — 1200000000 HC SEMI PRIVATE

## 2024-03-17 PROCEDURE — 83735 ASSAY OF MAGNESIUM: CPT

## 2024-03-17 PROCEDURE — 94761 N-INVAS EAR/PLS OXIMETRY MLT: CPT

## 2024-03-17 PROCEDURE — 36415 COLL VENOUS BLD VENIPUNCTURE: CPT

## 2024-03-17 RX ORDER — DOCUSATE SODIUM 100 MG/1
100 CAPSULE, LIQUID FILLED ORAL 2 TIMES DAILY
Status: DISCONTINUED | OUTPATIENT
Start: 2024-03-17 | End: 2024-03-18 | Stop reason: HOSPADM

## 2024-03-17 RX ORDER — GLIPIZIDE 5 MG/1
5 TABLET ORAL
Status: DISCONTINUED | OUTPATIENT
Start: 2024-03-17 | End: 2024-03-18 | Stop reason: HOSPADM

## 2024-03-17 RX ADMIN — DOCUSATE SODIUM 100 MG: 100 CAPSULE, LIQUID FILLED ORAL at 08:26

## 2024-03-17 RX ADMIN — SUCRALFATE 1 G: 1 TABLET ORAL at 10:55

## 2024-03-17 RX ADMIN — LEVOTHYROXINE SODIUM 150 MCG: 75 TABLET ORAL at 06:21

## 2024-03-17 RX ADMIN — MONTELUKAST 10 MG: 10 TABLET, FILM COATED ORAL at 20:38

## 2024-03-17 RX ADMIN — CETIRIZINE HYDROCHLORIDE 5 MG: 5 TABLET ORAL at 08:26

## 2024-03-17 RX ADMIN — POLYETHYLENE GLYCOL 3350 17 G: 17 POWDER, FOR SOLUTION ORAL at 10:55

## 2024-03-17 RX ADMIN — GLIPIZIDE 5 MG: 5 TABLET ORAL at 17:09

## 2024-03-17 RX ADMIN — EMPAGLIFLOZIN 10 MG: 10 TABLET, FILM COATED ORAL at 08:26

## 2024-03-17 RX ADMIN — OXYCODONE HYDROCHLORIDE 15 MG: 15 TABLET ORAL at 17:09

## 2024-03-17 RX ADMIN — OXYCODONE HYDROCHLORIDE 15 MG: 15 TABLET ORAL at 10:55

## 2024-03-17 RX ADMIN — METOPROLOL SUCCINATE 100 MG: 100 TABLET, EXTENDED RELEASE ORAL at 08:26

## 2024-03-17 RX ADMIN — SUCRALFATE 1 G: 1 TABLET ORAL at 20:38

## 2024-03-17 RX ADMIN — FERROUS SULFATE TAB EC 324 MG (65 MG FE EQUIVALENT) 324 MG: 324 (65 FE) TABLET DELAYED RESPONSE at 17:09

## 2024-03-17 RX ADMIN — PANTOPRAZOLE SODIUM 40 MG: 40 TABLET, DELAYED RELEASE ORAL at 17:09

## 2024-03-17 RX ADMIN — PANTOPRAZOLE SODIUM 40 MG: 40 TABLET, DELAYED RELEASE ORAL at 06:21

## 2024-03-17 RX ADMIN — OXYCODONE HYDROCHLORIDE 15 MG: 15 TABLET ORAL at 04:33

## 2024-03-17 RX ADMIN — OXYCODONE HYDROCHLORIDE 15 MG: 15 TABLET ORAL at 23:38

## 2024-03-17 RX ADMIN — SPIRONOLACTONE 25 MG: 25 TABLET ORAL at 08:27

## 2024-03-17 RX ADMIN — Medication 400 MG: at 08:27

## 2024-03-17 RX ADMIN — ATORVASTATIN CALCIUM 40 MG: 40 TABLET, FILM COATED ORAL at 20:38

## 2024-03-17 RX ADMIN — SUCRALFATE 1 G: 1 TABLET ORAL at 17:09

## 2024-03-17 RX ADMIN — ONDANSETRON 4 MG: 4 TABLET, ORALLY DISINTEGRATING ORAL at 20:38

## 2024-03-17 RX ADMIN — GLIPIZIDE 5 MG: 5 TABLET ORAL at 06:21

## 2024-03-17 RX ADMIN — SUCRALFATE 1 G: 1 TABLET ORAL at 06:21

## 2024-03-17 RX ADMIN — FERROUS SULFATE TAB EC 324 MG (65 MG FE EQUIVALENT) 324 MG: 324 (65 FE) TABLET DELAYED RESPONSE at 08:26

## 2024-03-17 RX ADMIN — INSULIN LISPRO 1 UNITS: 100 INJECTION, SOLUTION INTRAVENOUS; SUBCUTANEOUS at 08:29

## 2024-03-17 RX ADMIN — CITALOPRAM HYDROBROMIDE 10 MG: 20 TABLET ORAL at 08:28

## 2024-03-17 ASSESSMENT — PAIN DESCRIPTION - DESCRIPTORS: DESCRIPTORS: ACHING

## 2024-03-17 ASSESSMENT — PAIN SCALES - GENERAL
PAINLEVEL_OUTOF10: 7
PAINLEVEL_OUTOF10: 6
PAINLEVEL_OUTOF10: 5
PAINLEVEL_OUTOF10: 8

## 2024-03-17 ASSESSMENT — PAIN DESCRIPTION - LOCATION
LOCATION: BACK
LOCATION: BACK
LOCATION: BACK;LEG

## 2024-03-18 ENCOUNTER — PREP FOR SURGERY (OUTPATIENT)
Dept: OTHER | Facility: HOSPITAL | Age: 79
End: 2024-03-18
Payer: MEDICARE

## 2024-03-18 VITALS
HEART RATE: 72 BPM | RESPIRATION RATE: 17 BRPM | SYSTOLIC BLOOD PRESSURE: 122 MMHG | BODY MASS INDEX: 36.7 KG/M2 | WEIGHT: 187.9 LBS | OXYGEN SATURATION: 98 % | DIASTOLIC BLOOD PRESSURE: 67 MMHG | TEMPERATURE: 98.2 F

## 2024-03-18 DIAGNOSIS — D62 ACUTE BLOOD LOSS ANEMIA: Primary | ICD-10-CM

## 2024-03-18 LAB
ALBUMIN SERPL-MCNC: 3.1 G/DL (ref 3.4–4.8)
ALBUMIN/GLOB SERPL: 1.3 {RATIO} (ref 0.8–2)
ALP SERPL-CCNC: 105 U/L (ref 25–100)
ALT SERPL-CCNC: 10 U/L (ref 4–36)
ANION GAP SERPL CALCULATED.3IONS-SCNC: 7 MMOL/L (ref 3–16)
AST SERPL-CCNC: 10 U/L (ref 8–33)
BASOPHILS # BLD: 0.1 K/UL (ref 0–0.1)
BASOPHILS NFR BLD: 0.5 %
BILIRUB SERPL-MCNC: 0.3 MG/DL (ref 0.3–1.2)
BUN SERPL-MCNC: 19 MG/DL (ref 6–20)
CALCIUM SERPL-MCNC: 8.8 MG/DL (ref 8.5–10.5)
CHLORIDE SERPL-SCNC: 105 MMOL/L (ref 98–107)
CO2 SERPL-SCNC: 27 MMOL/L (ref 20–30)
CREAT SERPL-MCNC: 1 MG/DL (ref 0.4–1.2)
EOSINOPHIL # BLD: 0.3 K/UL (ref 0–0.4)
EOSINOPHIL NFR BLD: 1.7 %
ERYTHROCYTE [DISTWIDTH] IN BLOOD BY AUTOMATED COUNT: 19.5 % (ref 11–16)
GFR SERPLBLD CREATININE-BSD FMLA CKD-EPI: 58 ML/MIN/{1.73_M2}
GLOBULIN SER CALC-MCNC: 2.4 G/DL
GLUCOSE BLD-MCNC: 135 MG/DL (ref 74–106)
GLUCOSE BLD-MCNC: 222 MG/DL (ref 74–106)
GLUCOSE SERPL-MCNC: 152 MG/DL (ref 74–106)
HCT VFR BLD AUTO: 26.7 % (ref 37–47)
HGB BLD-MCNC: 7.9 G/DL (ref 11.5–16.5)
IMM GRANULOCYTES # BLD: 0.3 K/UL
IMM GRANULOCYTES NFR BLD: 2.2 % (ref 0–5)
LYMPHOCYTES # BLD: 2.5 K/UL (ref 1.5–4)
LYMPHOCYTES NFR BLD: 17.5 %
MAGNESIUM SERPL-MCNC: 2.1 MG/DL (ref 1.7–2.4)
MCH RBC QN AUTO: 26.4 PG (ref 27–32)
MCHC RBC AUTO-ENTMCNC: 29.6 G/DL (ref 31–35)
MCV RBC AUTO: 89.3 FL (ref 80–100)
MONOCYTES # BLD: 0.9 K/UL (ref 0.2–0.8)
MONOCYTES NFR BLD: 6.1 %
NEUTROPHILS # BLD: 10.5 K/UL (ref 2–7.5)
NEUTS SEG NFR BLD: 72 %
PERFORMED ON: ABNORMAL
PERFORMED ON: ABNORMAL
PLATELET # BLD AUTO: 207 K/UL (ref 150–400)
PMV BLD AUTO: 10.5 FL (ref 6–10)
POTASSIUM SERPL-SCNC: 5.1 MMOL/L (ref 3.4–5.1)
PROT SERPL-MCNC: 5.5 G/DL (ref 6.4–8.3)
RBC # BLD AUTO: 2.99 M/UL (ref 3.8–5.8)
SODIUM SERPL-SCNC: 139 MMOL/L (ref 136–145)
WBC # BLD AUTO: 14.6 K/UL (ref 4–11)

## 2024-03-18 PROCEDURE — 6370000000 HC RX 637 (ALT 250 FOR IP): Performed by: PHYSICIAN ASSISTANT

## 2024-03-18 PROCEDURE — 85025 COMPLETE CBC W/AUTO DIFF WBC: CPT

## 2024-03-18 PROCEDURE — 36415 COLL VENOUS BLD VENIPUNCTURE: CPT

## 2024-03-18 PROCEDURE — 97535 SELF CARE MNGMENT TRAINING: CPT

## 2024-03-18 PROCEDURE — 97530 THERAPEUTIC ACTIVITIES: CPT

## 2024-03-18 PROCEDURE — 97116 GAIT TRAINING THERAPY: CPT

## 2024-03-18 PROCEDURE — 99239 HOSP IP/OBS DSCHRG MGMT >30: CPT | Performed by: INTERNAL MEDICINE

## 2024-03-18 PROCEDURE — 6370000000 HC RX 637 (ALT 250 FOR IP): Performed by: INTERNAL MEDICINE

## 2024-03-18 PROCEDURE — 83735 ASSAY OF MAGNESIUM: CPT

## 2024-03-18 PROCEDURE — 80053 COMPREHEN METABOLIC PANEL: CPT

## 2024-03-18 RX ORDER — FERROUS SULFATE 324(65)MG
324 TABLET, DELAYED RELEASE (ENTERIC COATED) ORAL 2 TIMES DAILY WITH MEALS
Qty: 60 TABLET | Refills: 0 | Status: SHIPPED | OUTPATIENT
Start: 2024-03-18

## 2024-03-18 RX ORDER — PANTOPRAZOLE SODIUM 40 MG/1
40 TABLET, DELAYED RELEASE ORAL
Qty: 60 TABLET | Refills: 0 | Status: SHIPPED | OUTPATIENT
Start: 2024-03-18

## 2024-03-18 RX ORDER — SUCRALFATE 1 G/1
1 TABLET ORAL
Qty: 120 TABLET | Refills: 0 | Status: SHIPPED | OUTPATIENT
Start: 2024-03-18

## 2024-03-18 RX ADMIN — EMPAGLIFLOZIN 10 MG: 10 TABLET, FILM COATED ORAL at 08:19

## 2024-03-18 RX ADMIN — GLIPIZIDE 5 MG: 5 TABLET ORAL at 06:00

## 2024-03-18 RX ADMIN — DOCUSATE SODIUM 100 MG: 100 CAPSULE, LIQUID FILLED ORAL at 08:19

## 2024-03-18 RX ADMIN — OXYCODONE HYDROCHLORIDE 15 MG: 15 TABLET ORAL at 06:00

## 2024-03-18 RX ADMIN — CETIRIZINE HYDROCHLORIDE 5 MG: 5 TABLET ORAL at 08:19

## 2024-03-18 RX ADMIN — METOPROLOL SUCCINATE 100 MG: 100 TABLET, EXTENDED RELEASE ORAL at 08:19

## 2024-03-18 RX ADMIN — SUCRALFATE 1 G: 1 TABLET ORAL at 06:00

## 2024-03-18 RX ADMIN — SUCRALFATE 1 G: 1 TABLET ORAL at 10:32

## 2024-03-18 RX ADMIN — INSULIN LISPRO 1 UNITS: 100 INJECTION, SOLUTION INTRAVENOUS; SUBCUTANEOUS at 11:27

## 2024-03-18 RX ADMIN — OXYCODONE HYDROCHLORIDE 15 MG: 15 TABLET ORAL at 10:31

## 2024-03-18 RX ADMIN — Medication 400 MG: at 08:19

## 2024-03-18 RX ADMIN — CITALOPRAM HYDROBROMIDE 10 MG: 20 TABLET ORAL at 08:19

## 2024-03-18 RX ADMIN — FERROUS SULFATE TAB EC 324 MG (65 MG FE EQUIVALENT) 324 MG: 324 (65 FE) TABLET DELAYED RESPONSE at 08:19

## 2024-03-18 RX ADMIN — PANTOPRAZOLE SODIUM 40 MG: 40 TABLET, DELAYED RELEASE ORAL at 06:00

## 2024-03-18 RX ADMIN — SPIRONOLACTONE 25 MG: 25 TABLET ORAL at 08:19

## 2024-03-18 RX ADMIN — LEVOTHYROXINE SODIUM 150 MCG: 75 TABLET ORAL at 06:00

## 2024-03-18 ASSESSMENT — PAIN SCALES - GENERAL
PAINLEVEL_OUTOF10: 6
PAINLEVEL_OUTOF10: 7

## 2024-03-18 ASSESSMENT — PAIN DESCRIPTION - LOCATION: LOCATION: BACK

## 2024-03-18 NOTE — CARE COORDINATION
Case Management Assessment Discharge Note    Date / Time of Note:  03/18/24 2:16 PM  Discharge Note Completed by: Michelle Fajardo RN      Patient Name: Jolanta Montenegro   YOB: 1945  Diagnosis: Anemia [D64.9]  Symptomatic anemia [D64.9]   Date / Time: 3/13/2024 11:48 AM    Current PCP: Alyx Perez MD  Clinic patient: Yes    Hospitalization in the last 30 days: No    Advance Directives:  Code Status: Full Code    Financial:  Payor: [facility] MEDICAID / Plan: [facility] / Product Type: *No Product type* /      Pharmacy:    [id number] - [place], [place] - [address] [facility] - P [phone] - F [phone]  [address] [facility] [address] [facility] [address]  Phone: [phone] Fax: [phone]    [facility] [address] - P [phone] - F [phone]  [address] [address]  Phone: [phone] Fax: [phone]    Does patient want to participate in local refill/ meds to beds program?: Yes    Meds To Beds General Rules:  1. Can ONLY be done Monday- Friday between 8:30am-5pm  2. Prescription(s) must be in pharmacy by 3pm to be filled same day  3.Copy of patient's insurance/ prescription drug card and patient face sheet must be sent along with the prescription(s)  4. Cost of Rx cannot be added to hospital bill. If financial assistance is needed, please contact unit  or ;  or  CANNOT provide pharmacy voucher for patients co-pays  5. Patients can then  the prescription on their way out of the hospital at discharge, or pharmacy can deliver to the bedside if staff is available. (payment due at time of pick-up or delivery - cash, check, or card accepted)     Able to afford home medications/ co-pay costs: Yes    DISCHARGE Disposition: Home with O2 and assist PRN    Transportation:  Transportation PLAN for discharge: family   Mode of Transport: Private Car    Additional CM Notes: Pt is going home today.  No DME

## 2024-03-18 NOTE — DISCHARGE SUMMARY
Discharge Summary      Patient ID: Jolanta Montenegro       Patient's PCP: Alyx Perez MD    Admit Date: 3/13/2024     Discharge Date:   3/18/2024    Admitting Provider: Alyx Perez MD    Discharging Provider: ALICIA Carvalho     Reason for this admission:   Symptomatic anemia    Discharge Diagnoses:     Active Hospital Problems    Diagnosis Date Noted    YAZMIN (obstructive sleep apnea) [G47.33] 03/16/2024    Gastric nodule [K31.89] 03/14/2024    Acute cystitis without hematuria [N30.00] 03/14/2024    Symptomatic anemia [D64.9] 03/13/2024    Unintentional weight loss [R63.4] 03/13/2024    Leukocytosis [D72.829] 03/13/2024    Chronic systolic congestive heart failure (HCC) [I50.22] 10/31/2023    Hypothyroidism [E03.9] 09/26/2023    Generalized weakness [R53.1] 09/25/2023    Paroxysmal atrial fibrillation (HCC) [I48.0] 09/25/2023    Hypertension [I10]     Type 2 diabetes mellitus (HCC) [E11.9]        Procedures:  XR CHEST PORTABLE   Final Result      No acute pulmonary disease.         XR CHEST PORTABLE   Final Result      No acute pulmonary disease.           Per Dr. Otto's op note on 3/14     Findings:      1.  No mass lesion or large polyp seen.  Polyps or flat lesions could have been missed.  Prep was suboptimal.  Scattered diverticuli.  No evidence of active bleeding.     With the patient in the left lateral decubitus position, the Olympus videoendoscope was placed in the patient's mouth and under direct visualization passed into the esophagus. The scope was ultimately passed to the second portion duodenum. Cold biopsy forceps biopsies were obtained of gastric antral mucosa. Visualization was performed during both introduction and withdrawal of the endoscope and retroflexed view of the proximal stomach was obtained.      Findings::   Esophagus: Hiatal Hernia with reflux esophagitis; normal esophageal contractions. The findings do not support a diagnosis of Perez's Esophagus.  Stomach: Area of inflammation and

## 2024-03-18 NOTE — DISCHARGE INSTRUCTIONS
Disposition: home  Discharged Condition: Stable  Activity: activity as tolerated  Diet: cardiac diet and diabetic diet  Follow Up: Primary Care Provider in 1 week. Follow up with Dr. Otot as scheduled for repeat EGD. Do not take eliquis.   Patient to proceed with the following lab (cbc and cmp) in 1 week

## 2024-03-18 NOTE — PROGRESS NOTES
Blood sugar 181. Did not carry over from accucheck machine  
CLINICAL PHARMACY NOTE: MEDS TO BEDS    Total # of Prescriptions Filled: 3   The following medications were delivered to the patient:  Current Discharge Medication List        START taking these medications    Details   ferrous sulfate 324 (65 Fe) MG EC tablet Take 1 tablet by mouth 2 times daily (with meals)  Qty: 60 tablet, Refills: 0      pantoprazole (PROTONIX) 40 MG tablet Take 1 tablet by mouth 2 times daily (before meals)  Qty: 60 tablet, Refills: 0      sucralfate (CARAFATE) 1 GM tablet Take 1 tablet by mouth 4 times daily (before meals and nightly)  Qty: 120 tablet, Refills: 0           Additional Documentation:   Dispensed Ferrous Sulfate 325 mg tablets instead of 324 mg tablets with the approval of Nuris Gonsales.   Medications were delivered to patient's room and signed for by patient.   
Dr liriano notified of rechshell BS. Told to recheck at 8 and 11pm.   
Facility/Department: [facility] SURG   CLINICAL BEDSIDE SWALLOW EVALUATION    NAME: Jolanta Montenegro  : 1945  MRN: [id number]    ADMISSION DATE: 3/13/2024  ADMITTING DIAGNOSIS: has Anxiety; Osteoarthritis; Chronic back pain; Type 2 diabetes mellitus (HCC); Hypertension; Obesity; Hiatal hernia; Reflux esophagitis; Osteopenia; Inflammatory polyarthropathy (HCC); Hyperlipidemia; Vitamin B12 deficiency; PTTD (posterior tibial tendon dysfunction); GERD (gastroesophageal reflux disease); Rash; Acute systolic heart failure (HCC); Hypomagnesemia; Acute respiratory failure with hypoxia (HCC); Pneumonia; Iron deficiency anemia; Vitamin D deficiency; Paroxysmal atrial fibrillation (HCC); Generalized weakness; Hypothyroidism; Declining functional status; Anemia; Chronic systolic congestive heart failure (HCC); Symptomatic anemia; Unintentional weight loss; Leukocytosis; Gastric nodule; and Acute cystitis without hematuria on their problem list.  ONSET DATE: 2024    Recent Chest Xray/CT of Chest: 2024  IMPRESSION:     No acute pulmonary disease.    Date of Eval: 3/15/2024  Evaluating Therapist: TREVIN Sesay    Current Diet level:  Current Diet : Easy to chew  Current Liquid Diet : Thin    Primary Complaint  Shortness of breath    Pain:  Pain Assessment  Pain Assessment: None - Denies Pain  Pain Level: 6  Patient's Stated Pain Goal: 0 - No pain  Pain Location: Back  Pain Orientation: Lower    Reason for Referral  Jolanta Montenegro was referred for a bedside swallow evaluation to assess the efficiency of her swallow function, identify signs and symptoms of aspiration and make recommendations regarding safe dietary consistencies, effective compensatory strategies, and safe eating environment.    Impression  Dysphagia Diagnosis: No clinical indicators of dysphagia  Dysphagia Impression : Patient does not demonstrate overt s/sx of dysphagia at this time.  Dysphagia Outcome Severity Scale: Level 7: Normal in all 
I spoke with patient via telephone. She is agreeable to Meds to Beds.   
Medication Reconciliation completed with fill history from [facility] and patient interview. Patient had recently picked up Atorvastatin 20 mg po daily but stated that her doctor had increased it it to 40 mg po daily when she last saw them.  Of note, pt last filled her montelukast on 1/25/24 for 30DS, metoprolol on 1/15/24 for 30DS, and magnesium oxide on 11/11/23 for 30DS.  Pt stated that she still takes all three of these medications.    Overall, no changes were made to home med list.    Abiola Lamb, Jose Bolaños, PharmD  
Occupational Therapy  Facility/Department: [facility] MED SURG  Occupational Therapy Initial Assessment    Name: Jolanta Montenegro  : 1945  MRN: [id number]  Date of Service: 3/14/2024    Discharge Recommendations:  Home with Home health OT     Patient Diagnosis(es): There were no encounter diagnoses.  Past Medical History:  has a past medical history of Anxiety, Atrial fibrillation (HCC), Chronic back pain, Crohn's disease (HCC), Heart failure (HCC), Hiatal hernia, Hypertension, Inflammatory polyarthropathy (HCC), Obesity, Osteoarthritis, Osteopenia, Reflux esophagitis, Type II or unspecified type diabetes mellitus without mention of complication, not stated as uncontrolled, Urinary incontinence, and Vitamin B12 deficiency.  Past Surgical History:  has a past surgical history that includes fracture surgery (); Hemorrhoid surgery; Cholecystectomy; Carpal tunnel release; pr open repair of rotator cuff acute (2014); Total Thyroidectomy (2015); shoulder surgery; Upper gastrointestinal endoscopy (N/A, 3/14/2024); and Colonoscopy (N/A, 3/14/2024).    Assessment   Performance deficits / Impairments: Decreased endurance  Assessment: This 78 year old female was referred to OT services upon admission following onset of symptomatic anemia. Pt is pleasant and cooperative on room air. Pt follows commands appropriately. Pt had colonoscopy earlier on this date and reports fatigue. Pt transferred to sitting at EOB without difficulty. Pt able to maintain static sitting balance at EOB without LOB. Pt completed LB dressing with independence. Pt RUE is WFL however, LUE is limited AROM. Pt come to stand from EOB with SBA. Pt transferred from sitting to standing with SBA. Pt ambulated to door with SBA stooped posture no AD. Pt returned to EOB and requested to transfer to supine secondary to fatigue from procedure earlier in day. Assessment limited secondary to patient fatigue level. Pt will benefit from skilled OT services 
Occupational Therapy  Facility/Department: [facility] MED SURG  Daily Treatment Note  NAME: Jolanta Montenegro  : 1945  MRN: [id number]    Date of Service: 3/18/2024    Discharge Recommendations:  Home with Home health OT         Patient Diagnosis(es): The primary encounter diagnosis was Acute cystitis without hematuria. Diagnoses of Gastric nodule, Leukocytosis, unspecified type, and Callus of foot were also pertinent to this visit.     Assessment    Assessment: Patient lying in bed upon OT arrival on 2L O2. Patient's O2 was 99%.  Agreeable to tx.  Patient requested to take a shower.  Removed O2 and patient completed fx mobility to bathroom with CGA and no AD.  Patient sat on toilet to undress given 1  and SBA.  Patient tf to shower stool with SBA. Patient completed bathing with SBA.  Patient then dried off and stood from shower chair with SBA.  Patient tf to toilet to finish drying off and for removal of tread socks (left on for safety).  Patient then completed fx mobility to EOB with CGA and no AD.  Patient completed dressing task with MOD I seated on EOB.  Patient then completed fx mobility in hallway with no O2 using spc with SBA-CGA to nurses station and back to too with minimal SOA noted and O2 at 94% on room air at rest.  Patient has shown excellent improvement from last week.  Patient left in bed with call light within reach and telesitter in place  Activity Tolerance: Patient tolerated treatment well  Discharge Recommendations: Home with Home health OT     Subjective   Subjective  Subjective: I would like to take a shower  Pain: did not rate pain but stated \"I'm having a little bit of pain in my back\"           Objective    Vitals     Bed Mobility Training  Bed Mobility Training: No  Overall Level of Assistance: Modified independent  Balance  Sitting: Intact  Standing: Intact  Transfer Training  Transfer Training: Yes  Overall Level of Assistance: Stand-by assistance;Modified independent  Sit to Stand: 
Offered pt shower twice, pt stated, both times, she did not feel like it at the moment.   
Offered spiritual care to patient. Told pt to reach out if they needed anything further.   
On clear liquids.  Started bowel prep.  Blood pressure 122/59, HR 71.  
Patient HR in 110's on tele. EKG sent to nimisha VARGAS, new order to unhold metoprolol and give a dose now.   
Patient O2 sat dropped down to 86% while sleeping. Patient placed on 2L NC while napping and sat returned to 97%. Provider notified.   
Patient off unit att for procedure in endo unit. Patient stable and in no acute distress at time of transport.   
Patient still complaining of nausea. Notified ALICIA Lawler, GI cocktail ordered.  
Physical Therapy  Facility/Department: [facility] MED SURG  Daily Treatment Note  NAME: Jolanta Montenegro  : 1945  MRN: [id number]    Date of Service: 3/18/2024    Discharge Recommendations:  Continue to assess pending progress, Home with assist PRN, Outpatient PT, Home with Home health PT      Patient Diagnosis(es): The primary encounter diagnosis was Acute cystitis without hematuria. Diagnoses of Gastric nodule, Leukocytosis, unspecified type, and Callus of foot were also pertinent to this visit.    Assessment   Assessment: Cotreated with OT. Patient states she may get to go home today and would like to take a shower. Patient Mod I with bed mobility tasks. SpO2 99%, so supplemental O2 was removed. Patient reports she doesn't use O2 at home during the day. Stood from bedside with supervision and ambulated to the bathroom with SBA and no A.D. Transferred to the shower stool to complete showering and personal care tasks with assistance. Patient ambulated in the hallway 100 feet with SBA and no A.D. Patient slightly winded after activity, but SpO2 remained above 90% on room air. Patient returned to bed and was set up for lunch.  Activity Tolerance: Patient tolerated treatment well     Plan    Physical Therapy Plan  General Plan: 3-5 times per week  Days Per Week: 5 Days  Current Treatment Recommendations: Strengthening;Balance training;Functional mobility training;Transfer training;Endurance training;Gait training;Pain management;Home exercise program;Safety education & training;Patient/Caregiver education & training;Therapeutic activities     Restrictions  Restrictions/Precautions  Restrictions/Precautions: Fall Risk, General Precautions  Required Braces or Orthoses?: No     Subjective    Subjective  Subjective: Patient presents awake in bed on 2LO2, NAD. States she would like to take a shower.     Objective   Bed Mobility Training  Bed Mobility Training: No  Overall Level of Assistance: Modified 
Progress Note      Subjective:   Chief complaint: generalized weakness, anemia     Interval History:   Sitting up in bed. States she didn't sleep well last night so feels sleepy today. Hgb 7.4. she wants to work with pt/ot tomorrow and then see if strong enough for dc home or needs to stay for swing bed. On 2L O2 with sleep but not while awake. Had small BM this AM.    Review of systems:    Constitutional:  Denies fever or chills. Positive for fatigue and generalized weakness.   Eyes:  Denies eye pain or redness  HENT:  Denies nasal congestion or sore throat   Respiratory:  Denies cough or shortness of breath at rest.   Cardiovascular:  Denies chest pain or edema   GI:  Denies abdominal pain, nausea, vomiting, bloody stools or diarrhea   :  Denies dysuria or frequency  Musculoskeletal:  Denies acute neck pain or body aches  Integument:  Denies rash or itching  Neurologic:  Denies headache, dizziness, numbness, tingling or unilateral weakness  Psychiatric:  Denies acute depression or acute anxiety    Past medical history, surgical history, family history and social history reviewed and unchanged compared to H&P earlier this admission.    Medications:   Scheduled Meds:   glipiZIDE  5 mg Oral BID AC    sucralfate  1 g Oral 4x Daily AC & HS    pantoprazole  40 mg Oral BID AC    metoprolol succinate  100 mg Oral Daily    insulin lispro  0-4 Units SubCUTAneous TID WC    insulin lispro  0-4 Units SubCUTAneous Nightly    docusate sodium  100 mg Oral Daily    montelukast  10 mg Oral Nightly    oxyCODONE  15 mg Oral Q6H    atorvastatin  40 mg Oral Nightly    magnesium oxide  400 mg Oral Daily    spironolactone  25 mg Oral Daily    levothyroxine  150 mcg Oral QAM AC    cetirizine  5 mg Oral Daily    empagliflozin  10 mg Oral Daily    citalopram  10 mg Oral Daily    ferrous sulfate  324 mg Oral BID WC     Continuous Infusions:   dextrose         Objective:     Vital Signs  Temp: 98.1 °F (36.7 °C)  Pulse: 84  Respirations: 
Progress Note      Subjective:   Chief complaint: generalized weakness, anemia    Interval History:   Sitting up in bed. States she is feeling ok today. She is still feeling too weak to go home where she lives alone. Hgb improced to 7.7. states she's moving her bowels since her scopes last week. Appetite stable. Glucose improved. She asks to see podiatry Monday for toenails and callous to left great toe.     Review of systems:   Constitutional:  Denies fever or chills. Positive for fatigue and generalized weakness.   Eyes:  Denies eye pain or redness  HENT:  Denies nasal congestion or sore throat   Respiratory:  Denies cough or shortness of breath at rest. Admits to MICHAELS.  Cardiovascular:  Denies chest pain or edema   GI:  Denies abdominal pain, nausea, vomiting, bloody stools or diarrhea   :  Denies dysuria or frequency  Musculoskeletal:  Denies acute neck pain or body aches  Integument:  Denies rash or itching  Neurologic:  Denies headache, dizziness, numbness, tingling or unilateral weakness  Psychiatric:  Denies acute depression or acute anxiety    Past medical history, surgical history, family history and social history reviewed and unchanged compared to H&P earlier this admission.    Medications:   Scheduled Meds:   sucralfate  1 g Oral 4x Daily AC & HS    glipiZIDE  5 mg Oral QAM AC    pantoprazole  40 mg Oral BID AC    metoprolol succinate  100 mg Oral Daily    insulin lispro  0-4 Units SubCUTAneous TID WC    insulin lispro  0-4 Units SubCUTAneous Nightly    docusate sodium  100 mg Oral Daily    montelukast  10 mg Oral Nightly    oxyCODONE  15 mg Oral Q6H    atorvastatin  40 mg Oral Nightly    magnesium oxide  400 mg Oral Daily    spironolactone  25 mg Oral Daily    levothyroxine  150 mcg Oral QAM AC    cetirizine  5 mg Oral Daily    empagliflozin  10 mg Oral Daily    citalopram  10 mg Oral Daily    ferrous sulfate  324 mg Oral BID WC     Continuous Infusions:   dextrose         Objective:     Vital 
Pt awake.  No complaints of pain or nausea.  No trouble swallowing.  Abd soft.  +BS. +Flatus.  Side rails up x2.  Tolerates CL well.  Verbalizes understanding of d/c instructions.  Report called to ANTHONY Taylor.  Transported back to inpatient unit via .  
Pt discharged home.  Written and oral instructions given, Pt stated understanding.  IV and telemetry monitor removed.  
Pt exhibiting false low blood pressures related to irregular HR during PRBC infusion.  Nuris informed.  500 ml bolus infused.  Started 2nd IV.  Resumed PRBC.  /78.    
Pt via wc from inpatient unit.  No complaints noted.  Verifies she is here for EGD and colonoscopy with Dr. Otto.  States prep was effective.  Verbalizes understanding of procedure.  Consent on chart.  HR regular.  Lungs clear.  +BS.  Denies chest pain or SOA.  States she is here for evaluation of anemia.  Side rails up x 2.    
assistance;Contact-guard assistance     ADL  Grooming: Stand by assistance  UE Bathing: Stand by assistance  UE Bathing Skilled Clinical Factors: seated on stool  LE Bathing: Stand by assistance  LE Bathing Skilled Clinical Factors: seated on stool  UE Dressing: Modified independent   UE Dressing Skilled Clinical Factors: seated on EOB  LE Dressing: Modified independent   LE Dressing Skilled Clinical Factors: seated on EOB  Toileting: Modified independent   Functional Mobility: Contact guard assistance;Stand by assistance  Functional Mobility Skilled Clinical Factors: with spc  Skin Care: Lotion;Soap and water        Safety Devices  Type of Devices: Call light within reach;Left in bed;Telesitter in use  Restraints  Restraints Initially in Place: No     Patient Education  Education Given To: Patient  Education Provided: Transfer Training  Education Provided Comments: shower tf  Education Method: Verbal  Barriers to Learning: None  Education Outcome: Verbalized understanding;Demonstrated understanding    Goals  Short Term Goals  Time Frame for Short Term Goals: 1 week  Short Term Goal 1: Pt to complete bathing with MOD I.  Short Term Goal 2: Pt to tolerate x15 minutes of activity with no s/s of fatigue.  Short Term Goal 3: Pt to complete toileting with MOD I.  Short Term Goal 4: Pt to completed hygiene/grooming with MOD I standing at sink with no LOB.    Therapy Time   Individual Concurrent Group Co-treatment   Time In 0943         Time Out 1015         Minutes 32             This note serves as a DC summary in the event of pt discharge.      Yi Loza OT     
esophagus. The scope was ultimately passed to the second portion duodenum. Cold biopsy forceps biopsies were obtained of gastric antral mucosa. Visualization was performed during both introduction and withdrawal of the endoscope and retroflexed view of the proximal stomach was obtained.      Findings::   Esophagus: Hiatal Hernia with reflux esophagitis; normal esophageal contractions. The findings do not support a diagnosis of Perez's Esophagus.  Stomach: Area of inflammation and old bleeding in the gastric cardia.  Did not biopsy due to patient being on Eliquis.  Gastric nodule in antrum, biopsy x 2 obtained with a hot forcep.  Duodenum: Normal      Recommendations: Repeat EGD in 2 weeks to make sure area in the gastric cardia is healed up.  Will also need repeat colonoscopy in the next year for poor prep. Recommend stopping Eliquis for at least 2 weeks.     Assessment and Plan:     Active Hospital Problems    Diagnosis Date Noted    Gastric nodule [K31.89]  Noted during EGD with Dr. Otto (see op report above). Biopsy pending. Discussed with the patient.    2024    Acute cystitis without hematuria [N30.00]  Urine culture growing Ecoli. Completed 3 days of rocephin. Follow culture sensitivities.    2024    Symptomatic anemia [D64.9]  Hgb 6.3 on admit 3/13. Was 7.4 over the weekend in ER and 10s-11s in February which seems to be her baseline. She denies melena or hematochezia. Heavy family history of colon cancer including her brother who  of colon cancer in his 60s. Home eliquis placed on hold. Transfused 1 unit PRBC 3/13 and venofer infusions ordered for iron deficiency.  Hemoccult negative. IV PPI BID. Patient had EGD and colonoscopy with Dr. Otto on 3/14 (see report above). Carafate added. Hgb 3/15 7.3. monitor closely.    2024    Unintentional weight loss [R63.4]  Reports 30+ lb weight loss in last few months. She was also started on ozempic during this time which could have attributed. 
of recent bleeding.  Will need repeat esaphegogastroendoscopy in few weeks per general surgery.  Continue PPI and monitor hemoglobin level closely.  Eliquis on hold.   03/14/2024    Acute cystitis without hematuria [N30.00]  Continue antibiotic regimen follow culture results.  Adjust antibiotics according to culture result.   03/14/2024    Symptomatic anemia [D64.9]  Hemoglobin level improved after blood transfusion and iron infusion.  Esaphegogastroendoscopy was done and will need repeat esaphegogastroendoscopy for further evaluation and biopsy of the lesion.  May benefit from repeating colonoscopy since was poor prep.  Eliquis on hold.   03/13/2024    Unintentional weight loss [R63.4]  Repeat TSH in near future with borderline low was normal on recent lab.  Noted to have gastric lesion for which repeat esaphegogastroendoscopy is needed in the next 2 weeks for further evaluation and for possible biopsy.  Colonoscopy with poor prep.  Manage repeat colonoscopy for further evaluation specially with her positive family history of colon cancer.   03/13/2024    Leukocytosis [D72.829]  WBC somewhat better.  Recent steroid use.  UTI.  Profound anemia.  Continue same treatment plan as mentioned in other paragraphs and monitor response.   03/13/2024    Chronic systolic congestive heart failure (HCC) [I50.22]  Continue to monitor fluid balance closely specially with recent blood transfusion and iron infusion.  Discussed CHF instructions.   10/31/2023    Hypothyroidism [E03.9]  Recent TSH was at the lowest normal limit.  Recheck in the near future.   09/26/2023    Generalized weakness [R53.1]  Treat her acute issues as mentioned in other medical problems.  Try to improve her anemia and monitor response.  PT/OT.   09/25/2023    Paroxysmal atrial fibrillation (HCC) [I48.0]  Rate controlled.  Anticoagulation on hold.  Monitor closely.  Discussed with patient and her daughter was present regarding the risk from not being on 
needed;Verbalized understanding;Demonstrated understanding      Therapy Time   Individual Concurrent Group Co-treatment   Time In 1003         Time Out 1016         Minutes 13             This note will serve as DC summary in the event of pt discharge.     Ginger Bowen, PT

## 2024-03-18 NOTE — FLOWSHEET NOTE
03/13/24 1345   Assessment   Charting Type Admission   Psychosocial   Psychosocial (WDL) WDL   Neurological   Neuro (WDL) WDL   Level of Consciousness 0   Swallow Screening   Is the patient unable to remain alert for testing? No   Is the patient on a modified diet (thickened liquids) due to pre-existing dysphagia? No   Is there presence of existing enteral tube feeding via the stomach or nose? No   Is there presence of head-of-bed restrictions (less than 30 degrees)? No   Is there presence of tracheotomy tube? No   Is the patient ordered nothing-by-mouth status? (!) Yes   Karla Coma Scale   Eye Opening 4   Best Verbal Response 5   Best Motor Response 6   Karla Coma Scale Score 15   HEENT (Head, Ears, Eyes, Nose, & Throat)   HEENT (WDL) WDL   Incentive Spirometry Tx   Treatment Effort Initial   Predicted Volume 1000   Achieved Volume (mL) 1000 mL   Respiratory   Respiratory (WDL) X   Respiratory Interventions Incentive spirometry   Respiratory Pattern Regular   Respiratory Depth Normal   Respiratory Quality/Effort Unlabored;Dyspnea with exertion   Chest Assessment Chest expansion symmetrical   L Breath Sounds Clear   R Breath Sounds Clear   Level of Activity/Mobility 1   Breath Sounds   Breath Sounds Bilateral Clear   Right Upper Lobe Clear   Right Middle Lobe Clear   Right Lower Lobe Diminished;Clear   Left Upper Lobe Clear   Left Lower Lobe Clear;Diminished   Cardiac   Cardiac (WDL) WDL   Rhythm Interpretation   Pulse 84   Cardiac Monitor   Cardiac/Telemetry Monitor On Portable telemetry pack applied   Alarm Audible Yes   Alarms Set Yes   Gastrointestinal   Abdominal (WDL) WDL   Genitourinary   Genitourinary (WDL) WDL   Peripheral Vascular   Peripheral Vascular (WDL) WDL   Edema None   Skin Integumentary    Skin Integumentary (WDL) X   Skin Color Pale   Skin Condition/Temp Dry;Warm   Skin Integrity Wound (see LDA)   Location rt great toe   Musculoskeletal   Musculoskeletal (WDL) X   RUE Weakness   LUE 
   03/14/24 0011   Assessment   Charting Type Shift assessment   Psychosocial   Psychosocial (WDL) WDL   Neurological   Neuro (WDL) WDL   Level of Consciousness 0   [place] Coma Scale   Eye Opening 4   Best Verbal Response 5   Best Motor Response 6   Karla Coma Scale Score 15   NIHSS Stroke Scale   NIHSS Stroke Scale Assessed No   HEENT (Head, Ears, Eyes, Nose, & Throat)   HEENT (WDL) WDL   Respiratory   Respiratory (WDL) X   Respiratory Interventions H.O.B. elevated;Incentive spirometry   Respiratory Pattern Regular   Respiratory Depth Normal   Respiratory Quality/Effort Unlabored;Dyspnea with exertion   Chest Assessment Chest expansion symmetrical   L Breath Sounds Clear   R Breath Sounds Clear   Level of Activity/Mobility 1   Breath Sounds   Right Upper Lobe Clear   Right Middle Lobe Clear   Right Lower Lobe Diminished;Clear   Left Upper Lobe Clear   Left Lower Lobe Clear;Diminished   Cardiac   Cardiac (WDL) WDL   Rhythm Interpretation   Pulse 88   Cardiac Monitor   Cardiac/Telemetry Monitor On Portable telemetry pack applied   Alarm Audible Yes   Alarms Set Yes   Gastrointestinal   Abdominal (WDL) WDL   Last BM (including prior to admit) 03/13/24   Genitourinary   Genitourinary (WDL) WDL   Peripheral Vascular   Peripheral Vascular (WDL) WDL   Edema None   Skin Integumentary    Skin Integumentary (WDL) X   Skin Color Pale   Skin Condition/Temp Warm;Dry   Skin Integrity Wound (see LDA)   Location rt great toe   Musculoskeletal   Musculoskeletal (WDL) X   RUE Weakness   LUE Weakness   RL Extremity Weakness   LL Extremity Weakness   Care Plan - Metabolic/Fluid and Electrolytes Goals   Electrolytes maintained within normal limits Monitor labs and assess patient for signs and symptoms of electrolyte imbalances;Administer electrolyte replacement as ordered;Monitor response to electrolyte replacements, including repeat lab results as appropriate   Glucose maintained within prescribed range Monitor blood glucose as 
   03/14/24 0730   Assessment   Charting Type Shift assessment   Psychosocial   Psychosocial (WDL) WDL   Neurological   Neuro (WDL) WDL   Level of Consciousness 0   [place] Coma Scale   Eye Opening 4   Best Verbal Response 5   Best Motor Response 6   Karla Coma Scale Score 15   HEENT (Head, Ears, Eyes, Nose, & Throat)   HEENT (WDL) WDL   Respiratory   Respiratory (WDL) WDL   Respiratory Pattern Regular   Respiratory Depth Normal   Respiratory Quality/Effort Unlabored;Dyspnea with exertion   Chest Assessment Chest expansion symmetrical   L Breath Sounds Clear   R Breath Sounds Clear   Breath Sounds   Right Upper Lobe Clear   Right Middle Lobe Clear   Right Lower Lobe Diminished;Clear   Left Upper Lobe Clear   Left Lower Lobe Clear;Diminished   Cardiac   Cardiac (WDL) WDL   Cardiac Monitor   Cardiac/Telemetry Monitor On Portable telemetry pack applied   Alarm Audible Yes   Alarms Set Yes   Gastrointestinal   Abdominal (WDL) WDL   Last BM (including prior to admit) 03/13/24   Genitourinary   Genitourinary (WDL) WDL   Peripheral Vascular   Peripheral Vascular (WDL) WDL   Edema None   Skin Integumentary    Skin Integumentary (WDL) X   Skin Color Pale   Skin Condition/Temp Warm;Dry   Skin Integrity Wound (see LDA)   Location right great toe   Musculoskeletal   Musculoskeletal (WDL) X   RUE Weakness   LUE Weakness   RL Extremity Weakness   LL Extremity Weakness   Wound 03/13/24 Toe (Comment  which one) Lateral;Outer;Left scab   Date First Assessed/Time First Assessed: 03/13/24 1514   Present on Original Admission: Yes  Primary Wound Type: Traumatic  Location: Toe (Comment  which one)  Wound Location Orientation: Lateral;Outer;Left  Wound Description (Comments): scab   Wound Image    Wound Etiology Diabetic   Care Plan - Metabolic/Fluid and Electrolytes Goals   Electrolytes maintained within normal limits Monitor labs and assess patient for signs and symptoms of electrolyte imbalances   Glucose maintained within 
   03/14/24 0730   Wound 03/13/24 Toe (Comment  which one) Lateral;Outer;Left scab   Date First Assessed/Time First Assessed: 03/13/24 1514   Present on Original Admission: Yes  Primary Wound Type: Traumatic  Location: Toe (Comment  which one)  Wound Location Orientation: Lateral;Outer;Left  Wound Description (Comments): scab   Wound Image    Wound Etiology Diabetic   Wound Length (cm) 0.6 cm   Wound Width (cm) 0.5 cm   Wound Surface Area (cm^2) 0.3 cm^2   Wound Assessment   (brown dry callus)   Drainage Amount None (dry)   Odor None   Noris-wound Assessment Dry/flaky;Intact     In to assess patient left great toe, patient does have a history of diabetes. She states that the wound happened when she filed her feet too much. Patient states that she does not see podiatry. I advised patient that it would be beneficial for her to establish with a podiatrist for foot checks and for management of the area on her toe so that further damage does not occur. Patient states that she got a free pair of shoes some time last year but I am unsure if these are diabetic foot wear. Advised patient not to try to remove callus herself.   
   03/14/24 1200   Vital Signs   Pulse 88   Respirations 17   BP (!) 127/94   MAP (Calculated) 105   Oxygen Therapy   SpO2 95 %     Patient back on floor att. Vitals taken nimisha VARGAS notified of BP. Safety measures in place. No acute distress noted.   
   03/14/24 2203   Assessment   Charting Type Shift assessment   Psychosocial   Psychosocial (WDL) WDL   Neurological   Neuro (WDL) WDL   Level of Consciousness 0   [place] Coma Scale   Eye Opening 4   Best Verbal Response 5   Best Motor Response 6   Karla Coma Scale Score 15   HEENT (Head, Ears, Eyes, Nose, & Throat)   HEENT (WDL) WDL   Respiratory   Respiratory (WDL) WDL   Respiratory Pattern Regular   Respiratory Depth Normal   Respiratory Quality/Effort Unlabored;Dyspnea with exertion   Chest Assessment Chest expansion symmetrical   L Breath Sounds Clear   R Breath Sounds Clear   Breath Sounds   Right Upper Lobe Clear   Right Middle Lobe Clear   Right Lower Lobe Diminished;Clear   Left Upper Lobe Clear   Left Lower Lobe Clear;Diminished   Cardiac   Cardiac (WDL) WDL   Cardiac Monitor   Alarm Audible Yes   Gastrointestinal   Abdominal (WDL) WDL   Genitourinary   Genitourinary (WDL) WDL   Peripheral Vascular   Peripheral Vascular (WDL) WDL   Edema None   Skin Integumentary    Skin Integumentary (WDL) X   Skin Color Pale   Skin Condition/Temp Warm;Dry   Skin Integrity Wound (see LDA)   Musculoskeletal   Musculoskeletal (WDL) X   RUE Weakness   LUE Weakness   RL Extremity Weakness   LL Extremity Weakness   Wound 03/13/24 Toe (Comment  which one) Lateral;Outer;Left scab   Date First Assessed/Time First Assessed: 03/13/24 1514   Present on Original Admission: Yes  Primary Wound Type: Traumatic  Location: Toe (Comment  which one)  Wound Location Orientation: Lateral;Outer;Left  Wound Description (Comments): scab   Wound Etiology Diabetic   Wound Assessment   (brown dry callus)   Drainage Amount None (dry)   Odor None   Noris-wound Assessment Dry/flaky;Intact       
   03/17/24 0826   Assessment   Charting Type Shift assessment   Psychosocial   Psychosocial (WDL) WDL   Neurological   Neuro (WDL) WDL   Level of Consciousness 0   [place] Coma Scale   Eye Opening 4   Best Verbal Response 5   Best Motor Response 6   Karla Coma Scale Score 15   HEENT (Head, Ears, Eyes, Nose, & Throat)   HEENT (WDL) WDL   Respiratory   Respiratory (WDL) WDL   Respiratory Pattern Regular   Respiratory Depth Normal   Respiratory Quality/Effort Unlabored;Dyspnea with exertion   Chest Assessment Chest expansion symmetrical   L Breath Sounds Clear   R Breath Sounds Clear   Breath Sounds   Right Upper Lobe Clear   Right Middle Lobe Clear   Right Lower Lobe Diminished;Clear   Left Upper Lobe Clear   Left Lower Lobe Clear;Diminished   Cardiac   Cardiac (WDL) WDL   Cardiac Monitor   Cardiac/Telemetry Monitor On Portable telemetry pack applied   Alarm Audible Yes   Alarms Set Yes   Telemetry Monitor Alarm Parameters 50/120   Gastrointestinal   Abdominal (WDL) WDL   Genitourinary   Genitourinary (WDL) WDL   Peripheral Vascular   Peripheral Vascular (WDL) WDL   Edema None   Skin Integumentary    Skin Integumentary (WDL) X   Skin Color Pale   Skin Condition/Temp Dry;Warm   Skin Integrity Wound (see LDA)   Location right great toe   Musculoskeletal   Musculoskeletal (WDL) X   RUE Weakness   LUE Weakness   RL Extremity Weakness   LL Extremity Weakness   Wound 03/13/24 Toe (Comment  which one) Lateral;Outer;Left scab   Date First Assessed/Time First Assessed: 03/13/24 1514   Present on Original Admission: Yes  Primary Wound Type: Traumatic  Location: Toe (Comment  which one)  Wound Location Orientation: Lateral;Outer;Left  Wound Description (Comments): scab   Wound Etiology Diabetic   Dressing/Treatment Open to air   Wound Assessment   (brown dry callus)   Drainage Amount None (dry)   Odor None   Noris-wound Assessment Dry/flaky;Intact   Care Plan - Metabolic/Fluid and Electrolytes Goals   Electrolytes maintained 
   03/17/24 2100   Assessment   Charting Type Shift assessment   Psychosocial   Psychosocial (WDL) WDL   Neurological   Neuro (WDL) WDL   Level of Consciousness 0   [place] Coma Scale   Eye Opening 4   Best Verbal Response 5   Best Motor Response 6   Karla Coma Scale Score 15   HEENT (Head, Ears, Eyes, Nose, & Throat)   HEENT (WDL) WDL   Respiratory   Respiratory (WDL) WDL   Respiratory Pattern Regular   Respiratory Depth Normal   Respiratory Quality/Effort Unlabored;Dyspnea with exertion   Chest Assessment Chest expansion symmetrical   L Breath Sounds Clear   R Breath Sounds Clear   Breath Sounds   Right Upper Lobe Clear   Right Middle Lobe Clear   Right Lower Lobe Diminished;Clear   Left Upper Lobe Clear   Left Lower Lobe Clear;Diminished   Cardiac   Cardiac (WDL) WDL   Cardiac Monitor   Cardiac/Telemetry Monitor On Portable telemetry pack applied   Alarm Audible Yes   Telemetry Monitor Alarm Parameters 50/120   Gastrointestinal   Abdominal (WDL) WDL   Genitourinary   Genitourinary (WDL) WDL   Peripheral Vascular   Peripheral Vascular (WDL) WDL   Edema None   Skin Integumentary    Skin Integumentary (WDL) X   Skin Color Pale   Skin Condition/Temp Dry;Warm   Skin Integrity Wound (see LDA)   Location R Great Toe   Musculoskeletal   Musculoskeletal (WDL) X   RUE Weakness   LUE Weakness   RL Extremity Weakness   LL Extremity Weakness   Wound 03/13/24 Toe (Comment  which one) Lateral;Outer;Left scab   Date First Assessed/Time First Assessed: 03/13/24 1514   Present on Original Admission: Yes  Primary Wound Type: Traumatic  Location: Toe (Comment  which one)  Wound Location Orientation: Lateral;Outer;Left  Wound Description (Comments): scab   Wound Etiology Diabetic   Dressing/Treatment Open to air   Wound Assessment   (brown dry callus)   Drainage Amount None (dry)   Odor None   Noris-wound Assessment Dry/flaky;Intact       
Planning Goals   Discharge to home or other facility with appropriate resources Identify barriers to discharge with patient and caregiver;Identify discharge learning needs (meds, wound care, etc)     Pt is alert and oriented.  Pt is resting in bed and appears to have no acute distress. Pt currently on room air. Pt currently on telemetry monitoring showing Afib. Pt's lung sounds are clear and diminished. Pt encouraged to cough and deep breathe. Pt call bell and bedside table within reach.

## 2024-03-18 NOTE — PLAN OF CARE
Problem: Discharge Planning  Goal: Discharge to home or other facility with appropriate resources  3/14/2024 2206 by Bronson Lucio RN  Outcome: Progressing  3/14/2024 0830 by Nadia Taylor LPN  Outcome: Progressing  Flowsheets  Taken 3/14/2024 0730 by Nadia Taylor LPN  Discharge to home or other facility with appropriate resources: Identify barriers to discharge with patient and caregiver  Taken 3/14/2024 0011 by Nuria Gabriel RN  Discharge to home or other facility with appropriate resources:   Identify barriers to discharge with patient and caregiver   Refer to discharge planning if patient needs post-hospital services based on physician order or complex needs related to functional status, cognitive ability or social support system     Problem: Safety - Adult  Goal: Free from fall injury  3/14/2024 2206 by Bronson Lucio RN  Outcome: Progressing  3/14/2024 0830 by Nadia Taylor LPN  Outcome: Progressing     Problem: Metabolic/Fluid and Electrolytes - Adult  Goal: Electrolytes maintained within normal limits  3/14/2024 2206 by Bronson Lucio RN  Outcome: Progressing  3/14/2024 0830 by Nadia Taylor LPN  Outcome: Progressing  Flowsheets  Taken 3/14/2024 0730 by Nadia Taylor LPN  Electrolytes maintained within normal limits: Monitor labs and assess patient for signs and symptoms of electrolyte imbalances  Taken 3/14/2024 0011 by Nuria Gabriel RN  Electrolytes maintained within normal limits:   Monitor labs and assess patient for signs and symptoms of electrolyte imbalances   Administer electrolyte replacement as ordered   Monitor response to electrolyte replacements, including repeat lab results as appropriate  Goal: Glucose maintained within prescribed range  3/14/2024 0830 by Nadia Taylor LPN  Outcome: Progressing  Flowsheets  Taken 3/14/2024 0730 by Nadia Taylor LPN  Glucose maintained within prescribed range: Monitor blood glucose as ordered  Taken 3/14/2024 0011 by 
  Problem: Discharge Planning  Goal: Discharge to home or other facility with appropriate resources  3/15/2024 0927 by Temitope Taylor RN  Outcome: Progressing  Flowsheets (Taken 3/15/2024 0822)  Discharge to home or other facility with appropriate resources:   Identify barriers to discharge with patient and caregiver   Identify discharge learning needs (meds, wound care, etc)  3/14/2024 2206 by Bronson Lucio RN  Outcome: Progressing     Problem: Safety - Adult  Goal: Free from fall injury  3/15/2024 0927 by Temitope Taylor RN  Outcome: Progressing  3/14/2024 2206 by Bronson Lucio RN  Outcome: Progressing     Problem: Metabolic/Fluid and Electrolytes - Adult  Goal: Electrolytes maintained within normal limits  3/15/2024 0927 by Temitope Taylor RN  Outcome: Progressing  3/14/2024 2206 by Bronson Lucio RN  Outcome: Progressing  Goal: Glucose maintained within prescribed range  Outcome: Progressing     Problem: Chronic Conditions and Co-morbidities  Goal: Patient's chronic conditions and co-morbidity symptoms are monitored and maintained or improved  3/15/2024 0927 by Temitope Taylor RN  Outcome: Progressing  3/14/2024 2206 by Bronson Lucio RN  Outcome: Progressing     
  Problem: Discharge Planning  Goal: Discharge to home or other facility with appropriate resources  3/15/2024 2234 by Nuris Irby RN  Outcome: Progressing  3/15/2024 0927 by Temitope Taylor RN  Outcome: Progressing  Flowsheets (Taken 3/15/2024 0822)  Discharge to home or other facility with appropriate resources:   Identify barriers to discharge with patient and caregiver   Identify discharge learning needs (meds, wound care, etc)     Problem: Safety - Adult  Goal: Free from fall injury  3/15/2024 2234 by Nuris Irby RN  Outcome: Progressing  3/15/2024 0927 by Temitope Taylor RN  Outcome: Progressing     Problem: Metabolic/Fluid and Electrolytes - Adult  Goal: Electrolytes maintained within normal limits  3/15/2024 2234 by Nuris Irby RN  Outcome: Progressing  3/15/2024 0927 by Temitope Taylor RN  Outcome: Progressing  Goal: Glucose maintained within prescribed range  3/15/2024 2234 by Nuris Irby RN  Outcome: Progressing  3/15/2024 0927 by Temitope Taylor RN  Outcome: Progressing     Problem: Chronic Conditions and Co-morbidities  Goal: Patient's chronic conditions and co-morbidity symptoms are monitored and maintained or improved  3/15/2024 2234 by Nuris Irby RN  Outcome: Progressing  3/15/2024 0927 by Temitope Taylor RN  Outcome: Progressing     
  Problem: Discharge Planning  Goal: Discharge to home or other facility with appropriate resources  Outcome: Progressing  Flowsheets (Taken 3/17/2024 0826)  Discharge to home or other facility with appropriate resources: Identify barriers to discharge with patient and caregiver     Problem: Safety - Adult  Goal: Free from fall injury  3/17/2024 0936 by Nabila Rosen RN  Outcome: Progressing  3/17/2024 0050 by Harsh Breen RN  Outcome: Progressing     Problem: Metabolic/Fluid and Electrolytes - Adult  Goal: Electrolytes maintained within normal limits  3/17/2024 0936 by Nabila Rosen RN  Outcome: Progressing  Flowsheets (Taken 3/17/2024 0826)  Electrolytes maintained within normal limits: Monitor labs and assess patient for signs and symptoms of electrolyte imbalances  3/17/2024 0050 by Harsh Breen RN  Outcome: Progressing  Goal: Glucose maintained within prescribed range  Outcome: Progressing  Flowsheets (Taken 3/17/2024 0826)  Glucose maintained within prescribed range: Monitor blood glucose as ordered     Problem: Chronic Conditions and Co-morbidities  Goal: Patient's chronic conditions and co-morbidity symptoms are monitored and maintained or improved  Outcome: Progressing  Flowsheets (Taken 3/17/2024 0826)  Care Plan - Patient's Chronic Conditions and Co-Morbidity Symptoms are Monitored and Maintained or Improved: Monitor and assess patient's chronic conditions and comorbid symptoms for stability, deterioration, or improvement     
  Problem: Safety - Adult  Goal: Free from fall injury  3/16/2024 0955 by Siri Messer, RN  Outcome: Progressing  3/15/2024 2234 by Nuris Irby, RN  Outcome: Progressing     
  Problem: Safety - Adult  Goal: Free from fall injury  Outcome: Progressing     
  Problem: Safety - Adult  Goal: Free from fall injury  Outcome: Progressing     Problem: Chronic Conditions and Co-morbidities  Goal: Patient's chronic conditions and co-morbidity symptoms are monitored and maintained or improved  Outcome: Progressing     
  Problem: Safety - Adult  Goal: Free from fall injury  Outcome: Progressing     Problem: Metabolic/Fluid and Electrolytes - Adult  Goal: Electrolytes maintained within normal limits  Outcome: Progressing     
are monitored and maintained or improved  Outcome: Progressing  Flowsheets (Taken 3/14/2024 9707)  Care Plan - Patient's Chronic Conditions and Co-Morbidity Symptoms are Monitored and Maintained or Improved: Monitor and assess patient's chronic conditions and comorbid symptoms for stability, deterioration, or improvement

## 2024-03-18 NOTE — CARE COORDINATION
Prior to discharge, told patient of follow-up appointment 3/20/2024 at 3 PM. Reminded her of award forms.

## 2024-03-19 LAB
EKG ATRIAL RATE: 87 BPM
EKG DIAGNOSIS: NORMAL
EKG P AXIS: 60 DEGREES
EKG P-R INTERVAL: 236 MS
EKG Q-T INTERVAL: 406 MS
EKG QRS DURATION: 140 MS
EKG QTC CALCULATION (BAZETT): 488 MS
EKG R AXIS: -10 DEGREES
EKG T AXIS: 85 DEGREES
EKG VENTRICULAR RATE: 87 BPM

## 2024-03-19 NOTE — CARE COORDINATION
Patient states she is feeling good since going home. She said her medicines were explained and she has everything needed to stay at home. Referrals were sent to Dr. Gallegos in podiatry and to  hematology/oncology. A follow-up appointment with Dr. Perez was set for 3/26/2024 at 2:30 PM. She had no questions at the time.

## 2024-03-20 ENCOUNTER — CARE COORDINATION (OUTPATIENT)
Dept: PRIMARY CARE CLINIC | Age: 79
End: 2024-03-20

## 2024-03-20 NOTE — CARE COORDINATION
Care Transitions Initial Follow Up Call    Call within 2 business days of discharge: Yes    Patient Current Location:  Home: [address] [place] [id number]    Care Transition Nurse contacted the patient by telephone to perform post hospital discharge assessment. Verified name and  with patient as identifiers. Provided introduction to self, and explanation of the Care Transition Nurse role.     Patient: Jolanta Montenegro Patient : 1945   MRN: [id number]  Reason for Admission: anemia , UTI  Discharge Date: 3/18/24 RARS: Readmission Risk Score: 18.5      Last Discharge Facility       Date Complaint Diagnosis Description Type Department Provider    3/13/24  Acute cystitis without hematuria ... Admission (Discharged) [facility] Alyx Gottlieb MD            Was this an external facility discharge? No Discharge Facility: [facility]    Challenges to be reviewed by the provider   Additional needs identified to be addressed with provider: Yes  medications-holding eliquis               Method of communication with provider: chart routing.    Jolanta states that she is feeling better and slowly gaining strength.  We discussed her eliquis.  She is requesting a refill of Jardiance but appears there was confusion about pharmacy.  This refill was requested and sent to [facility].  We discussed her HFU appointment.      Care Transition Nurse reviewed discharge instructions with patient who verbalized understanding. The patient was given an opportunity to ask questions and does not have any further questions or concerns at this time. Were discharge instructions available to patient? Yes. Reviewed appropriate site of care based on symptoms and resources available to patient including: PCP. The patient agrees to contact the PCP office for questions related to their healthcare.     Advance Care Planning:   Does patient have an Advance Directive: not on file; education provided.    Medication reconciliation was performed with

## 2024-03-21 LAB
BACTERIA BLD CULT ORG #2: NORMAL
BACTERIA BLD CULT: NORMAL

## 2024-03-22 ENCOUNTER — TELEPHONE (OUTPATIENT)
Dept: SURGERY | Facility: CLINIC | Age: 79
End: 2024-03-22
Payer: MEDICARE

## 2024-03-25 ENCOUNTER — HOSPITAL ENCOUNTER (OUTPATIENT)
Facility: HOSPITAL | Age: 79
Discharge: HOME | End: 2024-03-25

## 2024-03-25 ENCOUNTER — NURSE ONLY (OUTPATIENT)
Dept: PRIMARY CARE CLINIC | Age: 79
End: 2024-03-25
Payer: MEDICARE

## 2024-03-25 DIAGNOSIS — E53.8 VITAMIN B12 DEFICIENCY: Primary | ICD-10-CM

## 2024-03-25 PROCEDURE — 96372 THER/PROPH/DIAG INJ SC/IM: CPT | Performed by: INTERNAL MEDICINE

## 2024-03-25 RX ORDER — CYANOCOBALAMIN 1000 UG/ML
1000 INJECTION, SOLUTION INTRAMUSCULAR; SUBCUTANEOUS ONCE
Status: COMPLETED | OUTPATIENT
Start: 2024-03-25 | End: 2024-03-25

## 2024-03-25 RX ADMIN — CYANOCOBALAMIN 1000 MCG: 1000 INJECTION, SOLUTION INTRAMUSCULAR; SUBCUTANEOUS at 16:21

## 2024-03-25 NOTE — PROGRESS NOTES
Administrations This Visit       cyanocobalamin injection 1,000 mcg       Admin Date  03/25/2024  16:21 Action  Given Dose  1,000 mcg Route  IntraMUSCular Site  Deltoid Right Administered By  Saleem Wynn    Ordering Provider: Alyx Perez MD    NDC: 29296-314-10    Lot#: [id number]    : [facility]    Patient Supplied?: No

## 2024-03-26 ENCOUNTER — HOSPITAL ENCOUNTER (OUTPATIENT)
Facility: HOSPITAL | Age: 79
Discharge: HOME | End: 2024-03-26
Payer: MEDICARE

## 2024-03-26 ENCOUNTER — OFFICE VISIT (OUTPATIENT)
Dept: PRIMARY CARE CLINIC | Age: 79
End: 2024-03-26

## 2024-03-26 VITALS
WEIGHT: 185 LBS | RESPIRATION RATE: 18 BRPM | SYSTOLIC BLOOD PRESSURE: 120 MMHG | DIASTOLIC BLOOD PRESSURE: 64 MMHG | OXYGEN SATURATION: 100 % | BODY MASS INDEX: 36.13 KG/M2 | HEART RATE: 105 BPM

## 2024-03-26 DIAGNOSIS — K31.89 GASTRIC NODULE: ICD-10-CM

## 2024-03-26 DIAGNOSIS — E66.01 SEVERE OBESITY (BMI 35.0-39.9) WITH COMORBIDITY (HCC): ICD-10-CM

## 2024-03-26 DIAGNOSIS — I50.22 CHRONIC SYSTOLIC CONGESTIVE HEART FAILURE (HCC): ICD-10-CM

## 2024-03-26 DIAGNOSIS — E11.69 TYPE 2 DIABETES MELLITUS WITH OTHER SPECIFIED COMPLICATION, WITHOUT LONG-TERM CURRENT USE OF INSULIN (HCC): ICD-10-CM

## 2024-03-26 DIAGNOSIS — I48.0 PAROXYSMAL ATRIAL FIBRILLATION (HCC): ICD-10-CM

## 2024-03-26 DIAGNOSIS — D50.9 IRON DEFICIENCY ANEMIA, UNSPECIFIED IRON DEFICIENCY ANEMIA TYPE: Primary | ICD-10-CM

## 2024-03-26 DIAGNOSIS — Z09 HOSPITAL DISCHARGE FOLLOW-UP: ICD-10-CM

## 2024-03-26 LAB
ALBUMIN SERPL-MCNC: 3.8 G/DL (ref 3.4–4.8)
ALBUMIN/GLOB SERPL: 1.6 {RATIO} (ref 0.8–2)
ALP SERPL-CCNC: 121 U/L (ref 25–100)
ALT SERPL-CCNC: 12 U/L (ref 4–36)
ANION GAP SERPL CALCULATED.3IONS-SCNC: 16 MMOL/L (ref 3–16)
AST SERPL-CCNC: 13 U/L (ref 8–33)
BILIRUB SERPL-MCNC: 0.3 MG/DL (ref 0.3–1.2)
BUN SERPL-MCNC: 23 MG/DL (ref 6–20)
CALCIUM SERPL-MCNC: 9.2 MG/DL (ref 8.5–10.5)
CHLORIDE SERPL-SCNC: 98 MMOL/L (ref 98–107)
CO2 SERPL-SCNC: 24 MMOL/L (ref 20–30)
CREAT SERPL-MCNC: 1.5 MG/DL (ref 0.4–1.2)
ERYTHROCYTE [DISTWIDTH] IN BLOOD BY AUTOMATED COUNT: 21.9 % (ref 11–16)
GFR SERPLBLD CREATININE-BSD FMLA CKD-EPI: 36 ML/MIN/{1.73_M2}
GLOBULIN SER CALC-MCNC: 2.4 G/DL
GLUCOSE SERPL-MCNC: 391 MG/DL (ref 74–106)
HCT VFR BLD AUTO: 35 % (ref 37–47)
HGB BLD-MCNC: 10.2 G/DL (ref 11.5–16.5)
MCH RBC QN AUTO: 27 PG (ref 27–32)
MCHC RBC AUTO-ENTMCNC: 29.1 G/DL (ref 31–35)
MCV RBC AUTO: 92.6 FL (ref 80–100)
PLATELET # BLD AUTO: 149 K/UL (ref 150–400)
PMV BLD AUTO: 12 FL (ref 6–10)
POTASSIUM SERPL-SCNC: 4.6 MMOL/L (ref 3.4–5.1)
PROT SERPL-MCNC: 6.2 G/DL (ref 6.4–8.3)
RBC # BLD AUTO: 3.78 M/UL (ref 3.8–5.8)
REASON FOR REJECTION: NORMAL
REJECTED TEST: NORMAL
SODIUM SERPL-SCNC: 138 MMOL/L (ref 136–145)
WBC # BLD AUTO: 9.9 K/UL (ref 4–11)

## 2024-03-26 PROCEDURE — 85027 COMPLETE CBC AUTOMATED: CPT

## 2024-03-26 PROCEDURE — 80053 COMPREHEN METABOLIC PANEL: CPT

## 2024-03-26 PROCEDURE — 36415 COLL VENOUS BLD VENIPUNCTURE: CPT

## 2024-03-26 ASSESSMENT — PATIENT HEALTH QUESTIONNAIRE - PHQ9
SUM OF ALL RESPONSES TO PHQ QUESTIONS 1-9: 0
SUM OF ALL RESPONSES TO PHQ9 QUESTIONS 1 & 2: 0
SUM OF ALL RESPONSES TO PHQ QUESTIONS 1-9: 0
SUM OF ALL RESPONSES TO PHQ QUESTIONS 1-9: 0
1. LITTLE INTEREST OR PLEASURE IN DOING THINGS: NOT AT ALL
SUM OF ALL RESPONSES TO PHQ QUESTIONS 1-9: 0
2. FEELING DOWN, DEPRESSED OR HOPELESS: NOT AT ALL

## 2024-03-26 ASSESSMENT — ENCOUNTER SYMPTOMS
ABDOMINAL PAIN: 0
WHEEZING: 0
SHORTNESS OF BREATH: 0
SORE THROAT: 0
SINUS PRESSURE: 0
VOMITING: 0
COUGH: 0
BACK PAIN: 1
NAUSEA: 0
EYE DISCHARGE: 0

## 2024-03-26 NOTE — PROGRESS NOTES
Chief Complaint   Patient presents with    Medicare AWV       Have you seen any other physician or provider since your last visit yes - [facility]    Have you had any other diagnostic tests since your last visit? yes -     Have you changed or stopped any medications since your last visit? yes -      
Products     Topamax [Topiramate] Other (See Comments)     Makes her feel drunk.       Medications listed as ordered at the time of discharge from hospital     Medication List            Accurate as of March 26, 2024  3:25 PM. If you have any questions, ask your nurse or doctor.                CONTINUE taking these medications      atorvastatin 40 MG tablet  Commonly known as: LIPITOR     blood glucose monitor kit and supplies  DX E11.9 TID     blood glucose test strips  Test TID DX E11.9     citalopram 10 MG tablet  Commonly known as: CELEXA  TAKE 1 TABLET BY MOUTH DAILY     docusate 100 MG Caps  Commonly known as: COLACE, DULCOLAX  Take 100 mg by mouth daily     empagliflozin 10 MG tablet  Commonly known as: Jardiance  Take 1 tablet by mouth daily     ferrous sulfate 324 (65 Fe) MG EC tablet  Take 1 tablet by mouth 2 times daily (with meals)     furosemide 20 MG tablet  Commonly known as: LASIX  Take 1 tablet by mouth daily     GLASPAK DISP SYRINGE 2.5CC 2.5 ML Misc  Generic drug: Syringe (Disposable)  For IM B12 injection q2w     glipiZIDE 10 MG extended release tablet  Commonly known as: GLUCOTROL XL  TAKE 1 TABLET BY MOUTH DAILY     * Lancets Misc  QD and PRN     * Lancets Misc  1 each by Does not apply route 3 times daily DX E11.9 TID     levocetirizine 5 MG tablet  Commonly known as: XYZAL  TAKE 1 TABLET BY MOUTH EVERY NIGHT     levothyroxine 150 MCG tablet  Commonly known as: SYNTHROID  Take 1 tablet by mouth daily     magnesium oxide 400 (240 Mg) MG tablet  Commonly known as: MAG-OX  Take 1 tablet by mouth daily     metoprolol succinate 100 MG extended release tablet  Commonly known as: TOPROL XL  Take 1 tablet by mouth daily     montelukast 10 MG tablet  Commonly known as: SINGULAIR  TAKE 1 TABLET BY MOUTH EVERY NIGHT     oxyCODONE 15 MG immediate release tablet  Commonly known as: OXY-IR     Ozempic (0.25 or 0.5 MG/DOSE) 2 MG/3ML Sopn  Generic drug: Semaglutide(0.25 or 0.5MG/DOS)  Inject 0.25 mg into the

## 2024-03-28 ENCOUNTER — OUTSIDE FACILITY SERVICE (OUTPATIENT)
Dept: SURGERY | Facility: CLINIC | Age: 79
End: 2024-03-28
Payer: MEDICARE

## 2024-03-28 ENCOUNTER — ANESTHESIA (OUTPATIENT)
Dept: ENDOSCOPY | Facility: HOSPITAL | Age: 79
End: 2024-03-28
Payer: MEDICARE

## 2024-03-28 ENCOUNTER — ANESTHESIA EVENT (OUTPATIENT)
Dept: ENDOSCOPY | Facility: HOSPITAL | Age: 79
End: 2024-03-28
Payer: MEDICARE

## 2024-03-28 ENCOUNTER — HOSPITAL ENCOUNTER (OUTPATIENT)
Facility: HOSPITAL | Age: 79
Setting detail: OUTPATIENT SURGERY
Discharge: HOME | End: 2024-03-28
Attending: SURGERY | Admitting: SURGERY
Payer: MEDICARE

## 2024-03-28 VITALS
TEMPERATURE: 98.1 F | BODY MASS INDEX: 36.32 KG/M2 | SYSTOLIC BLOOD PRESSURE: 124 MMHG | HEART RATE: 87 BPM | DIASTOLIC BLOOD PRESSURE: 73 MMHG | WEIGHT: 185 LBS | RESPIRATION RATE: 18 BRPM | OXYGEN SATURATION: 94 % | HEIGHT: 60 IN

## 2024-03-28 LAB
GLUCOSE BLD-MCNC: 183 MG/DL (ref 74–106)
PERFORMED ON: ABNORMAL

## 2024-03-28 PROCEDURE — 45384 COLONOSCOPY W/LESION REMOVAL: CPT | Performed by: SURGERY

## 2024-03-28 PROCEDURE — 45385 COLONOSCOPY W/LESION REMOVAL: CPT | Performed by: SURGERY

## 2024-03-28 PROCEDURE — 7100000010 HC PHASE II RECOVERY - FIRST 15 MIN: Performed by: SURGERY

## 2024-03-28 PROCEDURE — 3700000000 HC ANESTHESIA ATTENDED CARE: Performed by: SURGERY

## 2024-03-28 PROCEDURE — 3700000001 HC ADD 15 MINUTES (ANESTHESIA): Performed by: SURGERY

## 2024-03-28 PROCEDURE — 2500000003 HC RX 250 WO HCPCS: Performed by: NURSE ANESTHETIST, CERTIFIED REGISTERED

## 2024-03-28 PROCEDURE — 7100000011 HC PHASE II RECOVERY - ADDTL 15 MIN: Performed by: SURGERY

## 2024-03-28 PROCEDURE — 6360000002 HC RX W HCPCS: Performed by: NURSE ANESTHETIST, CERTIFIED REGISTERED

## 2024-03-28 PROCEDURE — 2709999900 HC NON-CHARGEABLE SUPPLY: Performed by: SURGERY

## 2024-03-28 PROCEDURE — 43239 EGD BIOPSY SINGLE/MULTIPLE: CPT | Performed by: SURGERY

## 2024-03-28 PROCEDURE — 3609010400 HC COLONOSCOPY POLYPECTOMY HOT BIOPSY: Performed by: SURGERY

## 2024-03-28 PROCEDURE — 2580000003 HC RX 258: Performed by: SURGERY

## 2024-03-28 PROCEDURE — 3609012400 HC EGD TRANSORAL BIOPSY SINGLE/MULTIPLE: Performed by: SURGERY

## 2024-03-28 RX ORDER — LANOLIN ALCOHOL/MO/W.PET/CERES
400 CREAM (GRAM) TOPICAL DAILY
Qty: 30 TABLET | Refills: 0 | Status: SHIPPED | OUTPATIENT
Start: 2024-03-28

## 2024-03-28 RX ORDER — SODIUM CHLORIDE, SODIUM LACTATE, POTASSIUM CHLORIDE, CALCIUM CHLORIDE 600; 310; 30; 20 MG/100ML; MG/100ML; MG/100ML; MG/100ML
INJECTION, SOLUTION INTRAVENOUS CONTINUOUS
Status: DISCONTINUED | OUTPATIENT
Start: 2024-03-28 | End: 2024-03-28 | Stop reason: HOSPADM

## 2024-03-28 RX ORDER — PROPOFOL 10 MG/ML
INJECTION, EMULSION INTRAVENOUS PRN
Status: DISCONTINUED | OUTPATIENT
Start: 2024-03-28 | End: 2024-03-28 | Stop reason: SDUPTHER

## 2024-03-28 RX ADMIN — PROPOFOL 40 MG: 10 INJECTION, EMULSION INTRAVENOUS at 08:57

## 2024-03-28 RX ADMIN — PROPOFOL 50 MG: 10 INJECTION, EMULSION INTRAVENOUS at 08:47

## 2024-03-28 RX ADMIN — LIDOCAINE HYDROCHLORIDE 30 MG: 10 INJECTION, SOLUTION INFILTRATION; PERINEURAL at 08:35

## 2024-03-28 RX ADMIN — PROPOFOL 50 MG: 10 INJECTION, EMULSION INTRAVENOUS at 08:35

## 2024-03-28 RX ADMIN — PROPOFOL 50 MG: 10 INJECTION, EMULSION INTRAVENOUS at 08:52

## 2024-03-28 RX ADMIN — PROPOFOL 30 MG: 10 INJECTION, EMULSION INTRAVENOUS at 09:02

## 2024-03-28 RX ADMIN — PROPOFOL 40 MG: 10 INJECTION, EMULSION INTRAVENOUS at 08:41

## 2024-03-28 RX ADMIN — SODIUM CHLORIDE, POTASSIUM CHLORIDE, SODIUM LACTATE AND CALCIUM CHLORIDE: 600; 310; 30; 20 INJECTION, SOLUTION INTRAVENOUS at 08:25

## 2024-03-28 RX ADMIN — PROPOFOL 30 MG: 10 INJECTION, EMULSION INTRAVENOUS at 08:38

## 2024-03-28 ASSESSMENT — ENCOUNTER SYMPTOMS
DYSPNEA ACTIVITY LEVEL: AFTER AMBULATING 1 FLIGHT OF STAIRS
SHORTNESS OF BREATH: 1

## 2024-03-28 ASSESSMENT — PAIN - FUNCTIONAL ASSESSMENT
PAIN_FUNCTIONAL_ASSESSMENT: NONE - DENIES PAIN
PAIN_FUNCTIONAL_ASSESSMENT: 0-10
PAIN_FUNCTIONAL_ASSESSMENT: NONE - DENIES PAIN
PAIN_FUNCTIONAL_ASSESSMENT: NONE - DENIES PAIN

## 2024-03-28 NOTE — PROGRESS NOTES
Pt arjun PO well without N/V.  IV DC'd with tip intact and pressure held.  DC instructions with F/U given without questions.  Condition stable.  Belongings with pt.

## 2024-03-28 NOTE — PROGRESS NOTES
Pt arrived in wheel chair to endoscopy. Grand son Gorge to drive home. Request MD call her dgt Jo Ann after procedure, [phone]. Consent verified without questions. LR at 80cc/hr per gravity. Prep completed and effective per pt. DC instructions completed with pt prior to sedation. No questions at this time. Denies need to void. Will continue to monitor closely.

## 2024-03-28 NOTE — OP NOTE
Olympus videoendoscope was placed in the patient's mouth and under direct visualization passed into the esophagus. The scope was ultimately passed to the second portion duodenum. Cold biopsy forceps biopsies were obtained of gastric antral mucosa. Visualization was performed during both introduction and withdrawal of the endoscope and retroflexed view of the proximal stomach was obtained.     Findings::   Esophagus: Hiatal Hernia with reflux esophagitis; normal esophageal contractions. The findings do not support a diagnosis of Perez's Esophagus.  Stomach: Gastric nodule antrum, biopsy x 4 obtained.  Improved gastritis in the cardia of the stomach.  Duodenum: Normal       Recommendations: Continue PPI medication, would recommend keeping patient off Eliquis for at least 2 weeks.  Repeat colonoscopy in 5 years    Electronically signed by Catrachito Otto MD, FACS on 3/28/2024 at 9:22 AM

## 2024-03-28 NOTE — H&P
HISTORY & PHYSICAL      Patient Name: Jolanta Montenegro      Medical Record Number: [id number]       Date of Service: 3/28/2024      I have reviewed the consult or history and physical from 3/21/2024.  There have been no significant changes in the patient's history or exam.  There have been no changes in the patient's medications.    Past Surgical History:   Procedure Laterality Date    CARPAL TUNNEL RELEASE      CHOLECYSTECTOMY      COLONOSCOPY N/A 3/14/2024    COLORECTAL CANCER SCREENING, NOT HIGH RISK performed by Catrachito Otto MD at [facility] ENDOSCOPY    FRACTURE SURGERY  1992/1996    t12    HEMORRHOID SURGERY      [place] OPEN REPAIR OF ROTATOR CUFF ACUTE  11/06/2014    Rotator Cuff Repair    SHOULDER SURGERY      TOTAL THYROIDECTOMY  03/2015    UPPER GASTROINTESTINAL ENDOSCOPY N/A 3/14/2024    ESOPHAGOGASTRODUODENOSCOPY BIOPSY performed by Catrachito Otto MD at [facility] ENDOSCOPY        Past Medical History:   Diagnosis Date    Anxiety     Atrial fibrillation (HCC)     Chronic back pain     Crohn's disease (HCC) 03/01/1995    Heart failure (HCC)     Hiatal hernia     Hypertension     Inflammatory polyarthropathy (HCC)     Obesity     Osteoarthritis     right shoulder    Osteopenia     Reflux esophagitis     Type II or unspecified type diabetes mellitus without mention of complication, not stated as uncontrolled     Urinary incontinence     Vitamin B12 deficiency         Current Facility-Administered Medications   Medication Dose Route Frequency Provider Last Rate Last Admin    lactated ringers IV soln infusion   IntraVENous Continuous Catrachito Otto  mL/hr at 03/28/24 0829 Restarted at 03/28/24 0907        Electronically signed by Catrachito Otto MD on 3/28/2024 at 9:22 AM

## 2024-03-28 NOTE — PROGRESS NOTES
Pt to room via stretcher, NAD, No C/O noted.  Assessment  Completed.  Awakens easily.  Abd soft/NT/ND; + flatus, +BS, Denies N/V/abdpain.

## 2024-03-28 NOTE — PROGRESS NOTES
Cecal polyp removed with snare/cautery but not retrieved. Specimen not in polyp trap after removal and suctioning. Dr. Fam trent.

## 2024-03-28 NOTE — PROGRESS NOTES
Pt arrived ambulatory.  Pt Sherwin ortiz, to drive home.  Consent verified without questions. LR @ 75 per gravity.  Prep complete and effective per patient.  DC instructions completed with patient.  No questions at this time.  Denies need to void at this time.  Will continue to monitor closely.

## 2024-03-28 NOTE — ANESTHESIA POSTPROCEDURE EVALUATION
Department of Anesthesiology  Postprocedure Note    Patient: Jolanta Montenegro  MRN: [id number]  YOB: 1945  Date of evaluation: 3/28/2024    Procedure Summary       Date: 03/28/24 Room / Location: [address]    Anesthesia Start: 0829 Anesthesia Stop: 0913    Procedures:       ESOPHAGOGASTRODUODENOSCOPY BIOPSY      COLORECTAL CANCER SCREENING, NOT HIGH RISK Diagnosis: Iron deficiency anemia due to chronic blood loss    Surgeons: Catrachito Otto MD Responsible Provider: Gabriel Villarreal APRN - CRNA    Anesthesia Type: MAC ASA Status: 4            Anesthesia Type: MAC    Angelina Phase I: Angelina Score: 10    Angelina Phase II: Angelina Score: 10    Anesthesia Post Evaluation    Patient location during evaluation: bedside  Patient participation: complete - patient participated  Level of consciousness: awake and alert  Airway patency: patent  Nausea & Vomiting: no nausea and no vomiting  Cardiovascular status: blood pressure returned to baseline  Respiratory status: acceptable  Hydration status: stable  Pain management: adequate    No notable events documented.

## 2024-04-15 RX ORDER — SITAGLIPTIN 50 MG/1
50 TABLET, FILM COATED ORAL DAILY
Qty: 30 TABLET | Refills: 3 | OUTPATIENT
Start: 2024-04-15

## 2024-04-15 RX ORDER — LEVOTHYROXINE SODIUM 0.15 MG/1
150 TABLET ORAL DAILY
Qty: 30 TABLET | Refills: 3 | Status: SHIPPED | OUTPATIENT
Start: 2024-04-15

## 2024-04-23 ENCOUNTER — LAB (OUTPATIENT)
Dept: LAB | Facility: HOSPITAL | Age: 79
End: 2024-04-23
Payer: MEDICARE

## 2024-04-23 ENCOUNTER — CONSULT (OUTPATIENT)
Dept: ONCOLOGY | Facility: CLINIC | Age: 79
End: 2024-04-23
Payer: MEDICARE

## 2024-04-23 VITALS
BODY MASS INDEX: 36.91 KG/M2 | DIASTOLIC BLOOD PRESSURE: 78 MMHG | HEART RATE: 103 BPM | TEMPERATURE: 97.4 F | OXYGEN SATURATION: 92 % | RESPIRATION RATE: 18 BRPM | WEIGHT: 188 LBS | SYSTOLIC BLOOD PRESSURE: 132 MMHG | HEIGHT: 60 IN

## 2024-04-23 DIAGNOSIS — D72.828 OTHER ELEVATED WHITE BLOOD CELL (WBC) COUNT: ICD-10-CM

## 2024-04-23 DIAGNOSIS — D64.9 ANEMIA, UNSPECIFIED TYPE: ICD-10-CM

## 2024-04-23 DIAGNOSIS — D72.828 OTHER ELEVATED WHITE BLOOD CELL (WBC) COUNT: Primary | ICD-10-CM

## 2024-04-23 PROBLEM — D72.829 LEUKOCYTOSIS: Status: ACTIVE | Noted: 2024-04-23

## 2024-04-23 LAB
ALBUMIN SERPL-MCNC: 3.8 G/DL (ref 3.5–5.2)
ALBUMIN/GLOB SERPL: 1.1 G/DL
ALP SERPL-CCNC: 144 U/L (ref 39–117)
ALT SERPL W P-5'-P-CCNC: 10 U/L (ref 1–33)
ANION GAP SERPL CALCULATED.3IONS-SCNC: 12 MMOL/L (ref 5–15)
AST SERPL-CCNC: 16 U/L (ref 1–32)
BASOPHILS # BLD AUTO: 0.03 10*3/MM3 (ref 0–0.2)
BASOPHILS NFR BLD AUTO: 0.2 % (ref 0–1.5)
BILIRUB SERPL-MCNC: 0.4 MG/DL (ref 0–1.2)
BUN SERPL-MCNC: 10 MG/DL (ref 8–23)
BUN/CREAT SERPL: 9 (ref 7–25)
CALCIUM SPEC-SCNC: 9.6 MG/DL (ref 8.6–10.5)
CHLORIDE SERPL-SCNC: 99 MMOL/L (ref 98–107)
CO2 SERPL-SCNC: 31 MMOL/L (ref 22–29)
CREAT SERPL-MCNC: 1.11 MG/DL (ref 0.57–1)
CRP SERPL-MCNC: 3.17 MG/DL (ref 0–0.5)
DEPRECATED RDW RBC AUTO: 59.6 FL (ref 37–54)
EGFRCR SERPLBLD CKD-EPI 2021: 51.6 ML/MIN/1.73
EOSINOPHIL # BLD AUTO: 0.16 10*3/MM3 (ref 0–0.4)
EOSINOPHIL NFR BLD AUTO: 1.1 % (ref 0.3–6.2)
ERYTHROCYTE [DISTWIDTH] IN BLOOD BY AUTOMATED COUNT: 17.3 % (ref 12.3–15.4)
ERYTHROCYTE [SEDIMENTATION RATE] IN BLOOD: 99 MM/HR (ref 0–30)
FERRITIN SERPL-MCNC: 146.9 NG/ML (ref 13–150)
GLOBULIN UR ELPH-MCNC: 3.6 GM/DL
GLUCOSE SERPL-MCNC: 186 MG/DL (ref 65–99)
HCT VFR BLD AUTO: 40.9 % (ref 34–46.6)
HGB BLD-MCNC: 12.6 G/DL (ref 12–15.9)
IMM GRANULOCYTES # BLD AUTO: 0.11 10*3/MM3 (ref 0–0.05)
IMM GRANULOCYTES NFR BLD AUTO: 0.7 % (ref 0–0.5)
IRON 24H UR-MRATE: 34 MCG/DL (ref 37–145)
IRON SATN MFR SERPL: 8 % (ref 20–50)
LDH SERPL-CCNC: 181 U/L (ref 135–214)
LYMPHOCYTES # BLD AUTO: 2.54 10*3/MM3 (ref 0.7–3.1)
LYMPHOCYTES NFR BLD AUTO: 16.9 % (ref 19.6–45.3)
MCH RBC QN AUTO: 28.3 PG (ref 26.6–33)
MCHC RBC AUTO-ENTMCNC: 30.8 G/DL (ref 31.5–35.7)
MCV RBC AUTO: 91.9 FL (ref 79–97)
MONOCYTES # BLD AUTO: 0.71 10*3/MM3 (ref 0.1–0.9)
MONOCYTES NFR BLD AUTO: 4.7 % (ref 5–12)
NEUTROPHILS NFR BLD AUTO: 11.51 10*3/MM3 (ref 1.7–7)
NEUTROPHILS NFR BLD AUTO: 76.4 % (ref 42.7–76)
PLATELET # BLD AUTO: 201 10*3/MM3 (ref 140–450)
PMV BLD AUTO: 11 FL (ref 6–12)
POTASSIUM SERPL-SCNC: 3.8 MMOL/L (ref 3.5–5.2)
PROT SERPL-MCNC: 7.4 G/DL (ref 6–8.5)
RBC # BLD AUTO: 4.45 10*6/MM3 (ref 3.77–5.28)
RETICS # AUTO: 0.17 10*6/MM3 (ref 0.02–0.13)
RETICS/RBC NFR AUTO: 3.75 % (ref 0.7–1.9)
SODIUM SERPL-SCNC: 142 MMOL/L (ref 136–145)
TIBC SERPL-MCNC: 414 MCG/DL (ref 298–536)
TRANSFERRIN SERPL-MCNC: 278 MG/DL (ref 200–360)
WBC NRBC COR # BLD AUTO: 15.06 10*3/MM3 (ref 3.4–10.8)

## 2024-04-23 PROCEDURE — 83540 ASSAY OF IRON: CPT

## 2024-04-23 PROCEDURE — 86140 C-REACTIVE PROTEIN: CPT

## 2024-04-23 PROCEDURE — 82607 VITAMIN B-12: CPT

## 2024-04-23 PROCEDURE — 85247 CLOT FACTOR VIII MULTIMETRIC: CPT

## 2024-04-23 PROCEDURE — 82746 ASSAY OF FOLIC ACID SERUM: CPT

## 2024-04-23 PROCEDURE — 85045 AUTOMATED RETICULOCYTE COUNT: CPT

## 2024-04-23 PROCEDURE — 83010 ASSAY OF HAPTOGLOBIN QUANT: CPT

## 2024-04-23 PROCEDURE — 3078F DIAST BP <80 MM HG: CPT | Performed by: INTERNAL MEDICINE

## 2024-04-23 PROCEDURE — 82728 ASSAY OF FERRITIN: CPT

## 2024-04-23 PROCEDURE — 85246 CLOT FACTOR VIII VW ANTIGEN: CPT

## 2024-04-23 PROCEDURE — 83615 LACTATE (LD) (LDH) ENZYME: CPT

## 2024-04-23 PROCEDURE — 85060 BLOOD SMEAR INTERPRETATION: CPT

## 2024-04-23 PROCEDURE — 85240 CLOT FACTOR VIII AHG 1 STAGE: CPT

## 2024-04-23 PROCEDURE — 36415 COLL VENOUS BLD VENIPUNCTURE: CPT

## 2024-04-23 PROCEDURE — 84466 ASSAY OF TRANSFERRIN: CPT

## 2024-04-23 PROCEDURE — 85245 CLOT FACTOR VIII VW RISTOCTN: CPT

## 2024-04-23 PROCEDURE — 80053 COMPREHEN METABOLIC PANEL: CPT

## 2024-04-23 PROCEDURE — 3075F SYST BP GE 130 - 139MM HG: CPT | Performed by: INTERNAL MEDICINE

## 2024-04-23 PROCEDURE — 85652 RBC SED RATE AUTOMATED: CPT

## 2024-04-23 PROCEDURE — 99204 OFFICE O/P NEW MOD 45 MIN: CPT | Performed by: INTERNAL MEDICINE

## 2024-04-23 PROCEDURE — 85025 COMPLETE CBC W/AUTO DIFF WBC: CPT

## 2024-04-23 PROCEDURE — 1126F AMNT PAIN NOTED NONE PRSNT: CPT | Performed by: INTERNAL MEDICINE

## 2024-04-23 RX ORDER — SUCRALFATE 1 G/1
1 TABLET ORAL 4 TIMES DAILY
COMMUNITY
Start: 2024-03-18

## 2024-04-23 RX ORDER — PANTOPRAZOLE SODIUM 40 MG/1
40 TABLET, DELAYED RELEASE ORAL DAILY
COMMUNITY
Start: 2024-03-18

## 2024-04-23 RX ORDER — SITAGLIPTIN 50 MG/1
1 TABLET, FILM COATED ORAL DAILY
COMMUNITY
Start: 2024-01-22

## 2024-04-23 RX ORDER — PNV NO.95/FERROUS FUM/FOLIC AC 28MG-0.8MG
325 TABLET ORAL 2 TIMES DAILY
COMMUNITY
Start: 2024-03-18

## 2024-04-24 LAB
CYTOLOGIST CVX/VAG CYTO: NORMAL
FOLATE SERPL-MCNC: 16.2 NG/ML (ref 4.78–24.2)
HAPTOGLOB SERPL-MCNC: 325 MG/DL (ref 30–200)
PATH INTERP BLD-IMP: NORMAL
VIT B12 BLD-MCNC: 1569 PG/ML (ref 211–946)

## 2024-04-25 LAB
FACT VIII ACT/NOR PPP: 172 % (ref 56–140)
Lab: NORMAL
VWF AG ACT/NOR PPP IA: 185 % (ref 50–200)

## 2024-04-25 RX ORDER — LANOLIN ALCOHOL/MO/W.PET/CERES
400 CREAM (GRAM) TOPICAL DAILY
Qty: 30 TABLET | Refills: 0 | Status: SHIPPED | OUTPATIENT
Start: 2024-04-25

## 2024-04-25 RX ORDER — SEMAGLUTIDE 0.68 MG/ML
INJECTION, SOLUTION SUBCUTANEOUS
Qty: 3 ML | Refills: 0 | Status: SHIPPED | OUTPATIENT
Start: 2024-04-25

## 2024-04-27 LAB — VWF:RCO ACT/NOR PPP PL AGG: 11 % (ref 50–200)

## 2024-04-29 ENCOUNTER — TELEPHONE (OUTPATIENT)
Dept: ONCOLOGY | Facility: CLINIC | Age: 79
End: 2024-04-29
Payer: MEDICARE

## 2024-05-01 ENCOUNTER — OFFICE VISIT (OUTPATIENT)
Dept: PRIMARY CARE CLINIC | Age: 79
End: 2024-05-01
Payer: MEDICARE

## 2024-05-01 VITALS
SYSTOLIC BLOOD PRESSURE: 140 MMHG | BODY MASS INDEX: 35.35 KG/M2 | DIASTOLIC BLOOD PRESSURE: 64 MMHG | OXYGEN SATURATION: 94 % | WEIGHT: 181 LBS | RESPIRATION RATE: 18 BRPM | HEART RATE: 87 BPM

## 2024-05-01 DIAGNOSIS — D72.829 LEUKOCYTOSIS, UNSPECIFIED TYPE: ICD-10-CM

## 2024-05-01 DIAGNOSIS — E03.9 HYPOTHYROIDISM, UNSPECIFIED TYPE: ICD-10-CM

## 2024-05-01 DIAGNOSIS — I50.22 CHRONIC SYSTOLIC CONGESTIVE HEART FAILURE (HCC): ICD-10-CM

## 2024-05-01 DIAGNOSIS — E11.69 TYPE 2 DIABETES MELLITUS WITH OTHER SPECIFIED COMPLICATION, WITHOUT LONG-TERM CURRENT USE OF INSULIN (HCC): Primary | ICD-10-CM

## 2024-05-01 DIAGNOSIS — D50.9 IRON DEFICIENCY ANEMIA, UNSPECIFIED IRON DEFICIENCY ANEMIA TYPE: ICD-10-CM

## 2024-05-01 DIAGNOSIS — E53.8 VITAMIN B12 DEFICIENCY: ICD-10-CM

## 2024-05-01 DIAGNOSIS — I48.0 PAROXYSMAL ATRIAL FIBRILLATION (HCC): ICD-10-CM

## 2024-05-01 DIAGNOSIS — R79.89 ELEVATED SERUM CREATININE: ICD-10-CM

## 2024-05-01 PROBLEM — I50.21 ACUTE SYSTOLIC HEART FAILURE (HCC): Status: RESOLVED | Noted: 2023-09-24 | Resolved: 2024-05-01

## 2024-05-01 PROBLEM — N30.00 ACUTE CYSTITIS WITHOUT HEMATURIA: Status: RESOLVED | Noted: 2024-03-14 | Resolved: 2024-05-01

## 2024-05-01 PROBLEM — J18.9 PNEUMONIA: Status: RESOLVED | Noted: 2023-09-24 | Resolved: 2024-05-01

## 2024-05-01 PROBLEM — J96.01 ACUTE RESPIRATORY FAILURE WITH HYPOXIA (HCC): Status: RESOLVED | Noted: 2023-09-24 | Resolved: 2024-05-01

## 2024-05-01 PROBLEM — R21 RASH: Status: RESOLVED | Noted: 2018-09-06 | Resolved: 2024-05-01

## 2024-05-01 PROCEDURE — 3077F SYST BP >= 140 MM HG: CPT | Performed by: INTERNAL MEDICINE

## 2024-05-01 PROCEDURE — G8400 PT W/DXA NO RESULTS DOC: HCPCS | Performed by: INTERNAL MEDICINE

## 2024-05-01 PROCEDURE — 3046F HEMOGLOBIN A1C LEVEL >9.0%: CPT | Performed by: INTERNAL MEDICINE

## 2024-05-01 PROCEDURE — 1090F PRES/ABSN URINE INCON ASSESS: CPT | Performed by: INTERNAL MEDICINE

## 2024-05-01 PROCEDURE — 3078F DIAST BP <80 MM HG: CPT | Performed by: INTERNAL MEDICINE

## 2024-05-01 PROCEDURE — 99214 OFFICE O/P EST MOD 30 MIN: CPT | Performed by: INTERNAL MEDICINE

## 2024-05-01 PROCEDURE — 96372 THER/PROPH/DIAG INJ SC/IM: CPT | Performed by: INTERNAL MEDICINE

## 2024-05-01 PROCEDURE — G8417 CALC BMI ABV UP PARAM F/U: HCPCS | Performed by: INTERNAL MEDICINE

## 2024-05-01 PROCEDURE — 1124F ACP DISCUSS-NO DSCNMKR DOCD: CPT | Performed by: INTERNAL MEDICINE

## 2024-05-01 PROCEDURE — 1036F TOBACCO NON-USER: CPT | Performed by: INTERNAL MEDICINE

## 2024-05-01 PROCEDURE — G8427 DOCREV CUR MEDS BY ELIG CLIN: HCPCS | Performed by: INTERNAL MEDICINE

## 2024-05-01 RX ORDER — CYANOCOBALAMIN 1000 UG/ML
1000 INJECTION, SOLUTION INTRAMUSCULAR; SUBCUTANEOUS ONCE
Status: COMPLETED | OUTPATIENT
Start: 2024-05-01 | End: 2024-05-01

## 2024-05-01 RX ADMIN — CYANOCOBALAMIN 1000 MCG: 1000 INJECTION, SOLUTION INTRAMUSCULAR; SUBCUTANEOUS at 15:05

## 2024-05-01 NOTE — PROGRESS NOTES
Chief Complaint   Patient presents with    Diabetes    Congestive Heart Failure    Atrial Fibrillation     Fs     Have you seen any other physician or provider since your last visit yes - sixto , oncology    Have you had any other diagnostic tests since your last visit? yes -     Have you changed or stopped any medications since your last visit? no       
 Negative for dysuria, frequency and urgency.   Musculoskeletal:  Positive for back pain and gait problem. Negative for arthralgias.   Skin:  Negative for rash and wound.   Neurological:  Positive for weakness (Generalized). Negative for syncope, numbness and headaches.   Hematological: Negative.    Psychiatric/Behavioral:  Negative for agitation and sleep disturbance. The patient is nervous/anxious.        Past Medical History:   Diagnosis Date    Anxiety     Atrial fibrillation (HCC)     Chronic back pain     Crohn's disease (HCC) 03/01/1995    Heart failure (HCC)     Hiatal hernia     Hypertension     Inflammatory polyarthropathy (HCC)     Obesity     Osteoarthritis     right shoulder    Osteopenia     Reflux esophagitis     Type II or unspecified type diabetes mellitus without mention of complication, not stated as uncontrolled     Urinary incontinence     Vitamin B12 deficiency      Past Surgical History:   Procedure Laterality Date    CARPAL TUNNEL RELEASE      CHOLECYSTECTOMY      COLONOSCOPY N/A 3/14/2024    COLORECTAL CANCER SCREENING, NOT HIGH RISK performed by Catrachito Otto MD at [facility] ENDOSCOPY    COLONOSCOPY N/A 3/28/2024    COLONOSCOPY POLYPECTOMY HOT BIOPSY performed by Catrachito Otto MD at [facility] ENDOSCOPY    FRACTURE SURGERY  1992/1996    t12    HEMORRHOID SURGERY      [place] OPEN REPAIR OF ROTATOR CUFF ACUTE  11/06/2014    Rotator Cuff Repair    SHOULDER SURGERY      TOTAL THYROIDECTOMY  03/2015    UPPER GASTROINTESTINAL ENDOSCOPY N/A 3/14/2024    ESOPHAGOGASTRODUODENOSCOPY BIOPSY performed by Catrachito Otto MD at [facility] ENDOSCOPY    UPPER GASTROINTESTINAL ENDOSCOPY N/A 3/28/2024    ESOPHAGOGASTRODUODENOSCOPY BIOPSY performed by Catrachito Otto MD at [facility] ENDOSCOPY     Family History   Problem Relation Age of Onset    Diabetes Mother     Stroke Mother     Hypertension Mother       Social History     Tobacco Use   Smoking Status Never   Smokeless Tobacco Never       OBJECTIVE:   Wt Readings from Last 3

## 2024-05-02 ENCOUNTER — CARE COORDINATION (OUTPATIENT)
Dept: PRIMARY CARE CLINIC | Age: 79
End: 2024-05-02

## 2024-05-07 RX ORDER — FUROSEMIDE 20 MG/1
20 TABLET ORAL DAILY
Qty: 60 TABLET | Refills: 0 | Status: SHIPPED | OUTPATIENT
Start: 2024-05-07

## 2024-05-07 RX ORDER — PNV NO.95/FERROUS FUM/FOLIC AC 28MG-0.8MG
1 TABLET ORAL 2 TIMES DAILY WITH MEALS
Qty: 60 TABLET | Refills: 0 | Status: SHIPPED | OUTPATIENT
Start: 2024-05-07

## 2024-05-07 RX ORDER — DOCUSATE SODIUM 100 MG/1
100 CAPSULE, LIQUID FILLED ORAL DAILY
Qty: 30 CAPSULE | Refills: 0 | Status: SHIPPED | OUTPATIENT
Start: 2024-05-07

## 2024-05-09 LAB — VWF MULTIMERS PPP IB: NORMAL

## 2024-05-15 RX ORDER — METOPROLOL SUCCINATE 100 MG/1
100 TABLET, EXTENDED RELEASE ORAL DAILY
Qty: 30 TABLET | Refills: 3 | Status: SHIPPED | OUTPATIENT
Start: 2024-05-15

## 2024-05-16 ENCOUNTER — OFFICE VISIT (OUTPATIENT)
Dept: ONCOLOGY | Facility: CLINIC | Age: 79
End: 2024-05-16
Payer: MEDICARE

## 2024-05-16 VITALS
HEART RATE: 119 BPM | SYSTOLIC BLOOD PRESSURE: 119 MMHG | OXYGEN SATURATION: 94 % | TEMPERATURE: 97.1 F | WEIGHT: 178 LBS | RESPIRATION RATE: 16 BRPM | BODY MASS INDEX: 34.95 KG/M2 | DIASTOLIC BLOOD PRESSURE: 76 MMHG | HEIGHT: 60 IN

## 2024-05-16 DIAGNOSIS — D50.8 OTHER IRON DEFICIENCY ANEMIA: ICD-10-CM

## 2024-05-16 DIAGNOSIS — K90.9 MALABSORPTION OF IRON: ICD-10-CM

## 2024-05-16 DIAGNOSIS — D68.00 VON WILLEBRAND DISEASE: ICD-10-CM

## 2024-05-16 DIAGNOSIS — D72.828 OTHER ELEVATED WHITE BLOOD CELL (WBC) COUNT: Primary | ICD-10-CM

## 2024-05-16 DIAGNOSIS — D64.9 ANEMIA, UNSPECIFIED TYPE: ICD-10-CM

## 2024-05-16 PROBLEM — D50.9 IRON DEFICIENCY ANEMIA: Status: ACTIVE | Noted: 2024-05-16

## 2024-05-16 PROCEDURE — 3074F SYST BP LT 130 MM HG: CPT | Performed by: INTERNAL MEDICINE

## 2024-05-16 PROCEDURE — 3078F DIAST BP <80 MM HG: CPT | Performed by: INTERNAL MEDICINE

## 2024-05-16 PROCEDURE — 1126F AMNT PAIN NOTED NONE PRSNT: CPT | Performed by: INTERNAL MEDICINE

## 2024-05-16 PROCEDURE — 99214 OFFICE O/P EST MOD 30 MIN: CPT | Performed by: INTERNAL MEDICINE

## 2024-05-16 RX ORDER — SODIUM CHLORIDE 9 MG/ML
20 INJECTION, SOLUTION INTRAVENOUS ONCE
OUTPATIENT
Start: 2024-05-30

## 2024-05-16 RX ORDER — SODIUM CHLORIDE 9 MG/ML
20 INJECTION, SOLUTION INTRAVENOUS ONCE
OUTPATIENT
Start: 2024-05-23

## 2024-05-24 ENCOUNTER — INFUSION (OUTPATIENT)
Dept: ONCOLOGY | Facility: HOSPITAL | Age: 79
End: 2024-05-24
Payer: MEDICARE

## 2024-05-24 VITALS
TEMPERATURE: 98.2 F | RESPIRATION RATE: 18 BRPM | OXYGEN SATURATION: 97 % | HEART RATE: 97 BPM | DIASTOLIC BLOOD PRESSURE: 58 MMHG | SYSTOLIC BLOOD PRESSURE: 128 MMHG

## 2024-05-24 DIAGNOSIS — K90.9 MALABSORPTION OF IRON: ICD-10-CM

## 2024-05-24 DIAGNOSIS — D50.8 OTHER IRON DEFICIENCY ANEMIA: Primary | ICD-10-CM

## 2024-05-24 PROCEDURE — 96374 THER/PROPH/DIAG INJ IV PUSH: CPT

## 2024-05-24 PROCEDURE — 25010000002 FERUMOXYTOL 510 MG/17ML SOLUTION 17 ML VIAL: Performed by: INTERNAL MEDICINE

## 2024-05-24 RX ORDER — SODIUM CHLORIDE 9 MG/ML
20 INJECTION, SOLUTION INTRAVENOUS ONCE
Status: DISCONTINUED | OUTPATIENT
Start: 2024-05-24 | End: 2024-05-24 | Stop reason: HOSPADM

## 2024-05-24 RX ADMIN — FERUMOXYTOL 510 MG: 510 INJECTION INTRAVENOUS at 13:40

## 2024-05-29 RX ORDER — LEVOCETIRIZINE DIHYDROCHLORIDE 5 MG/1
5 TABLET, FILM COATED ORAL NIGHTLY
Qty: 30 TABLET | Refills: 3 | Status: SHIPPED | OUTPATIENT
Start: 2024-05-29

## 2024-05-29 RX ORDER — LANOLIN ALCOHOL/MO/W.PET/CERES
400 CREAM (GRAM) TOPICAL DAILY
Qty: 30 TABLET | Refills: 3 | Status: SHIPPED | OUTPATIENT
Start: 2024-05-29

## 2024-05-29 RX ORDER — GLIPIZIDE 10 MG/1
10 TABLET, FILM COATED, EXTENDED RELEASE ORAL DAILY
Qty: 30 TABLET | Refills: 3 | Status: SHIPPED | OUTPATIENT
Start: 2024-05-29

## 2024-06-03 ENCOUNTER — INFUSION (OUTPATIENT)
Dept: ONCOLOGY | Facility: HOSPITAL | Age: 79
End: 2024-06-03
Payer: MEDICARE

## 2024-06-03 VITALS
HEART RATE: 109 BPM | TEMPERATURE: 97.9 F | WEIGHT: 175.9 LBS | BODY MASS INDEX: 34.35 KG/M2 | DIASTOLIC BLOOD PRESSURE: 70 MMHG | SYSTOLIC BLOOD PRESSURE: 122 MMHG | RESPIRATION RATE: 20 BRPM | OXYGEN SATURATION: 99 %

## 2024-06-03 DIAGNOSIS — D50.8 OTHER IRON DEFICIENCY ANEMIA: Primary | ICD-10-CM

## 2024-06-03 DIAGNOSIS — K90.9 MALABSORPTION OF IRON: ICD-10-CM

## 2024-06-03 PROCEDURE — 25010000002 FERUMOXYTOL 510 MG/17ML SOLUTION 17 ML VIAL: Performed by: INTERNAL MEDICINE

## 2024-06-03 PROCEDURE — 96375 TX/PRO/DX INJ NEW DRUG ADDON: CPT

## 2024-06-03 PROCEDURE — 96374 THER/PROPH/DIAG INJ IV PUSH: CPT

## 2024-06-03 RX ORDER — SODIUM CHLORIDE 9 MG/ML
20 INJECTION, SOLUTION INTRAVENOUS ONCE
Status: DISCONTINUED | OUTPATIENT
Start: 2024-06-03 | End: 2024-06-03 | Stop reason: HOSPADM

## 2024-06-03 RX ADMIN — FERUMOXYTOL 510 MG: 510 INJECTION INTRAVENOUS at 13:30

## 2024-06-05 RX ORDER — SUCRALFATE 1 G/1
TABLET ORAL
Qty: 120 TABLET | Refills: 0 | OUTPATIENT
Start: 2024-06-05

## 2024-06-05 RX ORDER — ERGOCALCIFEROL 1.25 MG/1
CAPSULE ORAL
Qty: 5 CAPSULE | Refills: 0 | Status: SHIPPED | OUTPATIENT
Start: 2024-06-05

## 2024-06-18 RX ORDER — DOCUSATE SODIUM 100 MG
100 CAPSULE ORAL DAILY
Qty: 30 CAPSULE | Refills: 0 | Status: SHIPPED | OUTPATIENT
Start: 2024-06-18

## 2024-06-26 RX ORDER — FUROSEMIDE 20 MG/1
20 TABLET ORAL DAILY
Qty: 60 TABLET | Refills: 0 | Status: SHIPPED | OUTPATIENT
Start: 2024-06-26

## 2024-06-26 RX ORDER — SEMAGLUTIDE 0.68 MG/ML
INJECTION, SOLUTION SUBCUTANEOUS
Qty: 3 ML | Refills: 0 | Status: SHIPPED | OUTPATIENT
Start: 2024-06-26 | End: 2024-06-26

## 2024-06-26 RX ORDER — SEMAGLUTIDE 0.68 MG/ML
INJECTION, SOLUTION SUBCUTANEOUS
Qty: 3 ML | Refills: 1 | Status: SHIPPED | OUTPATIENT
Start: 2024-06-26

## 2024-06-26 RX ORDER — PNV NO.95/FERROUS FUM/FOLIC AC 28MG-0.8MG
1 TABLET ORAL 2 TIMES DAILY WITH MEALS
Qty: 60 TABLET | Refills: 0 | Status: SHIPPED | OUTPATIENT
Start: 2024-06-26

## 2024-07-02 ENCOUNTER — OFFICE VISIT (OUTPATIENT)
Dept: PRIMARY CARE CLINIC | Age: 79
End: 2024-07-02
Payer: MEDICARE

## 2024-07-02 VITALS
DIASTOLIC BLOOD PRESSURE: 88 MMHG | WEIGHT: 180.2 LBS | SYSTOLIC BLOOD PRESSURE: 138 MMHG | HEART RATE: 107 BPM | RESPIRATION RATE: 18 BRPM | BODY MASS INDEX: 35.19 KG/M2 | OXYGEN SATURATION: 93 %

## 2024-07-02 DIAGNOSIS — E03.9 HYPOTHYROIDISM, UNSPECIFIED TYPE: ICD-10-CM

## 2024-07-02 DIAGNOSIS — E11.69 TYPE 2 DIABETES MELLITUS WITH OTHER SPECIFIED COMPLICATION, WITHOUT LONG-TERM CURRENT USE OF INSULIN (HCC): Primary | ICD-10-CM

## 2024-07-02 DIAGNOSIS — E55.9 VITAMIN D DEFICIENCY: ICD-10-CM

## 2024-07-02 DIAGNOSIS — E53.8 VITAMIN B12 DEFICIENCY: ICD-10-CM

## 2024-07-02 DIAGNOSIS — I50.22 CHRONIC SYSTOLIC CONGESTIVE HEART FAILURE (HCC): ICD-10-CM

## 2024-07-02 DIAGNOSIS — D50.9 IRON DEFICIENCY ANEMIA, UNSPECIFIED IRON DEFICIENCY ANEMIA TYPE: ICD-10-CM

## 2024-07-02 DIAGNOSIS — D72.829 LEUKOCYTOSIS, UNSPECIFIED TYPE: ICD-10-CM

## 2024-07-02 DIAGNOSIS — I48.0 PAROXYSMAL ATRIAL FIBRILLATION (HCC): ICD-10-CM

## 2024-07-02 LAB — HBA1C MFR BLD: 8.6 %

## 2024-07-02 PROCEDURE — 1090F PRES/ABSN URINE INCON ASSESS: CPT | Performed by: INTERNAL MEDICINE

## 2024-07-02 PROCEDURE — 3075F SYST BP GE 130 - 139MM HG: CPT | Performed by: INTERNAL MEDICINE

## 2024-07-02 PROCEDURE — 99214 OFFICE O/P EST MOD 30 MIN: CPT | Performed by: INTERNAL MEDICINE

## 2024-07-02 PROCEDURE — G8400 PT W/DXA NO RESULTS DOC: HCPCS | Performed by: INTERNAL MEDICINE

## 2024-07-02 PROCEDURE — 83036 HEMOGLOBIN GLYCOSYLATED A1C: CPT | Performed by: INTERNAL MEDICINE

## 2024-07-02 PROCEDURE — 3079F DIAST BP 80-89 MM HG: CPT | Performed by: INTERNAL MEDICINE

## 2024-07-02 PROCEDURE — 96372 THER/PROPH/DIAG INJ SC/IM: CPT | Performed by: INTERNAL MEDICINE

## 2024-07-02 PROCEDURE — 1036F TOBACCO NON-USER: CPT | Performed by: INTERNAL MEDICINE

## 2024-07-02 PROCEDURE — G8427 DOCREV CUR MEDS BY ELIG CLIN: HCPCS | Performed by: INTERNAL MEDICINE

## 2024-07-02 PROCEDURE — G8417 CALC BMI ABV UP PARAM F/U: HCPCS | Performed by: INTERNAL MEDICINE

## 2024-07-02 PROCEDURE — 1124F ACP DISCUSS-NO DSCNMKR DOCD: CPT | Performed by: INTERNAL MEDICINE

## 2024-07-02 PROCEDURE — 3052F HG A1C>EQUAL 8.0%<EQUAL 9.0%: CPT | Performed by: INTERNAL MEDICINE

## 2024-07-02 RX ORDER — VALSARTAN 40 MG/1
40 TABLET ORAL DAILY
Qty: 30 TABLET | Refills: 3 | Status: CANCELLED | OUTPATIENT
Start: 2024-07-02

## 2024-07-02 RX ORDER — BLOOD-GLUCOSE METER
1 KIT MISCELLANEOUS DAILY
Qty: 1 KIT | Refills: 0 | Status: SHIPPED | OUTPATIENT
Start: 2024-07-02

## 2024-07-02 RX ORDER — VALSARTAN 40 MG/1
40 TABLET ORAL DAILY
COMMUNITY

## 2024-07-02 RX ORDER — CYANOCOBALAMIN 1000 UG/ML
1000 INJECTION, SOLUTION INTRAMUSCULAR; SUBCUTANEOUS ONCE
Status: COMPLETED | OUTPATIENT
Start: 2024-07-02 | End: 2024-07-02

## 2024-07-02 RX ADMIN — CYANOCOBALAMIN 1000 MCG: 1000 INJECTION, SOLUTION INTRAMUSCULAR; SUBCUTANEOUS at 14:51

## 2024-07-08 NOTE — PROGRESS NOTES
Chief Complaint   Patient presents with    Diabetes    Atrial Fibrillation    Congestive Heart Failure       Have you seen any other physician or provider since your last visit yes - hematololgy, podiatry    Have you had any other diagnostic tests since your last visit? no    Have you changed or stopped any medications since your last visit? no       
    Tobacco Use   Smoking Status Never   Smokeless Tobacco Never       OBJECTIVE:   Wt Readings from Last 3 Encounters:   07/02/24 81.7 kg (180 lb 3.2 oz)   05/01/24 82.1 kg (181 lb)   03/28/24 83.9 kg (185 lb)     BP Readings from Last 3 Encounters:   07/02/24 138/88   05/01/24 (!) 140/64   03/28/24 124/73       /88   Pulse (!) 107   Resp 18   Wt 81.7 kg (180 lb 3.2 oz)   SpO2 93%   BMI 35.19 kg/m²      Physical Exam  Vitals and nursing note reviewed.   Constitutional:       Appearance: Normal appearance. She is well-developed. She is obese.   HENT:      Head: Normocephalic and atraumatic.      Right Ear: External ear normal.      Left Ear: External ear normal.      Nose: Nose normal.      Mouth/Throat:      Mouth: Mucous membranes are moist.      Pharynx: Oropharynx is clear.   Eyes:      Conjunctiva/sclera: Conjunctivae normal.      Pupils: Pupils are equal, round, and reactive to light.   Neck:      Thyroid: No thyromegaly.      Vascular: No JVD.   Cardiovascular:      Rate and Rhythm: Normal rate and regular rhythm.      Heart sounds: Normal heart sounds.   Pulmonary:      Effort: Pulmonary effort is normal.      Breath sounds: Normal breath sounds. No wheezing or rales.   Abdominal:      General: Bowel sounds are normal. There is no distension.      Palpations: Abdomen is soft.      Tenderness: There is no abdominal tenderness.   Musculoskeletal:         General: No tenderness.      Cervical back: Neck supple. No rigidity. No muscular tenderness.      Right lower leg: No edema.      Left lower leg: No edema.   Skin:     Findings: No erythema or rash.   Neurological:      General: No focal deficit present.      Mental Status: She is alert and oriented to person, place, and time.   Psychiatric:         Behavior: Behavior normal.         Judgment: Judgment normal.         Lab Results   Component Value Date/Time     03/26/2024 03:06 PM    K 4.6 03/26/2024 03:06 PM    K 4.5 03/15/2024 04:53 AM

## 2024-07-16 DIAGNOSIS — E11.69 TYPE 2 DIABETES MELLITUS WITH OTHER SPECIFIED COMPLICATION, WITHOUT LONG-TERM CURRENT USE OF INSULIN (HCC): Primary | ICD-10-CM

## 2024-07-16 RX ORDER — GLUCOSAMINE HCL/CHONDROITIN SU 500-400 MG
CAPSULE ORAL
Qty: 100 STRIP | Refills: 0 | Status: SHIPPED | OUTPATIENT
Start: 2024-07-16

## 2024-07-16 RX ORDER — LANCETS 30 GAUGE
1 EACH MISCELLANEOUS DAILY
Qty: 100 EACH | Refills: 5 | Status: SHIPPED | OUTPATIENT
Start: 2024-07-16

## 2024-07-16 RX ORDER — BLOOD-GLUCOSE METER
1 KIT MISCELLANEOUS DAILY
Qty: 1 KIT | Refills: 0 | Status: SHIPPED | OUTPATIENT
Start: 2024-07-16

## 2024-07-16 RX ORDER — SPIRONOLACTONE 25 MG/1
25 TABLET ORAL DAILY
Qty: 90 TABLET | Refills: 1 | Status: SHIPPED | OUTPATIENT
Start: 2024-07-16

## 2024-07-16 RX ORDER — GLUCOSAMINE HCL/CHONDROITIN SU 500-400 MG
CAPSULE ORAL
Qty: 100 STRIP | Refills: 0 | Status: SHIPPED | OUTPATIENT
Start: 2024-07-16 | End: 2024-07-16 | Stop reason: SDUPTHER

## 2024-07-18 RX ORDER — ATORVASTATIN CALCIUM 20 MG/1
20 TABLET, FILM COATED ORAL NIGHTLY
Qty: 30 TABLET | Refills: 0 | OUTPATIENT
Start: 2024-07-18

## 2024-07-23 RX ORDER — SEMAGLUTIDE 0.68 MG/ML
INJECTION, SOLUTION SUBCUTANEOUS
Qty: 3 ML | Refills: 0 | Status: SHIPPED | OUTPATIENT
Start: 2024-07-23 | End: 2024-07-24

## 2024-07-23 RX ORDER — ERGOCALCIFEROL 1.25 MG/1
CAPSULE ORAL
Qty: 5 CAPSULE | Refills: 0 | Status: SHIPPED | OUTPATIENT
Start: 2024-07-23

## 2024-07-24 RX ORDER — SEMAGLUTIDE 0.68 MG/ML
INJECTION, SOLUTION SUBCUTANEOUS
Qty: 3 ML | Refills: 1 | Status: SHIPPED | OUTPATIENT
Start: 2024-07-24

## 2024-07-25 RX ORDER — ATORVASTATIN CALCIUM 40 MG/1
40 TABLET, FILM COATED ORAL DAILY
Qty: 30 TABLET | Refills: 3 | Status: SHIPPED | OUTPATIENT
Start: 2024-07-25

## 2024-07-31 ENCOUNTER — TELEPHONE (OUTPATIENT)
Dept: CARDIOLOGY | Facility: CLINIC | Age: 79
End: 2024-07-31
Payer: MEDICARE

## 2024-08-07 RX ORDER — FUROSEMIDE 20 MG/1
20 TABLET ORAL DAILY
Qty: 60 TABLET | Refills: 0 | Status: SHIPPED | OUTPATIENT
Start: 2024-08-07

## 2024-08-07 RX ORDER — DOCUSATE SODIUM 100 MG
100 CAPSULE ORAL DAILY
Qty: 30 CAPSULE | Refills: 0 | Status: SHIPPED | OUTPATIENT
Start: 2024-08-07

## 2024-08-07 RX ORDER — ESOMEPRAZOLE MAGNESIUM 40 MG/1
40 CAPSULE, DELAYED RELEASE ORAL
Qty: 30 CAPSULE | Refills: 1 | Status: SHIPPED | OUTPATIENT
Start: 2024-08-07

## 2024-08-07 RX ORDER — ERGOCALCIFEROL 1.25 MG/1
CAPSULE ORAL
Qty: 5 CAPSULE | Refills: 0 | Status: SHIPPED | OUTPATIENT
Start: 2024-08-07

## 2024-08-07 RX ORDER — VALSARTAN 40 MG/1
40 TABLET ORAL DAILY
Qty: 90 TABLET | Refills: 3 | Status: SHIPPED | OUTPATIENT
Start: 2024-08-07

## 2024-08-07 RX ORDER — FERROUS SULFATE 325(65) MG
1 TABLET ORAL 2 TIMES DAILY WITH MEALS
Qty: 60 TABLET | Refills: 0 | Status: SHIPPED | OUTPATIENT
Start: 2024-08-07

## 2024-08-07 RX ORDER — CITALOPRAM HYDROBROMIDE 10 MG/1
10 TABLET ORAL DAILY
Qty: 30 TABLET | Refills: 2 | Status: SHIPPED | OUTPATIENT
Start: 2024-08-07

## 2024-08-12 ENCOUNTER — OFFICE VISIT (OUTPATIENT)
Dept: CARDIOLOGY | Facility: CLINIC | Age: 79
End: 2024-08-12
Payer: MEDICARE

## 2024-08-12 VITALS
HEIGHT: 60 IN | OXYGEN SATURATION: 93 % | WEIGHT: 177 LBS | HEART RATE: 110 BPM | DIASTOLIC BLOOD PRESSURE: 76 MMHG | SYSTOLIC BLOOD PRESSURE: 120 MMHG | BODY MASS INDEX: 34.75 KG/M2

## 2024-08-12 DIAGNOSIS — I10 ESSENTIAL HYPERTENSION: ICD-10-CM

## 2024-08-12 DIAGNOSIS — I48.0 PAROXYSMAL ATRIAL FIBRILLATION: ICD-10-CM

## 2024-08-12 DIAGNOSIS — I25.118 CORONARY ARTERY DISEASE OF NATIVE ARTERY OF NATIVE HEART WITH STABLE ANGINA PECTORIS: Primary | ICD-10-CM

## 2024-08-12 DIAGNOSIS — E78.2 MIXED HYPERLIPIDEMIA: ICD-10-CM

## 2024-08-12 DIAGNOSIS — I50.21 ACUTE SYSTOLIC CHF (CONGESTIVE HEART FAILURE): ICD-10-CM

## 2024-08-12 PROCEDURE — 1159F MED LIST DOCD IN RCRD: CPT | Performed by: HOSPITALIST

## 2024-08-12 PROCEDURE — 1160F RVW MEDS BY RX/DR IN RCRD: CPT | Performed by: HOSPITALIST

## 2024-08-12 PROCEDURE — 3074F SYST BP LT 130 MM HG: CPT | Performed by: HOSPITALIST

## 2024-08-12 PROCEDURE — 99214 OFFICE O/P EST MOD 30 MIN: CPT | Performed by: HOSPITALIST

## 2024-08-12 PROCEDURE — 3078F DIAST BP <80 MM HG: CPT | Performed by: HOSPITALIST

## 2024-08-12 PROCEDURE — G2211 COMPLEX E/M VISIT ADD ON: HCPCS | Performed by: HOSPITALIST

## 2024-08-12 RX ORDER — FERROUS SULFATE 325(65) MG
1 TABLET ORAL 2 TIMES DAILY WITH MEALS
COMMUNITY
Start: 2024-08-07

## 2024-08-12 RX ORDER — ASPIRIN 81 MG/1
81 TABLET ORAL DAILY
Qty: 90 TABLET | Refills: 3 | Status: SHIPPED | OUTPATIENT
Start: 2024-08-12

## 2024-08-13 DIAGNOSIS — D50.8 OTHER IRON DEFICIENCY ANEMIA: Primary | ICD-10-CM

## 2024-08-13 DIAGNOSIS — I48.0 PAROXYSMAL ATRIAL FIBRILLATION: Primary | ICD-10-CM

## 2024-08-15 ENCOUNTER — OFFICE VISIT (OUTPATIENT)
Dept: ONCOLOGY | Facility: CLINIC | Age: 79
End: 2024-08-15
Payer: MEDICARE

## 2024-08-15 ENCOUNTER — TELEPHONE (OUTPATIENT)
Dept: ONCOLOGY | Facility: CLINIC | Age: 79
End: 2024-08-15
Payer: MEDICARE

## 2024-08-15 ENCOUNTER — LAB (OUTPATIENT)
Dept: LAB | Facility: HOSPITAL | Age: 79
End: 2024-08-15
Payer: MEDICARE

## 2024-08-15 VITALS
TEMPERATURE: 97.5 F | DIASTOLIC BLOOD PRESSURE: 64 MMHG | HEIGHT: 60 IN | OXYGEN SATURATION: 94 % | RESPIRATION RATE: 20 BRPM | BODY MASS INDEX: 35.06 KG/M2 | WEIGHT: 178.6 LBS | SYSTOLIC BLOOD PRESSURE: 129 MMHG | HEART RATE: 115 BPM

## 2024-08-15 DIAGNOSIS — D50.8 OTHER IRON DEFICIENCY ANEMIA: Primary | ICD-10-CM

## 2024-08-15 DIAGNOSIS — D50.8 OTHER IRON DEFICIENCY ANEMIA: ICD-10-CM

## 2024-08-15 DIAGNOSIS — K90.9 MALABSORPTION OF IRON: ICD-10-CM

## 2024-08-15 LAB
BASOPHILS # BLD AUTO: 0.07 10*3/MM3 (ref 0–0.2)
BASOPHILS NFR BLD AUTO: 0.7 % (ref 0–1.5)
DEPRECATED RDW RBC AUTO: 53.3 FL (ref 37–54)
EOSINOPHIL # BLD AUTO: 0.18 10*3/MM3 (ref 0–0.4)
EOSINOPHIL NFR BLD AUTO: 1.7 % (ref 0.3–6.2)
ERYTHROCYTE [DISTWIDTH] IN BLOOD BY AUTOMATED COUNT: 16.5 % (ref 12.3–15.4)
FERRITIN SERPL-MCNC: 641.5 NG/ML (ref 13–150)
HCT VFR BLD AUTO: 44 % (ref 34–46.6)
HGB BLD-MCNC: 14.3 G/DL (ref 12–15.9)
IMM GRANULOCYTES # BLD AUTO: 0.08 10*3/MM3 (ref 0–0.05)
IMM GRANULOCYTES NFR BLD AUTO: 0.8 % (ref 0–0.5)
IRON 24H UR-MRATE: 75 MCG/DL (ref 37–145)
IRON SATN MFR SERPL: 22 % (ref 20–50)
LYMPHOCYTES # BLD AUTO: 2.08 10*3/MM3 (ref 0.7–3.1)
LYMPHOCYTES NFR BLD AUTO: 20 % (ref 19.6–45.3)
MCH RBC QN AUTO: 28.5 PG (ref 26.6–33)
MCHC RBC AUTO-ENTMCNC: 32.5 G/DL (ref 31.5–35.7)
MCV RBC AUTO: 87.6 FL (ref 79–97)
MONOCYTES # BLD AUTO: 0.53 10*3/MM3 (ref 0.1–0.9)
MONOCYTES NFR BLD AUTO: 5.1 % (ref 5–12)
NEUTROPHILS NFR BLD AUTO: 7.47 10*3/MM3 (ref 1.7–7)
NEUTROPHILS NFR BLD AUTO: 71.7 % (ref 42.7–76)
NRBC BLD AUTO-RTO: 0 /100 WBC (ref 0–0.2)
PLATELET # BLD AUTO: 158 10*3/MM3 (ref 140–450)
PMV BLD AUTO: 11.1 FL (ref 6–12)
RBC # BLD AUTO: 5.02 10*6/MM3 (ref 3.77–5.28)
TIBC SERPL-MCNC: 335 MCG/DL (ref 298–536)
TRANSFERRIN SERPL-MCNC: 225 MG/DL (ref 200–360)
WBC NRBC COR # BLD AUTO: 10.41 10*3/MM3 (ref 3.4–10.8)

## 2024-08-15 PROCEDURE — 83540 ASSAY OF IRON: CPT

## 2024-08-15 PROCEDURE — 3074F SYST BP LT 130 MM HG: CPT | Performed by: INTERNAL MEDICINE

## 2024-08-15 PROCEDURE — 85025 COMPLETE CBC W/AUTO DIFF WBC: CPT

## 2024-08-15 PROCEDURE — 99214 OFFICE O/P EST MOD 30 MIN: CPT | Performed by: INTERNAL MEDICINE

## 2024-08-15 PROCEDURE — 1125F AMNT PAIN NOTED PAIN PRSNT: CPT | Performed by: INTERNAL MEDICINE

## 2024-08-15 PROCEDURE — 84466 ASSAY OF TRANSFERRIN: CPT

## 2024-08-15 PROCEDURE — 82728 ASSAY OF FERRITIN: CPT

## 2024-08-15 PROCEDURE — 36415 COLL VENOUS BLD VENIPUNCTURE: CPT

## 2024-08-15 PROCEDURE — 3078F DIAST BP <80 MM HG: CPT | Performed by: INTERNAL MEDICINE

## 2024-08-16 ENCOUNTER — TELEPHONE (OUTPATIENT)
Dept: ONCOLOGY | Facility: CLINIC | Age: 79
End: 2024-08-16
Payer: MEDICARE

## 2024-08-22 PROBLEM — I25.10 CORONARY ARTERY DISEASE INVOLVING NATIVE CORONARY ARTERY: Status: ACTIVE | Noted: 2024-02-16

## 2024-08-22 PROBLEM — I50.20 HFREF (HEART FAILURE WITH REDUCED EJECTION FRACTION): Status: ACTIVE | Noted: 2024-08-22

## 2024-08-22 PROBLEM — D72.829 LEUKOCYTOSIS: Status: RESOLVED | Noted: 2024-04-23 | Resolved: 2024-08-22

## 2024-08-22 PROBLEM — I50.21 ACUTE SYSTOLIC CHF (CONGESTIVE HEART FAILURE): Status: RESOLVED | Noted: 2023-10-18 | Resolved: 2024-08-22

## 2024-08-26 ENCOUNTER — OFFICE VISIT (OUTPATIENT)
Dept: CARDIOLOGY | Facility: CLINIC | Age: 79
End: 2024-08-26
Payer: MEDICARE

## 2024-08-26 VITALS
BODY MASS INDEX: 34.95 KG/M2 | OXYGEN SATURATION: 96 % | HEART RATE: 109 BPM | HEIGHT: 60 IN | DIASTOLIC BLOOD PRESSURE: 74 MMHG | SYSTOLIC BLOOD PRESSURE: 118 MMHG | WEIGHT: 178 LBS

## 2024-08-26 DIAGNOSIS — I48.0 PAROXYSMAL ATRIAL FIBRILLATION: Primary | ICD-10-CM

## 2024-08-26 DIAGNOSIS — I50.22 CHRONIC SYSTOLIC HEART FAILURE: ICD-10-CM

## 2024-08-26 PROCEDURE — 99204 OFFICE O/P NEW MOD 45 MIN: CPT | Performed by: INTERNAL MEDICINE

## 2024-08-26 PROCEDURE — 3074F SYST BP LT 130 MM HG: CPT | Performed by: INTERNAL MEDICINE

## 2024-08-26 PROCEDURE — 3078F DIAST BP <80 MM HG: CPT | Performed by: INTERNAL MEDICINE

## 2024-08-26 PROCEDURE — G2211 COMPLEX E/M VISIT ADD ON: HCPCS | Performed by: INTERNAL MEDICINE

## 2024-08-28 ENCOUNTER — PREP FOR SURGERY (OUTPATIENT)
Dept: OTHER | Facility: HOSPITAL | Age: 79
End: 2024-08-28
Payer: MEDICARE

## 2024-08-28 DIAGNOSIS — I48.0 PAROXYSMAL ATRIAL FIBRILLATION: Primary | ICD-10-CM

## 2024-08-28 RX ORDER — SODIUM CHLORIDE 9 MG/ML
40 INJECTION, SOLUTION INTRAVENOUS AS NEEDED
OUTPATIENT
Start: 2024-08-28

## 2024-08-28 RX ORDER — ONDANSETRON 2 MG/ML
4 INJECTION INTRAMUSCULAR; INTRAVENOUS EVERY 6 HOURS PRN
OUTPATIENT
Start: 2024-08-28

## 2024-08-28 RX ORDER — NITROGLYCERIN 0.4 MG/1
0.4 TABLET SUBLINGUAL
OUTPATIENT
Start: 2024-08-28

## 2024-08-28 RX ORDER — ACETAMINOPHEN 325 MG/1
650 TABLET ORAL EVERY 4 HOURS PRN
OUTPATIENT
Start: 2024-08-28

## 2024-08-30 ENCOUNTER — HOSPITAL ENCOUNTER (OUTPATIENT)
Dept: CT IMAGING | Facility: HOSPITAL | Age: 79
Discharge: HOME | End: 2024-08-30
Payer: MEDICARE

## 2024-08-30 DIAGNOSIS — I48.0 PAROXYSMAL ATRIAL FIBRILLATION: ICD-10-CM

## 2024-08-30 PROCEDURE — 25510000001 IOPAMIDOL PER 1 ML: Performed by: INTERNAL MEDICINE

## 2024-08-30 PROCEDURE — 71275 CT ANGIOGRAPHY CHEST: CPT

## 2024-08-30 RX ORDER — IOPAMIDOL 755 MG/ML
100 INJECTION, SOLUTION INTRAVASCULAR
Status: COMPLETED | OUTPATIENT
Start: 2024-08-30 | End: 2024-08-30

## 2024-08-30 RX ADMIN — IOPAMIDOL 80 ML: 755 INJECTION, SOLUTION INTRAVENOUS at 15:07

## 2024-09-03 LAB — CREAT BLDA-MCNC: 1.2 MG/DL (ref 0.6–1.3)

## 2024-09-04 RX ORDER — GLIPIZIDE 10 MG/1
10 TABLET, FILM COATED, EXTENDED RELEASE ORAL DAILY
Qty: 30 TABLET | Refills: 3 | Status: SHIPPED | OUTPATIENT
Start: 2024-09-04

## 2024-09-05 ENCOUNTER — HOSPITAL ENCOUNTER (OUTPATIENT)
Facility: HOSPITAL | Age: 79
Discharge: HOME | End: 2024-09-05
Attending: INTERNAL MEDICINE | Admitting: INTERNAL MEDICINE
Payer: MEDICARE

## 2024-09-05 ENCOUNTER — ANESTHESIA EVENT (OUTPATIENT)
Dept: CARDIOLOGY | Facility: HOSPITAL | Age: 79
End: 2024-09-05
Payer: MEDICARE

## 2024-09-05 ENCOUNTER — ANESTHESIA (OUTPATIENT)
Dept: CARDIOLOGY | Facility: HOSPITAL | Age: 79
End: 2024-09-05
Payer: MEDICARE

## 2024-09-05 VITALS
DIASTOLIC BLOOD PRESSURE: 64 MMHG | BODY MASS INDEX: 34.87 KG/M2 | HEART RATE: 89 BPM | HEIGHT: 60 IN | TEMPERATURE: 97 F | RESPIRATION RATE: 14 BRPM | SYSTOLIC BLOOD PRESSURE: 116 MMHG | WEIGHT: 177.6 LBS | OXYGEN SATURATION: 94 %

## 2024-09-05 DIAGNOSIS — I48.0 PAROXYSMAL ATRIAL FIBRILLATION: ICD-10-CM

## 2024-09-05 LAB
ANION GAP SERPL CALCULATED.3IONS-SCNC: 12 MMOL/L (ref 5–15)
BUN SERPL-MCNC: 24 MG/DL (ref 8–23)
BUN/CREAT SERPL: 20.9 (ref 7–25)
CALCIUM SPEC-SCNC: 9.1 MG/DL (ref 8.6–10.5)
CHLORIDE SERPL-SCNC: 105 MMOL/L (ref 98–107)
CO2 SERPL-SCNC: 22 MMOL/L (ref 22–29)
CREAT SERPL-MCNC: 1.15 MG/DL (ref 0.57–1)
DEPRECATED RDW RBC AUTO: 53.8 FL (ref 37–54)
EGFRCR SERPLBLD CKD-EPI 2021: 49.5 ML/MIN/1.73
ERYTHROCYTE [DISTWIDTH] IN BLOOD BY AUTOMATED COUNT: 15.9 % (ref 12.3–15.4)
GLUCOSE SERPL-MCNC: 142 MG/DL (ref 65–99)
HCT VFR BLD AUTO: 45.3 % (ref 34–46.6)
HGB BLD-MCNC: 14.3 G/DL (ref 12–15.9)
MCH RBC QN AUTO: 28.9 PG (ref 26.6–33)
MCHC RBC AUTO-ENTMCNC: 31.6 G/DL (ref 31.5–35.7)
MCV RBC AUTO: 91.7 FL (ref 79–97)
PLATELET # BLD AUTO: 149 10*3/MM3 (ref 140–450)
PMV BLD AUTO: 11.1 FL (ref 6–12)
POTASSIUM SERPL-SCNC: 4.5 MMOL/L (ref 3.5–5.2)
QT INTERVAL: 400 MS
QTC INTERVAL: 492 MS
RBC # BLD AUTO: 4.94 10*6/MM3 (ref 3.77–5.28)
SODIUM SERPL-SCNC: 139 MMOL/L (ref 136–145)
WBC NRBC COR # BLD AUTO: 14.88 10*3/MM3 (ref 3.4–10.8)

## 2024-09-05 PROCEDURE — 80048 BASIC METABOLIC PNL TOTAL CA: CPT | Performed by: PHYSICIAN ASSISTANT

## 2024-09-05 PROCEDURE — 93656 COMPRE EP EVAL ABLTJ ATR FIB: CPT | Performed by: INTERNAL MEDICINE

## 2024-09-05 PROCEDURE — C1766 INTRO/SHEATH,STRBLE,NON-PEEL: HCPCS | Performed by: INTERNAL MEDICINE

## 2024-09-05 PROCEDURE — 93623 PRGRMD STIMJ&PACG IV RX NFS: CPT | Performed by: INTERNAL MEDICINE

## 2024-09-05 PROCEDURE — 25810000003 SODIUM CHLORIDE 0.9 % SOLUTION: Performed by: INTERNAL MEDICINE

## 2024-09-05 PROCEDURE — C1894 INTRO/SHEATH, NON-LASER: HCPCS | Performed by: INTERNAL MEDICINE

## 2024-09-05 PROCEDURE — C1733 CATH, EP, OTHR THAN COOL-TIP: HCPCS | Performed by: INTERNAL MEDICINE

## 2024-09-05 PROCEDURE — 25010000002 SUGAMMADEX 200 MG/2ML SOLUTION: Performed by: ANESTHESIOLOGY

## 2024-09-05 PROCEDURE — 93005 ELECTROCARDIOGRAM TRACING: CPT | Performed by: ANESTHESIOLOGY

## 2024-09-05 PROCEDURE — 85027 COMPLETE CBC AUTOMATED: CPT | Performed by: PHYSICIAN ASSISTANT

## 2024-09-05 PROCEDURE — C1732 CATH, EP, DIAG/ABL, 3D/VECT: HCPCS | Performed by: INTERNAL MEDICINE

## 2024-09-05 PROCEDURE — 25010000002 HEPARIN (PORCINE) PER 1000 UNITS: Performed by: INTERNAL MEDICINE

## 2024-09-05 PROCEDURE — 25010000002 ADENOSINE PER 6 MG: Performed by: INTERNAL MEDICINE

## 2024-09-05 PROCEDURE — C1759 CATH, INTRA ECHOCARDIOGRAPHY: HCPCS | Performed by: INTERNAL MEDICINE

## 2024-09-05 PROCEDURE — 85347 COAGULATION TIME ACTIVATED: CPT

## 2024-09-05 PROCEDURE — 93657 TX L/R ATRIAL FIB ADDL: CPT | Performed by: INTERNAL MEDICINE

## 2024-09-05 PROCEDURE — 25010000002 ONDANSETRON PER 1 MG: Performed by: ANESTHESIOLOGY

## 2024-09-05 PROCEDURE — 25010000002 PROPOFOL 10 MG/ML EMULSION

## 2024-09-05 PROCEDURE — 93655 ICAR CATH ABLTJ DSCRT ARRHYT: CPT | Performed by: INTERNAL MEDICINE

## 2024-09-05 PROCEDURE — 93622 COMP EP EVAL L VENTR PAC&REC: CPT | Performed by: INTERNAL MEDICINE

## 2024-09-05 PROCEDURE — C1730 CATH, EP, 19 OR FEW ELECT: HCPCS | Performed by: INTERNAL MEDICINE

## 2024-09-05 PROCEDURE — C1769 GUIDE WIRE: HCPCS | Performed by: INTERNAL MEDICINE

## 2024-09-05 PROCEDURE — C1760 CLOSURE DEV, VASC: HCPCS | Performed by: INTERNAL MEDICINE

## 2024-09-05 PROCEDURE — 93010 ELECTROCARDIOGRAM REPORT: CPT | Performed by: INTERNAL MEDICINE

## 2024-09-05 PROCEDURE — 36415 COLL VENOUS BLD VENIPUNCTURE: CPT

## 2024-09-05 PROCEDURE — 93005 ELECTROCARDIOGRAM TRACING: CPT | Performed by: INTERNAL MEDICINE

## 2024-09-05 PROCEDURE — 25010000002 DEXAMETHASONE PER 1 MG

## 2024-09-05 PROCEDURE — 25010000002 PROTAMINE SULFATE PER 10 MG: Performed by: INTERNAL MEDICINE

## 2024-09-05 RX ORDER — IPRATROPIUM BROMIDE AND ALBUTEROL SULFATE 2.5; .5 MG/3ML; MG/3ML
3 SOLUTION RESPIRATORY (INHALATION) ONCE AS NEEDED
Status: DISCONTINUED | OUTPATIENT
Start: 2024-09-05 | End: 2024-09-05 | Stop reason: HOSPADM

## 2024-09-05 RX ORDER — SODIUM CHLORIDE 0.9 % (FLUSH) 0.9 %
10 SYRINGE (ML) INJECTION AS NEEDED
Status: DISCONTINUED | OUTPATIENT
Start: 2024-09-05 | End: 2024-09-05 | Stop reason: HOSPADM

## 2024-09-05 RX ORDER — ONDANSETRON 2 MG/ML
4 INJECTION INTRAMUSCULAR; INTRAVENOUS ONCE AS NEEDED
Status: DISCONTINUED | OUTPATIENT
Start: 2024-09-05 | End: 2024-09-05 | Stop reason: HOSPADM

## 2024-09-05 RX ORDER — NITROGLYCERIN 0.4 MG/1
0.4 TABLET SUBLINGUAL
Status: DISCONTINUED | OUTPATIENT
Start: 2024-09-05 | End: 2024-09-05 | Stop reason: HOSPADM

## 2024-09-05 RX ORDER — MIDAZOLAM HYDROCHLORIDE 1 MG/ML
0.5 INJECTION INTRAMUSCULAR; INTRAVENOUS
Status: DISCONTINUED | OUTPATIENT
Start: 2024-09-05 | End: 2024-09-05 | Stop reason: HOSPADM

## 2024-09-05 RX ORDER — ADENOSINE 3 MG/ML
INJECTION, SOLUTION INTRAVENOUS
Status: DISCONTINUED | OUTPATIENT
Start: 2024-09-05 | End: 2024-09-05 | Stop reason: HOSPADM

## 2024-09-05 RX ORDER — FAMOTIDINE 10 MG/ML
20 INJECTION, SOLUTION INTRAVENOUS ONCE
Status: COMPLETED | OUTPATIENT
Start: 2024-09-05 | End: 2024-09-05

## 2024-09-05 RX ORDER — SODIUM CHLORIDE 0.9 % (FLUSH) 0.9 %
10 SYRINGE (ML) INJECTION EVERY 12 HOURS SCHEDULED
Status: DISCONTINUED | OUTPATIENT
Start: 2024-09-05 | End: 2024-09-05 | Stop reason: HOSPADM

## 2024-09-05 RX ORDER — SODIUM CHLORIDE 9 MG/ML
40 INJECTION, SOLUTION INTRAVENOUS AS NEEDED
Status: DISCONTINUED | OUTPATIENT
Start: 2024-09-05 | End: 2024-09-05 | Stop reason: HOSPADM

## 2024-09-05 RX ORDER — PROMETHAZINE HYDROCHLORIDE 25 MG/1
25 TABLET ORAL ONCE AS NEEDED
Status: DISCONTINUED | OUTPATIENT
Start: 2024-09-05 | End: 2024-09-05 | Stop reason: HOSPADM

## 2024-09-05 RX ORDER — HYDRALAZINE HYDROCHLORIDE 20 MG/ML
5 INJECTION INTRAMUSCULAR; INTRAVENOUS
Status: DISCONTINUED | OUTPATIENT
Start: 2024-09-05 | End: 2024-09-05 | Stop reason: HOSPADM

## 2024-09-05 RX ORDER — LIDOCAINE HYDROCHLORIDE 10 MG/ML
INJECTION, SOLUTION EPIDURAL; INFILTRATION; INTRACAUDAL; PERINEURAL AS NEEDED
Status: DISCONTINUED | OUTPATIENT
Start: 2024-09-05 | End: 2024-09-05 | Stop reason: SURG

## 2024-09-05 RX ORDER — LIDOCAINE HYDROCHLORIDE 10 MG/ML
0.5 INJECTION, SOLUTION EPIDURAL; INFILTRATION; INTRACAUDAL; PERINEURAL ONCE AS NEEDED
Status: DISCONTINUED | OUTPATIENT
Start: 2024-09-05 | End: 2024-09-05 | Stop reason: HOSPADM

## 2024-09-05 RX ORDER — ONDANSETRON 2 MG/ML
INJECTION INTRAMUSCULAR; INTRAVENOUS AS NEEDED
Status: DISCONTINUED | OUTPATIENT
Start: 2024-09-05 | End: 2024-09-05 | Stop reason: SURG

## 2024-09-05 RX ORDER — FENTANYL CITRATE 50 UG/ML
50 INJECTION, SOLUTION INTRAMUSCULAR; INTRAVENOUS
Status: DISCONTINUED | OUTPATIENT
Start: 2024-09-05 | End: 2024-09-05 | Stop reason: HOSPADM

## 2024-09-05 RX ORDER — SODIUM CHLORIDE 0.9 % (FLUSH) 0.9 %
3 SYRINGE (ML) INJECTION EVERY 12 HOURS SCHEDULED
Status: DISCONTINUED | OUTPATIENT
Start: 2024-09-05 | End: 2024-09-05 | Stop reason: HOSPADM

## 2024-09-05 RX ORDER — ACETAMINOPHEN 325 MG/1
650 TABLET ORAL EVERY 4 HOURS PRN
Status: DISCONTINUED | OUTPATIENT
Start: 2024-09-05 | End: 2024-09-05 | Stop reason: HOSPADM

## 2024-09-05 RX ORDER — DROPERIDOL 2.5 MG/ML
0.62 INJECTION, SOLUTION INTRAMUSCULAR; INTRAVENOUS ONCE AS NEEDED
Status: DISCONTINUED | OUTPATIENT
Start: 2024-09-05 | End: 2024-09-05 | Stop reason: HOSPADM

## 2024-09-05 RX ORDER — PROMETHAZINE HYDROCHLORIDE 25 MG/1
25 SUPPOSITORY RECTAL ONCE AS NEEDED
Status: DISCONTINUED | OUTPATIENT
Start: 2024-09-05 | End: 2024-09-05 | Stop reason: HOSPADM

## 2024-09-05 RX ORDER — HEPARIN SODIUM 10000 [USP'U]/100ML
INJECTION, SOLUTION INTRAVENOUS
Status: DISCONTINUED | OUTPATIENT
Start: 2024-09-05 | End: 2024-09-05 | Stop reason: HOSPADM

## 2024-09-05 RX ORDER — ONDANSETRON 2 MG/ML
4 INJECTION INTRAMUSCULAR; INTRAVENOUS EVERY 6 HOURS PRN
Status: DISCONTINUED | OUTPATIENT
Start: 2024-09-05 | End: 2024-09-05 | Stop reason: HOSPADM

## 2024-09-05 RX ORDER — SODIUM CHLORIDE, SODIUM LACTATE, POTASSIUM CHLORIDE, CALCIUM CHLORIDE 600; 310; 30; 20 MG/100ML; MG/100ML; MG/100ML; MG/100ML
9 INJECTION, SOLUTION INTRAVENOUS CONTINUOUS
Status: DISCONTINUED | OUTPATIENT
Start: 2024-09-05 | End: 2024-09-05 | Stop reason: HOSPADM

## 2024-09-05 RX ORDER — ROCURONIUM BROMIDE 10 MG/ML
INJECTION, SOLUTION INTRAVENOUS AS NEEDED
Status: DISCONTINUED | OUTPATIENT
Start: 2024-09-05 | End: 2024-09-05 | Stop reason: SURG

## 2024-09-05 RX ORDER — SODIUM CHLORIDE 0.9 % (FLUSH) 0.9 %
3-10 SYRINGE (ML) INJECTION AS NEEDED
Status: DISCONTINUED | OUTPATIENT
Start: 2024-09-05 | End: 2024-09-05 | Stop reason: HOSPADM

## 2024-09-05 RX ORDER — LABETALOL HYDROCHLORIDE 5 MG/ML
5 INJECTION, SOLUTION INTRAVENOUS
Status: DISCONTINUED | OUTPATIENT
Start: 2024-09-05 | End: 2024-09-05 | Stop reason: HOSPADM

## 2024-09-05 RX ORDER — FAMOTIDINE 20 MG/1
20 TABLET, FILM COATED ORAL ONCE
Status: DISCONTINUED | OUTPATIENT
Start: 2024-09-05 | End: 2024-09-05 | Stop reason: HOSPADM

## 2024-09-05 RX ORDER — NALOXONE HCL 0.4 MG/ML
0.4 VIAL (ML) INJECTION AS NEEDED
Status: DISCONTINUED | OUTPATIENT
Start: 2024-09-05 | End: 2024-09-05 | Stop reason: HOSPADM

## 2024-09-05 RX ORDER — DEXAMETHASONE SODIUM PHOSPHATE 4 MG/ML
INJECTION, SOLUTION INTRA-ARTICULAR; INTRALESIONAL; INTRAMUSCULAR; INTRAVENOUS; SOFT TISSUE AS NEEDED
Status: DISCONTINUED | OUTPATIENT
Start: 2024-09-05 | End: 2024-09-05 | Stop reason: SURG

## 2024-09-05 RX ORDER — BUPIVACAINE HCL/0.9 % NACL/PF 0.125 %
PLASTIC BAG, INJECTION (ML) EPIDURAL AS NEEDED
Status: DISCONTINUED | OUTPATIENT
Start: 2024-09-05 | End: 2024-09-05 | Stop reason: SURG

## 2024-09-05 RX ORDER — SODIUM CHLORIDE 9 MG/ML
INJECTION, SOLUTION INTRAVENOUS
Status: DISCONTINUED | OUTPATIENT
Start: 2024-09-05 | End: 2024-09-05 | Stop reason: HOSPADM

## 2024-09-05 RX ORDER — PROPOFOL 10 MG/ML
VIAL (ML) INTRAVENOUS AS NEEDED
Status: DISCONTINUED | OUTPATIENT
Start: 2024-09-05 | End: 2024-09-05 | Stop reason: SURG

## 2024-09-05 RX ORDER — PROTAMINE SULFATE 10 MG/ML
INJECTION, SOLUTION INTRAVENOUS
Status: DISCONTINUED | OUTPATIENT
Start: 2024-09-05 | End: 2024-09-05 | Stop reason: HOSPADM

## 2024-09-05 RX ORDER — DROPERIDOL 2.5 MG/ML
0.62 INJECTION, SOLUTION INTRAMUSCULAR; INTRAVENOUS
Status: DISCONTINUED | OUTPATIENT
Start: 2024-09-05 | End: 2024-09-05 | Stop reason: HOSPADM

## 2024-09-05 RX ORDER — HEPARIN SODIUM 1000 [USP'U]/ML
INJECTION, SOLUTION INTRAVENOUS; SUBCUTANEOUS
Status: DISCONTINUED | OUTPATIENT
Start: 2024-09-05 | End: 2024-09-05 | Stop reason: HOSPADM

## 2024-09-05 RX ADMIN — ROCURONIUM BROMIDE 10 MG: 10 SOLUTION INTRAVENOUS at 15:54

## 2024-09-05 RX ADMIN — SUGAMMADEX 200 MG: 100 INJECTION, SOLUTION INTRAVENOUS at 16:40

## 2024-09-05 RX ADMIN — PROPOFOL 100 MG: 10 INJECTION, EMULSION INTRAVENOUS at 15:16

## 2024-09-05 RX ADMIN — LIDOCAINE HYDROCHLORIDE 50 MG: 10 INJECTION, SOLUTION EPIDURAL; INFILTRATION; INTRACAUDAL; PERINEURAL at 15:16

## 2024-09-05 RX ADMIN — DEXAMETHASONE SODIUM PHOSPHATE 4 MG: 4 INJECTION, SOLUTION INTRAMUSCULAR; INTRAVENOUS at 15:23

## 2024-09-05 RX ADMIN — ONDANSETRON 4 MG: 2 INJECTION INTRAMUSCULAR; INTRAVENOUS at 16:40

## 2024-09-05 RX ADMIN — Medication 100 MCG: at 15:26

## 2024-09-05 RX ADMIN — FAMOTIDINE 20 MG: 10 INJECTION, SOLUTION INTRAVENOUS at 14:09

## 2024-09-05 RX ADMIN — ROCURONIUM BROMIDE 50 MG: 10 SOLUTION INTRAVENOUS at 15:16

## 2024-09-06 ENCOUNTER — CALL CENTER PROGRAMS (OUTPATIENT)
Dept: CALL CENTER | Facility: HOSPITAL | Age: 79
End: 2024-09-06
Payer: MEDICARE

## 2024-09-06 LAB — ACT BLD: 825 SECONDS (ref 82–152)

## 2024-09-08 LAB
QT INTERVAL: 402 MS
QTC INTERVAL: 533 MS

## 2024-09-09 RX ORDER — FERROUS SULFATE 325(65) MG
1 TABLET ORAL 2 TIMES DAILY WITH MEALS
Qty: 60 TABLET | Refills: 0 | Status: SHIPPED | OUTPATIENT
Start: 2024-09-09

## 2024-09-11 RX ORDER — ERGOCALCIFEROL 1.25 MG/1
CAPSULE, LIQUID FILLED ORAL
Qty: 5 CAPSULE | Refills: 0 | Status: SHIPPED | OUTPATIENT
Start: 2024-09-11

## 2024-09-19 RX ORDER — LEVOTHYROXINE SODIUM 150 UG/1
150 TABLET ORAL DAILY
Qty: 30 TABLET | Refills: 3 | Status: SHIPPED | OUTPATIENT
Start: 2024-09-19

## 2024-10-15 RX ORDER — EMPAGLIFLOZIN 10 MG/1
10 TABLET, FILM COATED ORAL DAILY
Qty: 30 TABLET | Refills: 3 | OUTPATIENT
Start: 2024-10-15

## 2024-10-17 ENCOUNTER — OFFICE VISIT (OUTPATIENT)
Dept: PRIMARY CARE CLINIC | Age: 79
End: 2024-10-17
Payer: MEDICARE

## 2024-10-17 VITALS
BODY MASS INDEX: 33.51 KG/M2 | HEART RATE: 54 BPM | DIASTOLIC BLOOD PRESSURE: 74 MMHG | RESPIRATION RATE: 94 BRPM | WEIGHT: 171.6 LBS | SYSTOLIC BLOOD PRESSURE: 122 MMHG

## 2024-10-17 DIAGNOSIS — I50.22 CHRONIC SYSTOLIC CONGESTIVE HEART FAILURE (HCC): ICD-10-CM

## 2024-10-17 DIAGNOSIS — Z86.2 HISTORY OF ANEMIA: ICD-10-CM

## 2024-10-17 DIAGNOSIS — E03.9 HYPOTHYROIDISM, UNSPECIFIED TYPE: ICD-10-CM

## 2024-10-17 DIAGNOSIS — E55.9 VITAMIN D DEFICIENCY: ICD-10-CM

## 2024-10-17 DIAGNOSIS — E11.69 TYPE 2 DIABETES MELLITUS WITH OTHER SPECIFIED COMPLICATION, WITHOUT LONG-TERM CURRENT USE OF INSULIN (HCC): Primary | ICD-10-CM

## 2024-10-17 DIAGNOSIS — I48.0 PAROXYSMAL ATRIAL FIBRILLATION (HCC): ICD-10-CM

## 2024-10-17 DIAGNOSIS — E53.8 VITAMIN B12 DEFICIENCY: ICD-10-CM

## 2024-10-17 LAB — HBA1C MFR BLD: 7.1 %

## 2024-10-17 PROCEDURE — 3074F SYST BP LT 130 MM HG: CPT | Performed by: INTERNAL MEDICINE

## 2024-10-17 PROCEDURE — 96372 THER/PROPH/DIAG INJ SC/IM: CPT | Performed by: INTERNAL MEDICINE

## 2024-10-17 PROCEDURE — 1036F TOBACCO NON-USER: CPT | Performed by: INTERNAL MEDICINE

## 2024-10-17 PROCEDURE — 3051F HG A1C>EQUAL 7.0%<8.0%: CPT | Performed by: INTERNAL MEDICINE

## 2024-10-17 PROCEDURE — G8482 FLU IMMUNIZE ORDER/ADMIN: HCPCS | Performed by: INTERNAL MEDICINE

## 2024-10-17 PROCEDURE — G8400 PT W/DXA NO RESULTS DOC: HCPCS | Performed by: INTERNAL MEDICINE

## 2024-10-17 PROCEDURE — 99213 OFFICE O/P EST LOW 20 MIN: CPT | Performed by: INTERNAL MEDICINE

## 2024-10-17 PROCEDURE — 83036 HEMOGLOBIN GLYCOSYLATED A1C: CPT | Performed by: INTERNAL MEDICINE

## 2024-10-17 PROCEDURE — 1124F ACP DISCUSS-NO DSCNMKR DOCD: CPT | Performed by: INTERNAL MEDICINE

## 2024-10-17 PROCEDURE — 3078F DIAST BP <80 MM HG: CPT | Performed by: INTERNAL MEDICINE

## 2024-10-17 PROCEDURE — 1090F PRES/ABSN URINE INCON ASSESS: CPT | Performed by: INTERNAL MEDICINE

## 2024-10-17 PROCEDURE — 90653 IIV ADJUVANT VACCINE IM: CPT | Performed by: INTERNAL MEDICINE

## 2024-10-17 PROCEDURE — G8427 DOCREV CUR MEDS BY ELIG CLIN: HCPCS | Performed by: INTERNAL MEDICINE

## 2024-10-17 PROCEDURE — G0008 ADMIN INFLUENZA VIRUS VAC: HCPCS | Performed by: INTERNAL MEDICINE

## 2024-10-17 PROCEDURE — G8417 CALC BMI ABV UP PARAM F/U: HCPCS | Performed by: INTERNAL MEDICINE

## 2024-10-17 RX ORDER — SEMAGLUTIDE 0.68 MG/ML
INJECTION, SOLUTION SUBCUTANEOUS
Qty: 3 ML | Refills: 1 | Status: SHIPPED | OUTPATIENT
Start: 2024-10-17

## 2024-10-17 RX ORDER — CYANOCOBALAMIN 1000 UG/ML
1000 INJECTION, SOLUTION INTRAMUSCULAR; SUBCUTANEOUS ONCE
Status: COMPLETED | OUTPATIENT
Start: 2024-10-17 | End: 2024-10-17

## 2024-10-17 RX ADMIN — CYANOCOBALAMIN 1000 MCG: 1000 INJECTION, SOLUTION INTRAMUSCULAR; SUBCUTANEOUS at 13:16

## 2024-10-17 ASSESSMENT — ENCOUNTER SYMPTOMS
SHORTNESS OF BREATH: 0
ABDOMINAL PAIN: 0
NAUSEA: 0
EYE DISCHARGE: 0
BACK PAIN: 1
SINUS PRESSURE: 0
WHEEZING: 0
SORE THROAT: 0
COUGH: 0
VOMITING: 0

## 2024-10-17 NOTE — PROGRESS NOTES
SUBJECTIVE:    Patient ID: Jolanta Montenegro is a 79 y.o.female.    Chief Complaint   Patient presents with    Diabetes    Atrial Fibrillation    Congestive Heart Failure         HPI:  Patient has had diabetes for the past several years.  She has been compliant with the medications and denies any side effects from it. She has not been monitoring fingersticks on a daily basis. She denies any hypoglycemic symptoms. She has been following a diabetic diet and has been active.  Her last eye exam was less than a year ago.  She is using oral meds and Ozempic.    Patient with history of A-fib.  Patient reported in the process to get Watchman device.  She has been follow-up with cardiology.  She denies any chest pain or palpitation.  She has been taking Xarelto.  She does have history of CHF but reported no swelling in the lower extremities or orthopnea.    Patient reported ongoing pain to her left knee.  She reported might be considering surgery in the future.    Patient's medications, allergies, past medical, surgical, social and family histories were reviewed and updated as appropriate in electronic medical record.        Outpatient Medications Marked as Taking for the 10/17/24 encounter (Office Visit) with Alyx Perez MD   Medication Sig Dispense Refill    OZEMPIC, 0.25 OR 0.5 MG/DOSE, 2 MG/3ML SOPN INJECT 0.5MG UNDER THE SKIN ONCE WEEKLY 3 mL 1    rivaroxaban (XARELTO) 20 MG TABS tablet Take 1 tablet by mouth Daily with supper      empagliflozin (JARDIANCE) 10 MG tablet Take 1 tablet by mouth daily 30 tablet 3    levothyroxine (SYNTHROID) 150 MCG tablet TAKE ONE TABLET BY MOUTH ONCE A DAY 30 tablet 3    vitamin D (ERGOCALCIFEROL) 1.25 MG (13326 UT) CAPS capsule TAKE ONE CAPSULE BY MOUTH ONE TIME WEEKLY 5 capsule 0    FEROSUL 325 (65 Fe) MG tablet TAKE ONE TABLET BY MOUTH TWICE A DAY WITH MEALS 60 tablet 0    glipiZIDE (GLUCOTROL XL) 10 MG extended release tablet TAKE ONE TABLET BY MOUTH ONCE A DAY 30 tablet 3    diclofenac

## 2024-10-18 ENCOUNTER — OFFICE VISIT (OUTPATIENT)
Dept: CARDIOLOGY | Facility: HOSPITAL | Age: 79
End: 2024-10-18
Payer: MEDICARE

## 2024-10-18 VITALS
HEIGHT: 60 IN | WEIGHT: 177.4 LBS | OXYGEN SATURATION: 99 % | DIASTOLIC BLOOD PRESSURE: 70 MMHG | BODY MASS INDEX: 34.83 KG/M2 | RESPIRATION RATE: 18 BRPM | TEMPERATURE: 97 F | SYSTOLIC BLOOD PRESSURE: 108 MMHG | HEART RATE: 80 BPM

## 2024-10-18 DIAGNOSIS — I50.22 CHRONIC HFREF (HEART FAILURE WITH REDUCED EJECTION FRACTION): ICD-10-CM

## 2024-10-18 DIAGNOSIS — I48.0 PAROXYSMAL ATRIAL FIBRILLATION: Primary | ICD-10-CM

## 2024-10-18 DIAGNOSIS — I10 ESSENTIAL HYPERTENSION: ICD-10-CM

## 2024-10-22 RX ORDER — ERGOCALCIFEROL 1.25 MG/1
CAPSULE, LIQUID FILLED ORAL
Qty: 5 CAPSULE | Refills: 3 | Status: SHIPPED | OUTPATIENT
Start: 2024-10-22

## 2024-10-22 RX ORDER — LANOLIN ALCOHOL/MO/W.PET/CERES
400 CREAM (GRAM) TOPICAL DAILY
Qty: 30 TABLET | Refills: 2 | Status: SHIPPED | OUTPATIENT
Start: 2024-10-22

## 2024-10-22 RX ORDER — CITALOPRAM HYDROBROMIDE 10 MG/1
10 TABLET ORAL DAILY
Qty: 30 TABLET | Refills: 2 | Status: SHIPPED | OUTPATIENT
Start: 2024-10-22

## 2024-10-24 RX ORDER — FUROSEMIDE 20 MG/1
20 TABLET ORAL DAILY
Qty: 60 TABLET | Refills: 0 | Status: SHIPPED | OUTPATIENT
Start: 2024-10-24

## 2024-11-01 RX ORDER — FUROSEMIDE 20 MG/1
20 TABLET ORAL DAILY
Qty: 60 TABLET | Refills: 0 | Status: SHIPPED | OUTPATIENT
Start: 2024-11-01

## 2024-11-01 RX ORDER — FERROUS SULFATE 325(65) MG
1 TABLET ORAL 2 TIMES DAILY WITH MEALS
Qty: 60 TABLET | Refills: 0 | Status: SHIPPED | OUTPATIENT
Start: 2024-11-01

## 2024-11-07 RX ORDER — ESOMEPRAZOLE MAGNESIUM 40 MG/1
40 CAPSULE, DELAYED RELEASE ORAL
Qty: 30 CAPSULE | Refills: 1 | Status: SHIPPED | OUTPATIENT
Start: 2024-11-07

## 2024-11-19 RX ORDER — MONTELUKAST SODIUM 10 MG/1
10 TABLET ORAL NIGHTLY
Qty: 30 TABLET | Refills: 3 | Status: SHIPPED | OUTPATIENT
Start: 2024-11-19

## 2024-12-09 ENCOUNTER — OFFICE VISIT (OUTPATIENT)
Dept: CARDIOLOGY | Facility: CLINIC | Age: 79
End: 2024-12-09
Payer: MEDICARE

## 2024-12-09 VITALS
WEIGHT: 178.6 LBS | SYSTOLIC BLOOD PRESSURE: 118 MMHG | OXYGEN SATURATION: 95 % | BODY MASS INDEX: 35.06 KG/M2 | HEART RATE: 92 BPM | DIASTOLIC BLOOD PRESSURE: 60 MMHG | HEIGHT: 60 IN

## 2024-12-09 DIAGNOSIS — I44.7 LBBB (LEFT BUNDLE BRANCH BLOCK): ICD-10-CM

## 2024-12-09 DIAGNOSIS — I48.0 PAROXYSMAL ATRIAL FIBRILLATION: ICD-10-CM

## 2024-12-09 DIAGNOSIS — I25.10 CORONARY ARTERY DISEASE INVOLVING NATIVE CORONARY ARTERY OF NATIVE HEART WITHOUT ANGINA PECTORIS: Primary | ICD-10-CM

## 2024-12-09 DIAGNOSIS — I10 ESSENTIAL HYPERTENSION: ICD-10-CM

## 2024-12-09 DIAGNOSIS — I50.20 HFREF (HEART FAILURE WITH REDUCED EJECTION FRACTION): ICD-10-CM

## 2024-12-09 PROCEDURE — 3074F SYST BP LT 130 MM HG: CPT | Performed by: PHYSICIAN ASSISTANT

## 2024-12-09 PROCEDURE — 99214 OFFICE O/P EST MOD 30 MIN: CPT | Performed by: PHYSICIAN ASSISTANT

## 2024-12-09 PROCEDURE — 93000 ELECTROCARDIOGRAM COMPLETE: CPT | Performed by: PHYSICIAN ASSISTANT

## 2024-12-09 PROCEDURE — 3078F DIAST BP <80 MM HG: CPT | Performed by: PHYSICIAN ASSISTANT
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
## 2024-12-16 RX ORDER — FERROUS SULFATE 325(65) MG
1 TABLET ORAL 2 TIMES DAILY WITH MEALS
Qty: 60 TABLET | Refills: 0 | Status: SHIPPED | OUTPATIENT
Start: 2024-12-16

## 2024-12-16 RX ORDER — ATORVASTATIN CALCIUM 40 MG/1
40 TABLET, FILM COATED ORAL DAILY
Qty: 30 TABLET | Refills: 3 | Status: SHIPPED | OUTPATIENT
Start: 2024-12-16

## 2024-12-16 RX ORDER — GLIPIZIDE 10 MG/1
10 TABLET, FILM COATED, EXTENDED RELEASE ORAL DAILY
Qty: 30 TABLET | Refills: 3 | Status: SHIPPED | OUTPATIENT
Start: 2024-12-16

## 2024-12-16 RX ORDER — SEMAGLUTIDE 0.68 MG/ML
INJECTION, SOLUTION SUBCUTANEOUS
Qty: 3 ML | Refills: 1 | Status: SHIPPED | OUTPATIENT
Start: 2024-12-16

## 2025-01-02 NOTE — TELEPHONE ENCOUNTER
Patient came by asking for samples of Myrbetriq. Patient does not have medication on list. Can she be given Myrbetriq and what dose. No

## 2025-01-03 ENCOUNTER — TELEPHONE (OUTPATIENT)
Dept: CARDIOLOGY | Facility: CLINIC | Age: 80
End: 2025-01-03
Payer: MEDICARE

## 2025-01-13 RX ORDER — LEVOTHYROXINE SODIUM 150 UG/1
150 TABLET ORAL DAILY
Qty: 30 TABLET | Refills: 3 | Status: SHIPPED | OUTPATIENT
Start: 2025-01-13

## 2025-01-13 RX ORDER — ESOMEPRAZOLE MAGNESIUM 40 MG/1
40 CAPSULE, DELAYED RELEASE ORAL
Qty: 30 CAPSULE | Refills: 1 | Status: SHIPPED | OUTPATIENT
Start: 2025-01-13

## 2025-01-13 RX ORDER — SPIRONOLACTONE 25 MG/1
25 TABLET ORAL DAILY
Qty: 90 TABLET | Refills: 1 | Status: SHIPPED | OUTPATIENT
Start: 2025-01-13

## 2025-01-22 ENCOUNTER — TELEPHONE (OUTPATIENT)
Dept: CARDIOLOGY | Facility: CLINIC | Age: 80
End: 2025-01-22
Payer: MEDICARE

## 2025-01-22 ENCOUNTER — TELEPHONE (OUTPATIENT)
Dept: CARDIOLOGY | Facility: HOSPITAL | Age: 80
End: 2025-01-22
Payer: MEDICARE

## 2025-01-22 DIAGNOSIS — I48.0 PAROXYSMAL ATRIAL FIBRILLATION: Primary | ICD-10-CM

## 2025-01-22 DIAGNOSIS — D64.9 ANEMIA, UNSPECIFIED TYPE: ICD-10-CM

## 2025-01-22 NOTE — TELEPHONE ENCOUNTER
Regina Ramsay, read the information in the SDM tool, Preventing Stroke in Atrial Fibrillation: A Patient's Guide to Shared Decision Making.  The patient has decided to pursue a LAAO procedure.  They are a suitable candidate for short term blood thinner therapy but is unable to take long term anticoagulation due to history of anemia.

## 2025-01-22 NOTE — TELEPHONE ENCOUNTER
Patient contacted to schedule LAAO procedure with Dr. Cates. Patient scheduled for 01/29/2025. Review process for LAAO, pt verbalized understanding. Of note- last dose of Ozempic on 01/18/2025, patient advised to hold one week prior to procedure.

## 2025-01-24 RX ORDER — LEVOCETIRIZINE DIHYDROCHLORIDE 5 MG/1
5 TABLET, FILM COATED ORAL NIGHTLY
Qty: 30 TABLET | Refills: 3 | Status: SHIPPED | OUTPATIENT
Start: 2025-01-24

## 2025-01-24 RX ORDER — FERROUS SULFATE 325(65) MG
1 TABLET ORAL 2 TIMES DAILY WITH MEALS
Qty: 60 TABLET | Refills: 0 | Status: SHIPPED | OUTPATIENT
Start: 2025-01-24

## 2025-01-24 NOTE — NURSING NOTE
PRE-LAAO HISTORY  Regina Figueroa Ramsay 1945   [address] [place] [address]   [phone] (home)     Referring MD: She Alarcon MD   Information obtained from: [x] Medical record review  [x] Patient report  Scheduled for: LAAO  implant on 01/29/2025 with Dr Dr. Cates  SDM Visit: LAQUITA De La Torre       CHADS-VASc Risk Assessment               6 Total Score    1 Hypertension    2 Age >/= 75    1 DM    1  1 Sex: Female  CHF    Criteria that do not apply:    CHF    PRIOR STROKE/TIA/THROMBO    Vascular Disease    Age 65-74             History & Risk Factors (prior to LAAO implant)  [id number] Risk Factors:  Congestive HF     [] No   [x] Yes  LV Dysfunction   [] No   [x] Yes  Hypertension      [] No   [x] Yes  Diabetes    [x] No   [] Yes  Stroke       [x] No   [] Yes  TIA       [x] No   [] Yes  Thromboembolism    [x] No   [] Yes  Vascular Disease   [] No   [x] Yes       If Yes, Type   [] Prior MI  [x] CAD      [] Aortic Plaque    Statin if indicated          [] No   [x] Yes- Atorvastatin   HAS-BLED Risk Factors:  HTN (uncontrolled) [] No  [x] Yes  Abnormal Renal Fxn  [] No  [x] Yes  Abnormal Liver Fxn   [x] No  [] Yes  Stroke            [x] No  [] Yes  Bleeding event [x] No  [] Yes  Labile INR      [x] No  [] Yes  Alcohol  [x] No  [] Yes  Antiplatelets      [x] No  [] Yes  NSAIDS  [x] No  [] Yes    Additional Stroke & Bleeding Risk Factors:  Increased Fall Risk   [x] No  [] Yes  Bleeding Event         [x] No  [] Yes      If Yes, Type      [] Intracranial [] Epistaxis   [] GI   [] Other    Rhythm History:  AFIBTYPE: paroxysmal    Valvular AFib        [x] No  [] Yes       If Yes, Rheum Valve Disease []  No  [] Yes       If Yes, Mitral Valve Replacement [] No  [] Yes            If Yes, Mechanical?   [] No  [] Yes       If Yes, Mitral Valve Repair  [] No  [] Yes    Attempt at AFib Termination:  [] No  [x] Yes       If Yes, pharmacologic CV    [] No  [x] Yes       If Yes, DC cardioversion  [] No  [x] Yes       If  Yes, catheter ablation  [] No  [x] Yes            If Yes, Date of most recent: 09/05/2024              If Yes, surgical ablation  [] No  [] Yes           If Yes, Date of most recent: ________           Atrial Flutter    [] No  [] Yes       If Yes, attempt at termination  [] No  [] Yes            If Yes, pharmacologic cardioversion [] No  [] Yes            If Yes, DC cardioversion  [] No  [] Yes            If Yes, catheter ablation  [] No  [] Yes            If Yes, Date of most recent: ________           AVIS Intervention    [x] No  [] Yes    Additional History & Risk Factors:  Cardiomyopathy   [] No  [x] Yes  Chronic Lung Disease  [x] No  [] Yes  Coronary Artery Disease  [] No  [x] Yes  Sleep Apnea    [] No  [x] Yes       If Yes, compliant with treatment [x] No  [] Yes    Epicardial Approach Considered [x] No  [] Yes    Diagnostic Studies:  Last Echo:  [x] TTE Date:   Results for orders placed during the hospital encounter of 10/31/23    Adult Transthoracic Echo Complete W/ Cont if Necessary Per Protocol    Interpretation Summary    Left ventricular systolic function is low normal. Estimated left ventricular EF = 45%    Septal wall motion is abnormal, consistent with a bundle branch block.    Left ventricular wall thickness is consistent with mild concentric hypertrophy.    Left ventricular diastolic function is consistent with (grade Ia w/high LAP) impaired relaxation.    The aortic valve exhibits sclerosis.    Mild mitral annular calcification is present.       Ace, arb arni for EF < 40%  NO      Pre-procedure blood thinner medications:      Continue uninterrupted Xarelto 20 mg daily. Start 81 mg ASA the day of procedure.     Of Note: Patient states that she has been told that she has a narrow esophagus, patient was told this compares to the size a child's. She denies any dysphagia or trauma/radiation to esophagus.        01/24/25 10:49 EST   DARIUSZ Revised 1/25/2024

## 2025-01-27 DIAGNOSIS — I48.0 PAROXYSMAL ATRIAL FIBRILLATION: Primary | ICD-10-CM

## 2025-01-27 NOTE — PROGRESS NOTES
Patient scheduled for PAT 1/28/25 for upcoming procedure w/ Dr. Cates. Eva from PAT called requesting orders be placed for this appointment.

## 2025-01-28 ENCOUNTER — PRE-ADMISSION TESTING (OUTPATIENT)
Dept: PREADMISSION TESTING | Facility: HOSPITAL | Age: 80
DRG: 274 | End: 2025-01-28
Payer: MEDICARE

## 2025-01-28 DIAGNOSIS — I48.0 PAROXYSMAL ATRIAL FIBRILLATION: ICD-10-CM

## 2025-01-28 DIAGNOSIS — D64.9 ANEMIA, UNSPECIFIED TYPE: ICD-10-CM

## 2025-01-28 LAB
ANION GAP SERPL CALCULATED.3IONS-SCNC: 13 MMOL/L (ref 5–15)
BASOPHILS # BLD AUTO: 0.08 10*3/MM3 (ref 0–0.2)
BASOPHILS NFR BLD AUTO: 0.6 % (ref 0–1.5)
BUN SERPL-MCNC: 35 MG/DL (ref 8–23)
BUN/CREAT SERPL: 27.6 (ref 7–25)
CALCIUM SPEC-SCNC: 9.6 MG/DL (ref 8.6–10.5)
CHLORIDE SERPL-SCNC: 103 MMOL/L (ref 98–107)
CO2 SERPL-SCNC: 26 MMOL/L (ref 22–29)
CREAT SERPL-MCNC: 1.27 MG/DL (ref 0.57–1)
DEPRECATED RDW RBC AUTO: 46.3 FL (ref 37–54)
EGFRCR SERPLBLD CKD-EPI 2021: 43.6 ML/MIN/1.73
EOSINOPHIL # BLD AUTO: 0.07 10*3/MM3 (ref 0–0.4)
EOSINOPHIL NFR BLD AUTO: 0.5 % (ref 0.3–6.2)
ERYTHROCYTE [DISTWIDTH] IN BLOOD BY AUTOMATED COUNT: 13.5 % (ref 12.3–15.4)
GLUCOSE SERPL-MCNC: 211 MG/DL (ref 65–99)
HCT VFR BLD AUTO: 38 % (ref 34–46.6)
HGB BLD-MCNC: 12 G/DL (ref 12–15.9)
IMM GRANULOCYTES # BLD AUTO: 0.14 10*3/MM3 (ref 0–0.05)
IMM GRANULOCYTES NFR BLD AUTO: 1.1 % (ref 0–0.5)
LYMPHOCYTES # BLD AUTO: 1.97 10*3/MM3 (ref 0.7–3.1)
LYMPHOCYTES NFR BLD AUTO: 15 % (ref 19.6–45.3)
MCH RBC QN AUTO: 29.4 PG (ref 26.6–33)
MCHC RBC AUTO-ENTMCNC: 31.6 G/DL (ref 31.5–35.7)
MCV RBC AUTO: 93.1 FL (ref 79–97)
MONOCYTES # BLD AUTO: 0.65 10*3/MM3 (ref 0.1–0.9)
MONOCYTES NFR BLD AUTO: 4.9 % (ref 5–12)
NEUTROPHILS NFR BLD AUTO: 10.23 10*3/MM3 (ref 1.7–7)
NEUTROPHILS NFR BLD AUTO: 77.9 % (ref 42.7–76)
NRBC BLD AUTO-RTO: 0 /100 WBC (ref 0–0.2)
PLATELET # BLD AUTO: 187 10*3/MM3 (ref 140–450)
PMV BLD AUTO: 10.9 FL (ref 6–12)
POTASSIUM SERPL-SCNC: 4.5 MMOL/L (ref 3.5–5.2)
RBC # BLD AUTO: 4.08 10*6/MM3 (ref 3.77–5.28)
SODIUM SERPL-SCNC: 142 MMOL/L (ref 136–145)
WBC NRBC COR # BLD AUTO: 13.14 10*3/MM3 (ref 3.4–10.8)

## 2025-01-28 PROCEDURE — 80048 BASIC METABOLIC PNL TOTAL CA: CPT | Performed by: PHYSICIAN ASSISTANT

## 2025-01-28 PROCEDURE — 85025 COMPLETE CBC W/AUTO DIFF WBC: CPT

## 2025-01-28 PROCEDURE — 36415 COLL VENOUS BLD VENIPUNCTURE: CPT | Performed by: INTERNAL MEDICINE

## 2025-01-29 ENCOUNTER — HOSPITAL ENCOUNTER (INPATIENT)
Facility: HOSPITAL | Age: 80
LOS: 2 days | Discharge: HOME | End: 2025-01-31
Attending: INTERNAL MEDICINE | Admitting: INTERNAL MEDICINE
Payer: MEDICARE

## 2025-01-29 DIAGNOSIS — Z95.818 PRESENCE OF WATCHMAN LEFT ATRIAL APPENDAGE CLOSURE DEVICE: ICD-10-CM

## 2025-01-29 DIAGNOSIS — D64.9 ANEMIA, UNSPECIFIED TYPE: ICD-10-CM

## 2025-01-29 DIAGNOSIS — Z95.818 PRESENCE OF WATCHMAN LEFT ATRIAL APPENDAGE CLOSURE DEVICE: Primary | ICD-10-CM

## 2025-01-29 DIAGNOSIS — I48.19 ATRIAL FIBRILLATION, PERSISTENT: ICD-10-CM

## 2025-01-29 DIAGNOSIS — I48.0 PAROXYSMAL ATRIAL FIBRILLATION: ICD-10-CM

## 2025-01-29 DIAGNOSIS — I48.0 PAROXYSMAL ATRIAL FIBRILLATION: Primary | ICD-10-CM

## 2025-01-29 LAB
ACT BLD: 538 SECONDS (ref 82–152)
ALBUMIN SERPL-MCNC: 2.9 G/DL (ref 3.5–5.2)
ALBUMIN/GLOB SERPL: 1.2 G/DL
ALP SERPL-CCNC: 109 U/L (ref 39–117)
ALT SERPL W P-5'-P-CCNC: 9 U/L (ref 1–33)
ANION GAP SERPL CALCULATED.3IONS-SCNC: 11 MMOL/L (ref 5–15)
AST SERPL-CCNC: 12 U/L (ref 1–32)
BILIRUB SERPL-MCNC: 0.2 MG/DL (ref 0–1.2)
BUN SERPL-MCNC: 28 MG/DL (ref 8–23)
BUN/CREAT SERPL: 20 (ref 7–25)
CALCIUM SPEC-SCNC: 8.5 MG/DL (ref 8.6–10.5)
CHLORIDE SERPL-SCNC: 102 MMOL/L (ref 98–107)
CO2 SERPL-SCNC: 24 MMOL/L (ref 22–29)
CREAT SERPL-MCNC: 1.4 MG/DL (ref 0.57–1)
DEPRECATED RDW RBC AUTO: 48.4 FL (ref 37–54)
EGFRCR SERPLBLD CKD-EPI 2021: 38.8 ML/MIN/1.73
ERYTHROCYTE [DISTWIDTH] IN BLOOD BY AUTOMATED COUNT: 13.9 % (ref 12.3–15.4)
GLOBULIN UR ELPH-MCNC: 2.4 GM/DL
GLUCOSE BLDC GLUCOMTR-MCNC: 151 MG/DL (ref 70–130)
GLUCOSE SERPL-MCNC: 238 MG/DL (ref 65–99)
HCT VFR BLD AUTO: 32.7 % (ref 34–46.6)
HGB BLD-MCNC: 10.1 G/DL (ref 12–15.9)
MCH RBC QN AUTO: 29.4 PG (ref 26.6–33)
MCHC RBC AUTO-ENTMCNC: 30.9 G/DL (ref 31.5–35.7)
MCV RBC AUTO: 95.3 FL (ref 79–97)
PLATELET # BLD AUTO: 148 10*3/MM3 (ref 140–450)
PMV BLD AUTO: 10.8 FL (ref 6–12)
POTASSIUM SERPL-SCNC: 4.8 MMOL/L (ref 3.5–5.2)
PROT SERPL-MCNC: 5.3 G/DL (ref 6–8.5)
RBC # BLD AUTO: 3.43 10*6/MM3 (ref 3.77–5.28)
SODIUM SERPL-SCNC: 137 MMOL/L (ref 136–145)
WBC NRBC COR # BLD AUTO: 13.16 10*3/MM3 (ref 3.4–10.8)

## 2025-01-29 PROCEDURE — C1889 IMPLANT/INSERT DEVICE, NOC: HCPCS | Performed by: INTERNAL MEDICINE

## 2025-01-29 PROCEDURE — C1759 CATH, INTRA ECHOCARDIOGRAPHY: HCPCS | Performed by: INTERNAL MEDICINE

## 2025-01-29 PROCEDURE — 25810000003 SODIUM CHLORIDE 0.9 % SOLUTION: Performed by: INTERNAL MEDICINE

## 2025-01-29 PROCEDURE — C1894 INTRO/SHEATH, NON-LASER: HCPCS | Performed by: INTERNAL MEDICINE

## 2025-01-29 PROCEDURE — 99152 MOD SED SAME PHYS/QHP 5/>YRS: CPT | Performed by: INTERNAL MEDICINE

## 2025-01-29 PROCEDURE — 25010000002 FENTANYL CITRATE (PF) 50 MCG/ML SOLUTION: Performed by: INTERNAL MEDICINE

## 2025-01-29 PROCEDURE — 93662 INTRACARDIAC ECG (ICE): CPT | Performed by: INTERNAL MEDICINE

## 2025-01-29 PROCEDURE — 80053 COMPREHEN METABOLIC PANEL: CPT | Performed by: PHYSICIAN ASSISTANT

## 2025-01-29 PROCEDURE — 25010000002 ONDANSETRON PER 1 MG: Performed by: INTERNAL MEDICINE

## 2025-01-29 PROCEDURE — 25010000002 MIDAZOLAM PER 1 MG: Performed by: INTERNAL MEDICINE

## 2025-01-29 PROCEDURE — 25010000002 NALOXONE PER 1 MG

## 2025-01-29 PROCEDURE — C1893 INTRO/SHEATH, FIXED,NON-PEEL: HCPCS | Performed by: INTERNAL MEDICINE

## 2025-01-29 PROCEDURE — C1760 CLOSURE DEV, VASC: HCPCS | Performed by: INTERNAL MEDICINE

## 2025-01-29 PROCEDURE — 33340 PERQ CLSR TCAT L ATR APNDGE: CPT | Performed by: INTERNAL MEDICINE

## 2025-01-29 PROCEDURE — 25010000002 PROTAMINE SULFATE PER 10 MG: Performed by: INTERNAL MEDICINE

## 2025-01-29 PROCEDURE — C1769 GUIDE WIRE: HCPCS | Performed by: INTERNAL MEDICINE

## 2025-01-29 PROCEDURE — 25010000002 CEFAZOLIN PER 500 MG

## 2025-01-29 PROCEDURE — 99153 MOD SED SAME PHYS/QHP EA: CPT | Performed by: INTERNAL MEDICINE

## 2025-01-29 PROCEDURE — 82948 REAGENT STRIP/BLOOD GLUCOSE: CPT

## 2025-01-29 PROCEDURE — 25510000001 IOPAMIDOL PER 1 ML: Performed by: INTERNAL MEDICINE

## 2025-01-29 PROCEDURE — 85347 COAGULATION TIME ACTIVATED: CPT

## 2025-01-29 PROCEDURE — 02L73DK OCCLUSION OF LEFT ATRIAL APPENDAGE WITH INTRALUMINAL DEVICE, PERCUTANEOUS APPROACH: ICD-10-PCS | Performed by: INTERNAL MEDICINE

## 2025-01-29 PROCEDURE — 25810000003 SODIUM CHLORIDE 0.9 % SOLUTION: Performed by: PHYSICIAN ASSISTANT

## 2025-01-29 PROCEDURE — 85027 COMPLETE CBC AUTOMATED: CPT | Performed by: PHYSICIAN ASSISTANT

## 2025-01-29 PROCEDURE — 25010000002 HEPARIN (PORCINE) PER 1000 UNITS: Performed by: INTERNAL MEDICINE

## 2025-01-29 DEVICE — LEFT ATRIAL APPENDAGE CLOSURE DEVICE WITH DELIVERY SYSTEM
Type: IMPLANTABLE DEVICE | Status: FUNCTIONAL
Brand: WATCHMAN FLX™ PRO

## 2025-01-29 DEVICE — CP SYS WATCHMAN TRUSTEER ACCESS: Type: IMPLANTABLE DEVICE | Status: FUNCTIONAL

## 2025-01-29 DEVICE — CP WATCHMAN FLX PRO PROC: Type: IMPLANTABLE DEVICE | Status: FUNCTIONAL

## 2025-01-29 RX ORDER — SODIUM CHLORIDE 9 MG/ML
100 INJECTION, SOLUTION INTRAVENOUS CONTINUOUS
Status: ACTIVE | OUTPATIENT
Start: 2025-01-30 | End: 2025-01-30

## 2025-01-29 RX ORDER — ATORVASTATIN CALCIUM 40 MG/1
40 TABLET, FILM COATED ORAL NIGHTLY
Status: DISCONTINUED | OUTPATIENT
Start: 2025-01-29 | End: 2025-01-31 | Stop reason: HOSPADM

## 2025-01-29 RX ORDER — OXYCODONE HYDROCHLORIDE 15 MG/1
15 TABLET ORAL EVERY 6 HOURS PRN
Status: DISCONTINUED | OUTPATIENT
Start: 2025-01-29 | End: 2025-01-31 | Stop reason: HOSPADM

## 2025-01-29 RX ORDER — FENTANYL CITRATE 50 UG/ML
INJECTION, SOLUTION INTRAMUSCULAR; INTRAVENOUS
Status: DISCONTINUED | OUTPATIENT
Start: 2025-01-29 | End: 2025-01-29 | Stop reason: HOSPADM

## 2025-01-29 RX ORDER — IOPAMIDOL 755 MG/ML
INJECTION, SOLUTION INTRAVASCULAR
Status: DISCONTINUED | OUTPATIENT
Start: 2025-01-29 | End: 2025-01-29 | Stop reason: HOSPADM

## 2025-01-29 RX ORDER — SODIUM CHLORIDE 0.9 % (FLUSH) 0.9 %
10 SYRINGE (ML) INJECTION AS NEEDED
Status: DISCONTINUED | OUTPATIENT
Start: 2025-01-29 | End: 2025-01-29 | Stop reason: HOSPADM

## 2025-01-29 RX ORDER — ONDANSETRON 2 MG/ML
INJECTION INTRAMUSCULAR; INTRAVENOUS
Status: DISCONTINUED | OUTPATIENT
Start: 2025-01-29 | End: 2025-01-29 | Stop reason: HOSPADM

## 2025-01-29 RX ORDER — ACETAMINOPHEN 325 MG/1
650 TABLET ORAL EVERY 4 HOURS PRN
Status: DISCONTINUED | OUTPATIENT
Start: 2025-01-29 | End: 2025-01-29 | Stop reason: HOSPADM

## 2025-01-29 RX ORDER — BUPIVACAINE HCL/0.9 % NACL/PF 0.125 %
PLASTIC BAG, INJECTION (ML) EPIDURAL
Status: DISCONTINUED | OUTPATIENT
Start: 2025-01-29 | End: 2025-01-29 | Stop reason: HOSPADM

## 2025-01-29 RX ORDER — ASPIRIN 81 MG/1
81 TABLET ORAL DAILY
Qty: 90 TABLET | Refills: 3 | Status: SHIPPED | OUTPATIENT
Start: 2025-01-29

## 2025-01-29 RX ORDER — SODIUM CHLORIDE 9 MG/ML
40 INJECTION, SOLUTION INTRAVENOUS AS NEEDED
Status: DISCONTINUED | OUTPATIENT
Start: 2025-01-29 | End: 2025-01-29 | Stop reason: HOSPADM

## 2025-01-29 RX ORDER — PROTAMINE SULFATE 10 MG/ML
INJECTION, SOLUTION INTRAVENOUS
Status: DISCONTINUED | OUTPATIENT
Start: 2025-01-29 | End: 2025-01-29 | Stop reason: HOSPADM

## 2025-01-29 RX ORDER — NALOXONE HYDROCHLORIDE 0.4 MG/ML
INJECTION, SOLUTION INTRAMUSCULAR; INTRAVENOUS; SUBCUTANEOUS
Status: COMPLETED
Start: 2025-01-29 | End: 2025-01-29

## 2025-01-29 RX ORDER — MIDAZOLAM HYDROCHLORIDE 1 MG/ML
INJECTION, SOLUTION INTRAMUSCULAR; INTRAVENOUS
Status: DISCONTINUED | OUTPATIENT
Start: 2025-01-29 | End: 2025-01-29 | Stop reason: HOSPADM

## 2025-01-29 RX ORDER — HEPARIN SODIUM 1000 [USP'U]/ML
INJECTION, SOLUTION INTRAVENOUS; SUBCUTANEOUS
Status: DISCONTINUED | OUTPATIENT
Start: 2025-01-29 | End: 2025-01-29 | Stop reason: HOSPADM

## 2025-01-29 RX ORDER — ACETAMINOPHEN 325 MG/1
650 TABLET ORAL EVERY 6 HOURS PRN
Status: DISCONTINUED | OUTPATIENT
Start: 2025-01-29 | End: 2025-01-31 | Stop reason: HOSPADM

## 2025-01-29 RX ORDER — SODIUM CHLORIDE 0.9 % (FLUSH) 0.9 %
3 SYRINGE (ML) INJECTION EVERY 12 HOURS SCHEDULED
Status: DISCONTINUED | OUTPATIENT
Start: 2025-01-29 | End: 2025-01-29 | Stop reason: HOSPADM

## 2025-01-29 RX ORDER — ONDANSETRON 2 MG/ML
4 INJECTION INTRAMUSCULAR; INTRAVENOUS EVERY 6 HOURS PRN
Status: DISCONTINUED | OUTPATIENT
Start: 2025-01-29 | End: 2025-01-31 | Stop reason: HOSPADM

## 2025-01-29 RX ORDER — ONDANSETRON 2 MG/ML
4 INJECTION INTRAMUSCULAR; INTRAVENOUS EVERY 6 HOURS PRN
Status: DISCONTINUED | OUTPATIENT
Start: 2025-01-29 | End: 2025-01-29 | Stop reason: HOSPADM

## 2025-01-29 RX ORDER — SODIUM CHLORIDE 9 MG/ML
INJECTION, SOLUTION INTRAVENOUS
Status: COMPLETED | OUTPATIENT
Start: 2025-01-29 | End: 2025-01-29

## 2025-01-29 RX ORDER — NITROGLYCERIN 0.4 MG/1
0.4 TABLET SUBLINGUAL
Status: DISCONTINUED | OUTPATIENT
Start: 2025-01-29 | End: 2025-01-29 | Stop reason: HOSPADM

## 2025-01-29 RX ORDER — ETOMIDATE 2 MG/ML
INJECTION INTRAVENOUS
Status: DISCONTINUED | OUTPATIENT
Start: 2025-01-29 | End: 2025-01-29 | Stop reason: HOSPADM

## 2025-01-29 RX ADMIN — OXYCODONE HYDROCHLORIDE 15 MG: 15 TABLET ORAL at 19:08

## 2025-01-29 RX ADMIN — ONDANSETRON 4 MG: 2 INJECTION INTRAMUSCULAR; INTRAVENOUS at 17:34

## 2025-01-29 RX ADMIN — NALXONE HYDROCHLORIDE 0.4 MG: 0.4 INJECTION INTRAMUSCULAR; INTRAVENOUS; SUBCUTANEOUS at 22:34

## 2025-01-29 RX ADMIN — ATORVASTATIN CALCIUM 40 MG: 40 TABLET, FILM COATED ORAL at 20:16

## 2025-01-29 RX ADMIN — ACETAMINOPHEN 650 MG: 325 TABLET ORAL at 17:34

## 2025-01-29 RX ADMIN — RIVAROXABAN 20 MG: 20 TABLET, FILM COATED ORAL at 21:59

## 2025-01-29 RX ADMIN — SODIUM CHLORIDE 2000 MG: 900 INJECTION INTRAVENOUS at 10:49

## 2025-01-29 RX ADMIN — SODIUM CHLORIDE 500 ML: 9 INJECTION, SOLUTION INTRAVENOUS at 20:13

## 2025-01-29 RX ADMIN — SODIUM CHLORIDE 100 ML/HR: 9 INJECTION, SOLUTION INTRAVENOUS at 23:58

## 2025-01-29 NOTE — H&P
Pre-LAAO Implantation History and Physical  [facility]      Patient:  Regina Ramsay  :  1945  MRN: [id number]    PCP:  She Alarcon MD  PHONE:  [phone]    DATE: 2025  ID: Regina Ramsay is a 79 y.o. female from [place]    CC: Atrial fibrillation, anemia    Patient Active Problem List    Diagnosis Date Noted    HFrEF (heart failure with reduced ejection fraction) 2024     Note Last Updated: 2024     Echocardiogram 2023:  LVEF 35-40%. Mild LVH. Indeterminate diastolic function.   Echocardiogram 10/31/2023:  LVEF 45%. Septal wall motion is abnormal, consistent with bundle branch block. Mild LVH. Grade Ia diastolic dysfunction. Aortic valve exhibits sclerosis. Mild mitral annular calcification.       Von Willebrand disease 2024    Iron deficiency anemia 2024    Malabsorption of iron 2024    Anemia 2024    Coronary artery disease involving native coronary artery 2024     Note Last Updated: 2024     LHC 10/31/2023: Severe small vessel disease involving the distal LAD, which is too small for catheter-based intervention. Intermediate to severe disease in a small RPDA.       Mixed hyperlipidemia 2024    Paroxysmal atrial fibrillation 10/18/2023     Note Last Updated: 2024     CHADsVASc score at least 6  Diagnosed 2023  Initiated on Eliquis      LBBB (left bundle branch block) 10/18/2023    Obesity (BMI 30-39.9) 2022    GERD (gastroesophageal reflux disease) 2022    Essential hypertension     Diabetes mellitus     Asthma         BRIEF HPI:   Ms. Ramsay is a 79-year-old female with the above medical history significant for atrial fibrillation, anemia and von Willebrand 's disease who presents for left atrial appendage occlusion implantation.  She underwent PVI by Dr. Dr. Cates in 2024 as well as typical flutter ablation.  She has had no further tachypalpitations or symptoms  "concerning for recurrence of arrhythmia.    Allergies:      Allergies   Allergen Reactions    Pregabalin Swelling and Rash     Lip swelling, blisters    Allopurinol Hives    Gabapentin Hives    Ibuprofen Urinary Retention    Metformin And Related Other (See Comments)     Yeast infection, diarrhea    Morphine Hives and Nausea Only    Topiramate Other (See Comments)     Makes her feel drunk.       MEDICATIONS:  Current Outpatient Medications   Medication Instructions    atorvastatin (LIPITOR) 40 mg, Oral, Every Night at Bedtime    citalopram (CELEXA) 10 mg, Daily    Cyanocobalamin (B-12 Compliance Injection) 1000 MCG/ML kit 1 mL, Every 14 Days    Diclofenac Sodium (VOLTAREN) 4 g, As Needed    empagliflozin (JARDIANCE) 10 mg, Daily    Esomeprazole Magnesium 40 mg, Daily    ferrous sulfate (FeroSul) 325 (65 FE) MG tablet 1 tablet, 2 Times Daily With Meals    furosemide (LASIX) 20 mg, Daily    glipizide (GLUCOTROL XL) 20 mg, Daily    levocetirizine (XYZAL) 5 mg, Daily    levothyroxine sodium (TIROSINT) 150 mcg, Every Early Morning    Magnesium Oxide -Mg Supplement 400 mg, Daily    metoprolol succinate XL (TOPROL-XL) 100 mg, Daily    montelukast (SINGULAIR) 10 mg, Nightly    oxyCODONE (ROXICODONE) 15 mg, Every 6 Hours PRN    Ozempic, 0.25 or 0.5 MG/DOSE, 2 MG/3ML solution pen-injector Inject 0.25 mg under the skin into the appropriate area as directed 1 (One) Time Per Week. Patient instructed to hold prior to procedure. Last dose 1/18/25    pantoprazole (PROTONIX) 40 mg, Daily    rivaroxaban (XARELTO) 20 mg, Oral, Daily With Dinner    spironolactone (ALDACTONE) 25 mg, Daily    valsartan (DIOVAN) 40 mg, Oral, Daily    vitamin D (ERGOCALCIFEROL) 50,000 Units, Weekly       Past medical & surgical history, social and family history reviewed in the electronic medical record.    ROS: Pertinent positives listed in the HPI and problem list above. All others reviewed and negative.     Physical Exam:   Ht 152.4 cm (60\")   Wt 84.6 " kg (186 lb 8.2 oz)   BMI 36.43 kg/m²     Constitutional:    Well-appearing 79 y.o. y/o adult  in no acute distress        Heart:    Regular rhythm and normal rate, no murmurs, rubs or gallops   Lungs:     Clear to auscultation bilaterally, no wheezes, rhales or rhonchi, nonlabored respirations       Extremities:   No gross deformities, no edema, clubbing, or cyanosis.    Pulses:    Neuro:  Psych:   Radial \pulses palpable and equal bilaterally.  No gross focal deficits  Mood and behavior appropriate for situation       Labs and Diagnostic Data:  Results from last 7 days   Lab Units 01/28/25  1507   SODIUM mmol/L 142   POTASSIUM mmol/L 4.5   CHLORIDE mmol/L 103   CO2 mmol/L 26.0   BUN mg/dL 35*   CREATININE mg/dL 1.27*   GLUCOSE mg/dL 211*   CALCIUM mg/dL 9.6     Results from last 7 days   Lab Units 01/28/25  1507   WBC 10*3/mm3 13.14*   HEMOGLOBIN g/dL 12.0   HEMATOCRIT % 38.0   PLATELETS 10*3/mm3 187     Lab Results   Component Value Date    CHOL 143 10/31/2023    TRIG 113 10/31/2023    HDL 45 10/31/2023    LDL 77 10/31/2023    AST 16 04/23/2024    ALT 10 04/23/2024                     Tele: Sinus rhythm    IMPRESSION:  Ms. Ramsay is a 79-year-old female who is an acceptable short-term candidate for chronic anticoagulation but a poor long-term candidate due to von Willebrand disease and anemia.  She presents for left atrial appendage occlusion today.  On Xarelto, LD this morning  IV Ancef 2 g ordered    PLAN:  Procedure to perform: LAAO. Risks, benefits and alternatives to the procedure explained to the patient and she understands and wishes to proceed.     Electronically signed by Francisco Hook PA-C, 01/29/25, 9:11 AM EST.

## 2025-01-29 NOTE — NURSING NOTE
LAAO Procedure Information   Regina Ramsay 1945   [address] [facility] [id number] [phone] (home)       AVIS Orifice Maximal Width: 24  mm  Cumulative Air Kerma:  212 mGy  Contrast Volume:  30 mL  Dose Area Product:  981.16 ?Gy-M2

## 2025-01-29 NOTE — OP NOTE
" LEFT ATRIAL APPENDAGE CLOSURE (LAAC)                                 Watchman Implant    PROCEDURES PERFORMED:    Left atrial appendage closure with 27 mm Watchman FLX Pro device with intracardiac echocardiography.  Patient was admitted to the hospital for this procedure.  This is an inpatient procedure.    PREPROCEDURAL DIAGNOSES:    1. persistent Atrial fibrillation  2. [id number] score of 4  3. HASBLED score of 3    REFERRING PHYSICIAN:    Dr BEAR    POST PROCEDURE DIANGOSES:  As above.    INDICATION FOR PROCEDURE:  Briefly, Regina Ramsay is a 79 y.o. year old female with a history of bleeding.    ANTICOAGULATION STRATEGY PRIOR TO AND POST PROCEDURE:  DOAC.  The last dose of anticoagulant was confirmed to have been taken this AM.    PT/INR:  No results found for: \"LABPROT\", \"INR\"  PTT:  No results found for: \"APTT\"    CBC:   WBC   Date Value Ref Range Status   01/28/2025 13.14 (H) 3.40 - 10.80 10*3/mm3 Final     RBC   Date Value Ref Range Status   01/28/2025 4.08 3.77 - 5.28 10*6/mm3 Final     Hemoglobin   Date Value Ref Range Status   01/28/2025 12.0 12.0 - 15.9 g/dL Final     Hematocrit   Date Value Ref Range Status   01/28/2025 38.0 34.0 - 46.6 % Final     MCV   Date Value Ref Range Status   01/28/2025 93.1 79.0 - 97.0 fL Final     RDW   Date Value Ref Range Status   01/28/2025 13.5 12.3 - 15.4 % Final     Platelets   Date Value Ref Range Status   01/28/2025 187 140 - 450 10*3/mm3 Final     BMP:     Sodium   Date Value Ref Range Status   01/28/2025 142 136 - 145 mmol/L Final     Potassium   Date Value Ref Range Status   01/28/2025 4.5 3.5 - 5.2 mmol/L Final     Chloride   Date Value Ref Range Status   01/28/2025 103 98 - 107 mmol/L Final     CO2   Date Value Ref Range Status   01/28/2025 26.0 22.0 - 29.0 mmol/L Final     BUN   Date Value Ref Range Status   01/28/2025 35 (H) 8 - 23 mg/dL Final     Creatinine   Date Value Ref Range Status   01/28/2025 1.27 (H) 0.57 - 1.00 mg/dL Final     eGFR   Date Value " "Ref Range Status   01/28/2025 43.6 (L) >60.0 mL/min/1.73 Final       Vital Signs: /65   Pulse 98   Temp 97.3 °F (36.3 °C) (Temporal)   Resp 10   Ht 152.4 cm (60\")   Wt 84.6 kg (186 lb 8.2 oz)   SpO2 100%   BMI 36.43 kg/m²  O2 Flow Rate (L/min): 2 L/min   Admit Weight:  84.6 kg (186 lb 8.2 oz)  BMI: Body mass index is 36.43 kg/m².    MODERATE SEDATION FOR PROCEDURE:    Moderate sedation was given during this procedure.    I supervised and directed RN to administer this sedation.  This staff member also monitored the patient's hemodynamic and respiratory status and response to these medications.  Please see the full detailed procedure report generated by the electrophysiology laboratory staff.  The patient tolerated moderate sedation well.  There were no complications regarding sedation.  The total dose of fentanyl was 100 mcg and the total dose of midazolam was 4 mg.  The total dose of Brevital was 0 mg.  First sedation was administered at 1105 and continued through 1139.    PROCEDURE NARRATIVE:    The patient was able to give written informed consent after revisiting the key portions of the risk versus benefit profile of the procedure.  This discussion was framed by our lengthy conversations  (please see our detailed notes).  Patient verbalized strong understanding of this discussion and a strong desire to proceed with the procedure.  Please note that this detailed informed consent process utilized mutual and shared decision making process between all parties involved, principally the physician and patient, but also potentially with input from the patient's selected family and friends.    Please also note that the patient was seen and examined prior to this procedure by a non implanting physician who agreed that this patient would benefit from left atrial appendage closure as an alternative to long-term anticoagulation.  Please see this physician separate attestation.    The patient was brought to the " EP Lab in the post absorptive state.      The patient was then prepared and draped in a routing sterile fashion.  Seldinger access was obtained at the right common femoral vein with a dual venipunctures utilizing ultrasound probe guided vascular access.  A J tip wire was advanced into the vascular space.  A pair of sheaths were placed into the inferior vena cava.  The patient was anticoagulated with unfractionated heparin in bolus and then continuous infusion to maintain an ACT of between 200 and 300 seconds.    A phased array intracardiac echocardiography probe was placed into the right atrium, right ventricle and after transseptal puncture also into the left atrium and left ventricle.  This was used for visualization of critical cardiac structures such as the fossa ovalis interatrial septum left atrial appendage left superior pulmonary vein mitral valve annulus and pericardial space.  This aspect of the procedure is a separate and independent part of the procedure which was critical for implantation of the Watchman.    We performed transseptal puncture with a combination of fluoroscopic and echocardiographic guidance.    The Watchman delivery sheath was then placed in the left atrium under echocardiographic and fluoroscopic guidance safely.  Left atrial appendage was then cannulated with a 10 mm angled pigtail catheter.  This was again performed with a combination of echocardiographic and fluoroscopic guidance.  Contrast injection was also performed.  The Watchman delivery sheath was then placed into optimal position within left atrial appendage.  The left atrial appendage diameter was viewed in multiple projections on echocardiography as well as fluoroscopy with contrast injection into the left atrial appendage.  A 27 mm device was selected and delivered with the supplied delivery tools to the left atrial appendage safely.      PASS criteria were then evaluated and confirmed.      The device was then safely  released.    Catheters and sheaths were withdrawn from the left atrium and then the body.  Hemostasis at the site of venous access was achieved after withdrawing the sheath from the body with a vascade closure device  and manual pressure.  Intracardiac echocardiogram demonstrated no pericardial effusion.    Protamine was given to reverse anticoagulation.    The patient was transferred to recovery in stable condition.    COMPLICATIONS: no    EBL: minimal    KEY PROCEDURAL FINDINGS:     27 mm Watchman FLX Pro implanted with all trabeculae covered and no jesse-device leak    POST PROCEDURAL PLAN:    Uninterrupted anticoagulation for not less than 45 days unless specially instructed otherwise by myself or another member of our EP physician team. The patient will also be taking 81 mg of aspirin.  An imaging ( CARMELA or CT scan ) will be obtained to assess for appropriate closure of the left atrial appendage occlusion device at 45 days post implant.    Please note that this patient was admitted specifically for this elective inpatient procedure.  This is an inpatient procedure.  Due to COVID-19 global pandemic and bed shortage we will endeavor to discharge this patient later today rather than tomorrow.  I think that he will tolerate this well.  We will of course wait until later in the afternoon to make a final decision.  The patient will be seen at our office per protocol for this procedure.    Inocencio Cates DO, FAC, [facility]  Cardiac Electrophysiologist  [facility] / [facility]

## 2025-01-30 ENCOUNTER — APPOINTMENT (OUTPATIENT)
Dept: CT IMAGING | Facility: HOSPITAL | Age: 80
End: 2025-01-30
Payer: MEDICARE

## 2025-01-30 ENCOUNTER — APPOINTMENT (OUTPATIENT)
Dept: CARDIOLOGY | Facility: HOSPITAL | Age: 80
End: 2025-01-30
Payer: MEDICARE

## 2025-01-30 ENCOUNTER — APPOINTMENT (OUTPATIENT)
Dept: GENERAL RADIOLOGY | Facility: HOSPITAL | Age: 80
End: 2025-01-30
Payer: MEDICARE

## 2025-01-30 LAB
ALBUMIN SERPL-MCNC: 2.7 G/DL (ref 3.5–5.2)
ALBUMIN/GLOB SERPL: 1.4 G/DL
ALP SERPL-CCNC: 95 U/L (ref 39–117)
ALT SERPL W P-5'-P-CCNC: 6 U/L (ref 1–33)
ANION GAP SERPL CALCULATED.3IONS-SCNC: 8 MMOL/L (ref 5–15)
ASCENDING AORTA: 3.5 CM
AST SERPL-CCNC: 10 U/L (ref 1–32)
AV MEAN PRESS GRAD SYS DOP V1V2: 8.3 MMHG
AV VMAX SYS DOP: 190.3 CM/SEC
B PARAPERT DNA SPEC QL NAA+PROBE: NOT DETECTED
B PERT DNA SPEC QL NAA+PROBE: NOT DETECTED
BH CV ECHO MEAS - AO MAX PG: 14.5 MMHG
BH CV ECHO MEAS - AO ROOT DIAM: 3.4 CM
BH CV ECHO MEAS - AO V2 VTI: 34 CM
BH CV ECHO MEAS - AVA(I,D): 1.53 CM2
BH CV ECHO MEAS - EDV(CUBED): 97.3 ML
BH CV ECHO MEAS - EDV(MOD-SP2): 89.9 ML
BH CV ECHO MEAS - EDV(MOD-SP4): 95.5 ML
BH CV ECHO MEAS - EF(MOD-SP2): 48.8 %
BH CV ECHO MEAS - EF(MOD-SP4): 50.3 %
BH CV ECHO MEAS - ESV(CUBED): 32.8 ML
BH CV ECHO MEAS - ESV(MOD-SP2): 46 ML
BH CV ECHO MEAS - ESV(MOD-SP4): 47.5 ML
BH CV ECHO MEAS - FS: 30.4 %
BH CV ECHO MEAS - IVS/LVPW: 1 CM
BH CV ECHO MEAS - IVSD: 1.2 CM
BH CV ECHO MEAS - LA DIMENSION: 4.9 CM
BH CV ECHO MEAS - LAT PEAK E' VEL: 10.9 CM/SEC
BH CV ECHO MEAS - LV MASS(C)D: 205 GRAMS
BH CV ECHO MEAS - LV MAX PG: 3.3 MMHG
BH CV ECHO MEAS - LV MEAN PG: 2 MMHG
BH CV ECHO MEAS - LV V1 MAX: 91.1 CM/SEC
BH CV ECHO MEAS - LV V1 VTI: 16.5 CM
BH CV ECHO MEAS - LVIDD: 4.6 CM
BH CV ECHO MEAS - LVIDS: 3.2 CM
BH CV ECHO MEAS - LVOT AREA: 3.1 CM2
BH CV ECHO MEAS - LVOT DIAM: 2 CM
BH CV ECHO MEAS - LVPWD: 1.2 CM
BH CV ECHO MEAS - MED PEAK E' VEL: 6.9 CM/SEC
BH CV ECHO MEAS - MV E MAX VEL: 143 CM/SEC
BH CV ECHO MEAS - MV MAX PG: 6.6 MMHG
BH CV ECHO MEAS - MV MEAN PG: 4.4 MMHG
BH CV ECHO MEAS - MV V2 VTI: 31 CM
BH CV ECHO MEAS - MVA(VTI): 1.67 CM2
BH CV ECHO MEAS - PA ACC TIME: 0.07 SEC
BH CV ECHO MEAS - RAP SYSTOLE: 3 MMHG
BH CV ECHO MEAS - RVSP: 39 MMHG
BH CV ECHO MEAS - SV(LVOT): 51.8 ML
BH CV ECHO MEAS - SV(MOD-SP2): 43.9 ML
BH CV ECHO MEAS - SV(MOD-SP4): 48 ML
BH CV ECHO MEAS - TAPSE (>1.6): 1.83 CM
BH CV ECHO MEAS - TR MAX PG: 35.5 MMHG
BH CV ECHO MEAS - TR MAX VEL: 298 CM/SEC
BH CV ECHO MEASUREMENTS AVERAGE E/E' RATIO: 16.07
BH CV XLRA - RV BASE: 4 CM
BH CV XLRA - RV LENGTH: 7.6 CM
BH CV XLRA - RV MID: 2.6 CM
BH CV XLRA - TDI S': 11.2 CM/SEC
BILIRUB SERPL-MCNC: 0.2 MG/DL (ref 0–1.2)
BILIRUB UR QL STRIP: NEGATIVE
BUN SERPL-MCNC: 29 MG/DL (ref 8–23)
BUN/CREAT SERPL: 19.1 (ref 7–25)
C PNEUM DNA NPH QL NAA+NON-PROBE: NOT DETECTED
CALCIUM SPEC-SCNC: 8 MG/DL (ref 8.6–10.5)
CHLORIDE SERPL-SCNC: 103 MMOL/L (ref 98–107)
CLARITY UR: CLEAR
CO2 SERPL-SCNC: 24 MMOL/L (ref 22–29)
COLOR UR: YELLOW
CREAT SERPL-MCNC: 1.52 MG/DL (ref 0.57–1)
CRP SERPL-MCNC: 3.63 MG/DL (ref 0–0.5)
D-LACTATE SERPL-SCNC: 1.4 MMOL/L (ref 0.5–2)
DEPRECATED RDW RBC AUTO: 48.9 FL (ref 37–54)
EGFRCR SERPLBLD CKD-EPI 2021: 35.2 ML/MIN/1.73
ERYTHROCYTE [DISTWIDTH] IN BLOOD BY AUTOMATED COUNT: 14.1 % (ref 12.3–15.4)
FLUAV SUBTYP SPEC NAA+PROBE: NOT DETECTED
FLUBV RNA ISLT QL NAA+PROBE: NOT DETECTED
GEN 5 1HR TROPONIN T REFLEX: 115 NG/L
GLOBULIN UR ELPH-MCNC: 1.9 GM/DL
GLUCOSE BLDC GLUCOMTR-MCNC: 117 MG/DL (ref 70–130)
GLUCOSE BLDC GLUCOMTR-MCNC: 161 MG/DL (ref 70–130)
GLUCOSE BLDC GLUCOMTR-MCNC: 176 MG/DL (ref 70–130)
GLUCOSE BLDC GLUCOMTR-MCNC: 237 MG/DL (ref 70–130)
GLUCOSE SERPL-MCNC: 227 MG/DL (ref 65–99)
GLUCOSE UR STRIP-MCNC: ABNORMAL MG/DL
HADV DNA SPEC NAA+PROBE: NOT DETECTED
HCOV 229E RNA SPEC QL NAA+PROBE: NOT DETECTED
HCOV HKU1 RNA SPEC QL NAA+PROBE: NOT DETECTED
HCOV NL63 RNA SPEC QL NAA+PROBE: NOT DETECTED
HCOV OC43 RNA SPEC QL NAA+PROBE: NOT DETECTED
HCT VFR BLD AUTO: 27.9 % (ref 34–46.6)
HCT VFR BLD AUTO: 28.5 % (ref 34–46.6)
HCT VFR BLD AUTO: 29.1 % (ref 34–46.6)
HCT VFR BLD AUTO: 31.4 % (ref 34–46.6)
HGB BLD-MCNC: 10 G/DL (ref 12–15.9)
HGB BLD-MCNC: 8.7 G/DL (ref 12–15.9)
HGB BLD-MCNC: 9 G/DL (ref 12–15.9)
HGB BLD-MCNC: 9 G/DL (ref 12–15.9)
HGB UR QL STRIP.AUTO: NEGATIVE
HMPV RNA NPH QL NAA+NON-PROBE: NOT DETECTED
HPIV1 RNA ISLT QL NAA+PROBE: NOT DETECTED
HPIV2 RNA SPEC QL NAA+PROBE: NOT DETECTED
HPIV3 RNA NPH QL NAA+PROBE: NOT DETECTED
HPIV4 P GENE NPH QL NAA+PROBE: NOT DETECTED
KETONES UR QL STRIP: NEGATIVE
LEFT ATRIUM VOLUME INDEX: 47.6 ML/M2
LEUKOCYTE ESTERASE UR QL STRIP.AUTO: NEGATIVE
LV EF BIPLANE MOD: 49.2 %
M PNEUMO IGG SER IA-ACNC: NOT DETECTED
MCH RBC QN AUTO: 30.1 PG (ref 26.6–33)
MCHC RBC AUTO-ENTMCNC: 31.6 G/DL (ref 31.5–35.7)
MCV RBC AUTO: 95.3 FL (ref 79–97)
MRSA DNA SPEC QL NAA+PROBE: NEGATIVE
NITRITE UR QL STRIP: NEGATIVE
NT-PROBNP SERPL-MCNC: 802.5 PG/ML (ref 0–1800)
PH UR STRIP.AUTO: 5.5 [PH] (ref 5–8)
PLATELET # BLD AUTO: 138 10*3/MM3 (ref 140–450)
PMV BLD AUTO: 10.9 FL (ref 6–12)
POTASSIUM SERPL-SCNC: 4.5 MMOL/L (ref 3.5–5.2)
PROCALCITONIN SERPL-MCNC: 0.78 NG/ML (ref 0–0.25)
PROT SERPL-MCNC: 4.6 G/DL (ref 6–8.5)
PROT UR QL STRIP: NEGATIVE
RBC # BLD AUTO: 2.99 10*6/MM3 (ref 3.77–5.28)
RHINOVIRUS RNA SPEC NAA+PROBE: NOT DETECTED
RSV RNA NPH QL NAA+NON-PROBE: NOT DETECTED
SARS-COV-2 RNA NPH QL NAA+NON-PROBE: NOT DETECTED
SODIUM SERPL-SCNC: 135 MMOL/L (ref 136–145)
SP GR UR STRIP: 1.02 (ref 1–1.03)
TROPONIN T % DELTA: -3
TROPONIN T NUMERIC DELTA: -4 NG/L
TROPONIN T SERPL HS-MCNC: 119 NG/L
TROPONIN T SERPL HS-MCNC: 121 NG/L
UROBILINOGEN UR QL STRIP: ABNORMAL
WBC NRBC COR # BLD AUTO: 13 10*3/MM3 (ref 3.4–10.8)

## 2025-01-30 PROCEDURE — 71045 X-RAY EXAM CHEST 1 VIEW: CPT

## 2025-01-30 PROCEDURE — 87040 BLOOD CULTURE FOR BACTERIA: CPT | Performed by: STUDENT IN AN ORGANIZED HEALTH CARE EDUCATION/TRAINING PROGRAM

## 2025-01-30 PROCEDURE — 85027 COMPLETE CBC AUTOMATED: CPT | Performed by: INTERNAL MEDICINE

## 2025-01-30 PROCEDURE — 81003 URINALYSIS AUTO W/O SCOPE: CPT | Performed by: STUDENT IN AN ORGANIZED HEALTH CARE EDUCATION/TRAINING PROGRAM

## 2025-01-30 PROCEDURE — 83880 ASSAY OF NATRIURETIC PEPTIDE: CPT | Performed by: INTERNAL MEDICINE

## 2025-01-30 PROCEDURE — 74176 CT ABD & PELVIS W/O CONTRAST: CPT

## 2025-01-30 PROCEDURE — 85014 HEMATOCRIT: CPT | Performed by: PHYSICIAN ASSISTANT

## 2025-01-30 PROCEDURE — 93306 TTE W/DOPPLER COMPLETE: CPT | Performed by: INTERNAL MEDICINE

## 2025-01-30 PROCEDURE — 25010000002 ALBUMIN HUMAN 5% PER 50 ML: Performed by: STUDENT IN AN ORGANIZED HEALTH CARE EDUCATION/TRAINING PROGRAM

## 2025-01-30 PROCEDURE — 84484 ASSAY OF TROPONIN QUANT: CPT | Performed by: INTERNAL MEDICINE

## 2025-01-30 PROCEDURE — 0202U NFCT DS 22 TRGT SARS-COV-2: CPT | Performed by: PHYSICIAN ASSISTANT

## 2025-01-30 PROCEDURE — 86140 C-REACTIVE PROTEIN: CPT | Performed by: PHYSICIAN ASSISTANT

## 2025-01-30 PROCEDURE — 99291 CRITICAL CARE FIRST HOUR: CPT | Performed by: STUDENT IN AN ORGANIZED HEALTH CARE EDUCATION/TRAINING PROGRAM

## 2025-01-30 PROCEDURE — 85018 HEMOGLOBIN: CPT | Performed by: PHYSICIAN ASSISTANT

## 2025-01-30 PROCEDURE — 93306 TTE W/DOPPLER COMPLETE: CPT

## 2025-01-30 PROCEDURE — 93010 ELECTROCARDIOGRAM REPORT: CPT | Performed by: INTERNAL MEDICINE

## 2025-01-30 PROCEDURE — 82948 REAGENT STRIP/BLOOD GLUCOSE: CPT

## 2025-01-30 PROCEDURE — 25810000003 SODIUM CHLORIDE 0.9 % SOLUTION: Performed by: PHYSICIAN ASSISTANT

## 2025-01-30 PROCEDURE — 80053 COMPREHEN METABOLIC PANEL: CPT | Performed by: INTERNAL MEDICINE

## 2025-01-30 PROCEDURE — 87641 MR-STAPH DNA AMP PROBE: CPT | Performed by: PHYSICIAN ASSISTANT

## 2025-01-30 PROCEDURE — 25810000003 LACTATED RINGERS SOLUTION: Performed by: STUDENT IN AN ORGANIZED HEALTH CARE EDUCATION/TRAINING PROGRAM

## 2025-01-30 PROCEDURE — P9041 ALBUMIN (HUMAN),5%, 50ML: HCPCS | Performed by: STUDENT IN AN ORGANIZED HEALTH CARE EDUCATION/TRAINING PROGRAM

## 2025-01-30 PROCEDURE — 93005 ELECTROCARDIOGRAM TRACING: CPT | Performed by: INTERNAL MEDICINE

## 2025-01-30 PROCEDURE — 25010000002 CEFEPIME PER 500 MG: Performed by: PHYSICIAN ASSISTANT

## 2025-01-30 PROCEDURE — 83605 ASSAY OF LACTIC ACID: CPT | Performed by: INTERNAL MEDICINE

## 2025-01-30 PROCEDURE — 84145 PROCALCITONIN (PCT): CPT | Performed by: PHYSICIAN ASSISTANT

## 2025-01-30 PROCEDURE — 84484 ASSAY OF TROPONIN QUANT: CPT | Performed by: PHYSICIAN ASSISTANT

## 2025-01-30 PROCEDURE — 71250 CT THORAX DX C-: CPT

## 2025-01-30 RX ORDER — SODIUM CHLORIDE 0.9 % (FLUSH) 0.9 %
10 SYRINGE (ML) INJECTION EVERY 12 HOURS SCHEDULED
Status: DISCONTINUED | OUTPATIENT
Start: 2025-01-30 | End: 2025-01-31 | Stop reason: HOSPADM

## 2025-01-30 RX ORDER — SODIUM CHLORIDE 9 MG/ML
40 INJECTION, SOLUTION INTRAVENOUS AS NEEDED
Status: DISCONTINUED | OUTPATIENT
Start: 2025-01-30 | End: 2025-01-31 | Stop reason: HOSPADM

## 2025-01-30 RX ORDER — MIDODRINE HYDROCHLORIDE 5 MG/1
5 TABLET ORAL
Status: DISCONTINUED | OUTPATIENT
Start: 2025-01-30 | End: 2025-01-31 | Stop reason: HOSPADM

## 2025-01-30 RX ORDER — SODIUM CHLORIDE 0.9 % (FLUSH) 0.9 %
10 SYRINGE (ML) INJECTION AS NEEDED
Status: DISCONTINUED | OUTPATIENT
Start: 2025-01-30 | End: 2025-01-31 | Stop reason: HOSPADM

## 2025-01-30 RX ORDER — NITROGLYCERIN 0.4 MG/1
0.4 TABLET SUBLINGUAL
Status: DISCONTINUED | OUTPATIENT
Start: 2025-01-30 | End: 2025-01-31 | Stop reason: HOSPADM

## 2025-01-30 RX ORDER — ALBUMIN HUMAN 50 G/1000ML
12.5 SOLUTION INTRAVENOUS
Status: COMPLETED | OUTPATIENT
Start: 2025-01-30 | End: 2025-01-30

## 2025-01-30 RX ORDER — POLYETHYLENE GLYCOL 3350 17 G/17G
17 POWDER, FOR SOLUTION ORAL ONCE
Status: COMPLETED | OUTPATIENT
Start: 2025-01-30 | End: 2025-01-30

## 2025-01-30 RX ORDER — AMOXICILLIN 250 MG
2 CAPSULE ORAL 2 TIMES DAILY PRN
Status: DISCONTINUED | OUTPATIENT
Start: 2025-01-30 | End: 2025-01-31 | Stop reason: HOSPADM

## 2025-01-30 RX ORDER — NOREPINEPHRINE BITARTRATE 0.03 MG/ML
.02-.3 INJECTION, SOLUTION INTRAVENOUS
Status: DISCONTINUED | OUTPATIENT
Start: 2025-01-30 | End: 2025-01-31 | Stop reason: HOSPADM

## 2025-01-30 RX ADMIN — POLYETHYLENE GLYCOL 3350 17 G: 17 POWDER, FOR SOLUTION ORAL at 06:12

## 2025-01-30 RX ADMIN — CEFEPIME HYDROCHLORIDE 1000 MG: 1 INJECTION, POWDER, FOR SOLUTION INTRAMUSCULAR; INTRAVENOUS at 06:12

## 2025-01-30 RX ADMIN — NOREPINEPHRINE BITARTRATE 0.05 MCG/KG/MIN: 0.03 INJECTION, SOLUTION INTRAVENOUS at 03:36

## 2025-01-30 RX ADMIN — ACETAMINOPHEN 650 MG: 325 TABLET ORAL at 01:19

## 2025-01-30 RX ADMIN — ATORVASTATIN CALCIUM 40 MG: 40 TABLET, FILM COATED ORAL at 20:11

## 2025-01-30 RX ADMIN — MIDODRINE HYDROCHLORIDE 5 MG: 5 TABLET ORAL at 11:24

## 2025-01-30 RX ADMIN — MIDODRINE HYDROCHLORIDE 5 MG: 5 TABLET ORAL at 18:26

## 2025-01-30 RX ADMIN — Medication 10 ML: at 08:56

## 2025-01-30 RX ADMIN — CEFEPIME HYDROCHLORIDE 1000 MG: 1 INJECTION, POWDER, FOR SOLUTION INTRAMUSCULAR; INTRAVENOUS at 12:26

## 2025-01-30 RX ADMIN — ALBUMIN (HUMAN) 12.5 G: 12.5 INJECTION, SOLUTION INTRAVENOUS at 06:23

## 2025-01-30 RX ADMIN — MUPIROCIN 1 APPLICATION: 20 OINTMENT TOPICAL at 08:54

## 2025-01-30 RX ADMIN — MUPIROCIN 1 APPLICATION: 20 OINTMENT TOPICAL at 06:12

## 2025-01-30 RX ADMIN — OXYCODONE HYDROCHLORIDE 15 MG: 15 TABLET ORAL at 20:11

## 2025-01-30 RX ADMIN — ALBUMIN (HUMAN) 12.5 G: 12.5 INJECTION, SOLUTION INTRAVENOUS at 08:55

## 2025-01-30 RX ADMIN — Medication 10 ML: at 20:11

## 2025-01-30 RX ADMIN — SODIUM CHLORIDE 100 ML/HR: 9 INJECTION, SOLUTION INTRAVENOUS at 10:03

## 2025-01-30 RX ADMIN — RIVAROXABAN 20 MG: 20 TABLET, FILM COATED ORAL at 18:26

## 2025-01-30 RX ADMIN — SODIUM CHLORIDE, POTASSIUM CHLORIDE, SODIUM LACTATE AND CALCIUM CHLORIDE 1000 ML: 600; 310; 30; 20 INJECTION, SOLUTION INTRAVENOUS at 03:40

## 2025-01-30 RX ADMIN — NOREPINEPHRINE BITARTRATE 0.02 MCG/KG/MIN: 0.03 INJECTION, SOLUTION INTRAVENOUS at 03:11

## 2025-01-30 NOTE — PROGRESS NOTES
"  [facility] at [facility]  CARDIAC EP PROGRESS NOTE    Date of Admission: 1/29/2025  Date of Service: 01/30/25    Primary Care Physician: She Alarcon MD    Chief Complaint: atrial fibrillation, hypotensive postop      Subjective      HPI: Patient underwent Watchman left atrial appendage occlusion device implantation yesterday without obvious complication immediately.  After the procedure she was borderline hypotensive and then bottomed out after getting her home blood pressure medicine.  She was given fluids after confirmation of her LVEF 49%.  This helped a little bit but she eventually required pressors.  CT chest and abdomen showed no obvious signs of bleeding.  Echocardiogram showed no pericardial effusion.  The patient reports she has had issues coming out of general anesthesia in the past.  Troponin elevated but stable consistent with 24 hours postop.  She reports fatigue and a little bit of shortness of breath this morning but denies any chest pain or palpitations.  Right groin site hemostatic.      Objective   Vitals: BP 96/50 (BP Location: Right arm, Patient Position: Lying)   Pulse 75   Temp 98.2 °F (36.8 °C) (Oral)   Resp 16   Ht 152.4 cm (60\")   Wt 91.7 kg (202 lb 2.6 oz)   SpO2 94%   BMI 39.48 kg/m²     Physical Exam:   GENERAL: Alert, cooperative, in no acute distress.   HEART: Regular rate and irregularly irregular rhythm; no murmurs, rubs or gallops  LUNGS: Clear to auscultation bilaterally. No wheezing, rales or rhonchi. Nonlabored breathing  NEUROLOGIC: No gross focal abnormalities  EXTREMITIES: No obvious deformities, cyanosis, or edema noted.  Right groin site hemostatic.  PSYCH: normal mood, behavior    Results:  Results from last 7 days   Lab Units 01/30/25  0547 01/30/25  0213 01/29/25 2034 01/28/25  1507   WBC 10*3/mm3  --  13.00* 13.16* 13.14*   HEMOGLOBIN g/dL 10.0* 9.0* 10.1* 12.0   HEMATOCRIT % 31.4* 28.5* 32.7* 38.0   PLATELETS 10*3/mm3  --  138* 148 " 187     Results from last 7 days   Lab Units 01/30/25  0213 01/29/25 2034 01/28/25  1507   SODIUM mmol/L 135* 137 142   POTASSIUM mmol/L 4.5 4.8 4.5   CHLORIDE mmol/L 103 102 103   CO2 mmol/L 24.0 24.0 26.0   BUN mg/dL 29* 28* 35*   CREATININE mg/dL 1.52* 1.40* 1.27*   GLUCOSE mg/dL 227* 238* 211*      Lab Results   Component Value Date    CHOL 143 10/31/2023    TRIG 113 10/31/2023    HDL 45 10/31/2023    LDL 77 10/31/2023    AST 10 01/30/2025    ALT 6 01/30/2025                         Results from last 7 days   Lab Units 01/30/25  0756 01/30/25  0547 01/30/25 0213   HSTROP T ng/L 115* 119* 121*     Results from last 7 days   Lab Units 01/30/25 0213   PROBNP pg/mL 802.5         Intake/Output Summary (Last 24 hours) at 1/30/2025 1257  Last data filed at 1/30/2025 1003  Gross per 24 hour   Intake 4288.93 ml   Output 500 ml   Net 3788.93 ml       I personally reviewed the patient's EKG/Telemetry data    Radiology Data:   CT Abdomen Pelvis Without Contrast    Result Date: 1/30/2025  Impression: 1.No acute abdominal or pelvic abnormality. 2.Mild soft tissue stranding in the right groin with a few prominent lymph nodes. This could be related to infection or recent instrumentation. Electronically Signed: James Jefferson MD  1/30/2025 3:13 AM EST  Workstation ID: [id number]    CT Chest Without Contrast Diagnostic    Result Date: 1/30/2025  Impression: Mild bilateral basilar atelectasis. No active disease. Electronically Signed: James Jefferson MD  1/30/2025 3:06 AM EST  Workstation ID: [id number]    XR Chest 1 View    Result Date: 1/30/2025  Impression: Cardiomegaly. No active disease. Electronically Signed: James Jefferson MD  1/30/2025 2:56 AM EST  Workstation ID: [id number]       Current Medications:  atorvastatin, 40 mg, Oral, Nightly  cefepime, 1,000 mg, Intravenous, Q12H  midodrine, 5 mg, Oral, TID AC  mupirocin, 1 Application, Each Nare, BID  Ryan, , ,   rivaroxaban, 20 mg, Oral, Daily With Dinner  sodium chloride, 10 mL,  Intravenous, Q12H      norepinephrine, 0.02-0.3 mcg/kg/min, Last Rate: Stopped (01/30/25 1144)        Assessment and Plan:   Persistent Atrial Fibrillation  Rate controlled  Xarelto  History of GI Bleeding/von Willebrand's Disease  Now s/p Watchman LAAO device implant. R groin hemostatic, open to air  Postop Hypotension without obvious cause  CT chest and abdomen without signs of bleeding, HGB did drop from 12 to 10, but could be dilutional. Now stable.  Echo with no pericardial effusion  S/p fluids and norepi 0.03  Adding midodrine 5 mg tid today. Hopefully can wean off pressors and go home tomorrow    Electronically signed by Francisco Hook PA-C, 01/30/25, 1:06 PM EST.

## 2025-01-30 NOTE — H&P
INTENSIVIST   PROGRESS NOTE          SUBJECTIVE     Regina 79 y.o. female is followed for:No chief complaint on file.       Persistent atrial fibrillation    Essential hypertension    Diabetes mellitus    Paroxysmal atrial fibrillation    LBBB (left bundle branch block)    Coronary artery disease involving native coronary artery    Anemia    Von Willebrand disease    Iron deficiency anemia    HFrEF (heart failure with reduced ejection fraction)    As an Intensivist, we provide an integrated approach to the ICU patient and family, medical management of comorbid conditions, including but not limited to electrolytes, glycemic control, organ dysfunction, lead interdisciplinary rounds and coordinate the care with all other services, including those from other specialists.     HPI:  Regina Ramsay is a 79 y.o. female with a past medical history including atrial fibrillation s/p watchman's procedure (1/29/25), von Willebrand disease, iron deficiency anemia, CAD s/p LHC, hyperlipidemia, GERD, NIDDM, and obesity who presented to our facility for further evaluation and treatment of paroxysmal atrial fibrillation.  Patient was admitted yesterday for elective watchman's procedure with cardiology after failing PVI with Dr. Todd in September 2024.  In lieu of her chronic von Willebrand disease, she was deemed a poor candidate for chronic anticoagulation.  Per intraoperative note, patient tolerated the procedure well and there were no documented intraoperative complications.  Patient was then transferred to PACU and subsequently to medical-surgical floor for close observation.  Overnight, patient developed multiple episodes of hypotension with SBP low 60s/upper 50s.  Rapid Response team were called to bedside and evaluated the patient.  Cardiology was also contacted and patient was given IVF fluid bolus 500 cc with no improvement.  Consequently, transfer to the ICU for pressor initiation and further evaluation was requested by  cardiology team.     Patient was seen at bedside.  She states she was having a headache since yesterday that is now improved.  Denies any shortness of breath, chest pain, nausea or vomiting.  States her stools are dark which is chronic for her given her iron supplementation.  No bleeding that she is aware of.  Patient is currently on 0.4 of Levophed.  No family at bedside.         PMH: She  has a past medical history of Anxiety, Arthritis, Asthma, Atrial fibrillation, CHF (congestive heart failure), Colon polyps, Coronary artery disease, Crohn's colitis, Diabetes mellitus, Disease of thyroid gland, Elevated cholesterol, GERD (gastroesophageal reflux disease), Hard to intubate, Hiatal hernia, History of benign thyroid tumors, History of goiter, History of transfusion (10/2024), Hypertension, Infection of tooth (right lower wisdom tooth) (05/06/2022), Kidney stones, Mumps, OAB (overactive bladder), Skin cancer, Sleep apnea, and Umbilical hernia.   PSxH: She  has a past surgical history that includes Shoulder surgery (Left); Back surgery; Hysterectomy; Hernia repair; Hemorroidectomy; Thyroidectomy; Carpal tunnel release (Bilateral); Cholecystectomy; Finger surgery; Colonoscopy (2016); Hardware Removal (Left, 8/1/2022); Total shoulder arthroplasty w/ distal clavicle excision (Left, 8/1/2022); Cardiac catheterization (N/A, 10/31/2023); and Cardiac electrophysiology procedure (N/A, 9/5/2024).     Medications:  No current facility-administered medications on file prior to encounter.     Current Outpatient Medications on File Prior to Encounter   Medication Sig    atorvastatin (LIPITOR) 40 MG tablet Take 1 tablet by mouth every night at bedtime.    citalopram (CeleXA) 10 MG tablet Take 1 tablet by mouth Daily.    Cyanocobalamin (B-12 Compliance Injection) 1000 MCG/ML kit Inject 1 mL as directed Every 14 (Fourteen) Days.    Diclofenac Sodium (VOLTAREN) 1 % gel gel Apply 4 g topically to the appropriate area as directed As  Needed.    empagliflozin (JARDIANCE) 10 MG tablet tablet Take 1 tablet by mouth Daily.    Esomeprazole Magnesium 20 MG tablet delayed-release Take 40 mg by mouth Daily.    ferrous sulfate (FeroSul) 325 (65 FE) MG tablet Take 1 tablet by mouth 2 (Two) Times a Day With Meals.    furosemide (LASIX) 20 MG tablet Take 1 tablet by mouth Daily.    glipiZIDE (GLUCOTROL) 10 MG 24 hr tablet Take 2 tablets by mouth Daily. Pt only takes one time per day (nightly)    levocetirizine (XYZAL) 5 MG tablet Take 1 tablet by mouth Daily.    levothyroxine sodium (TIROSINT) 150 MCG capsule Take 1 capsule by mouth Every Morning.    Magnesium Oxide -Mg Supplement 400 (240 Mg) MG tablet Take 1 tablet by mouth Daily.    metoprolol succinate XL (TOPROL-XL) 100 MG 24 hr tablet Take 1 tablet by mouth Daily.    montelukast (SINGULAIR) 10 MG tablet Take 1 tablet by mouth Every Night.    oxyCODONE (ROXICODONE) 15 MG immediate release tablet Take 1 tablet by mouth Every 6 (Six) Hours As Needed for Moderate Pain.    Ozempic, 0.25 or 0.5 MG/DOSE, 2 MG/3ML solution pen-injector Inject 0.25 mg under the skin into the appropriate area as directed 1 (One) Time Per Week. Patient instructed to hold prior to procedure. Last dose 1/18/25    pantoprazole (PROTONIX) 40 MG EC tablet Take 1 tablet by mouth Daily.    rivaroxaban (XARELTO) 20 MG tablet Take 1 tablet by mouth Daily With Dinner.    spironolactone (ALDACTONE) 25 MG tablet Take 1 tablet by mouth Daily.    valsartan (DIOVAN) 40 MG tablet TAKE ONE TABLET BY MOUTH ONCE A DAY    vitamin D (ERGOCALCIFEROL) 1.25 MG (62575 UT) capsule capsule Take 1 capsule by mouth 1 (One) Time Per Week.        Allergies: She is allergic to pregabalin, allopurinol, gabapentin, ibuprofen, metformin and related, morphine, and topiramate.   FH: Her family history includes Diabetes in her mother; Heart attack in her father; Heart disease in her father.   SH: She  reports that she has never smoked. She has never used smokeless  tobacco. She reports that she does not drink alcohol and does not use drugs.     The patient's relevant past medical, surgical and social history were reviewed and updated in Epic as appropriate.                 Review of Systems  As described in the HPI.       OBJECTIVE     Vitals:  Temp: 97.4 °F (36.3 °C) (25 0200) Temp  Min: 97.3 °F (36.3 °C)  Max: 101.4 °F (38.6 °C)   Temp core:      BP: (!) 59/34 (250) BP  Min: 59/34  Max: 127/58   MAP (non-invasive) Noninvasive MAP (mmHg): 44 (25) Noninvasive MAP (mmHg)  Av  Min: 44  Max: 96   Pulse: 92 (25) Pulse  Min: 78  Max: 113   Resp: 12 (25) Resp  Min: 10  Max: 12   SpO2: 95 % (25) SpO2  Min: 85 %  Max: 100 %   Device: nasal cannula (25)    Flow Rate: 1.5 (25) Flow (L/min) (Oxygen Therapy)  Min: 1.5  Max: 2         25  0823   Weight: 84.6 kg (186 lb 8.2 oz)        Intake/Ouptut 24 hrs (7:00AM - 6:59 AM)  Intake & Output (last 2 days)       None            Medications (drips):  norepinephrine  sodium chloride, Last Rate: 100 mL/hr (25 2358)           Physical Examination  Telemetry:  Rhythm: sinus tachycardia (25 0000)         Constitutional:  No acute distress.   Cardiovascular: Tachy   Respiratory: Normal breath sounds  No adventitious sounds   Abdominal:  Soft with no tenderness.   Extremities: No Edema   Neurological:   Alert, Oriented, Cooperative.  Best Eye Response: 4-->(E4) spontaneous (25)  Best Motor Response: 6-->(M6) obeys commands (25)  Best Verbal Response: 5-->(V5) oriented (25)  Lenny Coma Scale Score: 15 (25)          Lines, Drains & Airways       Active LDAs       Name Placement date Placement time Site Days    Peripheral IV 25 0845 Left;Posterior;Proximal Forearm 25  0845  Forearm  less than 1    Peripheral IV 25 0240 Anterior;Left Forearm 25  0240  Forearm  less than 1                     Results Reviewed:  Laboratory  Microbiology  Radiology  Pathology    Hematology:  Results from last 7 days   Lab Units 01/30/25  0213 01/29/25 2034 01/28/25  1507   WBC 10*3/mm3 13.00* 13.16* 13.14*   HEMOGLOBIN g/dL 9.0* 10.1* 12.0   MCV fL 95.3 95.3 93.1   PLATELETS 10*3/mm3 138* 148 187     Results from last 7 days   Lab Units 01/28/25  1507   NEUTROS ABS 10*3/mm3 10.23*   LYMPHS ABS 10*3/mm3 1.97   EOS ABS 10*3/mm3 0.07     Chemistry:  Estimated Creatinine Clearance: 29.9 mL/min (A) (by C-G formula based on SCr of 1.52 mg/dL (H)).    Results from last 7 days   Lab Units 01/30/25 0213 01/29/25 2034   SODIUM mmol/L 135* 137   POTASSIUM mmol/L 4.5 4.8   CHLORIDE mmol/L 103 102   CO2 mmol/L 24.0 24.0   BUN mg/dL 29* 28*   CREATININE mg/dL 1.52* 1.40*   GLUCOSE mg/dL 227* 238*     Results from last 7 days   Lab Units 01/30/25 0213 01/29/25  2034   CALCIUM mg/dL 8.0* 8.5*       Hepatic Panel:  Results from last 7 days   Lab Units 01/30/25 0213 01/29/25 2034   ALBUMIN g/dL 2.7* 2.9*   TOTAL PROTEIN g/dL 4.6* 5.3*   BILIRUBIN mg/dL 0.2 0.2   AST (SGOT) U/L 10 12   ALT (SGPT) U/L 6 9   ALK PHOS U/L 95 109        Coagulation Labs:         Cardiac Labs:  Results from last 7 days   Lab Units 01/30/25 0213   PROBNP pg/mL 802.5   HSTROP T ng/L 121*       Biomarkers:  Results from last 7 days   Lab Units 01/30/25 0213   LACTATE mmol/L 1.4       U/A              COVID-19  Lab Results   Component Value Date    COVID19 Not Detected 07/29/2022       Arterial Blood Gases:        Images:  No radiology results for the last day    Echo:  Results for orders placed during the hospital encounter of 10/31/23    Adult Transthoracic Echo Complete W/ Cont if Necessary Per Protocol    Interpretation Summary    Left ventricular systolic function is low normal. Estimated left ventricular EF = 45%    Septal wall motion is abnormal, consistent with a bundle branch block.    Left ventricular wall thickness is consistent with  mild concentric hypertrophy.    Left ventricular diastolic function is consistent with (grade Ia w/high LAP) impaired relaxation.    The aortic valve exhibits sclerosis.    Mild mitral annular calcification is present.      Results: Reviewed.  I reviewed the patient's new laboratory and imaging results.  I independently reviewed the patient's new images.    Medications: Reviewed.    Assessment    A/P     Hospital:  LOS: 1 day   ICU: Patient does not have an ICU stay during this admission.     Active Hospital Problems    Diagnosis  POA    **Persistent atrial fibrillation [I48.19]  Yes    HFrEF (heart failure with reduced ejection fraction) [I50.20]  Yes     Echocardiogram 9/24/2023:  LVEF 35-40%. Mild LVH. Indeterminate diastolic function.   Echocardiogram 10/31/2023:  LVEF 45%. Septal wall motion is abnormal, consistent with bundle branch block. Mild LVH. Grade Ia diastolic dysfunction. Aortic valve exhibits sclerosis. Mild mitral annular calcification.       Von Willebrand disease [D68.00]  Yes    Iron deficiency anemia [D50.9]  Yes    Anemia [D64.9]  Unknown    Coronary artery disease involving native coronary artery [I25.10]  Yes     C 10/31/2023: Severe small vessel disease involving the distal LAD, which is too small for catheter-based intervention. Intermediate to severe disease in a small RPDA.       Paroxysmal atrial fibrillation [I48.0]  Unknown     CHADsVASc score at least 6  Diagnosed 9/2023  Initiated on Eliquis  9/2024 PVA + typical flutter ablation by Dr. Cates      LBBB (left bundle branch block) [I44.7]  Yes    Essential hypertension [I10]  Yes    Diabetes mellitus [E11.9]  Yes     Regina is a 79 y.o. female admitted on 1/29/2025 with Paroxysmal atrial fibrillation [I48.0]  Anemia, unspecified type [D64.9]  Persistent atrial fibrillation [I48.19]    Assessment/Management/Treatment Plan:    //Hypotension, undifferentiated  //Acute on chronic iron deficiency anemia  //Persistent atrial fibrillation  s/p watchman implant 1/29/25  //ANGELICA  //PMH: von Willebrand disease, CAD, hyperlipidemia, GERD, NIDDM, and obesity       Pulmonary   1L NC, O2 sats adequate   CT chest b/l atelectasis mild but without acute process   Cardiovascular  Spoke with cardiology regarding transfer. Stat echo ordered and Dr spencer to read once available. 10/2023 EF 45%, grade I diastolic dysfunction.  Given 500cc, will give additional 1L IVF. If not responsive will start levophed for MAP >65. LA 1.4   Trop 121, will repeat. EKG reviewed w/o ischemic changes, Qtc 494. Avoid prolonging agents.   Neuro  Alert and oriented, no issues   GI/Hepatology  CT abdomen pelvis w/o negative. Unfortunately not performed with IV contrast. If Hgb drops, will consider repeat with contrast.   NPO pending possible procedure   Renal  Creatinine 1.5, K 4.5.  Baseline creatinine appears to be 1.1-1.2.  IVF as above  ID/Antibiotics  Febrile last night to 101.4 and now hypotensive requiring vasopressor.  Obtain UA and blood cultures. Will start cefepime empirically.  Hematology  Hgb 9 from 10 yesterday evening and 12 12/28 AM. No signs of bleeding at this time. Currently on xarelto. If found to have bleed, will discuss with cardiology for reversal given watchman placement 1/29. Repeat Hgb at 6AM.   Endocrine  Body mass index is 36.43 kg/m². Obese Class II: 35-39.9kg/m2  Prediabetes (A1C 5.7%-6.4%). Accuchecks q6hr. No insulin required.     Lab Results   Lab Value Date/Time    HGBA1C 9.2 (H) 02/22/2024 1613    HGBA1C 9.00 (H) 10/31/2023 1125    HGBA1C 9.1 (H) 09/24/2023 0730     Results from last 7 days   Lab Units 01/30/25  0205 01/29/25  1535   GLUCOSE mg/dL 237* 151*       Diet: Diet: Regular/House; Fluid Consistency: Thin (IDDSI 0)  No active supplement orders      Advance Directives: Code Status and Medical Interventions: CPR (Attempt to Resuscitate); Full Support   Ordered at: 01/29/25 1216     Level Of Support Discussed With:    Patient     Code Status (Patient  has no pulse and is not breathing):    CPR (Attempt to Resuscitate)     Medical Interventions (Patient has pulse or is breathing):    Full Support        VTE Prophylaxis:  Pharmacologic & mechanical VTE prophylaxis orders are present.    Code status: full (confirmed on transfer to ICU)    Disposition: Transfer to ICU    Plan of care and goals reviewed during interdisciplinary rounds.  I discussed the patient's findings and my recommendations with patient and nursing staff    Time: was greater than 35 minutes. This is non-concurrent time.   (This excludes time spent performing separately reportable procedures and services).    She has a high risk of imminent or life-threatening  deterioration, which requires the highest level of physician preparedness to intervene urgently. I devoted my full attention to the direct care of this patient for the amount of time indicated above.     Time spent with family or surrogate(s) is included only if the patient was incapable of providing the necessary information or participating in medical decision making.    She has the following organ/system impairments Shock.        Kathrin Diop MD  Pulmonary Critical Care Medicine

## 2025-01-30 NOTE — CASE MANAGEMENT/SOCIAL WORK
Discharge Planning Assessment  [facility]     Patient Name: Regina Ramsay  MRN: [id number]  Today's Date: 1/30/2025    Admit Date: 1/29/2025    Plan: Home   Discharge Needs Assessment       Row Name 01/30/25 1221       Living Environment    People in Home grandchild(maribell)    Name(s) of People in Home adult grandson stays with her and is helpful    Current Living Arrangements home    Potentially Unsafe Housing Conditions unable to assess    In the past 12 months has the electric, gas, oil, or water company threatened to shut off services in your home? No    Primary Care Provided by self    Provides Primary Care For no one    Family Caregiver if Needed child(maribell), adult    Family Caregiver Names Migdalia, daughter    Quality of Family Relationships unable to assess    Able to Return to Prior Arrangements yes       Resource/Environmental Concerns    Resource/Environmental Concerns none    Transportation Concerns none       Transportation Needs    In the past 12 months, has lack of transportation kept you from medical appointments or from getting medications? no    In the past 12 months, has lack of transportation kept you from meetings, work, or from getting things needed for daily living? No       Food Insecurity    Within the past 12 months, you worried that your food would run out before you got the money to buy more. Never true    Within the past 12 months, the food you bought just didn't last and you didn't have money to get more. Never true       Transition Planning    Patient/Family Anticipates Transition to home with family    Patient/Family Anticipated Services at Transition     Transportation Anticipated family or friend will provide       Discharge Needs Assessment    Equipment Currently Used at Home cane, straight;rollator    Concerns to be Addressed discharge planning    Do you want help finding or keeping work or a job? I do not need or want help    Do you want help with school or training?  For example, starting or completing job training or getting a high school diploma, GED or equivalent No                   Discharge Plan       Row Name 01/30/25 1224       Plan    Plan Home    Patient/Family in Agreement with Plan yes    Plan Comments Spoke with patient at bedside to initiate discharge planning.  Confirmed residence in [facility], adult grandson lives with her; PCP is She Alarcon; insurance is Medicare B and [employer] Medicaid.  Patient states she is independent with ADLs and has a cane and rollator for assist with ambulation.  Patient is not current with home health.  Discharge goal is home and family can transport.  CM will continue to follow.    Final Discharge Disposition Code 01 - home or self-care                  Continued Care and Services - Admitted Since 1/29/2025    No active coordination exists for this encounter.       Expected Discharge Date and Time       Expected Discharge Date Expected Discharge Time    Jan 29, 2025            Demographic Summary       Row Name 01/30/25 1219       General Information    Referral Source admission list    Reason for Consult discharge planning    Preferred Language English       Contact Information    Permission Granted to Share Info With                    Functional Status       Row Name 01/30/25 1219       Functional Status    Usual Activity Tolerance good       Physical Activity    On average, how many days per week do you engage in moderate to strenuous exercise (like a brisk walk)? 0 days    On average, how many minutes do you engage in exercise at this level? 0 min    Number of minutes of exercise per week 0       Functional Status, IADL    Medications independent    Meal Preparation independent    Housekeeping independent    Laundry independent    Shopping independent    If for any reason you need help with day-to-day activities such as bathing, preparing meals, shopping, managing finances, etc., do you get the help you need? I  don't need any help                   Psychosocial    No documentation.                  Abuse/Neglect    No documentation.                  Legal    No documentation.                  Substance Abuse    No documentation.                  Patient Forms    No documentation.                     Shari Soriano RN

## 2025-01-30 NOTE — PROGRESS NOTES
79 yoF with PMH of atrial fibrillation on Xarelto, Von Willebrand disease, iron deficiency anemia, CAD, dyslipidemia, NIDDM, GERD, and obesity who was transferred to ICU 1/30/25 for higher level of care and hemodynamic support.     She was initially admitted yesterday morning for elective Watchman implant per Dr. Cates. Per report, patient tolerated the procedure well was transferred to PACU and subsequently medical-surgical floor. Overnight developed hemodynamic instability prompting RRT and was ultimately transferred to ICU and placed on Levophed infusion as hypotension was refractory to IVF.     Hgb has drifted from 10.1 on admission to 8.7 postoperatively. CT chest and A/P were negative for acute process or any obvious S/S bleeding. Echocardiogram showed LVEF 46-50% with indeterminate LV diastolic function but was negative for pericardial effusion. Troponin downtrending and proBNP normal.     Was additionally febrile with Tmax 101.4 in the setting of elevated PCT and WBC was pancultured and placed on empiric cefepime.     Plan:  Midodrine initiated today per Cardiology. Wean Levophed infusion as able (done).   Continue to monitor serial H&H. No obvious S/S bleeding at this time. If Hgb drops further will likely need to repeat CT A/P with contrast.   Continue empiric antibiotics and follow up culture results. Patient currently afebrile, however last night developed fever of 101.4F in the setting of elevated PCT and WBC.   Continue gentle IV hydration in the setting of renal insufficiency. Repeat BMP the AM. Avoid nephrotoxic agents.   AM labs.     Steffanie Tyler, DNP, APRN, AGACNP-BC  Pulmonary and Critical Care Medicine

## 2025-01-31 ENCOUNTER — READMISSION MANAGEMENT (OUTPATIENT)
Dept: CALL CENTER | Facility: HOSPITAL | Age: 80
End: 2025-01-31
Payer: MEDICARE

## 2025-01-31 VITALS
WEIGHT: 202.16 LBS | OXYGEN SATURATION: 94 % | SYSTOLIC BLOOD PRESSURE: 117 MMHG | HEIGHT: 60 IN | BODY MASS INDEX: 39.69 KG/M2 | TEMPERATURE: 98 F | RESPIRATION RATE: 14 BRPM | HEART RATE: 90 BPM | DIASTOLIC BLOOD PRESSURE: 61 MMHG

## 2025-01-31 PROBLEM — Z95.818 PRESENCE OF WATCHMAN LEFT ATRIAL APPENDAGE CLOSURE DEVICE: Status: ACTIVE | Noted: 2025-01-31

## 2025-01-31 LAB
ANION GAP SERPL CALCULATED.3IONS-SCNC: 6 MMOL/L (ref 5–15)
BUN SERPL-MCNC: 24 MG/DL (ref 8–23)
BUN/CREAT SERPL: 22.6 (ref 7–25)
CALCIUM SPEC-SCNC: 8.5 MG/DL (ref 8.6–10.5)
CHLORIDE SERPL-SCNC: 107 MMOL/L (ref 98–107)
CO2 SERPL-SCNC: 24 MMOL/L (ref 22–29)
CREAT SERPL-MCNC: 1.06 MG/DL (ref 0.57–1)
DEPRECATED RDW RBC AUTO: 48.2 FL (ref 37–54)
EGFRCR SERPLBLD CKD-EPI 2021: 54.2 ML/MIN/1.73
ERYTHROCYTE [DISTWIDTH] IN BLOOD BY AUTOMATED COUNT: 13.8 % (ref 12.3–15.4)
GLUCOSE BLDC GLUCOMTR-MCNC: 108 MG/DL (ref 70–130)
GLUCOSE BLDC GLUCOMTR-MCNC: 219 MG/DL (ref 70–130)
GLUCOSE SERPL-MCNC: 108 MG/DL (ref 65–99)
HCT VFR BLD AUTO: 28.3 % (ref 34–46.6)
HGB BLD-MCNC: 8.9 G/DL (ref 12–15.9)
MAGNESIUM SERPL-MCNC: 2 MG/DL (ref 1.6–2.4)
MCH RBC QN AUTO: 30.2 PG (ref 26.6–33)
MCHC RBC AUTO-ENTMCNC: 31.4 G/DL (ref 31.5–35.7)
MCV RBC AUTO: 95.9 FL (ref 79–97)
PHOSPHATE SERPL-MCNC: 3.1 MG/DL (ref 2.5–4.5)
PLATELET # BLD AUTO: 125 10*3/MM3 (ref 140–450)
PMV BLD AUTO: 11.1 FL (ref 6–12)
POTASSIUM SERPL-SCNC: 4.2 MMOL/L (ref 3.5–5.2)
RBC # BLD AUTO: 2.95 10*6/MM3 (ref 3.77–5.28)
SODIUM SERPL-SCNC: 137 MMOL/L (ref 136–145)
WBC NRBC COR # BLD AUTO: 7.93 10*3/MM3 (ref 3.4–10.8)

## 2025-01-31 PROCEDURE — 99239 HOSP IP/OBS DSCHRG MGMT >30: CPT | Performed by: NURSE PRACTITIONER

## 2025-01-31 PROCEDURE — 25010000002 CEFEPIME PER 500 MG: Performed by: PHYSICIAN ASSISTANT

## 2025-01-31 PROCEDURE — 80048 BASIC METABOLIC PNL TOTAL CA: CPT | Performed by: NURSE PRACTITIONER

## 2025-01-31 PROCEDURE — 83735 ASSAY OF MAGNESIUM: CPT | Performed by: NURSE PRACTITIONER

## 2025-01-31 PROCEDURE — 84100 ASSAY OF PHOSPHORUS: CPT | Performed by: NURSE PRACTITIONER

## 2025-01-31 PROCEDURE — 82948 REAGENT STRIP/BLOOD GLUCOSE: CPT

## 2025-01-31 PROCEDURE — 99232 SBSQ HOSP IP/OBS MODERATE 35: CPT

## 2025-01-31 PROCEDURE — 85027 COMPLETE CBC AUTOMATED: CPT | Performed by: NURSE PRACTITIONER

## 2025-01-31 RX ORDER — MONTELUKAST SODIUM 10 MG/1
10 TABLET ORAL NIGHTLY
Status: DISCONTINUED | OUTPATIENT
Start: 2025-01-31 | End: 2025-01-31

## 2025-01-31 RX ORDER — MIDODRINE HYDROCHLORIDE 5 MG/1
5 TABLET ORAL
Qty: 180 TABLET | Refills: 5 | Status: SHIPPED | OUTPATIENT
Start: 2025-01-31

## 2025-01-31 RX ORDER — FUROSEMIDE 20 MG/1
20 TABLET ORAL EVERY OTHER DAY
Start: 2025-02-01

## 2025-01-31 RX ORDER — MONTELUKAST SODIUM 10 MG/1
10 TABLET ORAL DAILY
Status: DISCONTINUED | OUTPATIENT
Start: 2025-01-31 | End: 2025-01-31 | Stop reason: HOSPADM

## 2025-01-31 RX ORDER — METOPROLOL SUCCINATE 25 MG/1
25 TABLET, EXTENDED RELEASE ORAL DAILY
Qty: 60 TABLET | Refills: 5 | Status: SHIPPED | OUTPATIENT
Start: 2025-01-31

## 2025-01-31 RX ADMIN — Medication 10 ML: at 08:28

## 2025-01-31 RX ADMIN — CEFEPIME HYDROCHLORIDE 1000 MG: 1 INJECTION, POWDER, FOR SOLUTION INTRAMUSCULAR; INTRAVENOUS at 00:24

## 2025-01-31 RX ADMIN — MUPIROCIN 1 APPLICATION: 20 OINTMENT TOPICAL at 08:28

## 2025-01-31 RX ADMIN — AMOXICILLIN AND CLAVULANATE POTASSIUM 1 TABLET: 875; 125 TABLET, FILM COATED ORAL at 10:30

## 2025-01-31 RX ADMIN — OXYCODONE HYDROCHLORIDE 15 MG: 15 TABLET ORAL at 12:44

## 2025-01-31 RX ADMIN — MONTELUKAST 10 MG: 10 TABLET, FILM COATED ORAL at 08:28

## 2025-01-31 RX ADMIN — OXYCODONE HYDROCHLORIDE 15 MG: 15 TABLET ORAL at 06:23

## 2025-01-31 RX ADMIN — MIDODRINE HYDROCHLORIDE 5 MG: 5 TABLET ORAL at 08:28

## 2025-01-31 RX ADMIN — MIDODRINE HYDROCHLORIDE 5 MG: 5 TABLET ORAL at 12:44

## 2025-01-31 NOTE — PROGRESS NOTES
"  [facility]  CARDIAC EP PROGRESS NOTE    Date of Admission: 1/29/2025  Date of Service: 01/31/25    Primary Care Physician: She Alarcon MD    Chief Complaint: atrial fibrillation, postop hypotension      Subjective      HPI: Patient reports feeling much better. Now off pressors on midodrine.       Objective   Vitals: /50   Pulse 82   Temp 98 °F (36.7 °C) (Oral)   Resp 14   Ht 152.4 cm (60\")   Wt 91.7 kg (202 lb 2.6 oz)   SpO2 92%   BMI 39.48 kg/m²     Physical Exam:   GENERAL: Alert, cooperative, in no acute distress.   HEART: Regular rate and rhythm; no murmurs, rubs or gallops  LUNGS: Clear to auscultation bilaterally. No wheezing, rales or rhonchi. Nonlabored breathing. 89+% on room air  EXTREMITIES: No obvious deformities, cyanosis, or edema noted.     Results:  Results from last 7 days   Lab Units 01/31/25  0652 01/30/25  1750 01/30/25  1313 01/30/25  0547 01/30/25 0213 01/29/25 2034   WBC 10*3/mm3 7.93  --   --   --  13.00* 13.16*   HEMOGLOBIN g/dL 8.9* 9.0* 8.7*   < > 9.0* 10.1*   HEMATOCRIT % 28.3* 29.1* 27.9*   < > 28.5* 32.7*   PLATELETS 10*3/mm3 125*  --   --   --  138* 148    < > = values in this interval not displayed.     Results from last 7 days   Lab Units 01/31/25  0652 01/30/25  0213 01/29/25 2034   SODIUM mmol/L 137 135* 137   POTASSIUM mmol/L 4.2 4.5 4.8   CHLORIDE mmol/L 107 103 102   CO2 mmol/L 24.0 24.0 24.0   BUN mg/dL 24* 29* 28*   CREATININE mg/dL 1.06* 1.52* 1.40*   GLUCOSE mg/dL 108* 227* 238*      Lab Results   Component Value Date    CHOL 143 10/31/2023    TRIG 113 10/31/2023    HDL 45 10/31/2023    LDL 77 10/31/2023    AST 10 01/30/2025    ALT 6 01/30/2025                         Results from last 7 days   Lab Units 01/30/25  0756 01/30/25  0547 01/30/25  0213   HSTROP T ng/L 115* 119* 121*     Results from last 7 days   Lab Units 01/30/25 0213   PROBNP pg/mL 802.5         Intake/Output Summary (Last 24 hours) at 1/31/2025 " 0947  Last data filed at 1/31/2025 0831  Gross per 24 hour   Intake 1752.12 ml   Output 2450 ml   Net -697.88 ml       I personally reviewed the patient's EKG/Telemetry data    Radiology Data:   CT Abdomen Pelvis Without Contrast    Result Date: 1/30/2025  Impression: 1.No acute abdominal or pelvic abnormality. 2.Mild soft tissue stranding in the right groin with a few prominent lymph nodes. This could be related to infection or recent instrumentation. Electronically Signed: James Jefferson MD  1/30/2025 3:13 AM EST  Workstation ID: [id number]    CT Chest Without Contrast Diagnostic    Result Date: 1/30/2025  Impression: Mild bilateral basilar atelectasis. No active disease. Electronically Signed: James Jefferson MD  1/30/2025 3:06 AM EST  Workstation ID: [id number]    XR Chest 1 View    Result Date: 1/30/2025  Impression: Cardiomegaly. No active disease. Electronically Signed: James Jefferson MD  1/30/2025 2:56 AM EST  Workstation ID: [id number]       Current Medications:  amoxicillin-clavulanate, 1 tablet, Oral, Q12H  atorvastatin, 40 mg, Oral, Nightly  midodrine, 5 mg, Oral, TID AC  montelukast, 10 mg, Oral, Daily  mupirocin, 1 Application, Each Nare, BID  Ryan, , ,   rivaroxaban, 20 mg, Oral, Daily With Dinner  sodium chloride, 10 mL, Intravenous, Q12H      norepinephrine, 0.02-0.3 mcg/kg/min, Last Rate: Stopped (01/30/25 1144)        Assessment and Plan:   Persistent Atrial Fibrillation  Rate controlled even with metoprolol on hold  Xarelto  History of GI Bleeding/von Willebrand's Disease  Now s/p Watchman LAAO device implant. R groin hemostatic, open to air  Postop Hypotension without obvious cause  CT chest and abdomen without signs of bleeding, HGB did drop from 12 to 10, but could be dilutional. Now stable.  Echo with no pericardial effusion  S/p fluids and norepi 0.03  Pressors weaned off successfully, maintaining SBP>100 on midodrine 5 mg tid    Patient stable from cardiac standpoint if H/H continues to remain  stable.  Would continue midodrine 5 tid and allow drug holiday from home ToprolXL, Arb, spironolactone. Take Lasix PRN for swelling or >2# weight gain in 24 hours. Follow up with Heart Valve sometime next week if able to go home today/over the weekend. Antibiotics per ICU. Cardiac EP will follow on as needed basis over the weekend. Reach out to us or on call provider with any further questions.    Regarding cardiac meds:  Stop spironolactone and ARB due to low blood pressures.  Restart lower dose metoprolol succinate 25 qd (down from 100)  Change Lasix to qod starting tomorrow  Continue midodrine 5 mg tid started this admission for postop hypotension. Patient is still with borderline pressures but doing well off pressors.   Continue Xarelto and aspirin.  Continue statin    Electronically signed by Francisco Hook PA-C, 01/31/25, 9:51 AM EST.

## 2025-01-31 NOTE — DISCHARGE SUMMARY
DISCHARGE SUMMARY       Patient name: Regina Ramsay  CSN: [id number]  MRN: [id number]  : 1945    Date of Admission: 2025  Date of Discharge:  2025    Admitting Physician: Inocencio Cates DO   Primary Care Provider: She Alarcon MD    Admission Diagnosis: Persistent atrial fibrillation      Discharge Diagnoses:     Persistent atrial fibrillation    Essential hypertension    Diabetes mellitus    Paroxysmal atrial fibrillation    LBBB (left bundle branch block)    Coronary artery disease involving native coronary artery    Anemia    Von Willebrand disease    Iron deficiency anemia    HFrEF (heart failure with reduced ejection fraction)    Presence of Watchman left atrial appendage closure device    Procedures:  25: Watchman Implant     Imaging:  CT Abdomen Pelvis Without Contrast    Result Date: 2025  Impression: 1.No acute abdominal or pelvic abnormality. 2.Mild soft tissue stranding in the right groin with a few prominent lymph nodes. This could be related to infection or recent instrumentation. Electronically Signed: James Jefferson MD  2025 3:13 AM EST  Workstation ID: [id number]    CT Chest Without Contrast Diagnostic    Result Date: 2025  Impression: Mild bilateral basilar atelectasis. No active disease. Electronically Signed: James Jefferson MD  2025 3:06 AM EST  Workstation ID: [id number]    XR Chest 1 View    Result Date: 2025  Impression: Cardiomegaly. No active disease. Electronically Signed: James Jefferson MD  2025 2:56 AM EST  Workstation ID: [id number]     History of Present Illness:  79 yoF with PMH of atrial fibrillation on Xarelto, Von Willebrand disease, iron deficiency anemia, CAD, dyslipidemia, NIDDM, GERD, and obesity who was admitted to [facility] 25 elective Watchman implant per Dr. Cates.    Hospital Course:  Patient was admitted 2* issues discussed in the above HPI and underwent Watchman implant per Dr. Cates. Per report, patient tolerated the  "procedure well was transferred to PACU and subsequently medical-surgical floor. Overnight developed hemodynamic instability prompting RRT and was ultimately transferred to ICU and placed on Levophed infusion as hypotension was refractory to IVF.      Hgb has drifted from 10.1 on admission to 8.7 postoperatively. CT chest and A/P were negative for acute process or any obvious S/S bleeding. Echocardiogram showed LVEF 46-50% with indeterminate LV diastolic function but was negative for pericardial effusion. Troponin downtrending and proBNP normal.      Was additionally febrile overnight with Tmax 101.4 in the setting of elevated PCT and WBC was pancultured and placed on empiric cefepime. Inflammatory markers ultimately downtrended / cultures with no growth at 24 hours and antibiotics were deescalated to PO Augmentin.     The following day patient was initiated on midodrine per Cardiology with improvement in blood pressure and was able to be weaned off of pressors. GDMT was also adjusted per Cardiology.     She has had no further issues during her hospitalization and has been deemed appropriate for discharge home. She is feeling well, is tolerating a PO diet, and is ambulating without difficulty.     Discharge medications and recommendations are listed below:     Vitals:  /61   Pulse 90   Temp 98 °F (36.7 °C) (Oral)   Resp 14   Ht 152.4 cm (60\")   Wt 91.7 kg (202 lb 2.6 oz)   SpO2 94%   BMI 39.48 kg/m²     Physical Exam:  Constitutional:  Appears well-developed and well-nourished. No distress.   HEENT:  Normocephalic and atraumatic. PER  Neck: Normal range of motion. Neck supple. No JVD present.   Cardiovascular: Normal rate, regular rhythm, normal heart sounds and intact distal pulses.  No gallop and no friction rub.  No murmur heard.  Pulmonary/Chest: Effort normal and breath sounds normal. No respiratory distress. No wheezes, rhonchi or rales.   Abdominal: Soft. No distension and no mass. There is no " tenderness.   Musculoskeletal: Normal range of motion.   Neurological: Alert and oriented to person, place, and time.  No focal deficits  Skin: Skin is warm and dry. No rash noted.   Extremities:  No clubbing, edema or cyanosis  Psychiatric: Normal mood and affect. Behavior is normal.     Labs:  Results from last 7 days   Lab Units 01/31/25  0652   WBC 10*3/mm3 7.93   HEMOGLOBIN g/dL 8.9*   HEMATOCRIT % 28.3*   PLATELETS 10*3/mm3 125*     Results from last 7 days   Lab Units 01/31/25  0652 01/30/25  0213   SODIUM mmol/L 137 135*   POTASSIUM mmol/L 4.2 4.5   CHLORIDE mmol/L 107 103   CO2 mmol/L 24.0 24.0   BUN mg/dL 24* 29*   CREATININE mg/dL 1.06* 1.52*   CALCIUM mg/dL 8.5* 8.0*   BILIRUBIN mg/dL  --  0.2   ALK PHOS U/L  --  95   ALT (SGPT) U/L  --  6   AST (SGOT) U/L  --  10   GLUCOSE mg/dL 108* 227*         Magnesium   Date Value Ref Range Status   01/31/2025 2.0 1.6 - 2.4 mg/dL Final     Phosphorus   Date Value Ref Range Status   01/31/2025 3.1 2.5 - 4.5 mg/dL Final           Discharge Medications:     Discharge Medications        New Medications        Instructions Start Date   amoxicillin-clavulanate 875-125 MG per tablet  Commonly known as: AUGMENTIN   1 tablet, Oral, Every 12 Hours Scheduled      aspirin 81 MG EC tablet   81 mg, Oral, Daily      midodrine 5 MG tablet  Commonly known as: PROAMATINE   5 mg, Oral, 3 Times Daily Before Meals             Changes to Medications        Instructions Start Date   furosemide 20 MG tablet  Commonly known as: LASIX  What changed: when to take this   20 mg, Oral, Every Other Day   Start Date: February 1, 2025     metoprolol succinate XL 25 MG 24 hr tablet  Commonly known as: TOPROL-XL  What changed:   medication strength  how much to take   25 mg, Oral, Daily             Continue These Medications        Instructions Start Date   atorvastatin 40 MG tablet  Commonly known as: LIPITOR   40 mg, Oral, Every Night at Bedtime      B-12 Compliance Injection 1000 MCG/ML  kit  Generic drug: Cyanocobalamin   1 mL, Every 14 Days      citalopram 10 MG tablet  Commonly known as: CeleXA   10 mg, Daily      Diclofenac Sodium 1 % gel gel  Commonly known as: VOLTAREN   4 g, As Needed      empagliflozin 10 MG tablet tablet  Commonly known as: JARDIANCE   10 mg, Daily      Esomeprazole Magnesium 20 MG tablet delayed-release   40 mg, Daily      FeroSul 325 (65 FE) MG tablet  Generic drug: ferrous sulfate   1 tablet, 2 Times Daily With Meals      glipizide 10 MG 24 hr tablet  Commonly known as: GLUCOTROL XL   20 mg, Daily      levocetirizine 5 MG tablet  Commonly known as: XYZAL   5 mg, Daily      levothyroxine sodium 150 MCG capsule  Commonly known as: TIROSINT   150 mcg, Every Early Morning      Magnesium Oxide -Mg Supplement 400 (240 Mg) MG tablet   400 mg, Daily      montelukast 10 MG tablet  Commonly known as: SINGULAIR   10 mg, Nightly      oxyCODONE 15 MG immediate release tablet  Commonly known as: ROXICODONE   15 mg, Every 6 Hours PRN      Ozempic (0.25 or 0.5 MG/DOSE) 2 MG/3ML solution pen-injector  Generic drug: Semaglutide(0.25 or 0.5MG/DOS)   Inject 0.25 mg under the skin into the appropriate area as directed 1 (One) Time Per Week. Patient instructed to hold prior to procedure. Last dose 1/18/25      pantoprazole 40 MG EC tablet  Commonly known as: PROTONIX   40 mg, Daily      rivaroxaban 20 MG tablet  Commonly known as: XARELTO   20 mg, Oral, Daily With Dinner      vitamin D 1.25 MG (81868 UT) capsule capsule  Commonly known as: ERGOCALCIFEROL   50,000 Units, Weekly             Stop These Medications      spironolactone 25 MG tablet  Commonly known as: ALDACTONE     valsartan 40 MG tablet  Commonly known as: DIOVAN            Discharge Diet:   Diet Instructions       Diet: Regular/House Diet, Diabetic Diets; Consistent Carbohydrate; Regular (IDDSI 7); Thin (IDDSI 0)      Discharge Diet:  Regular/House Diet  Diabetic Diets       Diabetic Diet: Consistent Carbohydrate    Texture:  Regular (IDDSI 7)    Fluid Consistency: Thin (IDDSI 0)          Activity at Discharge:    Activity Instructions       Activity as Tolerated            Follow-up Appointments  Future Appointments   Date Time Provider Department Center   2/5/2025  1:30 PM Shaniqua Adame APRN [facility]   2/13/2025  1:15 PM Jose Antonio Damon MD [facility]   2/21/2025  2:15 PM Ronit Monroe APRN [facility]   3/17/2025  1:00 PM SUSAN JOIE CT 1 BH SUSAN CT RI [facility] (Cl   5/5/2025  3:00 PM Inocencio Cates,  [facility]   8/1/2025  1:30 PM Abrahan Gomes APRN [facility]   9/8/2025  1:30 PM Inocencio Cates,  [facility]     Additional Instructions for the Follow-ups that You Need to Schedule       Discharge Follow-up with PCP   As directed       Currently Documented PCP:    She Alarcon MD    PCP Phone Number:    [phone]     Follow Up Details: 1 week              Discharge Instructions:  Okay to discharge home   Medications as above   Follow up with providers as above     Current Code Status       Date Active Code Status Order ID Comments User Context       1/29/2025 1216 CPR (Attempt to Resuscitate) [id number]  Inocencio Cates DO Inpatient        Question Answer    Code Status (Patient has no pulse and is not breathing) CPR (Attempt to Resuscitate)    Medical Interventions (Patient has pulse or is breathing) Full Support    Level Of Support Discussed With Patient             Steffanie Tyler DNP, APRN, [facility]  Pulmonary and Critical Care Medicine  01/31/25     Time: Discharge 40 min    CC: She Alarcon MD    Please note that portions of this note were completed with a voice recognition program.

## 2025-01-31 NOTE — PLAN OF CARE
Goal Outcome Evaluation:         VSS. IVs removed. Patient education reviewed on AVS and verified follow up cardiology appointment. Patient verbalized understanding. Patient to  medications from pharmacy upon discharge. Awaiting transportation from [facility] for discharge.

## 2025-01-31 NOTE — CASE MANAGEMENT/SOCIAL WORK
Case Management Discharge Note      Final Note: Patient to discharge home.  No needs identified.         Selected Continued Care - Admitted Since 1/29/2025       Destination    No services have been selected for the patient.                Durable Medical Equipment    No services have been selected for the patient.                Dialysis/Infusion    No services have been selected for the patient.                Home Medical Care    No services have been selected for the patient.                Therapy    No services have been selected for the patient.                Community Resources    No services have been selected for the patient.                Community & DME    No services have been selected for the patient.                         Final Discharge Disposition Code: 01 - home or self-care

## 2025-02-01 LAB
QT INTERVAL: 404 MS
QTC INTERVAL: 494 MS

## 2025-02-01 NOTE — OUTREACH NOTE
Prep Survey      Flowsheet Row Responses   Mandaen facility patient discharged from? [facility]   Is LACE score < 7 ? No   Eligibility Readm Mgmt   Discharge diagnosis Persistent atrial fibrillation-Watchman implant this visit   Does the patient have one of the following disease processes/diagnoses(primary or secondary)? General Surgery   Does the patient have Home health ordered? No   Is there a DME ordered? No   Prep survey completed? Yes            BAILEY CARRILLO - Registered Nurse

## 2025-02-03 ENCOUNTER — CARE COORDINATION (OUTPATIENT)
Age: 80
End: 2025-02-03

## 2025-02-03 NOTE — CARE COORDINATION
Care Transitions Initial Follow Up Call    Call within 2 business days of discharge: Yes     Patient: Jolanta Montenegro Patient : 1945 MRN: [id number]    Last Discharge Facility       Date Complaint Diagnosis Description Type Department Provider    3/28/24   Admission (Discharged) Catrachito Mahajan MD            RARS: Readmission Risk Score: 18.5       Spoke with: Attempting HFU call, unsuccessful.  Phone rings, no answer.  Unable to leave message.     Discharge department/facility: [facility]     Non-face-to-face services provided:  Scheduled appointment with PCP-Ana  Obtained and reviewed discharge summary and/or continuity of care documents    Follow Up  Future Appointments   Date Time Provider Department Center   2025  1:15 PM Alyx Perez MD [facility]   2025  1:00 PM Alyx Perez MD [facility] DEP       Marilyn Rod RN

## 2025-02-04 ENCOUNTER — READMISSION MANAGEMENT (OUTPATIENT)
Dept: CALL CENTER | Facility: HOSPITAL | Age: 80
End: 2025-02-04
Payer: MEDICARE

## 2025-02-04 LAB
BACTERIA SPEC AEROBE CULT: NORMAL
BACTERIA SPEC AEROBE CULT: NORMAL

## 2025-02-04 NOTE — OUTREACH NOTE
General Surgery Week 1 Survey      Flowsheet Row Responses   [facility] [place] patient discharged from? [place]   Does the patient have one of the following disease processes/diagnoses(primary or secondary)? General Surgery   Week 1 attempt successful? No   Unsuccessful attempts Attempt 1  [Attempted patient and daughter]            BONY FRENCH - Registered Nurse

## 2025-02-04 NOTE — CARE COORDINATION
Care Transitions Initial Follow Up Call    Call within 2 business days of discharge: Yes     Patient: Jolanta Montenegro Patient : 1945 MRN: [id number]    Last Discharge Facility       Date Complaint Diagnosis Description Type Department Provider    3/28/24   Admission (Discharged) Catrachito Mahajan MD            RARS: Readmission Risk Score: 18.5       Spoke with: Attempting HFU call, unsuccessful.  Unable to leave message.     Discharge department/facility: [facility]    Non-face-to-face services provided:  Scheduled appointment with PCP-Ana  Obtained and reviewed discharge summary and/or continuity of care documents    Follow Up  Future Appointments   Date Time Provider Department Center   2/10/2025  9:45 AM Alyx Perez MD [facility]   2025  1:00 PM Alyx Perez MD [facility] DEP       Marilyn Rod RN

## 2025-02-05 ENCOUNTER — OFFICE VISIT (OUTPATIENT)
Dept: CARDIOLOGY | Facility: HOSPITAL | Age: 80
End: 2025-02-05
Payer: MEDICARE

## 2025-02-05 VITALS
DIASTOLIC BLOOD PRESSURE: 68 MMHG | BODY MASS INDEX: 37.11 KG/M2 | HEART RATE: 81 BPM | OXYGEN SATURATION: 95 % | RESPIRATION RATE: 18 BRPM | SYSTOLIC BLOOD PRESSURE: 116 MMHG | WEIGHT: 189 LBS | HEIGHT: 60 IN

## 2025-02-05 DIAGNOSIS — I48.19 ATRIAL FIBRILLATION, PERSISTENT: Primary | ICD-10-CM

## 2025-02-05 DIAGNOSIS — I50.20 HFREF (HEART FAILURE WITH REDUCED EJECTION FRACTION): ICD-10-CM

## 2025-02-05 DIAGNOSIS — I44.7 LBBB (LEFT BUNDLE BRANCH BLOCK): ICD-10-CM

## 2025-02-05 DIAGNOSIS — I95.89 CHRONIC HYPOTENSION: ICD-10-CM

## 2025-02-05 DIAGNOSIS — Z95.818 PRESENCE OF WATCHMAN LEFT ATRIAL APPENDAGE CLOSURE DEVICE: ICD-10-CM

## 2025-02-05 RX ORDER — FUROSEMIDE 20 MG/1
20 TABLET ORAL DAILY
Start: 2025-02-05

## 2025-02-05 NOTE — PROGRESS NOTES
"[facility], [facility] Heart and Vascular    Chief Complaint  Atrial Fibrillation    Subjective    History of Present Illness {CC  Problem List  Visit  Diagnosis   Encounters  Notes  Medications  Labs  Result Review Imaging  Media :23}     Regina Ramsay presents to [facility] CARDIOLOGY for   History of Present Illness     79-year-old female with history of persistent atrial fibrillation (watchman implantation 1/29/2025), ablation for atrial fibrillation 9/5/2024, HTN,  diabetes, left bundle branch block, CAD, anemia, von Willebrand disease, iron deficiency, heart failure with reduced ejection fraction    Patient with history of HTN but was hypotensive during hospital visit,  on midodrine.  HF meds continued.     Heart failure medications include Jardiance, metoprolol succinate, Lasix    A-fib medications include Xarelto.  Amiodarone has been discontinued.      Pt reports increased edema in the right leg after d/c.  Had been taking water pill every other day but started stating daily.  Edema has resolved.      NO CP or pressure.  Occasional palpitations with activity.  No dizziness, near syncope, syncope.      Objective     Vital Signs:   Vitals:    02/05/25 1337 02/05/25 1355   BP: 135/71 116/68   BP Location: Left arm    Patient Position: Sitting    Cuff Size: Adult    Pulse: 81    Resp: 18    SpO2: 95%    Weight: 85.7 kg (189 lb)    Height: 152.4 cm (60\")      Body mass index is 36.91 kg/m².  Physical Exam  Vitals reviewed.   Constitutional:       General: She is not in acute distress.  Pulmonary:      Effort: Pulmonary effort is normal.   Skin:     Coloration: Skin is not pale.   Neurological:      Mental Status: She is alert.   Psychiatric:         Mood and Affect: Mood normal.         Behavior: Behavior normal. Behavior is cooperative.              Result Review  Data Reviewed:{ Labs  Result Review  Imaging  Med Tab  Media :23}   Echocardiogram " 1/30/2025: EF 46 to 50%, septal wall motion abnormal consistent with left bundle branch block, left atrial cavity mild to moderately dilated,RVSP 39 mmHg    EKG 2/1/2025: Atrial fibrillation 90 bpm              Assessment and Plan {CC Problem List  Visit Diagnosis  ROS  Review (Popup)  Health Maintenance  Quality  BestPractice  Medications  SmartSets  SnapShot Encounters  Media :23}   1. Atrial fibrillation, persistent  Hx of PVA 09/2024  S/p Watchman 01/29/25    Xarelto  BB    CARMELA to be scheduled in 45 days    2. HFrEF (heart failure with reduced ejection fraction)  EF 46 to 50%  Recent edema, now resolved  No progressive dyspnea  Lasix daily  - Basic Metabolic Panel    3. Chronic hypotension  Currently on midodrine  Blood pressure stable no dizziness, near-syncope, syncope    4. LBBB (left bundle branch block)      5. Presence of Watchman left atrial appendage closure device      Patient will follow-up with cardiology as scheduled.  Follow-up in the heart valve center as scheduled.          Follow Up {Instructions Charge Capture  Follow-up Communications :23}   Return if symptoms worsen or fail to improve.    Patient was given instructions and counseling regarding her condition or for health maintenance advice. Please see specific information pulled into the AVS if appropriate.  Patient was instructed to call the Heart and Valve Center with any questions, concerns, or worsening symptoms.

## 2025-02-06 NOTE — CARE COORDINATION
Care Transitions Initial Follow Up Call    Call within 2 business days of discharge: Yes     Patient: Jolanta Montenegro Patient : 1945 MRN: [id number]    Last Discharge Facility       Date Complaint Diagnosis Description Type Department Provider    3/28/24   Admission (Discharged) Catrachito Mahajan MD            RARS: Readmission Risk Score: 18.5       Spoke with: Multiple attempts made to contact patient, unsuccessful.  Unable to leave message.     Discharge department/facility: [facility]    Non-face-to-face services provided:  Scheduled appointment with PCP-Ana  Obtained and reviewed discharge summary and/or continuity of care documents    Follow Up  Future Appointments   Date Time Provider Department Center   2/10/2025  9:45 AM Alyx Perez MD [facility]   2025  1:00 PM Alyx Perez MD [facility]       Marilyn Rod RN

## 2025-02-10 ENCOUNTER — OFFICE VISIT (OUTPATIENT)
Age: 80
End: 2025-02-10

## 2025-02-10 VITALS
SYSTOLIC BLOOD PRESSURE: 126 MMHG | BODY MASS INDEX: 35.94 KG/M2 | RESPIRATION RATE: 18 BRPM | WEIGHT: 184 LBS | DIASTOLIC BLOOD PRESSURE: 64 MMHG | OXYGEN SATURATION: 95 % | HEART RATE: 81 BPM

## 2025-02-10 DIAGNOSIS — R60.0 LOWER EXTREMITY EDEMA: ICD-10-CM

## 2025-02-10 DIAGNOSIS — E53.8 VITAMIN B12 DEFICIENCY: ICD-10-CM

## 2025-02-10 DIAGNOSIS — E11.69 TYPE 2 DIABETES MELLITUS WITH OTHER SPECIFIED COMPLICATION, WITHOUT LONG-TERM CURRENT USE OF INSULIN (HCC): ICD-10-CM

## 2025-02-10 DIAGNOSIS — D64.9 ANEMIA, UNSPECIFIED TYPE: ICD-10-CM

## 2025-02-10 DIAGNOSIS — I50.22 CHRONIC SYSTOLIC CONGESTIVE HEART FAILURE (HCC): ICD-10-CM

## 2025-02-10 DIAGNOSIS — I48.0 PAROXYSMAL ATRIAL FIBRILLATION (HCC): Primary | ICD-10-CM

## 2025-02-10 RX ORDER — ERGOCALCIFEROL 1.25 MG/1
50000 CAPSULE, LIQUID FILLED ORAL WEEKLY
Qty: 5 CAPSULE | Refills: 3 | Status: SHIPPED | OUTPATIENT
Start: 2025-02-10

## 2025-02-10 RX ORDER — CITALOPRAM HYDROBROMIDE 10 MG/1
10 TABLET ORAL DAILY
Qty: 30 TABLET | Refills: 2 | Status: SHIPPED | OUTPATIENT
Start: 2025-02-10

## 2025-02-10 RX ORDER — CYANOCOBALAMIN 1000 UG/ML
1000 INJECTION, SOLUTION INTRAMUSCULAR; SUBCUTANEOUS ONCE
Status: COMPLETED | OUTPATIENT
Start: 2025-02-10 | End: 2025-02-10

## 2025-02-10 RX ORDER — ASPIRIN 81 MG/1
81 TABLET ORAL DAILY
COMMUNITY
Start: 2025-01-29

## 2025-02-10 RX ORDER — ESOMEPRAZOLE MAGNESIUM 40 MG/1
40 CAPSULE, DELAYED RELEASE ORAL
Qty: 30 CAPSULE | Refills: 1 | Status: SHIPPED | OUTPATIENT
Start: 2025-02-10

## 2025-02-10 RX ORDER — FERROUS SULFATE 325(65) MG
1 TABLET ORAL 2 TIMES DAILY WITH MEALS
Qty: 60 TABLET | Refills: 0 | Status: SHIPPED | OUTPATIENT
Start: 2025-02-10

## 2025-02-10 RX ORDER — METOPROLOL SUCCINATE 25 MG/1
25 TABLET, EXTENDED RELEASE ORAL DAILY
COMMUNITY
Start: 2025-01-31

## 2025-02-10 RX ORDER — MIDODRINE HYDROCHLORIDE 5 MG/1
5 TABLET ORAL
COMMUNITY
Start: 2025-01-31

## 2025-02-10 RX ORDER — FUROSEMIDE 20 MG/1
20 TABLET ORAL DAILY
Qty: 60 TABLET | Refills: 0 | Status: SHIPPED | OUTPATIENT
Start: 2025-02-10

## 2025-02-10 RX ADMIN — CYANOCOBALAMIN 1000 MCG: 1000 INJECTION, SOLUTION INTRAMUSCULAR; SUBCUTANEOUS at 10:13

## 2025-02-10 SDOH — ECONOMIC STABILITY: FOOD INSECURITY: WITHIN THE PAST 12 MONTHS, THE FOOD YOU BOUGHT JUST DIDN'T LAST AND YOU DIDN'T HAVE MONEY TO GET MORE.: NEVER TRUE

## 2025-02-10 SDOH — ECONOMIC STABILITY: FOOD INSECURITY: WITHIN THE PAST 12 MONTHS, YOU WORRIED THAT YOUR FOOD WOULD RUN OUT BEFORE YOU GOT MONEY TO BUY MORE.: NEVER TRUE

## 2025-02-10 ASSESSMENT — ENCOUNTER SYMPTOMS
BACK PAIN: 1
SINUS PRESSURE: 0
WHEEZING: 0
EYE DISCHARGE: 0
SORE THROAT: 0
NAUSEA: 0
ABDOMINAL PAIN: 0
COUGH: 0
VOMITING: 0
SHORTNESS OF BREATH: 0

## 2025-02-10 ASSESSMENT — PATIENT HEALTH QUESTIONNAIRE - PHQ9
2. FEELING DOWN, DEPRESSED OR HOPELESS: NOT AT ALL
1. LITTLE INTEREST OR PLEASURE IN DOING THINGS: NOT AT ALL
SUM OF ALL RESPONSES TO PHQ QUESTIONS 1-9: 0
SUM OF ALL RESPONSES TO PHQ QUESTIONS 1-9: 0
SUM OF ALL RESPONSES TO PHQ9 QUESTIONS 1 & 2: 0
SUM OF ALL RESPONSES TO PHQ QUESTIONS 1-9: 0
SUM OF ALL RESPONSES TO PHQ QUESTIONS 1-9: 0

## 2025-02-10 NOTE — PROGRESS NOTES
Chief Complaint   Patient presents with    Follow-Up from Hospital     [facility]   Diagnoses   Presence of Watchman left atrial appendage closure device   Atrial fibrillation, persistent              Have you seen any other physician or provider since your last visit yes - [facility], cardiology     Have you had any other diagnostic tests since your last visit? yes -     Have you changed or stopped any medications since your last visit? yes - metoprolol dose changed, stopped spironolactone and valsartan. Started midodrine and aspirin but she stopped the aspirin because of bruising. They wanted her to take lasix every other day but she continued to have swelling so she went back to daily and told cardiology that at follow up she said.    
4.6 03/26/2024 03:06 PM    K 4.5 03/15/2024 04:53 AM    CL 98 03/26/2024 03:06 PM    CO2 24 03/26/2024 03:06 PM    GLUCOSE 391 03/26/2024 03:06 PM    BUN 23 03/26/2024 03:06 PM    CREATININE 1.5 03/26/2024 03:06 PM    CALCIUM 9.2 03/26/2024 03:06 PM    BILITOT 0.3 03/26/2024 03:06 PM    ALT 12 03/26/2024 03:06 PM    AST 13 03/26/2024 03:06 PM       Hemoglobin A1C (%)   Date Value   10/17/2024 7.1         Lab Results   Component Value Date/Time    WBC 9.9 03/26/2024 03:06 PM    NEUTROABS 10.5 03/18/2024 04:20 AM    HGB 10.2 03/26/2024 03:06 PM    HCT 35.0 03/26/2024 03:06 PM    MCV 92.6 03/26/2024 03:06 PM     03/26/2024 03:06 PM       Lab Results   Component Value Date    TSH 0.46 02/22/2024     Assessment/Plan:  1. Paroxysmal atrial fibrillation (HCC)  Sinus rhythm on exam. Will cont on meds.  Status postplacement of Watchman device.  I had a long discussion with the patient regarding the risk/benefit from anticoagulation. Patient is aware of that.  Patient to continue follow-up with cardiology.  Monitor labs and electrolytes.    - CBC with Auto Differential; Future  - Comprehensive Metabolic Panel; Future  - TSH; Future  - Magnesium; Future    2. Anemia, unspecified type  Worsening hemoglobin level.  Could be related to IV fluids but more than expected.  Proceed with monitoring hemoglobin level and proceed with anemia workup.  Further recommendation based on test result.  Monitor closely specially would be on anticoagulation.    - CBC with Auto Differential; Future  - Comprehensive Metabolic Panel; Future  - Ferritin; Future  - Folate; Future  - Iron and TIBC; Future  - TSH; Future  - Magnesium; Future    3. Type 2 diabetes mellitus with other specified complication, without long-term current use of insulin (HCC)  Stable. I advised her regarding diabetic diet, exercise and weight control.  Also, I advised her to stay on the current medication, monitor her fingerstick closely.  I am going to check the A1c

## 2025-02-11 ENCOUNTER — HOSPITAL ENCOUNTER (EMERGENCY)
Facility: HOSPITAL | Age: 80
Discharge: HOME | End: 2025-02-11
Attending: EMERGENCY MEDICINE
Payer: MEDICARE

## 2025-02-11 ENCOUNTER — HOSPITAL ENCOUNTER (OUTPATIENT)
Dept: ULTRASOUND IMAGING | Facility: HOSPITAL | Age: 80
Discharge: HOME | End: 2025-02-11
Attending: INTERNAL MEDICINE
Payer: MEDICARE

## 2025-02-11 VITALS
WEIGHT: 184 LBS | TEMPERATURE: 98 F | OXYGEN SATURATION: 96 % | RESPIRATION RATE: 16 BRPM | SYSTOLIC BLOOD PRESSURE: 122 MMHG | HEART RATE: 69 BPM | BODY MASS INDEX: 36.12 KG/M2 | DIASTOLIC BLOOD PRESSURE: 56 MMHG | HEIGHT: 60 IN

## 2025-02-11 DIAGNOSIS — R60.0 LOWER EXTREMITY EDEMA: ICD-10-CM

## 2025-02-11 DIAGNOSIS — K62.5 RECTAL BLEEDING: Primary | ICD-10-CM

## 2025-02-11 LAB
ALBUMIN SERPL-MCNC: 3.5 G/DL (ref 3.4–4.8)
ALBUMIN/GLOB SERPL: 1.1 {RATIO} (ref 0.8–2)
ALP SERPL-CCNC: 124 U/L (ref 25–100)
ALT SERPL-CCNC: 10 U/L (ref 4–36)
ANION GAP SERPL CALCULATED.3IONS-SCNC: 10 MMOL/L (ref 3–16)
AST SERPL-CCNC: 13 U/L (ref 8–33)
BASOPHILS # BLD: 0.2 K/UL (ref 0–0.1)
BASOPHILS NFR BLD: 1.2 %
BILIRUB SERPL-MCNC: <0.2 MG/DL (ref 0.3–1.2)
BILIRUB UR QL STRIP.AUTO: NEGATIVE
BUN SERPL-MCNC: 20 MG/DL (ref 6–20)
CALCIUM SERPL-MCNC: 8.9 MG/DL (ref 8.3–10.6)
CHLORIDE SERPL-SCNC: 105 MMOL/L (ref 98–107)
CLARITY UR: CLEAR
CO2 SERPL-SCNC: 26 MMOL/L (ref 20–30)
COLOR UR: YELLOW
CREAT SERPL-MCNC: 1.1 MG/DL (ref 0.4–1.2)
EOSINOPHIL # BLD: 0.7 K/UL (ref 0–0.4)
EOSINOPHIL NFR BLD: 5.4 %
EPI CELLS #/AREA URNS HPF: ABNORMAL /HPF (ref 0–5)
ERYTHROCYTE [DISTWIDTH] IN BLOOD BY AUTOMATED COUNT: 16 % (ref 11–16)
GFR SERPLBLD CREATININE-BSD FMLA CKD-EPI: 52 ML/MIN/{1.73_M2}
GLOBULIN SER CALC-MCNC: 3.1 G/DL
GLUCOSE SERPL-MCNC: 143 MG/DL (ref 74–106)
GLUCOSE UR STRIP.AUTO-MCNC: >=1000 MG/DL
HCT VFR BLD AUTO: 30.5 % (ref 37–47)
HGB BLD-MCNC: 9.2 G/DL (ref 11.5–16.5)
HGB UR QL STRIP.AUTO: ABNORMAL
IMM GRANULOCYTES # BLD: 0.3 K/UL
IMM GRANULOCYTES NFR BLD: 2 % (ref 0–5)
INR PPP: 2.19 (ref 0.9–1.1)
KETONES UR STRIP.AUTO-MCNC: NEGATIVE MG/DL
LEUKOCYTE ESTERASE UR QL STRIP.AUTO: NEGATIVE
LYMPHOCYTES # BLD: 3.3 K/UL (ref 1.5–4)
LYMPHOCYTES NFR BLD: 25.1 %
MCH RBC QN AUTO: 29.8 PG (ref 27–32)
MCHC RBC AUTO-ENTMCNC: 30.2 G/DL (ref 31–35)
MCV RBC AUTO: 98.7 FL (ref 80–100)
MONOCYTES # BLD: 0.8 K/UL (ref 0.2–0.8)
MONOCYTES NFR BLD: 5.6 %
NEUTROPHILS # BLD: 8.1 K/UL (ref 2–7.5)
NEUTS SEG NFR BLD: 60.7 %
NITRITE UR QL STRIP.AUTO: NEGATIVE
PH UR STRIP.AUTO: 5 [PH] (ref 5–8)
PLATELET # BLD AUTO: 195 K/UL (ref 150–400)
PMV BLD AUTO: 10.7 FL (ref 6–10)
POTASSIUM SERPL-SCNC: 3.8 MMOL/L (ref 3.4–5.1)
PROT SERPL-MCNC: 6.6 G/DL (ref 6.4–8.3)
PROT UR STRIP.AUTO-MCNC: NEGATIVE MG/DL
PROTHROMBIN TIME: 23.7 SEC (ref 12.1–14)
RBC # BLD AUTO: 3.09 M/UL (ref 3.8–5.8)
RBC #/AREA URNS HPF: ABNORMAL /HPF (ref 0–4)
SODIUM SERPL-SCNC: 141 MMOL/L (ref 136–145)
SP GR UR STRIP.AUTO: 1.01 (ref 1–1.03)
UA COMPLETE W REFLEX CULTURE PNL UR: ABNORMAL
UA DIPSTICK W REFLEX MICRO PNL UR: YES
URN SPEC COLLECT METH UR: ABNORMAL
UROBILINOGEN UR STRIP-ACNC: 0.2 E.U./DL
WBC # BLD AUTO: 13.3 K/UL (ref 4–11)
WBC #/AREA URNS HPF: ABNORMAL /HPF (ref 0–5)

## 2025-02-11 PROCEDURE — 85610 PROTHROMBIN TIME: CPT

## 2025-02-11 PROCEDURE — 93971 EXTREMITY STUDY: CPT

## 2025-02-11 PROCEDURE — 81001 URINALYSIS AUTO W/SCOPE: CPT

## 2025-02-11 PROCEDURE — 80053 COMPREHEN METABOLIC PANEL: CPT

## 2025-02-11 PROCEDURE — 36415 COLL VENOUS BLD VENIPUNCTURE: CPT

## 2025-02-11 PROCEDURE — 85025 COMPLETE CBC W/AUTO DIFF WBC: CPT

## 2025-02-11 PROCEDURE — 99284 EMERGENCY DEPT VISIT MOD MDM: CPT

## 2025-02-11 ASSESSMENT — LIFESTYLE VARIABLES: HOW OFTEN DO YOU HAVE A DRINK CONTAINING ALCOHOL: NEVER

## 2025-02-11 ASSESSMENT — PAIN SCALES - GENERAL
PAINLEVEL_OUTOF10: 10
PAINLEVEL_OUTOF10: 0

## 2025-02-11 ASSESSMENT — PAIN - FUNCTIONAL ASSESSMENT: PAIN_FUNCTIONAL_ASSESSMENT: 0-10

## 2025-02-11 NOTE — ED PROVIDER NOTES
.        [facility]  EMERGENCY DEPARTMENT ENCOUNTER        Pt Name: Jolanta Montenegro  MRN: [id number]  Birthdate 1945  Date of evaluation: 2/11/2025  Provider: Amy Hidalgo MD  PCP: Alyx Perez MD  Note Started: 4:35 PM EST 2/11/25    CHIEF COMPLAINT       Chief Complaint   Patient presents with    Rectal Bleeding    Fatigue    Nausea    Vomiting       HISTORY OF PRESENT ILLNESS: 1 or more Elements     History from : Patient    Limitations to history : None    Jolanta Montenegro is a 79 y.o. female who presents patient presents having noticed blood in her stool within the last 5 or 6 hours she complains that she is also had some weakness over the last 2 weeks and nausea she actually vomited yesterday but has not vomited today she was seen yesterday by her primary care..  She denies any abdominal pain her nausea gets worse after eating.  Nursing Notes were all reviewed and agreed with or any disagreements were addressed in the HPI.    REVIEW OF SYSTEMS :      Review of Systems        SURGICAL HISTORY     Past Surgical History:   Procedure Laterality Date    CARPAL TUNNEL RELEASE      CHOLECYSTECTOMY      COLONOSCOPY N/A 3/14/2024    COLORECTAL CANCER SCREENING, NOT HIGH RISK performed by Catrachito Otto MD at [facility] ENDOSCOPY    COLONOSCOPY N/A 3/28/2024    COLONOSCOPY POLYPECTOMY HOT BIOPSY performed by Catrachito Otto MD at [facility] ENDOSCOPY    FRACTURE SURGERY  1992/1996    t12    HEMORRHOID SURGERY      AK OPEN REPAIR OF ROTATOR CUFF ACUTE  11/06/2014    Rotator Cuff Repair    SHOULDER SURGERY      TOTAL THYROIDECTOMY  03/2015    UPPER GASTROINTESTINAL ENDOSCOPY N/A 3/14/2024    ESOPHAGOGASTRODUODENOSCOPY BIOPSY performed by Catrachito Otto MD at [facility] ENDOSCOPY    UPPER GASTROINTESTINAL ENDOSCOPY N/A 3/28/2024    ESOPHAGOGASTRODUODENOSCOPY BIOPSY performed by Catrachito Otto MD at [facility] ENDOSCOPY       CURRENTMEDICATIONS       Previous Medications    ASPIRIN 81 MG EC TABLET    Take 1

## 2025-02-11 NOTE — ED TRIAGE NOTES
Pt states she saw her pcp yesterday and was scheduled for an u/s today.  She did have that done just before she signed in for ER.  She states she called pcp office and advised that she had some rectal bleeding.  She was advised to be seen in the er.  Pt states that the rectal bleeding started this am and only happens when she has a bm.  Dr park did see blood in the commode from when pt used the restroom.  Pt also c/o feeling weak since she had surgery done 2 weeks ago.  She has also had some nausea and vomiting.  She did not vomit today but did vomit yesterday and has vomited about 5 times in the last 2 weeks.  She gil advise that she is taking eliquis.

## 2025-02-11 NOTE — ED NOTES
Pt requests to use the restroom prior to triage.  She was taken to restroom via w/c.  She is able to gt out of wheel chair without assistance.  She does walk at home using a cane.  She states she doesn't usually walk that far.

## 2025-02-12 NOTE — ED NOTES
Reviewed discharge plan with Jolanta Montenegro.  Encouraged her to f/u with Alxy Perez MD tomorrow and she understood.  NAD noted on discharge, gait steady.        Electronically signed by Matilde Dawn RN on 2/11/2025 at 7:25 PM

## 2025-02-13 ENCOUNTER — OFFICE VISIT (OUTPATIENT)
Dept: ONCOLOGY | Facility: CLINIC | Age: 80
End: 2025-02-13
Payer: MEDICARE

## 2025-02-13 ENCOUNTER — LAB (OUTPATIENT)
Dept: LAB | Facility: HOSPITAL | Age: 80
End: 2025-02-13
Payer: MEDICARE

## 2025-02-13 VITALS
TEMPERATURE: 97.3 F | SYSTOLIC BLOOD PRESSURE: 125 MMHG | RESPIRATION RATE: 16 BRPM | BODY MASS INDEX: 36.01 KG/M2 | HEIGHT: 60 IN | HEART RATE: 89 BPM | DIASTOLIC BLOOD PRESSURE: 57 MMHG | WEIGHT: 183.4 LBS | OXYGEN SATURATION: 96 %

## 2025-02-13 DIAGNOSIS — Z95.818 PRESENCE OF WATCHMAN LEFT ATRIAL APPENDAGE CLOSURE DEVICE: ICD-10-CM

## 2025-02-13 DIAGNOSIS — D50.8 OTHER IRON DEFICIENCY ANEMIA: ICD-10-CM

## 2025-02-13 DIAGNOSIS — I44.7 LBBB (LEFT BUNDLE BRANCH BLOCK): ICD-10-CM

## 2025-02-13 DIAGNOSIS — K90.9 MALABSORPTION OF IRON: ICD-10-CM

## 2025-02-13 DIAGNOSIS — D68.00 VON WILLEBRAND DISEASE: ICD-10-CM

## 2025-02-13 DIAGNOSIS — I50.20 HFREF (HEART FAILURE WITH REDUCED EJECTION FRACTION): ICD-10-CM

## 2025-02-13 DIAGNOSIS — I95.89 CHRONIC HYPOTENSION: ICD-10-CM

## 2025-02-13 DIAGNOSIS — I48.19 ATRIAL FIBRILLATION, PERSISTENT: ICD-10-CM

## 2025-02-13 DIAGNOSIS — D50.8 OTHER IRON DEFICIENCY ANEMIA: Primary | ICD-10-CM

## 2025-02-13 LAB
ANION GAP SERPL CALCULATED.3IONS-SCNC: 11.9 MMOL/L (ref 5–15)
BASOPHILS # BLD AUTO: 0.18 10*3/MM3 (ref 0–0.2)
BASOPHILS NFR BLD AUTO: 1.2 % (ref 0–1.5)
BUN SERPL-MCNC: 18 MG/DL (ref 8–23)
BUN/CREAT SERPL: 14.5 (ref 7–25)
CALCIUM SPEC-SCNC: 9 MG/DL (ref 8.6–10.5)
CHLORIDE SERPL-SCNC: 102 MMOL/L (ref 98–107)
CO2 SERPL-SCNC: 27.1 MMOL/L (ref 22–29)
CREAT SERPL-MCNC: 1.24 MG/DL (ref 0.57–1)
DEPRECATED RDW RBC AUTO: 58.8 FL (ref 37–54)
EGFRCR SERPLBLD CKD-EPI 2021: 44.9 ML/MIN/1.73
EOSINOPHIL # BLD AUTO: 0.77 10*3/MM3 (ref 0–0.4)
EOSINOPHIL NFR BLD AUTO: 5.2 % (ref 0.3–6.2)
ERYTHROCYTE [DISTWIDTH] IN BLOOD BY AUTOMATED COUNT: 16.7 % (ref 12.3–15.4)
FERRITIN SERPL-MCNC: 162.2 NG/ML (ref 13–150)
GLUCOSE SERPL-MCNC: 181 MG/DL (ref 65–99)
HCT VFR BLD AUTO: 31 % (ref 34–46.6)
HGB BLD-MCNC: 9.4 G/DL (ref 12–15.9)
IMM GRANULOCYTES # BLD AUTO: 0.16 10*3/MM3 (ref 0–0.05)
IMM GRANULOCYTES NFR BLD AUTO: 1.1 % (ref 0–0.5)
IRON 24H UR-MRATE: 68 MCG/DL (ref 37–145)
IRON SATN MFR SERPL: 16 % (ref 20–50)
LYMPHOCYTES # BLD AUTO: 2.85 10*3/MM3 (ref 0.7–3.1)
LYMPHOCYTES NFR BLD AUTO: 19.1 % (ref 19.6–45.3)
MCH RBC QN AUTO: 30.3 PG (ref 26.6–33)
MCHC RBC AUTO-ENTMCNC: 30.3 G/DL (ref 31.5–35.7)
MCV RBC AUTO: 100 FL (ref 79–97)
MONOCYTES # BLD AUTO: 0.71 10*3/MM3 (ref 0.1–0.9)
MONOCYTES NFR BLD AUTO: 4.7 % (ref 5–12)
NEUTROPHILS NFR BLD AUTO: 10.28 10*3/MM3 (ref 1.7–7)
NEUTROPHILS NFR BLD AUTO: 68.7 % (ref 42.7–76)
NRBC BLD AUTO-RTO: 0.3 /100 WBC (ref 0–0.2)
PLATELET # BLD AUTO: 219 10*3/MM3 (ref 140–450)
PMV BLD AUTO: 11.5 FL (ref 6–12)
POTASSIUM SERPL-SCNC: 4.6 MMOL/L (ref 3.5–5.2)
RBC # BLD AUTO: 3.1 10*6/MM3 (ref 3.77–5.28)
SODIUM SERPL-SCNC: 141 MMOL/L (ref 136–145)
TIBC SERPL-MCNC: 435 MCG/DL (ref 298–536)
TRANSFERRIN SERPL-MCNC: 292 MG/DL (ref 200–360)
WBC NRBC COR # BLD AUTO: 14.95 10*3/MM3 (ref 3.4–10.8)

## 2025-02-13 PROCEDURE — 36415 COLL VENOUS BLD VENIPUNCTURE: CPT

## 2025-02-13 PROCEDURE — 83540 ASSAY OF IRON: CPT

## 2025-02-13 PROCEDURE — 80048 BASIC METABOLIC PNL TOTAL CA: CPT

## 2025-02-13 PROCEDURE — 84466 ASSAY OF TRANSFERRIN: CPT

## 2025-02-13 PROCEDURE — 3078F DIAST BP <80 MM HG: CPT | Performed by: INTERNAL MEDICINE

## 2025-02-13 PROCEDURE — 1126F AMNT PAIN NOTED NONE PRSNT: CPT | Performed by: INTERNAL MEDICINE

## 2025-02-13 PROCEDURE — 3074F SYST BP LT 130 MM HG: CPT | Performed by: INTERNAL MEDICINE

## 2025-02-13 PROCEDURE — 85025 COMPLETE CBC W/AUTO DIFF WBC: CPT

## 2025-02-13 PROCEDURE — 99214 OFFICE O/P EST MOD 30 MIN: CPT | Performed by: INTERNAL MEDICINE

## 2025-02-13 PROCEDURE — 82728 ASSAY OF FERRITIN: CPT

## 2025-02-13 NOTE — PROGRESS NOTES
Follow Up Office Visit      Date: 2025     Patient Name: Regina Ramsay  MRN: [id number]  : 1945  Referring Physician: She Alarcon     Chief Complaint: Follow-up for type II von Willebrand's disease, iron deficiency anemia, leukocytosis     History of Present Illness: Regina Ramsay is a pleasant 78 y.o. female with a past medical history of A-fib, GERD, type 2 diabetes, vitamin B12 deficiency, anxiety, hyperlipidemia, hypothyroidism, hypertension, CAD who presents today for evaluation of leukocytosis and anemia. The patient is accompanied by their daughter who contributes to the history of their care.  Patient was recently hospitalized in 2024 for severe weakness and fatigue.  She was found to have a hemoglobin of around 7.  She underwent an EGD and colonoscopy which did not reveal any malignancy in the colon or stomach but did note some irritation in the gastric area.  She had her apixaban stopped and was placed on a PPI with some improvement of her symptoms.  Her daughter notes a diagnosis of von Willebrand's disease.  The patient states that she has never been tested for this but has noted easy bleeding throughout her life.  Had a hysterectomy at age 32.  In regards to her leukocytosis, this has been mild and predominantly neutrophilic.  Likely related to chronic inflammation as the patient has history of arthritis as well as recurrent infections.  Denies any explained fevers, chills, night sweats, weight loss.     Interval History:  Presents to clinic for follow-up.  Completed IV Feraheme in 2024.  Underwent a watchman's procedure at the end of 2025.  Has been having some dark stools since.  Has had some right lower extremity swelling as well.  Recent ultrasound without evidence of DVT but did note a small hematoma.  Has been feeling more fatigued and tired since her operation    Oncology History:    Oncology/Hematology History    No history exists.       Subjective       Review of Systems:   Constitutional: Negative for fevers, chills, or weight loss  Eyes: Negative for blurred vision or discharge         Ear/Nose/Throat: Negative for difficulty swallowing, sore throat, LAD                                                       Respiratory: Negative for cough, SOA, wheezing                                                                                        Cardiovascular: Negative for chest pain or palpitations                                                                  Gastrointestinal: Negative for nausea, vomiting or diarrhea                                                                     Genitourinary: Negative for dysuria or hematuria                                                                                           Musculoskeletal: Negative for any joint pains or muscle aches                                                                        Neurologic: Negative for any weakness, headaches, dizziness                                                                         Hematologic: Negative for any easy bleeding or bruising                                                                                   Psychiatric: Negative for anxiety or depression                          Past Medical History/Past Surgical History/ Family History/ Social History: Reviewed by me and unchanged from my previous documentation done on August 2024.     Medications:     Current Outpatient Medications:     aspirin 81 MG EC tablet, Take 1 tablet by mouth Daily., Disp: 90 tablet, Rfl: 3    atorvastatin (LIPITOR) 40 MG tablet, Take 1 tablet by mouth every night at bedtime., Disp: 90 tablet, Rfl: 3    citalopram (CeleXA) 10 MG tablet, Take 1 tablet by mouth Daily., Disp: , Rfl:     Cyanocobalamin (B-12 Compliance Injection) 1000 MCG/ML kit, Inject 1 mL as directed Every 14 (Fourteen) Days., Disp: , Rfl:     Diclofenac Sodium (VOLTAREN) 1 % gel gel, Apply 4 g topically to the  appropriate area as directed As Needed., Disp: , Rfl:     empagliflozin (JARDIANCE) 10 MG tablet tablet, Take 1 tablet by mouth Daily., Disp: , Rfl:     Esomeprazole Magnesium 20 MG tablet delayed-release, Take 40 mg by mouth Daily., Disp: , Rfl:     ferrous sulfate (FeroSul) 325 (65 FE) MG tablet, Take 1 tablet by mouth 2 (Two) Times a Day With Meals., Disp: , Rfl:     furosemide (LASIX) 20 MG tablet, Take 1 tablet by mouth Daily., Disp: , Rfl:     glipiZIDE (GLUCOTROL) 10 MG 24 hr tablet, Take 2 tablets by mouth Daily. Pt only takes one time per day (nightly), Disp: , Rfl:     levocetirizine (XYZAL) 5 MG tablet, Take 1 tablet by mouth Daily., Disp: , Rfl:     levothyroxine sodium (TIROSINT) 150 MCG capsule, Take 1 capsule by mouth Every Morning., Disp: , Rfl:     Magnesium Oxide -Mg Supplement 400 (240 Mg) MG tablet, Take 1 tablet by mouth Daily., Disp: , Rfl:     metoprolol succinate XL (TOPROL-XL) 25 MG 24 hr tablet, Take 1 tablet by mouth Daily., Disp: 60 tablet, Rfl: 5    midodrine (PROAMATINE) 5 MG tablet, Take 1 tablet by mouth 3 (Three) Times a Day Before Meals., Disp: 180 tablet, Rfl: 5    montelukast (SINGULAIR) 10 MG tablet, Take 1 tablet by mouth Every Night., Disp: , Rfl:     oxyCODONE (ROXICODONE) 15 MG immediate release tablet, Take 1 tablet by mouth Every 6 (Six) Hours As Needed for Moderate Pain., Disp: , Rfl:     Ozempic, 0.25 or 0.5 MG/DOSE, 2 MG/3ML solution pen-injector, Inject 0.25 mg under the skin into the appropriate area as directed 1 (One) Time Per Week. Patient instructed to hold prior to procedure. Last dose 1/18/25, Disp: , Rfl:     pantoprazole (PROTONIX) 40 MG EC tablet, Take 1 tablet by mouth Daily., Disp: , Rfl:     rivaroxaban (XARELTO) 20 MG tablet, Take 1 tablet by mouth Daily With Dinner., Disp: 90 tablet, Rfl: 1    vitamin D (ERGOCALCIFEROL) 1.25 MG (88380 UT) capsule capsule, Take 1 capsule by mouth 1 (One) Time Per Week., Disp: , Rfl:     Allergies:   Allergies   Allergen  "Reactions    Pregabalin Swelling and Rash     Lip swelling, blisters    Allopurinol Hives    Gabapentin Hives    Ibuprofen Urinary Retention    Metformin And Related Other (See Comments)     Yeast infection, diarrhea    Morphine Hives and Nausea Only    Topiramate Other (See Comments)     Makes her feel drunk.       Objective     Physical Exam:  Vital Signs:   Vitals:    02/13/25 1321   BP: 125/57   Pulse: 89   Resp: 16   Temp: 97.3 °F (36.3 °C)   SpO2: 96%   Weight: 83.2 kg (183 lb 6.4 oz)   Height: 152.4 cm (60\")   PainSc: 0-No pain     Pain Score    02/13/25 1321   PainSc: 0-No pain     ECOG Performance Status: 2 - Symptomatic, <50% confined to bed    Constitutional: NAD, ECOG 2  Eyes: PERRLA, scleral anicteric  ENT: No LAD, no thyromegaly  Respiratory: CTAB, no wheezing, rales, rhonchi  Cardiovascular: RRR, no murmurs, pulses 2+ bilaterally  Abdomen: soft, NT/ND, no HSM  Musculoskeletal: strength 5/5 bilaterally, no c/c/e  Neurologic: A&O x 3, CN II-XII intact grossly    Results Review:   Admission on 01/29/2025, Discharged on 01/31/2025   Component Date Value Ref Range Status    Activated Clotting Time  01/29/2025 538 (H)  82 - 152 Seconds Final    Serial Number: [id number]:  [id number]    Glucose 01/29/2025 151 (H)  70 - 130 mg/dL Final    WBC 01/29/2025 13.16 (H)  3.40 - 10.80 10*3/mm3 Final    RBC 01/29/2025 3.43 (L)  3.77 - 5.28 10*6/mm3 Final    Hemoglobin 01/29/2025 10.1 (L)  12.0 - 15.9 g/dL Final    Hematocrit 01/29/2025 32.7 (L)  34.0 - 46.6 % Final    MCV 01/29/2025 95.3  79.0 - 97.0 fL Final    MCH 01/29/2025 29.4  26.6 - 33.0 pg Final    MCHC 01/29/2025 30.9 (L)  31.5 - 35.7 g/dL Final    RDW 01/29/2025 13.9  12.3 - 15.4 % Final    RDW-SD 01/29/2025 48.4  37.0 - 54.0 fl Final    MPV 01/29/2025 10.8  6.0 - 12.0 fL Final    Platelets 01/29/2025 148  140 - 450 10*3/mm3 Final    Glucose 01/29/2025 238 (H)  65 - 99 mg/dL Final    BUN 01/29/2025 28 (H)  8 - 23 mg/dL Final    Creatinine 01/29/2025 1.40 (H) "  0.57 - 1.00 mg/dL Final    Sodium 01/29/2025 137  136 - 145 mmol/L Final    Potassium 01/29/2025 4.8  3.5 - 5.2 mmol/L Final    Chloride 01/29/2025 102  98 - 107 mmol/L Final    CO2 01/29/2025 24.0  22.0 - 29.0 mmol/L Final    Calcium 01/29/2025 8.5 (L)  8.6 - 10.5 mg/dL Final    Total Protein 01/29/2025 5.3 (L)  6.0 - 8.5 g/dL Final    Albumin 01/29/2025 2.9 (L)  3.5 - 5.2 g/dL Final    ALT (SGPT) 01/29/2025 9  1 - 33 U/L Final    AST (SGOT) 01/29/2025 12  1 - 32 U/L Final    Alkaline Phosphatase 01/29/2025 109  39 - 117 U/L Final    Total Bilirubin 01/29/2025 0.2  0.0 - 1.2 mg/dL Final    Globulin 01/29/2025 2.4  gm/dL Final    Calculated Result    A/G Ratio 01/29/2025 1.2  g/dL Final    BUN/Creatinine Ratio 01/29/2025 20.0  7.0 - 25.0 Final    Anion Gap 01/29/2025 11.0  5.0 - 15.0 mmol/L Final    eGFR 01/29/2025 38.8 (L)  >60.0 mL/min/1.73 Final    QT Interval 01/30/2025 404  ms Final    QTC Interval 01/30/2025 494  ms Final    WBC 01/30/2025 13.00 (H)  3.40 - 10.80 10*3/mm3 Final    RBC 01/30/2025 2.99 (L)  3.77 - 5.28 10*6/mm3 Final    Hemoglobin 01/30/2025 9.0 (L)  12.0 - 15.9 g/dL Final    Hematocrit 01/30/2025 28.5 (L)  34.0 - 46.6 % Final    MCV 01/30/2025 95.3  79.0 - 97.0 fL Final    MCH 01/30/2025 30.1  26.6 - 33.0 pg Final    MCHC 01/30/2025 31.6  31.5 - 35.7 g/dL Final    RDW 01/30/2025 14.1  12.3 - 15.4 % Final    RDW-SD 01/30/2025 48.9  37.0 - 54.0 fl Final    MPV 01/30/2025 10.9  6.0 - 12.0 fL Final    Platelets 01/30/2025 138 (L)  140 - 450 10*3/mm3 Final    Glucose 01/30/2025 227 (H)  65 - 99 mg/dL Final    BUN 01/30/2025 29 (H)  8 - 23 mg/dL Final    Creatinine 01/30/2025 1.52 (H)  0.57 - 1.00 mg/dL Final    Sodium 01/30/2025 135 (L)  136 - 145 mmol/L Final    Potassium 01/30/2025 4.5  3.5 - 5.2 mmol/L Final    Chloride 01/30/2025 103  98 - 107 mmol/L Final    CO2 01/30/2025 24.0  22.0 - 29.0 mmol/L Final    Calcium 01/30/2025 8.0 (L)  8.6 - 10.5 mg/dL Final    Total Protein 01/30/2025 4.6 (L)   6.0 - 8.5 g/dL Final    Albumin 01/30/2025 2.7 (L)  3.5 - 5.2 g/dL Final    ALT (SGPT) 01/30/2025 6  1 - 33 U/L Final    AST (SGOT) 01/30/2025 10  1 - 32 U/L Final    Alkaline Phosphatase 01/30/2025 95  39 - 117 U/L Final    Total Bilirubin 01/30/2025 0.2  0.0 - 1.2 mg/dL Final    Globulin 01/30/2025 1.9  gm/dL Final    Calculated Result    A/G Ratio 01/30/2025 1.4  g/dL Final    BUN/Creatinine Ratio 01/30/2025 19.1  7.0 - 25.0 Final    Anion Gap 01/30/2025 8.0  5.0 - 15.0 mmol/L Final    eGFR 01/30/2025 35.2 (L)  >60.0 mL/min/1.73 Final    Lactate 01/30/2025 1.4  0.5 - 2.0 mmol/L Final    Falsely depressed results may occur on samples drawn from patients receiving N-Acetylcysteine (NAC) or Metamizole.    HS Troponin T 01/30/2025 121 (C)  <14 ng/L Final    proBNP 01/30/2025 802.5  0.0 - 1,800.0 pg/mL Final    Glucose 01/30/2025 237 (H)  70 - 130 mg/dL Final    EF(MOD-bp) 01/30/2025 49.2  % Final    LVIDd 01/30/2025 4.6  cm Final    LVIDs 01/30/2025 3.2  cm Final    IVSd 01/30/2025 1.20  cm Final    LVPWd 01/30/2025 1.20  cm Final    FS 01/30/2025 30.4  % Final    IVS/LVPW 01/30/2025 1.00  cm Final    ESV(cubed) 01/30/2025 32.8  ml Final    EDV(cubed) 01/30/2025 97.3  ml Final    LV mass(C)d 01/30/2025 205.0  grams Final    LVOT area 01/30/2025 3.1  cm2 Final    LVOT diam 01/30/2025 2.00  cm Final    EDV(MOD-sp2) 01/30/2025 89.9  ml Final    EDV(MOD-sp4) 01/30/2025 95.5  ml Final    ESV(MOD-sp2) 01/30/2025 46.0  ml Final    ESV(MOD-sp4) 01/30/2025 47.5  ml Final    SV(MOD-sp2) 01/30/2025 43.9  ml Final    SV(MOD-sp4) 01/30/2025 48.0  ml Final    EF(MOD-sp2) 01/30/2025 48.8  % Final    EF(MOD-sp4) 01/30/2025 50.3  % Final    MV E max lukas 01/30/2025 143.0  cm/sec Final    LA ESV Index (BP) 01/30/2025 47.6  ml/m2 Final    Med Peak E' Lukas 01/30/2025 6.9  cm/sec Final    Lat Peak E' Lukas 01/30/2025 10.9  cm/sec Final    TR max lukas 01/30/2025 298.0  cm/sec Final    Avg E/e' ratio 01/30/2025 16.07   Final    SV(LVOT)  01/30/2025 51.8  ml Final    RV Base 01/30/2025 4.0  cm Final    RV Mid 01/30/2025 2.6  cm Final    RV Length 01/30/2025 7.6  cm Final    TAPSE (>1.6) 01/30/2025 1.83  cm Final    RV S' 01/30/2025 11.2  cm/sec Final    LA dimension (2D)  01/30/2025 4.9  cm Final    LV V1 max 01/30/2025 91.1  cm/sec Final    LV V1 max PG 01/30/2025 3.3  mmHg Final    LV V1 mean PG 01/30/2025 2.00  mmHg Final    LV V1 VTI 01/30/2025 16.5  cm Final    Ao pk daniele 01/30/2025 190.3  cm/sec Final    Ao max PG 01/30/2025 14.5  mmHg Final    Ao mean PG 01/30/2025 8.3  mmHg Final    Ao V2 VTI 01/30/2025 34.0  cm Final    MATTHEW(I,D) 01/30/2025 1.53  cm2 Final    MV max PG 01/30/2025 6.6  mmHg Final    MV mean PG 01/30/2025 4.4  mmHg Final    MV V2 VTI 01/30/2025 31.0  cm Final    MVA(VTI) 01/30/2025 1.67  cm2 Final    TR max PG 01/30/2025 35.5  mmHg Final    PA acc time 01/30/2025 0.07  sec Final    Ao root diam 01/30/2025 3.4  cm Final    Ascending aorta 01/30/2025 3.5  cm Final    RVSP(TR) 01/30/2025 39  mmHg Final    RAP systole 01/30/2025 3  mmHg Final    Blood Culture 01/30/2025 No growth at 5 days   Final    Blood Culture 01/30/2025 No growth at 5 days   Final    Color, UA 01/30/2025 Yellow  Yellow, Straw Final    Appearance, UA 01/30/2025 Clear  Clear Final    pH, UA 01/30/2025 5.5  5.0 - 8.0 Final    Specific Gravity, UA 01/30/2025 1.018  1.001 - 1.030 Final    Glucose, UA 01/30/2025 >=1000 mg/dL (3+) (A)  Negative Final    Ketones, UA 01/30/2025 Negative  Negative Final    Bilirubin, UA 01/30/2025 Negative  Negative Final    Blood, UA 01/30/2025 Negative  Negative Final    Protein, UA 01/30/2025 Negative  Negative Final    Leuk Esterase, UA 01/30/2025 Negative  Negative Final    Nitrite, UA 01/30/2025 Negative  Negative Final    Urobilinogen, UA 01/30/2025 0.2 E.U./dL  0.2 - 1.0 E.U./dL Final    ADENOVIRUS, PCR 01/30/2025 Not Detected  Not Detected Final    Coronavirus 229E 01/30/2025 Not Detected  Not Detected Final    Coronavirus HKU1  01/30/2025 Not Detected  Not Detected Final    Coronavirus NL63 01/30/2025 Not Detected  Not Detected Final    Coronavirus OC43 01/30/2025 Not Detected  Not Detected Final    COVID19 01/30/2025 Not Detected  Not Detected - Ref. Range Final    Human Metapneumovirus 01/30/2025 Not Detected  Not Detected Final    Human Rhinovirus/Enterovirus 01/30/2025 Not Detected  Not Detected Final    Influenza A PCR 01/30/2025 Not Detected  Not Detected Final    Influenza B PCR 01/30/2025 Not Detected  Not Detected Final    Parainfluenza Virus 1 01/30/2025 Not Detected  Not Detected Final    Parainfluenza Virus 2 01/30/2025 Not Detected  Not Detected Final    Parainfluenza Virus 3 01/30/2025 Not Detected  Not Detected Final    Parainfluenza Virus 4 01/30/2025 Not Detected  Not Detected Final    RSV, PCR 01/30/2025 Not Detected  Not Detected Final    Bordetella pertussis pcr 01/30/2025 Not Detected  Not Detected Final    Bordetella parapertussis PCR 01/30/2025 Not Detected  Not Detected Final    Chlamydophila pneumoniae PCR 01/30/2025 Not Detected  Not Detected Final    Mycoplasma pneumo by PCR 01/30/2025 Not Detected  Not Detected Final    MRSA PCR 01/30/2025 Negative  Negative Final    Hemoglobin 01/30/2025 10.0 (L)  12.0 - 15.9 g/dL Final    Hematocrit 01/30/2025 31.4 (L)  34.0 - 46.6 % Final    Hemoglobin 01/30/2025 8.7 (L)  12.0 - 15.9 g/dL Final    Hematocrit 01/30/2025 27.9 (L)  34.0 - 46.6 % Final    HS Troponin T 01/30/2025 119 (C)  <14 ng/L Final    Procalcitonin 01/30/2025 0.78 (H)  0.00 - 0.25 ng/mL Final    C-Reactive Protein 01/30/2025 3.63 (H)  0.00 - 0.50 mg/dL Final    Glucose 01/30/2025 176 (H)  70 - 130 mg/dL Final    HS Troponin T 01/30/2025 115 (C)  <14 ng/L Final    Troponin T Numeric Delta 01/30/2025 -4  ng/L Final    Troponin T % Delta 01/30/2025 -3  Abnormal if >/= 20% Final    Hemoglobin 01/30/2025 9.0 (L)  12.0 - 15.9 g/dL Final    Hematocrit 01/30/2025 29.1 (L)  34.0 - 46.6 % Final    Glucose 01/30/2025  117  70 - 130 mg/dL Final    Glucose 01/30/2025 161 (H)  70 - 130 mg/dL Final    WBC 01/31/2025 7.93  3.40 - 10.80 10*3/mm3 Final    RBC 01/31/2025 2.95 (L)  3.77 - 5.28 10*6/mm3 Final    Hemoglobin 01/31/2025 8.9 (L)  12.0 - 15.9 g/dL Final    Hematocrit 01/31/2025 28.3 (L)  34.0 - 46.6 % Final    MCV 01/31/2025 95.9  79.0 - 97.0 fL Final    MCH 01/31/2025 30.2  26.6 - 33.0 pg Final    MCHC 01/31/2025 31.4 (L)  31.5 - 35.7 g/dL Final    RDW 01/31/2025 13.8  12.3 - 15.4 % Final    RDW-SD 01/31/2025 48.2  37.0 - 54.0 fl Final    MPV 01/31/2025 11.1  6.0 - 12.0 fL Final    Platelets 01/31/2025 125 (L)  140 - 450 10*3/mm3 Final    Glucose 01/31/2025 108 (H)  65 - 99 mg/dL Final    BUN 01/31/2025 24 (H)  8 - 23 mg/dL Final    Creatinine 01/31/2025 1.06 (H)  0.57 - 1.00 mg/dL Final    Sodium 01/31/2025 137  136 - 145 mmol/L Final    Potassium 01/31/2025 4.2  3.5 - 5.2 mmol/L Final    Chloride 01/31/2025 107  98 - 107 mmol/L Final    CO2 01/31/2025 24.0  22.0 - 29.0 mmol/L Final    Calcium 01/31/2025 8.5 (L)  8.6 - 10.5 mg/dL Final    BUN/Creatinine Ratio 01/31/2025 22.6  7.0 - 25.0 Final    Anion Gap 01/31/2025 6.0  5.0 - 15.0 mmol/L Final    eGFR 01/31/2025 54.2 (L)  >60.0 mL/min/1.73 Final    Magnesium 01/31/2025 2.0  1.6 - 2.4 mg/dL Final    Phosphorus 01/31/2025 3.1  2.5 - 4.5 mg/dL Final    Glucose 01/31/2025 108  70 - 130 mg/dL Final    Glucose 01/31/2025 219 (H)  70 - 130 mg/dL Final       Vascular duplex lower extremity venous right    Result Date: 2/11/2025  Narrative: EXAM: RIGHT LOWER EXTREMITY NONINVASIVE DUPLEX DOPPLER INDICATION: Lower extremity swelling COMPARISON: None TECHNICAL FACTORS: Grayscale real-time imaging with spectral analysis and color flow. FINDINGS: Limitations: Technical satisfactory exam. RIGHT LOWER EXTREMITY: *  Common femoral vein and femoral vein: No filling defects.  Normal compressibility.  Normal respiratory variation.   *  Popliteal vein: No filling defects.  Normal  compressibility.  Normal respiratory variation. *  Infrapopliteal veins: No filling defects.  Normal compressibility.  Normal respiratory variation. Ultrasonography of the right inguinal region and area of palpable abnormality demonstrates a 8 x 8 x 5 mm mixed echogenicity masslike lesion, likely a small hematoma from recent angiographic access.    Impression: 1. No evidence of deep venous thrombosis. Electronically signed by Eliezer Pedroza MD    STAT Adult Transthoracic Echo Complete W/ Cont if Necessary Per Protocol    Result Date: 1/30/2025  Narrative:   Left ventricular systolic function is mildly decreased. Calculated left ventricular EF = 49.2% Left ventricular ejection fraction appears to be 46 - 50%.   Left ventricular wall thickness is consistent with mild concentric hypertrophy.   Septal wall motion is abnormal, consistent with a bundle branch block.   The following left ventricular wall segments are hypokinetic: mid inferior and basal inferior.   Left ventricular diastolic function was indeterminate.   The left atrial cavity is mild to moderately dilated.   Estimated right ventricular systolic pressure from tricuspid regurgitation is mildly elevated (35-45 mmHg). Calculated right ventricular systolic pressure from tricuspid regurgitation is 39 mmHg.     CT Abdomen Pelvis Without Contrast    Result Date: 1/30/2025  Narrative: CT ABDOMEN PELVIS WO CONTRAST Date of Exam: 1/30/2025 2:52 AM EST Indication: RP bleed eval. Comparison: None available. Technique: Axial CT images were obtained of the abdomen and pelvis without the administration of contrast. Reconstructed coronal and sagittal images were also obtained. Automated exposure control and iterative construction methods were used. Findings: Lung Bases:   There is bilateral basilar atelectasis. The heart is enlarged. There is calcific atherosclerosis of the coronary arteries. There is aortic valvular and mitral annular calcification. Liver: Liver is normal  in size and CT density. No focal lesions. Biliary/Gallbladder:  The gallbladder is surgically absent. The biliary tree is nondilated. Spleen: Spleen is normal in size and CT density. Pancreas:  Pancreas is normal. There is no evidence of pancreatic mass or peripancreatic fluid. Kidneys:  There is a 2 mm nonobstructing stone of the posterior lower pole right kidney. There is no hydronephrosis of the right or left kidney. Adrenals:  Adrenal glands are unremarkable. Retroperitoneal/Lymph Nodes/Vasculature:  No retroperitoneal adenopathy is identified. Gastrointestinal/Mesentery:  The bowel loops are non-dilated without wall thickening or mass. The appendix appears within normal limits. No evidence of obstruction. No free air. No mesenteric fluid collections identified. Bladder:  The bladder is normal. Genital:   Unremarkable       Bony Structures/soft tissues:   Visualized bony structures are consistent with the patient's age. There is some mild soft tissue stranding in the right groin with a few prominent adjacent lymph nodes. This could be related to infection or recent instrumentation.     Impression: Impression: 1.No acute abdominal or pelvic abnormality. 2.Mild soft tissue stranding in the right groin with a few prominent lymph nodes. This could be related to infection or recent instrumentation. Electronically Signed: James Jefferson MD  1/30/2025 3:13 AM EST  Workstation ID: [id number]    CT Chest Without Contrast Diagnostic    Result Date: 1/30/2025  Narrative: CT CHEST WO CONTRAST DIAGNOSTIC Date of Exam: 1/30/2025 2:52 AM EST Indication: Cardiogenic shock. Comparison: CT chest August 30, 2024 Technique: Axial CT images were obtained of the chest without contrast administration.  Reconstructed coronal and sagittal images were also obtained. Automated exposure control and iterative construction methods were used. Findings: Hilum and Mediastinum: The heart is of normal size. There is no pathologic adenopathy. There is  no pericardial effusion. There is calcific atherosclerosis of the coronary arteries. There is mild aortic valvular calcification. There is a watchman device in the right atrium. Lung Parenchyma and Pleura: There is mild bilateral basilar atelectasis. There are no focal consolidations indicate pneumonia. There are some scattered calcified granulomata. There are no soft tissue pulmonary nodules. Upper Abdomen: Status post cholecystectomy. Soft tissues: Unremarkable. Osseous structures: No aggressive focal lytic or sclerotic osseous lesions.     Impression: Impression: Mild bilateral basilar atelectasis. No active disease. Electronically Signed: James Jefferson MD  1/30/2025 3:06 AM EST  Workstation ID: [id number]    XR Chest 1 View    Result Date: 1/30/2025  Narrative: XR CHEST 1 VW Date of Exam: 1/30/2025 2:26 AM EST Indication: low bp, Comparison: Chest radiograph 5/12/2022 Findings: The heart is enlarged. The pulmonary vascular markings are normal. The lungs are clear. There are postoperative changes from left shoulder arthroplasty.     Impression: Impression: Cardiomegaly. No active disease. Electronically Signed: James Jefferson MD  1/30/2025 2:56 AM EST  Workstation ID: [id number]     Assessment / Plan      Assessment/Plan:   1.  Type II von Willebrand's disease  -Noted on von Willebrand studies in April 2024  -Would differentiate subtype A and B with the RIPA test however this is only completed in [place] and she does not have the capabilities to get there for testing  -Should patient have any further bleeding episodes would consider von Willebrand concentrate to assist with hemostasis  -Would likely prevent it from von Willebrand concentrate or bypassing agent prior to any further major surgical interventions     2. Other elevated white blood cell (WBC) count (Primary)  -Secondary to chronic inflammation  -Peripheral smear with no immature cells or blast  -Flow cytometry with no significant immunophenotypic  abnormalities  -Elevated CRP and ESR  -No further hematologic workup required     3.  Iron deficient anemia/4.  Malabsorption of iron  -Anemia workup in April 2024 only notable for iron deficiency anemia with a significantly decreased iron and transferrin saturation level  -Ferritin within normal limits but likely falsely elevated in the setting of chronic inflammation  - Intolerant of oral iron due to no improvement of her symptoms or labs  -Completed IV Feraheme in June 2024  -Hemoglobin significantly proved in August 2024.  Iron studies stable  -Hemoglobin decreasing in February 2025.  Iron studies pending.  Will consider further IV iron based off results  -Last EGD and colonoscopy in March 2024    5.  Atrial fibrillation  -Status post watchman's procedure in January 2025  -Advised patient to discuss with her cardiologist about her baby aspirin and Xarelto use       Follow Up:   Follow-up in 3 months     Jose Antonio Damon MD  Hematology and Oncology     Please note that portions of this note may have been completed with a voice recognition program. Efforts were made to edit the dictations, but occasionally words are mistranscribed.

## 2025-02-14 ENCOUNTER — TELEPHONE (OUTPATIENT)
Dept: ONCOLOGY | Facility: CLINIC | Age: 80
End: 2025-02-14
Payer: MEDICARE

## 2025-02-14 DIAGNOSIS — K90.9 MALABSORPTION OF IRON: Primary | ICD-10-CM

## 2025-02-14 DIAGNOSIS — D50.8 OTHER IRON DEFICIENCY ANEMIA: ICD-10-CM

## 2025-02-14 RX ORDER — SODIUM CHLORIDE 9 MG/ML
20 INJECTION, SOLUTION INTRAVENOUS ONCE
OUTPATIENT
Start: 2025-02-27

## 2025-02-14 RX ORDER — FAMOTIDINE 10 MG/ML
20 INJECTION, SOLUTION INTRAVENOUS AS NEEDED
OUTPATIENT
Start: 2025-02-27

## 2025-02-14 RX ORDER — DIPHENHYDRAMINE HYDROCHLORIDE 50 MG/ML
50 INJECTION INTRAMUSCULAR; INTRAVENOUS AS NEEDED
OUTPATIENT
Start: 2025-02-20

## 2025-02-14 RX ORDER — FAMOTIDINE 10 MG/ML
20 INJECTION, SOLUTION INTRAVENOUS AS NEEDED
OUTPATIENT
Start: 2025-02-20

## 2025-02-14 RX ORDER — SODIUM CHLORIDE 9 MG/ML
20 INJECTION, SOLUTION INTRAVENOUS ONCE
OUTPATIENT
Start: 2025-02-20

## 2025-02-14 RX ORDER — HYDROCORTISONE SODIUM SUCCINATE 100 MG/2ML
100 INJECTION INTRAMUSCULAR; INTRAVENOUS AS NEEDED
OUTPATIENT
Start: 2025-02-20

## 2025-02-14 RX ORDER — HYDROCORTISONE SODIUM SUCCINATE 100 MG/2ML
100 INJECTION INTRAMUSCULAR; INTRAVENOUS AS NEEDED
OUTPATIENT
Start: 2025-02-27

## 2025-02-14 RX ORDER — DIPHENHYDRAMINE HYDROCHLORIDE 50 MG/ML
50 INJECTION INTRAMUSCULAR; INTRAVENOUS AS NEEDED
OUTPATIENT
Start: 2025-02-27

## 2025-02-14 NOTE — TELEPHONE ENCOUNTER
Attempted to call Regina to notify she will need iron infusions.  Have sent notification to scheduling to contact.  No answer, unable to leave message

## 2025-02-18 ENCOUNTER — TELEPHONE (OUTPATIENT)
Dept: ONCOLOGY | Facility: CLINIC | Age: 80
End: 2025-02-18
Payer: MEDICARE

## 2025-02-18 ENCOUNTER — READMISSION MANAGEMENT (OUTPATIENT)
Dept: CALL CENTER | Facility: HOSPITAL | Age: 80
End: 2025-02-18
Payer: MEDICARE

## 2025-02-18 NOTE — OUTREACH NOTE
General Surgery Week 3 Survey      Flowsheet Row Responses   [facility] patient discharged from? [facility]   Does the patient have one of the following disease processes/diagnoses(primary or secondary)? General Surgery   Week 3 attempt successful? No   Call start time 1605   Unsuccessful attempts Attempt 1            Daisy LUX - Registered Nurse

## 2025-02-21 ENCOUNTER — OFFICE VISIT (OUTPATIENT)
Dept: CARDIOLOGY | Facility: CLINIC | Age: 80
End: 2025-02-21
Payer: MEDICARE

## 2025-02-21 VITALS
SYSTOLIC BLOOD PRESSURE: 138 MMHG | HEART RATE: 49 BPM | HEIGHT: 60 IN | WEIGHT: 179 LBS | BODY MASS INDEX: 35.14 KG/M2 | OXYGEN SATURATION: 88 % | DIASTOLIC BLOOD PRESSURE: 84 MMHG

## 2025-02-21 DIAGNOSIS — E78.2 MIXED HYPERLIPIDEMIA: ICD-10-CM

## 2025-02-21 DIAGNOSIS — I50.20 HFREF (HEART FAILURE WITH REDUCED EJECTION FRACTION): ICD-10-CM

## 2025-02-21 DIAGNOSIS — I10 ESSENTIAL HYPERTENSION: ICD-10-CM

## 2025-02-21 DIAGNOSIS — I48.0 PAROXYSMAL ATRIAL FIBRILLATION: ICD-10-CM

## 2025-02-21 DIAGNOSIS — I25.10 CORONARY ARTERY DISEASE INVOLVING NATIVE CORONARY ARTERY OF NATIVE HEART WITHOUT ANGINA PECTORIS: Primary | ICD-10-CM

## 2025-02-21 RX ORDER — EMPAGLIFLOZIN 10 MG/1
10 TABLET, FILM COATED ORAL DAILY
Qty: 30 TABLET | Refills: 3 | Status: SHIPPED | OUTPATIENT
Start: 2025-02-21

## 2025-02-21 RX ORDER — SEMAGLUTIDE 0.68 MG/ML
INJECTION, SOLUTION SUBCUTANEOUS
Qty: 3 ML | Refills: 1 | Status: SHIPPED | OUTPATIENT
Start: 2025-02-21

## 2025-02-21 RX ORDER — CITALOPRAM HYDROBROMIDE 10 MG/1
10 TABLET ORAL DAILY
Qty: 30 TABLET | Refills: 2 | Status: SHIPPED | OUTPATIENT
Start: 2025-02-21

## 2025-02-21 NOTE — PROGRESS NOTES
[facility]   [address], [address]  [place], [place], [address] [phone]  [facility]           OUTPATIENT CLINIC FOLLOW UP NOTE    Patient Care Team:  Patient Care Team:  She Alarcon MD as PCP - General (Internal Medicine)  Inocencio Cates DO as Consulting Physician (Cardiology)    Subjective:      Chief Complaint   Patient presents with    Atrial fibrillation, persistent    Weakness - Generalized         Regina Ramsay is a 79 y.o. female.  Cardiac focused, problem list:  CAD  [facility] 10/31/2023:  Severe small vessel disease involving the distal LAD, which is too small for catheter-based intervention. Intermediate to severe disease in a small RPDA.  Paroxysmal atrial fibrillation  CHADsVASc score at least 6  Diagnosed 9/2023  Status post EPS with catheter ablation of PAF and typical right atrial flutter, 9/05/2024, Dr. Cates  Status post AVIS closure with 27 mm Watchman FLX Pro device implant, 1/29/2025, Dr. Cates  Acute systolic heart failure  Echocardiogram 9/24/2023:  LVEF 35-40%. Mild LVH. Indeterminate diastolic function.   Echocardiogram 10/31/2023:  LVEF 45%. Septal wall motion is abnormal, consistent with bundle branch block. Mild LVH. Grade Ia diastolic dysfunction. Aortic valve exhibits sclerosis. Mild mitral annular calcification.   Echocardiogram 1/30/2025:  LVEF 46-50%. Mild concentric LVH. Septal wall motion is abnormal, consistent with BBB. Indeterminate LV diastolic function. Moderately dilated LA. RVSP 35-45 mmHg  Abnormal perioperative EKG  Tachycardia, LVH with repolarization abnormality/strain, possible prior infarct pattern  Exertional dyspnea  Initial cardiac consultation 6/4  Stress test 7/22/2021:  Normal myocardial perfusion study with no evidence of ischemia. Low risk study.   Left bundle branch block  New on EKG 5/12/2022  PACs  Asymptomatic  PAD  Small vessel disease below the knee, arterial ultrasound 10/2023  Iron deficiency  anemia  Type II von Willebrand's disease   Noted on von Willebrand studies, 4/2024  Osteoarthritis  Affecting multiple joints  Trauma to left shoulder status post surgical repair in [place] October 2019  Prior adverse reaction to anesthesia  Patient reports asystole, data deficit  Tachycardia  Hypertension  Diabetes mellitus  Hypothyroidism  Obstructive sleep apnea  Hiatal hernia  Umbilical hernia  Anxiety    HPI:    Patient presents today for follow up.  She underwent Watchman device implant with Dr. Cates on 1/29/2025. She became hemodynamically unstable after the procedure and was transferred to ICU for further management, requiring Levophed for hypotension. She was started on midodrine and weaned off pressors.  Was able to be discharged on 1/31/2025.      Patient reports she did have some dark stools last week and was evaluated in the ED at [facility].  Has had follow up with Dr. Damon for anemia, von Willebrand's disease.  H&H stable on labs 2/13/2025.      Patient had increased LE edema that began after her hospital admission for Watchman implant.  Has been taking Lasix 20 mg daily which has improved her edema.  Renal function stable on labs 2/13/2025.  She denies chest pain, shortness of breath or palpitations.     Review of Systems:  As noted above in the HPI     PFSH:  Patient Active Problem List   Diagnosis    Essential hypertension    Diabetes mellitus    Asthma    Obesity (BMI 30-39.9)    GERD (gastroesophageal reflux disease)    Paroxysmal atrial fibrillation    LBBB (left bundle branch block)    Coronary artery disease involving native coronary artery    Mixed hyperlipidemia    Anemia    Von Willebrand disease    Iron deficiency anemia    Malabsorption of iron    HFrEF (heart failure with reduced ejection fraction)    Persistent atrial fibrillation    Presence of Watchman left atrial appendage closure device         Current Outpatient Medications:     aspirin 81 MG EC tablet, Take 1 tablet  by mouth Daily., Disp: 90 tablet, Rfl: 3    atorvastatin (LIPITOR) 40 MG tablet, Take 1 tablet by mouth every night at bedtime., Disp: 90 tablet, Rfl: 3    citalopram (CeleXA) 10 MG tablet, Take 1 tablet by mouth Daily., Disp: , Rfl:     Cyanocobalamin (B-12 Compliance Injection) 1000 MCG/ML kit, Inject 1 mL as directed Every 14 (Fourteen) Days., Disp: , Rfl:     Diclofenac Sodium (VOLTAREN) 1 % gel gel, Apply 4 g topically to the appropriate area as directed As Needed., Disp: , Rfl:     empagliflozin (JARDIANCE) 10 MG tablet tablet, Take 1 tablet by mouth Daily., Disp: , Rfl:     Esomeprazole Magnesium 20 MG tablet delayed-release, Take 40 mg by mouth Daily., Disp: , Rfl:     ferrous sulfate (FeroSul) 325 (65 FE) MG tablet, Take 1 tablet by mouth 2 (Two) Times a Day With Meals., Disp: , Rfl:     furosemide (LASIX) 20 MG tablet, Take 1 tablet by mouth Daily., Disp: , Rfl:     glipiZIDE (GLUCOTROL) 10 MG 24 hr tablet, Take 2 tablets by mouth Daily. Pt only takes one time per day (nightly), Disp: , Rfl:     levocetirizine (XYZAL) 5 MG tablet, Take 1 tablet by mouth Daily., Disp: , Rfl:     levothyroxine sodium (TIROSINT) 150 MCG capsule, Take 1 capsule by mouth Every Morning., Disp: , Rfl:     Magnesium Oxide -Mg Supplement 400 (240 Mg) MG tablet, Take 1 tablet by mouth Daily., Disp: , Rfl:     metoprolol succinate XL (TOPROL-XL) 25 MG 24 hr tablet, Take 1 tablet by mouth Daily., Disp: 60 tablet, Rfl: 5    midodrine (PROAMATINE) 5 MG tablet, Take 1 tablet by mouth 3 (Three) Times a Day Before Meals., Disp: 180 tablet, Rfl: 5    montelukast (SINGULAIR) 10 MG tablet, Take 1 tablet by mouth Every Night., Disp: , Rfl:     oxyCODONE (ROXICODONE) 15 MG immediate release tablet, Take 1 tablet by mouth Every 6 (Six) Hours As Needed for Moderate Pain., Disp: , Rfl:     Ozempic, 0.25 or 0.5 MG/DOSE, 2 MG/3ML solution pen-injector, Inject 0.25 mg under the skin into the appropriate area as directed 1 (One) Time Per Week. Patient  "instructed to hold prior to procedure. Last dose 1/18/25, Disp: , Rfl:     pantoprazole (PROTONIX) 40 MG EC tablet, Take 1 tablet by mouth Daily., Disp: , Rfl:     rivaroxaban (XARELTO) 20 MG tablet, Take 1 tablet by mouth Daily With Dinner., Disp: 90 tablet, Rfl: 1    vitamin D (ERGOCALCIFEROL) 1.25 MG (07653 UT) capsule capsule, Take 1 capsule by mouth 1 (One) Time Per Week., Disp: , Rfl:      reports that she has never smoked. She has never used smokeless tobacco.      Objective:   Physical exam:  /84 (BP Location: Left arm, Patient Position: Sitting)   Pulse (!) 49   Ht 152.4 cm (60\")   Wt 81.2 kg (179 lb)   SpO2 (!) 88%   BMI 34.96 kg/m²   CONSTITUTIONAL: No acute distress  RESPIRATORY: Normal effort. Clear to auscultation bilaterally without wheezing or rales  CARDIOVASCULAR: Regular rhythm with ectopic heartbeats with normal S1 and S2. Without murmur.  PERIPHERAL VASCULAR:Normal radial pulse. There is no lower extremity edema bilaterally.      Labs:    Lab Results   Component Value Date    LDL 77 10/31/2023     No components found for: \"LDLDIRECTC\"    Diagnostic Data:    Procedures    Results for orders placed during the hospital encounter of 01/29/25    STAT Adult Transthoracic Echo Complete W/ Cont if Necessary Per Protocol    Interpretation Summary    Left ventricular systolic function is mildly decreased. Calculated left ventricular EF = 49.2% Left ventricular ejection fraction appears to be 46 - 50%.    Left ventricular wall thickness is consistent with mild concentric hypertrophy.    Septal wall motion is abnormal, consistent with a bundle branch block.    The following left ventricular wall segments are hypokinetic: mid inferior and basal inferior.    Left ventricular diastolic function was indeterminate.    The left atrial cavity is mild to moderately dilated.    Estimated right ventricular systolic pressure from tricuspid regurgitation is mildly elevated (35-45 mmHg). Calculated right " ventricular systolic pressure from tricuspid regurgitation is 39 mmHg.      Assessment and Plan:     Chronic systolic heart failure  Left bundle branch block  -Nonischemic  -EF improved to 45% with medical therapy  -Currently without angina.   -Continue current related medications.     Paroxysmal atrial fibrillation  -Had severe anemia requiring multiple transfusions on Eliquis.   -Status post Watchman implant 1/29/2025, Dr. Cates, complicated by severe hypotension requiring IV pressors. BP improved with addition of midodrine.   -Recent dark stools, currently no active bleeding.  H&H stable.  Instructed patient to contact our office if with recurrent bleeding.   -Continue Xarelto for now.  Plan for 45 day CTA chest to reassess Watchman device.   -Continue beta-blocker    Primary hypertension  -At goal, continue current related medications.     CAD  Elevated cholesterol  -Without angina currently.  Has severe small vessel disease not amenable to PCI  -Continue aspirin, statin, beta blocker, valsartan     PAD  -Has small vessel disease below the knee  -Aggressive lifestyle and risk factor modification as well as medical therapy       - Return in about 6 months (around 8/21/2025) for Next scheduled follow up with Dr. Gama.    Electronically signed by LAQUITA Virgen, 08/12/24, 5:12 PM EDT.

## 2025-02-27 ENCOUNTER — READMISSION MANAGEMENT (OUTPATIENT)
Dept: CALL CENTER | Facility: HOSPITAL | Age: 80
End: 2025-02-27
Payer: MEDICARE

## 2025-02-27 ENCOUNTER — HOSPITAL ENCOUNTER (OUTPATIENT)
Facility: HOSPITAL | Age: 80
Discharge: HOME | End: 2025-02-27
Payer: MEDICARE

## 2025-02-27 VITALS
SYSTOLIC BLOOD PRESSURE: 135 MMHG | HEART RATE: 90 BPM | OXYGEN SATURATION: 97 % | DIASTOLIC BLOOD PRESSURE: 83 MMHG | TEMPERATURE: 98.1 F

## 2025-02-27 DIAGNOSIS — K90.9 MALABSORPTION OF IRON: ICD-10-CM

## 2025-02-27 DIAGNOSIS — D50.8 OTHER IRON DEFICIENCY ANEMIA: Primary | ICD-10-CM

## 2025-02-27 PROCEDURE — 25010000002 FERUMOXYTOL 510 MG/17ML SOLUTION 17 ML VIAL: Performed by: INTERNAL MEDICINE

## 2025-02-27 PROCEDURE — 96374 THER/PROPH/DIAG INJ IV PUSH: CPT

## 2025-02-27 PROCEDURE — 96365 THER/PROPH/DIAG IV INF INIT: CPT

## 2025-02-27 RX ORDER — SODIUM CHLORIDE 9 MG/ML
20 INJECTION, SOLUTION INTRAVENOUS ONCE
Status: DISCONTINUED | OUTPATIENT
Start: 2025-02-27 | End: 2025-02-28 | Stop reason: HOSPADM

## 2025-02-27 RX ADMIN — SODIUM CHLORIDE 510 MG: 9 INJECTION, SOLUTION INTRAVENOUS at 14:30

## 2025-02-27 NOTE — OUTREACH NOTE
General Surgery Week 3 Survey      Flowsheet Row Responses   [facility] patient discharged from? [facility]   Does the patient have one of the following disease processes/diagnoses(primary or secondary)? General Surgery   Week 3 attempt successful? No   Unsuccessful attempts Attempt 2   Discharge diagnosis Persistent atrial fibrillation-Watchman implant this visit            LINDA COLON - Registered Nurse

## 2025-03-05 ENCOUNTER — OFFICE VISIT (OUTPATIENT)
Age: 80
End: 2025-03-05
Payer: MEDICARE

## 2025-03-05 VITALS
TEMPERATURE: 98.2 F | DIASTOLIC BLOOD PRESSURE: 72 MMHG | WEIGHT: 182 LBS | BODY MASS INDEX: 35.73 KG/M2 | HEART RATE: 103 BPM | HEIGHT: 60 IN | OXYGEN SATURATION: 96 % | SYSTOLIC BLOOD PRESSURE: 134 MMHG

## 2025-03-05 DIAGNOSIS — E11.69 TYPE 2 DIABETES MELLITUS WITH OTHER SPECIFIED COMPLICATION, WITHOUT LONG-TERM CURRENT USE OF INSULIN (HCC): ICD-10-CM

## 2025-03-05 DIAGNOSIS — I50.22 CHRONIC SYSTOLIC CONGESTIVE HEART FAILURE (HCC): ICD-10-CM

## 2025-03-05 DIAGNOSIS — E03.9 HYPOTHYROIDISM, UNSPECIFIED TYPE: ICD-10-CM

## 2025-03-05 DIAGNOSIS — E53.8 VITAMIN B12 DEFICIENCY: ICD-10-CM

## 2025-03-05 DIAGNOSIS — I48.0 PAROXYSMAL ATRIAL FIBRILLATION (HCC): ICD-10-CM

## 2025-03-05 DIAGNOSIS — D50.9 IRON DEFICIENCY ANEMIA, UNSPECIFIED IRON DEFICIENCY ANEMIA TYPE: Primary | ICD-10-CM

## 2025-03-05 PROCEDURE — 1036F TOBACCO NON-USER: CPT | Performed by: INTERNAL MEDICINE

## 2025-03-05 PROCEDURE — G8417 CALC BMI ABV UP PARAM F/U: HCPCS | Performed by: INTERNAL MEDICINE

## 2025-03-05 PROCEDURE — 1090F PRES/ABSN URINE INCON ASSESS: CPT | Performed by: INTERNAL MEDICINE

## 2025-03-05 PROCEDURE — 99214 OFFICE O/P EST MOD 30 MIN: CPT | Performed by: INTERNAL MEDICINE

## 2025-03-05 PROCEDURE — 3078F DIAST BP <80 MM HG: CPT | Performed by: INTERNAL MEDICINE

## 2025-03-05 PROCEDURE — G8427 DOCREV CUR MEDS BY ELIG CLIN: HCPCS | Performed by: INTERNAL MEDICINE

## 2025-03-05 PROCEDURE — 3075F SYST BP GE 130 - 139MM HG: CPT | Performed by: INTERNAL MEDICINE

## 2025-03-05 PROCEDURE — 1159F MED LIST DOCD IN RCRD: CPT | Performed by: INTERNAL MEDICINE

## 2025-03-05 PROCEDURE — 1160F RVW MEDS BY RX/DR IN RCRD: CPT | Performed by: INTERNAL MEDICINE

## 2025-03-05 PROCEDURE — G8400 PT W/DXA NO RESULTS DOC: HCPCS | Performed by: INTERNAL MEDICINE

## 2025-03-05 PROCEDURE — 1124F ACP DISCUSS-NO DSCNMKR DOCD: CPT | Performed by: INTERNAL MEDICINE

## 2025-03-05 RX ORDER — ERGOCALCIFEROL 1.25 MG/1
50000 CAPSULE, LIQUID FILLED ORAL WEEKLY
Qty: 5 CAPSULE | Refills: 3 | Status: SHIPPED | OUTPATIENT
Start: 2025-03-05

## 2025-03-05 RX ORDER — DOCUSATE SODIUM 100 MG/1
100 CAPSULE, LIQUID FILLED ORAL DAILY
Qty: 30 CAPSULE | Refills: 0 | Status: SHIPPED | OUTPATIENT
Start: 2025-03-05

## 2025-03-05 ASSESSMENT — ENCOUNTER SYMPTOMS
COUGH: 0
WHEEZING: 0
VOMITING: 0
BACK PAIN: 1
NAUSEA: 0
SHORTNESS OF BREATH: 0
SINUS PRESSURE: 0
ABDOMINAL PAIN: 0
EYE DISCHARGE: 0
SORE THROAT: 0

## 2025-03-05 NOTE — PROGRESS NOTES
Chief Complaint   Patient presents with    GI Bleeding     Follow up from recent GI bleed /rectal bleeding        Have you seen any other physician or provider since your last visit yes -     Have you had any other diagnostic tests since your last visit? yes -     Have you changed or stopped any medications since your last visit? no       
diabetes, even with good control.  A1C will be checked  in few months.   Lab Results   Component Value Date    LABA1C 7.1 10/17/2024     4. Chronic systolic congestive heart failure (HCC)  Will cont current meds. Will have patient to monitor daily wt, edema, any worsening of MICHAELS and SOA. Low Na diet and fluid restriction. Monitor renal function closely. Long discussion with patient to be very compliant with taking meds.    5. Hypothyroidism, unspecified type  Last TSH was WNL. I am going to check the TSH every few months.  I am going to continue the current dose of Levothyroxine.  I have advised her on the importance of taking the medication on a daily basis.    Lab Results   Component Value Date    TSH 0.46 02/22/2024     6. Vitamin B12 deficiency  Will cont on B12 injection on a monthly basis.      Orders Placed This Encounter   Medications    docusate sodium (STOOL SOFTENER) 100 MG capsule     Sig: Take 1 capsule by mouth daily     Dispense:  30 capsule     Refill:  0    vitamin D (ERGOCALCIFEROL) 1.25 MG (98716 UT) CAPS capsule     Sig: Take 1 capsule by mouth once a week     Dispense:  5 capsule     Refill:  3        I, Justina Quiroz LPN am scribing for and in the presence of Alyx Perez MD on this date 3/5/2025 at 3:30 PM.    I, Dr. Alyx Perez, personally performed the services described in the documentation as scribed by Justina Quiroz LPN, in my presence and it is both accurate and complete.

## 2025-03-06 ENCOUNTER — HOSPITAL ENCOUNTER (OUTPATIENT)
Facility: HOSPITAL | Age: 80
Discharge: HOME | End: 2025-03-06
Payer: MEDICARE

## 2025-03-06 VITALS
RESPIRATION RATE: 16 BRPM | OXYGEN SATURATION: 94 % | TEMPERATURE: 98 F | DIASTOLIC BLOOD PRESSURE: 79 MMHG | SYSTOLIC BLOOD PRESSURE: 133 MMHG | HEART RATE: 71 BPM

## 2025-03-06 DIAGNOSIS — D50.8 OTHER IRON DEFICIENCY ANEMIA: Primary | ICD-10-CM

## 2025-03-06 DIAGNOSIS — K90.9 MALABSORPTION OF IRON: ICD-10-CM

## 2025-03-06 PROCEDURE — 96374 THER/PROPH/DIAG INJ IV PUSH: CPT

## 2025-03-06 PROCEDURE — 25010000002 FERUMOXYTOL 510 MG/17ML SOLUTION 17 ML VIAL: Performed by: INTERNAL MEDICINE

## 2025-03-06 RX ORDER — SODIUM CHLORIDE 9 MG/ML
20 INJECTION, SOLUTION INTRAVENOUS ONCE
Status: DISCONTINUED | OUTPATIENT
Start: 2025-03-06 | End: 2025-03-07 | Stop reason: HOSPADM

## 2025-03-06 RX ORDER — HYDROCORTISONE SODIUM SUCCINATE 100 MG/2ML
100 INJECTION INTRAMUSCULAR; INTRAVENOUS AS NEEDED
Status: DISCONTINUED | OUTPATIENT
Start: 2025-03-06 | End: 2025-03-07 | Stop reason: HOSPADM

## 2025-03-06 RX ORDER — DIPHENHYDRAMINE HYDROCHLORIDE 50 MG/ML
50 INJECTION INTRAMUSCULAR; INTRAVENOUS AS NEEDED
Status: DISCONTINUED | OUTPATIENT
Start: 2025-03-06 | End: 2025-03-07 | Stop reason: HOSPADM

## 2025-03-06 RX ORDER — FAMOTIDINE 10 MG/ML
20 INJECTION, SOLUTION INTRAVENOUS AS NEEDED
Status: DISCONTINUED | OUTPATIENT
Start: 2025-03-06 | End: 2025-03-07 | Stop reason: HOSPADM

## 2025-03-06 RX ADMIN — FERUMOXYTOL 510 MG: 510 INJECTION INTRAVENOUS at 15:22

## 2025-03-10 ENCOUNTER — TELEPHONE (OUTPATIENT)
Dept: CARDIOLOGY | Facility: CLINIC | Age: 80
End: 2025-03-10
Payer: MEDICARE

## 2025-03-10 NOTE — TELEPHONE ENCOUNTER
Returned pt vm wanting to know when she will be off of her medications. I was unable to get through or LVM due to a full box.   Pt has CTA scheduled for 3/17 and hopefully we will be able to transition off of Xarelto and on to Plavix after Dr. Cates reviews that CTA.     Malika Monroe RN

## 2025-03-13 DIAGNOSIS — K21.9 GASTROESOPHAGEAL REFLUX DISEASE, UNSPECIFIED WHETHER ESOPHAGITIS PRESENT: ICD-10-CM

## 2025-03-13 RX ORDER — DOCUSATE SODIUM 100 MG/1
100 CAPSULE, LIQUID FILLED ORAL DAILY
Qty: 30 CAPSULE | Refills: 0 | Status: SHIPPED | OUTPATIENT
Start: 2025-03-13

## 2025-03-13 RX ORDER — LANOLIN ALCOHOL/MO/W.PET/CERES
400 CREAM (GRAM) TOPICAL DAILY
Qty: 30 TABLET | Refills: 2 | Status: SHIPPED | OUTPATIENT
Start: 2025-03-13

## 2025-03-13 RX ORDER — OMEPRAZOLE 40 MG/1
40 CAPSULE, DELAYED RELEASE ORAL
Qty: 30 CAPSULE | Refills: 5 | Status: SHIPPED | OUTPATIENT
Start: 2025-03-13

## 2025-03-13 NOTE — TELEPHONE ENCOUNTER
Nexium is no longer covered by pts insurance. I confirmed that they prefer Omeprazole 40mg , pt is okay with changing if you are (pended)    Artemio Magaña

## 2025-03-17 ENCOUNTER — HOSPITAL ENCOUNTER (OUTPATIENT)
Dept: CT IMAGING | Facility: HOSPITAL | Age: 80
Discharge: HOME | End: 2025-03-17
Admitting: INTERNAL MEDICINE
Payer: MEDICARE

## 2025-03-17 DIAGNOSIS — I48.0 PAROXYSMAL ATRIAL FIBRILLATION: ICD-10-CM

## 2025-03-17 DIAGNOSIS — Z95.818 PRESENCE OF WATCHMAN LEFT ATRIAL APPENDAGE CLOSURE DEVICE: ICD-10-CM

## 2025-03-17 PROCEDURE — 25510000001 IOPAMIDOL PER 1 ML: Performed by: INTERNAL MEDICINE

## 2025-03-17 PROCEDURE — 71275 CT ANGIOGRAPHY CHEST: CPT

## 2025-03-17 RX ORDER — IOPAMIDOL 755 MG/ML
100 INJECTION, SOLUTION INTRAVASCULAR
Status: COMPLETED | OUTPATIENT
Start: 2025-03-17 | End: 2025-03-17

## 2025-03-17 RX ADMIN — IOPAMIDOL 90 ML: 755 INJECTION, SOLUTION INTRAVENOUS at 13:18

## 2025-03-20 ENCOUNTER — HOSPITAL ENCOUNTER (OUTPATIENT)
Facility: HOSPITAL | Age: 80
Discharge: HOME | End: 2025-03-20
Payer: MEDICARE

## 2025-03-20 ENCOUNTER — LAB (OUTPATIENT)
Age: 80
End: 2025-03-20
Payer: MEDICARE

## 2025-03-20 DIAGNOSIS — E11.69 TYPE 2 DIABETES MELLITUS WITH OTHER SPECIFIED COMPLICATION, WITHOUT LONG-TERM CURRENT USE OF INSULIN: ICD-10-CM

## 2025-03-20 DIAGNOSIS — R60.0 LOWER EXTREMITY EDEMA: ICD-10-CM

## 2025-03-20 DIAGNOSIS — E53.8 VITAMIN B12 DEFICIENCY: Primary | ICD-10-CM

## 2025-03-20 DIAGNOSIS — I48.0 PAROXYSMAL ATRIAL FIBRILLATION (HCC): ICD-10-CM

## 2025-03-20 DIAGNOSIS — I50.22 CHRONIC SYSTOLIC CONGESTIVE HEART FAILURE (HCC): ICD-10-CM

## 2025-03-20 DIAGNOSIS — D64.9 ANEMIA, UNSPECIFIED TYPE: ICD-10-CM

## 2025-03-20 LAB
ALBUMIN SERPL-MCNC: 4.2 G/DL (ref 3.4–4.8)
ALBUMIN/GLOB SERPL: 1.1 {RATIO} (ref 0.8–2)
ALP SERPL-CCNC: 187 U/L (ref 25–100)
ALT SERPL-CCNC: 15 U/L (ref 4–36)
ANION GAP SERPL CALCULATED.3IONS-SCNC: 15 MMOL/L (ref 3–16)
AST SERPL-CCNC: 15 U/L (ref 8–33)
BASOPHILS # BLD: 0.1 K/UL (ref 0–0.1)
BASOPHILS NFR BLD: 0.9 %
BILIRUB SERPL-MCNC: 0.4 MG/DL (ref 0.3–1.2)
BUN SERPL-MCNC: 26 MG/DL (ref 6–20)
CALCIUM SERPL-MCNC: 10.2 MG/DL (ref 8.3–10.6)
CHLORIDE SERPL-SCNC: 97 MMOL/L (ref 98–107)
CO2 SERPL-SCNC: 28 MMOL/L (ref 20–30)
CREAT SERPL-MCNC: 1.2 MG/DL (ref 0.4–1.2)
EOSINOPHIL # BLD: 0.2 K/UL (ref 0–0.4)
EOSINOPHIL NFR BLD: 1.4 %
ERYTHROCYTE [DISTWIDTH] IN BLOOD BY AUTOMATED COUNT: 14.4 % (ref 11–16)
FERRITIN SERPL IA-MCNC: 1159 NG/ML (ref 22–322)
FOLATE SERPL-MCNC: 18.3 NG/ML
GFR SERPLBLD CREATININE-BSD FMLA CKD-EPI: 46 ML/MIN/{1.73_M2}
GLOBULIN SER CALC-MCNC: 4 G/DL
GLUCOSE SERPL-MCNC: 177 MG/DL (ref 74–106)
HCT VFR BLD AUTO: 52.6 % (ref 37–47)
HGB BLD-MCNC: 16.1 G/DL (ref 11.5–16.5)
IMM GRANULOCYTES # BLD: 0 K/UL
IMM GRANULOCYTES NFR BLD: 0.4 % (ref 0–5)
IRON SATN MFR SERPL: 30 % (ref 15–50)
IRON SERPL-MCNC: 108 UG/DL (ref 37–145)
LYMPHOCYTES # BLD: 2.3 K/UL (ref 1.5–4)
LYMPHOCYTES NFR BLD: 22.1 %
MAGNESIUM SERPL-MCNC: 2.5 MG/DL (ref 1.7–2.4)
MCH RBC QN AUTO: 28.9 PG (ref 27–32)
MCHC RBC AUTO-ENTMCNC: 30.6 G/DL (ref 31–35)
MCV RBC AUTO: 94.3 FL (ref 80–100)
MONOCYTES # BLD: 0.6 K/UL (ref 0.2–0.8)
MONOCYTES NFR BLD: 5.5 %
NEUTROPHILS # BLD: 7.4 K/UL (ref 2–7.5)
NEUTS SEG NFR BLD: 69.7 %
PLATELET # BLD AUTO: 193 K/UL (ref 150–400)
PMV BLD AUTO: 11.8 FL (ref 6–10)
POTASSIUM SERPL-SCNC: 4.3 MMOL/L (ref 3.4–5.1)
PROT SERPL-MCNC: 8.2 G/DL (ref 6.4–8.3)
RBC # BLD AUTO: 5.58 M/UL (ref 3.8–5.8)
SODIUM SERPL-SCNC: 140 MMOL/L (ref 136–145)
TIBC SERPL-MCNC: 361 UG/DL (ref 250–450)
TSH SERPL DL<=0.005 MIU/L-ACNC: 0.23 UIU/ML (ref 0.27–4.2)
WBC # BLD AUTO: 10.5 K/UL (ref 4–11)

## 2025-03-20 PROCEDURE — 82728 ASSAY OF FERRITIN: CPT

## 2025-03-20 PROCEDURE — 82746 ASSAY OF FOLIC ACID SERUM: CPT

## 2025-03-20 PROCEDURE — 80053 COMPREHEN METABOLIC PANEL: CPT

## 2025-03-20 PROCEDURE — 83735 ASSAY OF MAGNESIUM: CPT

## 2025-03-20 PROCEDURE — 96372 THER/PROPH/DIAG INJ SC/IM: CPT | Performed by: INTERNAL MEDICINE

## 2025-03-20 PROCEDURE — 83550 IRON BINDING TEST: CPT

## 2025-03-20 PROCEDURE — 83540 ASSAY OF IRON: CPT

## 2025-03-20 PROCEDURE — 85025 COMPLETE CBC W/AUTO DIFF WBC: CPT

## 2025-03-20 PROCEDURE — 84443 ASSAY THYROID STIM HORMONE: CPT

## 2025-03-20 RX ORDER — CYANOCOBALAMIN 1000 UG/ML
1000 INJECTION, SOLUTION INTRAMUSCULAR; SUBCUTANEOUS ONCE
Status: COMPLETED | OUTPATIENT
Start: 2025-03-20 | End: 2025-03-20

## 2025-03-20 RX ADMIN — CYANOCOBALAMIN 1000 MCG: 1000 INJECTION, SOLUTION INTRAMUSCULAR; SUBCUTANEOUS at 15:08

## 2025-03-21 ENCOUNTER — RESULTS FOLLOW-UP (OUTPATIENT)
Age: 80
End: 2025-03-21

## 2025-04-15 RX ORDER — GLIPIZIDE 10 MG/1
10 TABLET, FILM COATED, EXTENDED RELEASE ORAL DAILY
Qty: 30 TABLET | Refills: 3 | Status: SHIPPED | OUTPATIENT
Start: 2025-04-15

## 2025-04-15 RX ORDER — FUROSEMIDE 20 MG/1
20 TABLET ORAL DAILY
Qty: 60 TABLET | Refills: 0 | Status: SHIPPED | OUTPATIENT
Start: 2025-04-15

## 2025-04-16 RX ORDER — SEMAGLUTIDE 0.68 MG/ML
INJECTION, SOLUTION SUBCUTANEOUS
Qty: 3 ML | Refills: 1 | Status: SHIPPED | OUTPATIENT
Start: 2025-04-16

## 2025-04-16 RX ORDER — ATORVASTATIN CALCIUM 40 MG/1
40 TABLET, FILM COATED ORAL DAILY
Qty: 30 TABLET | Refills: 3 | Status: SHIPPED | OUTPATIENT
Start: 2025-04-16

## 2025-04-16 RX ORDER — METOPROLOL SUCCINATE 25 MG/1
25 TABLET, EXTENDED RELEASE ORAL DAILY
Qty: 30 TABLET | Refills: 3 | Status: SHIPPED | OUTPATIENT
Start: 2025-04-16

## 2025-04-16 RX ORDER — MIDODRINE HYDROCHLORIDE 5 MG/1
5 TABLET ORAL
Qty: 90 TABLET | Refills: 3 | Status: SHIPPED | OUTPATIENT
Start: 2025-04-16

## 2025-04-17 ENCOUNTER — OFFICE VISIT (OUTPATIENT)
Dept: GASTROENTEROLOGY | Facility: CLINIC | Age: 80
End: 2025-04-17
Payer: MEDICARE

## 2025-04-17 VITALS — DIASTOLIC BLOOD PRESSURE: 74 MMHG | OXYGEN SATURATION: 98 % | HEART RATE: 70 BPM | SYSTOLIC BLOOD PRESSURE: 126 MMHG

## 2025-04-17 DIAGNOSIS — R79.9 ABNORMAL FINDING OF BLOOD CHEMISTRY, UNSPECIFIED: ICD-10-CM

## 2025-04-17 DIAGNOSIS — R19.7 DIARRHEA, UNSPECIFIED TYPE: ICD-10-CM

## 2025-04-17 DIAGNOSIS — D50.9 IRON DEFICIENCY ANEMIA, UNSPECIFIED IRON DEFICIENCY ANEMIA TYPE: Primary | ICD-10-CM

## 2025-04-17 DIAGNOSIS — K21.9 GASTROESOPHAGEAL REFLUX DISEASE, UNSPECIFIED WHETHER ESOPHAGITIS PRESENT: ICD-10-CM

## 2025-04-17 DIAGNOSIS — Z11.59 ENCOUNTER FOR SCREENING FOR OTHER VIRAL DISEASES: ICD-10-CM

## 2025-04-17 DIAGNOSIS — Z87.19 HISTORY OF CROHN'S DISEASE: ICD-10-CM

## 2025-04-17 RX ORDER — OMEPRAZOLE 40 MG/1
40 CAPSULE, DELAYED RELEASE ORAL
COMMUNITY
Start: 2025-03-13

## 2025-04-17 RX ORDER — PSEUDOEPHEDRINE HCL 30 MG
100 TABLET ORAL
COMMUNITY
Start: 2025-03-13
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
## 2025-04-30 ENCOUNTER — HOSPITAL ENCOUNTER (OUTPATIENT)
Facility: HOSPITAL | Age: 80
Discharge: HOME | End: 2025-04-30

## 2025-04-30 LAB
ALBUMIN SERPL-MCNC: 3.6 G/DL (ref 3.4–4.8)
ALBUMIN/GLOB SERPL: 1 {RATIO} (ref 0.8–2)
ALP SERPL-CCNC: 153 U/L (ref 25–100)
ALT SERPL-CCNC: 9 U/L (ref 4–36)
ANION GAP SERPL CALCULATED.3IONS-SCNC: 13 MMOL/L (ref 3–16)
AST SERPL-CCNC: 15 U/L (ref 8–33)
BILIRUB SERPL-MCNC: 0.4 MG/DL (ref 0.3–1.2)
BUN SERPL-MCNC: 15 MG/DL (ref 6–20)
CALCIUM SERPL-MCNC: 9.6 MG/DL (ref 8.3–10.6)
CHLORIDE SERPL-SCNC: 104 MMOL/L (ref 98–107)
CO2 SERPL-SCNC: 23 MMOL/L (ref 20–30)
CREAT SERPL-MCNC: 1.1 MG/DL (ref 0.4–1.2)
CRP SERPL-MCNC: 14.7 MG/L (ref 0–5.1)
ERYTHROCYTE [DISTWIDTH] IN BLOOD BY AUTOMATED COUNT: 13.7 % (ref 11–16)
FERRITIN SERPL IA-MCNC: 494 NG/ML (ref 22–322)
FOLATE SERPL-MCNC: 17.3 NG/ML
GFR SERPLBLD CREATININE-BSD FMLA CKD-EPI: 52 ML/MIN/{1.73_M2}
GLOBULIN SER CALC-MCNC: 3.7 G/DL
GLUCOSE SERPL-MCNC: 247 MG/DL (ref 74–106)
HCT VFR BLD AUTO: 45.4 % (ref 37–47)
HGB BLD-MCNC: 14.2 G/DL (ref 11.5–16.5)
IRON SATN MFR SERPL: 20 % (ref 15–50)
IRON SERPL-MCNC: 57 UG/DL (ref 37–145)
MCH RBC QN AUTO: 28.2 PG (ref 27–32)
MCHC RBC AUTO-ENTMCNC: 31.3 G/DL (ref 31–35)
MCV RBC AUTO: 90.1 FL (ref 80–100)
PLATELET # BLD AUTO: 163 K/UL (ref 150–400)
PMV BLD AUTO: 11 FL (ref 6–10)
POTASSIUM SERPL-SCNC: 3.8 MMOL/L (ref 3.4–5.1)
PROT SERPL-MCNC: 7.3 G/DL (ref 6.4–8.3)
RBC # BLD AUTO: 5.04 M/UL (ref 3.8–5.8)
SODIUM SERPL-SCNC: 140 MMOL/L (ref 136–145)
TIBC SERPL-MCNC: 279 UG/DL (ref 250–450)
VIT B12 SERPL-MCNC: 620 PG/ML (ref 211–911)
WBC # BLD AUTO: 10.5 K/UL (ref 4–11)

## 2025-05-01 LAB
HBV SURFACE AG SERPL QL IA: NORMAL
HCV AB SERPL QL IA: NORMAL

## 2025-05-03 LAB
1,25(OH)2D3 SERPL-MCNC: 55.1 PG/ML (ref 19.9–79.3)
GAMMA INTERFERON BACKGROUND BLD IA-ACNC: 0.05 IU/ML
M TB IFN-G BLD-IMP: NEGATIVE
M TB IFN-G CD4+ BCKGRND COR BLD-ACNC: 0 IU/ML
M TB IFN-G CD4+CD8+ BCKGRND COR BLD-ACNC: 0.01 IU/ML
MITOGEN IGNF BCKGRD COR BLD-ACNC: 9.95 IU/ML

## 2025-05-05 ENCOUNTER — OFFICE VISIT (OUTPATIENT)
Dept: CARDIOLOGY | Facility: CLINIC | Age: 80
End: 2025-05-05
Payer: MEDICARE

## 2025-05-05 VITALS
SYSTOLIC BLOOD PRESSURE: 130 MMHG | HEIGHT: 60 IN | WEIGHT: 179.4 LBS | OXYGEN SATURATION: 95 % | DIASTOLIC BLOOD PRESSURE: 86 MMHG | HEART RATE: 83 BPM | BODY MASS INDEX: 35.22 KG/M2

## 2025-05-05 DIAGNOSIS — I48.0 PAROXYSMAL ATRIAL FIBRILLATION: Primary | ICD-10-CM

## 2025-05-05 DIAGNOSIS — Z95.818 PRESENCE OF WATCHMAN LEFT ATRIAL APPENDAGE CLOSURE DEVICE: ICD-10-CM

## 2025-05-05 DIAGNOSIS — I50.20 HFREF (HEART FAILURE WITH REDUCED EJECTION FRACTION): ICD-10-CM

## 2025-05-05 PROCEDURE — 3079F DIAST BP 80-89 MM HG: CPT | Performed by: PHYSICIAN ASSISTANT

## 2025-05-05 PROCEDURE — 3075F SYST BP GE 130 - 139MM HG: CPT | Performed by: PHYSICIAN ASSISTANT

## 2025-05-05 PROCEDURE — 99214 OFFICE O/P EST MOD 30 MIN: CPT | Performed by: PHYSICIAN ASSISTANT
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
## 2025-05-07 ENCOUNTER — HOSPITAL ENCOUNTER (OUTPATIENT)
Facility: HOSPITAL | Age: 80
Discharge: HOME | End: 2025-05-07
Payer: MEDICARE

## 2025-05-07 PROCEDURE — 87324 CLOSTRIDIUM AG IA: CPT

## 2025-05-07 PROCEDURE — 83993 ASSAY FOR CALPROTECTIN FECAL: CPT

## 2025-05-07 PROCEDURE — 87506 IADNA-DNA/RNA PROBE TQ 6-11: CPT

## 2025-05-07 PROCEDURE — 87449 NOS EACH ORGANISM AG IA: CPT

## 2025-05-08 LAB — MISCELLANEOUS LAB TEST ORDER: NORMAL

## 2025-05-09 LAB
GI PATHOGENS PNL STL NAA+PROBE: NORMAL
REASON FOR REJECTION: NORMAL
REJECTED TEST: NORMAL

## 2025-05-11 LAB — CALPROTECTIN STL-MCNT: 122 UG/G

## 2025-05-15 ENCOUNTER — PROCEDURE VISIT (OUTPATIENT)
Dept: GASTROENTEROLOGY | Facility: CLINIC | Age: 80
End: 2025-05-15
Payer: MEDICARE

## 2025-05-15 VITALS — DIASTOLIC BLOOD PRESSURE: 72 MMHG | HEART RATE: 87 BPM | SYSTOLIC BLOOD PRESSURE: 118 MMHG | OXYGEN SATURATION: 96 %

## 2025-05-15 DIAGNOSIS — Z87.19 HISTORY OF CROHN'S DISEASE: ICD-10-CM

## 2025-05-15 DIAGNOSIS — D50.9 IRON DEFICIENCY ANEMIA, UNSPECIFIED IRON DEFICIENCY ANEMIA TYPE: ICD-10-CM

## 2025-05-15 DIAGNOSIS — R19.7 DIARRHEA, UNSPECIFIED TYPE: ICD-10-CM

## 2025-05-15 RX ORDER — LEVOTHYROXINE SODIUM 150 UG/1
150 TABLET ORAL DAILY
Qty: 90 TABLET | Refills: 0 | Status: SHIPPED | OUTPATIENT
Start: 2025-05-15

## 2025-05-15 NOTE — PROGRESS NOTES
Patient presented to office for Pill Cam Endoscopy. Received consent. Discussed risks and benefits. Patient hooked to sensor belt monitor that was paired to capsule. Patient swallowed capsule without difficulty at 0830. Patient returned to office at 1600 to return equipment. Patient did not have any complaints of abdominal pain, nausea or vomiting. Pictures downloaded for review by Dr. Verdugo.

## 2025-05-19 ENCOUNTER — TELEPHONE (OUTPATIENT)
Dept: GASTROENTEROLOGY | Facility: CLINIC | Age: 80
End: 2025-05-19
Payer: MEDICARE

## 2025-05-19 NOTE — ADDENDUM NOTE
Addended by: CHIQUI PRASAD on: 5/18/2025 08:16 PM     Modules accepted: Orders, Level of Service

## 2025-05-19 NOTE — TELEPHONE ENCOUNTER
----- Message from Zayda VILLANUEVA sent at 5/19/2025  8:57 AM EDT -----    ----- Message -----  From: Danie Verdugo MD  Sent: 5/18/2025   8:18 PM EDT  To: Shea Joseph PA-C; [facility]    Let her know that her PillCam studies incomplete as PillCam stayed at distal ileum even at the end of the study.  There are some signs of chronic inflammation we will discuss when she comes back for follow-up. Make sure she does not have any new symptoms as no colon views.

## 2025-05-22 RX ORDER — MONTELUKAST SODIUM 10 MG/1
10 TABLET ORAL NIGHTLY
Qty: 30 TABLET | Refills: 3 | Status: SHIPPED | OUTPATIENT
Start: 2025-05-22

## 2025-05-22 RX ORDER — EMPAGLIFLOZIN 10 MG/1
10 TABLET, FILM COATED ORAL DAILY
Qty: 30 TABLET | Refills: 3 | Status: SHIPPED | OUTPATIENT
Start: 2025-05-22

## 2025-05-27 ENCOUNTER — HOSPITAL ENCOUNTER (EMERGENCY)
Facility: HOSPITAL | Age: 80
Discharge: HOME | End: 2025-05-27
Attending: EMERGENCY MEDICINE
Payer: MEDICARE

## 2025-05-27 ENCOUNTER — APPOINTMENT (OUTPATIENT)
Dept: CT IMAGING | Facility: HOSPITAL | Age: 80
End: 2025-05-27
Attending: EMERGENCY MEDICINE
Payer: MEDICARE

## 2025-05-27 ENCOUNTER — APPOINTMENT (OUTPATIENT)
Dept: GENERAL RADIOLOGY | Facility: HOSPITAL | Age: 80
End: 2025-05-27
Attending: EMERGENCY MEDICINE
Payer: MEDICARE

## 2025-05-27 VITALS
HEART RATE: 80 BPM | BODY MASS INDEX: 34.36 KG/M2 | SYSTOLIC BLOOD PRESSURE: 153 MMHG | DIASTOLIC BLOOD PRESSURE: 81 MMHG | HEIGHT: 60 IN | RESPIRATION RATE: 17 BRPM | OXYGEN SATURATION: 100 % | WEIGHT: 175 LBS | TEMPERATURE: 98.3 F

## 2025-05-27 DIAGNOSIS — K21.00 GASTROESOPHAGEAL REFLUX DISEASE WITH ESOPHAGITIS WITHOUT HEMORRHAGE: ICD-10-CM

## 2025-05-27 DIAGNOSIS — K44.9 HIATAL HERNIA: ICD-10-CM

## 2025-05-27 DIAGNOSIS — R07.89 ATYPICAL CHEST PAIN: Primary | ICD-10-CM

## 2025-05-27 LAB
ALBUMIN SERPL-MCNC: 3.6 G/DL (ref 3.4–4.8)
ALBUMIN/GLOB SERPL: 0.9 {RATIO} (ref 0.8–2)
ALP SERPL-CCNC: 162 U/L (ref 25–100)
ALT SERPL-CCNC: 9 U/L (ref 4–36)
ANION GAP SERPL CALCULATED.3IONS-SCNC: 12 MMOL/L (ref 3–16)
AST SERPL-CCNC: 14 U/L (ref 8–33)
BASOPHILS # BLD: 0.1 K/UL (ref 0–0.1)
BASOPHILS NFR BLD: 0.5 %
BILIRUB SERPL-MCNC: 0.4 MG/DL (ref 0.3–1.2)
BUN SERPL-MCNC: 18 MG/DL (ref 6–20)
CALCIUM SERPL-MCNC: 9.6 MG/DL (ref 8.3–10.6)
CHLORIDE SERPL-SCNC: 100 MMOL/L (ref 98–107)
CO2 SERPL-SCNC: 26 MMOL/L (ref 20–30)
CREAT SERPL-MCNC: 1.4 MG/DL (ref 0.6–1.2)
D DIMER PPP DDU-MCNC: >4 UG/ML FEU (ref 0–0.6)
EOSINOPHIL # BLD: 0.2 K/UL (ref 0–0.4)
EOSINOPHIL NFR BLD: 1.7 %
ERYTHROCYTE [DISTWIDTH] IN BLOOD BY AUTOMATED COUNT: 13.6 % (ref 11–16)
GFR SERPLBLD CREATININE-BSD FMLA CKD-EPI: 39 ML/MIN/{1.73_M2}
GLOBULIN SER CALC-MCNC: 4 G/DL
GLUCOSE SERPL-MCNC: 145 MG/DL (ref 74–106)
HCT VFR BLD AUTO: 44.2 % (ref 37–47)
HGB BLD-MCNC: 14.2 G/DL (ref 11.5–16.5)
IMM GRANULOCYTES # BLD: 0.1 K/UL
IMM GRANULOCYTES NFR BLD: 0.6 % (ref 0–5)
LYMPHOCYTES # BLD: 2.1 K/UL (ref 1.5–4)
LYMPHOCYTES NFR BLD: 17.7 %
MAGNESIUM SERPL-MCNC: 1.9 MG/DL (ref 1.7–2.4)
MCH RBC QN AUTO: 28.1 PG (ref 27–32)
MCHC RBC AUTO-ENTMCNC: 32.1 G/DL (ref 31–35)
MCV RBC AUTO: 87.5 FL (ref 80–100)
MONOCYTES # BLD: 0.7 K/UL (ref 0.2–0.8)
MONOCYTES NFR BLD: 6 %
NEUTROPHILS # BLD: 8.9 K/UL (ref 2–7.5)
NEUTS SEG NFR BLD: 73.5 %
NT-PROBNP SERPL-MCNC: 1449 PG/ML (ref 0–1800)
PLATELET # BLD AUTO: 217 K/UL (ref 150–400)
PMV BLD AUTO: 11.2 FL (ref 6–10)
POTASSIUM SERPL-SCNC: 4 MMOL/L (ref 3.4–5.1)
PROT SERPL-MCNC: 7.6 G/DL (ref 6.4–8.3)
RBC # BLD AUTO: 5.05 M/UL (ref 3.8–5.8)
SARS-COV-2 RDRP RESP QL NAA+PROBE: NOT DETECTED
SODIUM SERPL-SCNC: 138 MMOL/L (ref 136–145)
TROPONIN, HIGH SENSITIVITY: 26 NG/L (ref 0–14)
TROPONIN, HIGH SENSITIVITY: 32 NG/L (ref 0–14)
WBC # BLD AUTO: 12.1 K/UL (ref 4–11)

## 2025-05-27 PROCEDURE — 36415 COLL VENOUS BLD VENIPUNCTURE: CPT

## 2025-05-27 PROCEDURE — 99285 EMERGENCY DEPT VISIT HI MDM: CPT

## 2025-05-27 PROCEDURE — 71275 CT ANGIOGRAPHY CHEST: CPT

## 2025-05-27 PROCEDURE — 83880 ASSAY OF NATRIURETIC PEPTIDE: CPT

## 2025-05-27 PROCEDURE — 6360000004 HC RX CONTRAST MEDICATION: Performed by: EMERGENCY MEDICINE

## 2025-05-27 PROCEDURE — 85025 COMPLETE CBC W/AUTO DIFF WBC: CPT

## 2025-05-27 PROCEDURE — 85379 FIBRIN DEGRADATION QUANT: CPT

## 2025-05-27 PROCEDURE — 80053 COMPREHEN METABOLIC PANEL: CPT

## 2025-05-27 PROCEDURE — 83735 ASSAY OF MAGNESIUM: CPT

## 2025-05-27 PROCEDURE — 84484 ASSAY OF TROPONIN QUANT: CPT

## 2025-05-27 PROCEDURE — 71045 X-RAY EXAM CHEST 1 VIEW: CPT

## 2025-05-27 PROCEDURE — 87635 SARS-COV-2 COVID-19 AMP PRB: CPT

## 2025-05-27 RX ORDER — IOPAMIDOL 755 MG/ML
100 INJECTION, SOLUTION INTRAVASCULAR
Status: COMPLETED | OUTPATIENT
Start: 2025-05-27 | End: 2025-05-27

## 2025-05-27 RX ORDER — FAMOTIDINE 20 MG/1
20 TABLET, FILM COATED ORAL 2 TIMES DAILY
Qty: 60 TABLET | Refills: 3 | Status: SHIPPED | OUTPATIENT
Start: 2025-05-27

## 2025-05-27 RX ORDER — FAMOTIDINE 20 MG/1
20 TABLET, FILM COATED ORAL 2 TIMES DAILY
Qty: 60 TABLET | Refills: 3 | Status: SHIPPED | OUTPATIENT
Start: 2025-05-27 | End: 2025-05-27

## 2025-05-27 RX ADMIN — IOPAMIDOL 100 ML: 755 INJECTION, SOLUTION INTRAVENOUS at 02:22

## 2025-05-27 NOTE — ED PROVIDER NOTES
occasionally words are mis-transcribed.)    Burke Flores MD (electronically signed)           Burke Flores MD  05/27/25 0350

## 2025-05-27 NOTE — DISCHARGE INSTRUCTIONS
Follow-up with your primary care provider in outpatient basis at the earliest possible appointment.  Return to the emergency department for increasing chest pain, shortness of breath, nausea, vomiting, fever, chills, altered mental status, any other concern.

## 2025-05-30 ENCOUNTER — TELEPHONE (OUTPATIENT)
Dept: GASTROENTEROLOGY | Facility: CLINIC | Age: 80
End: 2025-05-30
Payer: MEDICARE

## 2025-05-30 NOTE — TELEPHONE ENCOUNTER
Called patient; no answer/no voicemail.  Called patient's daughter, SHAI.    ----- Message from Shea Joseph sent at 5/30/2025  2:54 PM EDT -----  Regarding: GI follow up  Please see if pt can come or do telehealth visit for double book appt on Monday 6/2 at 1 PM to discuss results and tx plan for IBD

## 2025-06-05 ENCOUNTER — RESULTS FOLLOW-UP (OUTPATIENT)
Age: 80
End: 2025-06-05

## 2025-06-05 ENCOUNTER — HOSPITAL ENCOUNTER (OUTPATIENT)
Facility: HOSPITAL | Age: 80
Discharge: HOME | End: 2025-06-05
Payer: MEDICARE

## 2025-06-05 ENCOUNTER — OFFICE VISIT (OUTPATIENT)
Age: 80
End: 2025-06-05
Payer: MEDICARE

## 2025-06-05 VITALS
SYSTOLIC BLOOD PRESSURE: 126 MMHG | DIASTOLIC BLOOD PRESSURE: 76 MMHG | OXYGEN SATURATION: 93 % | WEIGHT: 174.6 LBS | HEART RATE: 78 BPM | BODY MASS INDEX: 34.1 KG/M2 | RESPIRATION RATE: 18 BRPM

## 2025-06-05 DIAGNOSIS — I44.7 LBBB (LEFT BUNDLE BRANCH BLOCK): ICD-10-CM

## 2025-06-05 DIAGNOSIS — E11.69 TYPE 2 DIABETES MELLITUS WITH OTHER SPECIFIED COMPLICATION, WITHOUT LONG-TERM CURRENT USE OF INSULIN (HCC): Primary | ICD-10-CM

## 2025-06-05 DIAGNOSIS — D50.9 IRON DEFICIENCY ANEMIA, UNSPECIFIED IRON DEFICIENCY ANEMIA TYPE: ICD-10-CM

## 2025-06-05 DIAGNOSIS — E53.8 VITAMIN B12 DEFICIENCY: ICD-10-CM

## 2025-06-05 DIAGNOSIS — I48.0 PAROXYSMAL ATRIAL FIBRILLATION (HCC): ICD-10-CM

## 2025-06-05 DIAGNOSIS — Z87.898 HISTORY OF CHEST PAIN: ICD-10-CM

## 2025-06-05 DIAGNOSIS — H54.7 IMPAIRED VISION: ICD-10-CM

## 2025-06-05 DIAGNOSIS — I50.22 CHRONIC SYSTOLIC CONGESTIVE HEART FAILURE (HCC): ICD-10-CM

## 2025-06-05 DIAGNOSIS — E11.69 TYPE 2 DIABETES MELLITUS WITH OTHER SPECIFIED COMPLICATION, WITHOUT LONG-TERM CURRENT USE OF INSULIN (HCC): ICD-10-CM

## 2025-06-05 DIAGNOSIS — R79.89 ELEVATED D-DIMER: ICD-10-CM

## 2025-06-05 DIAGNOSIS — E03.9 HYPOTHYROIDISM, UNSPECIFIED TYPE: ICD-10-CM

## 2025-06-05 LAB
ALBUMIN SERPL-MCNC: 3.8 G/DL (ref 3.4–4.8)
ALBUMIN/GLOB SERPL: 0.9 {RATIO} (ref 0.8–2)
ALP SERPL-CCNC: 173 U/L (ref 25–100)
ALT SERPL-CCNC: 14 U/L (ref 4–36)
ANION GAP SERPL CALCULATED.3IONS-SCNC: 14 MMOL/L (ref 3–16)
AST SERPL-CCNC: 22 U/L (ref 8–33)
BILIRUB SERPL-MCNC: 0.5 MG/DL (ref 0.3–1.2)
BUN SERPL-MCNC: 18 MG/DL (ref 6–20)
CALCIUM SERPL-MCNC: 9.9 MG/DL (ref 8.3–10.6)
CHLORIDE SERPL-SCNC: 96 MMOL/L (ref 98–107)
CO2 SERPL-SCNC: 28 MMOL/L (ref 20–30)
CREAT SERPL-MCNC: 1.3 MG/DL (ref 0.6–1.2)
CRP SERPL-MCNC: 28.9 MG/L (ref 0–5.1)
ERYTHROCYTE [SEDIMENTATION RATE] IN BLOOD BY WESTERGREN METHOD: 9 MM/HR (ref 0–30)
GFR SERPLBLD CREATININE-BSD FMLA CKD-EPI: 42 ML/MIN/{1.73_M2}
GLOBULIN SER CALC-MCNC: 4.1 G/DL
GLUCOSE SERPL-MCNC: 163 MG/DL (ref 74–106)
HBA1C MFR BLD: 9 %
POTASSIUM SERPL-SCNC: 4.8 MMOL/L (ref 3.4–5.1)
PROT SERPL-MCNC: 7.9 G/DL (ref 6.4–8.3)
SODIUM SERPL-SCNC: 138 MMOL/L (ref 136–145)

## 2025-06-05 PROCEDURE — 80053 COMPREHEN METABOLIC PANEL: CPT

## 2025-06-05 PROCEDURE — 85652 RBC SED RATE AUTOMATED: CPT

## 2025-06-05 PROCEDURE — 86140 C-REACTIVE PROTEIN: CPT

## 2025-06-05 PROCEDURE — 83036 HEMOGLOBIN GLYCOSYLATED A1C: CPT | Performed by: INTERNAL MEDICINE

## 2025-06-05 RX ORDER — CYANOCOBALAMIN 1000 UG/ML
1000 INJECTION, SOLUTION INTRAMUSCULAR; SUBCUTANEOUS ONCE
Status: COMPLETED | OUTPATIENT
Start: 2025-06-05 | End: 2025-06-05

## 2025-06-05 RX ADMIN — CYANOCOBALAMIN 1000 MCG: 1000 INJECTION, SOLUTION INTRAMUSCULAR; SUBCUTANEOUS at 13:44

## 2025-06-05 ASSESSMENT — ENCOUNTER SYMPTOMS
SHORTNESS OF BREATH: 0
VOMITING: 0
SINUS PRESSURE: 0
ABDOMINAL PAIN: 0
EYE DISCHARGE: 0
WHEEZING: 0
COUGH: 0
SORE THROAT: 0
BACK PAIN: 1
NAUSEA: 0

## 2025-06-05 NOTE — PROGRESS NOTES
SUBJECTIVE:    Patient ID: Jolanta Montenegro is a 79 y.o.female.    Chief Complaint   Patient presents with    Diabetes    Atrial Fibrillation    Congestive Heart Failure         HPI:  Patient has had diabetes for the past  several years.  She has been compliant with the medications and denies any side effects from it. She has not been monitoring fingersticks on a daily basis. She denies any hypoglycemic symptoms. She has  been following a diabetic diet and has  been active.  Her last eye exam was less than a year ago.  She is using oral meds and Ozempic.     Patient present today to follow-up from recent ER visit.  Patient reported she started to have some chest pain/tightness for few days before she ended up deciding to go to the emergency room.  She thought initially related to acid reflux and possible gas but she got concerned since her symptoms did not go away.  Workup in the emergency room was unremarkable but she was noted to have significantly elevated D-dimer for which CTA chest was done and was unremarkable.  Patient reported having no more issues with chest pain.  Patient with history of A-Fib.  She reported that her blood thinner was discontinued after placement of Watchman device.  Blood pressure has been stable.    Patient's medications, allergies, past medical, surgical, social and family histories were reviewed and updated as appropriate in the electronic medical record/chart.        Outpatient Medications Marked as Taking for the 6/5/25 encounter (Office Visit) with Alyx Perez MD   Medication Sig Dispense Refill    famotidine (PEPCID) 20 MG tablet Take 1 tablet by mouth 2 times daily 60 tablet 3    JARDIANCE 10 MG tablet TAKE ONE TABLET BY MOUTH ONCE A DAY 30 tablet 3    montelukast (SINGULAIR) 10 MG tablet TAKE ONE TABLET BY MOUTH EVERY NIGHT 30 tablet 3    diclofenac sodium (VOLTAREN) 1 % GEL Apply topically 4 times daily as needed for Pain APPLY 4 GRAMS TOPICALLYTO AFFECTED AREA(S) 4 TIMES A DAY AS

## 2025-06-09 RX ORDER — CLOPIDOGREL BISULFATE 75 MG/1
75 TABLET ORAL DAILY
COMMUNITY
Start: 2025-03-21

## 2025-06-17 ENCOUNTER — TELEPHONE (OUTPATIENT)
Dept: GASTROENTEROLOGY | Facility: CLINIC | Age: 80
End: 2025-06-17
Payer: MEDICARE

## 2025-06-17 NOTE — TELEPHONE ENCOUNTER
Pt had pillcam 5/2025, looks like we called her about results but she did not answer. I would like for her to come into the office to discuss results and plan. If possible, arrange appt with me 6/19 at 2:30 PM. Thank you.

## 2025-06-20 ENCOUNTER — OFFICE VISIT (OUTPATIENT)
Dept: CARDIOLOGY | Facility: CLINIC | Age: 80
End: 2025-06-20
Payer: MEDICARE

## 2025-06-20 VITALS
OXYGEN SATURATION: 95 % | WEIGHT: 174 LBS | DIASTOLIC BLOOD PRESSURE: 78 MMHG | HEART RATE: 82 BPM | BODY MASS INDEX: 32.85 KG/M2 | HEIGHT: 61 IN | SYSTOLIC BLOOD PRESSURE: 120 MMHG

## 2025-06-20 DIAGNOSIS — I25.10 CORONARY ARTERY DISEASE INVOLVING NATIVE CORONARY ARTERY OF NATIVE HEART WITHOUT ANGINA PECTORIS: Primary | ICD-10-CM

## 2025-06-20 DIAGNOSIS — I50.22 CHRONIC HFREF (HEART FAILURE WITH REDUCED EJECTION FRACTION): ICD-10-CM

## 2025-06-20 DIAGNOSIS — I48.0 PAROXYSMAL ATRIAL FIBRILLATION: ICD-10-CM

## 2025-06-20 RX ORDER — ASPIRIN 81 MG/1
81 TABLET ORAL DAILY
COMMUNITY

## 2025-06-20 RX ORDER — CLOPIDOGREL BISULFATE 75 MG/1
75 TABLET ORAL DAILY
COMMUNITY
Start: 2025-03-21

## 2025-06-20 RX ORDER — FAMOTIDINE 20 MG/1
20 TABLET, FILM COATED ORAL 2 TIMES DAILY
COMMUNITY
Start: 2025-05-27

## 2025-06-20 NOTE — PROGRESS NOTES
[facility]   [address], [address]  [place], [place], [address] [phone]  WWW.[facility] SystemsH[facility]           OUTPATIENT CLINIC FOLLOW UP NOTE    Patient Care Team:  Patient Care Team:  She Alarcon MD as PCP - General (Internal Medicine)  Inocencio Cates DO as Consulting Physician (Cardiology)  Davie Severino PA as Physician Assistant (Cardiology)    Subjective:      Chief Complaint   Patient presents with    Chronic systolic heart failure         Regina Ramsay is a 79 y.o. female.  Cardiac focused, problem list:  CAD  [facility] 10/31/2023:  Severe small vessel disease involving the distal LAD, which is too small for catheter-based intervention. Intermediate to severe disease in a small RPDA.  Paroxysmal atrial fibrillation  CHADsVASc score at least 6  Diagnosed 9/2023  Status post EPS with catheter ablation of PAF and typical right atrial flutter, 9/05/2024, Dr. Cates  Status post AVIS closure with 27 mm Watchman FLX Pro device implant, 1/29/2025, Dr. Cates  Postprocedural hypotension warranting vasopressors  Chronic systolic heart failure  Echocardiogram 9/24/2023:  LVEF 35-40%. Mild LVH. Indeterminate diastolic function.   Echocardiogram 10/31/2023:  LVEF 45%. Septal wall motion is abnormal, consistent with bundle branch block. Mild LVH. Grade Ia diastolic dysfunction. Aortic valve exhibits sclerosis. Mild mitral annular calcification.   Echocardiogram 1/30/2025:  LVEF 46-50%. Mild concentric LVH. Septal wall motion is abnormal, consistent with BBB. Indeterminate LV diastolic function. Moderately dilated LA. RVSP 35-45 mmHg  Abnormal perioperative EKG  Tachycardia, LVH with repolarization abnormality/strain, possible prior infarct pattern  Exertional dyspnea  Initial cardiac consultation 6/4  Stress test 7/22/2021:  Normal myocardial perfusion study with no evidence of ischemia. Low risk study.   Left bundle branch block  New on EKG  5/12/2022  PACs  Asymptomatic  PAD  Arterial US 10/2023 - Small vessel disease below the knee,   CKD  Iron deficiency anemia  Type II von Willebrand's disease   Noted on von Willebrand studies, 4/2024  Osteoarthritis  Affecting multiple joints  Trauma to left shoulder status post surgical repair in [place] October 2019  Prior adverse reaction to anesthesia  Patient reports asystole, data deficit  Tachycardia  Hypertension  Diabetes mellitus  Hypothyroidism  Obstructive sleep apnea  Hiatal hernia  Umbilical hernia  Anxiety    HPI:    ER visit in May 2025, events and results reviewed.  No significant chest pain since.  Does have some fleeting chest/left shoulder like discomfort.  Possibly musculoskeletal.  Possibly PACs.  Currently functioning at her baseline level.    Review of Systems:  As noted above in the HPI     PFSH:  Patient Active Problem List   Diagnosis    Essential hypertension    Diabetes mellitus    Asthma    Obesity (BMI 30-39.9)    GERD (gastroesophageal reflux disease)    Paroxysmal atrial fibrillation    LBBB (left bundle branch block)    Coronary artery disease involving native coronary artery    Mixed hyperlipidemia    Anemia    Von Willebrand disease    Iron deficiency anemia    Malabsorption of iron    HFrEF (heart failure with reduced ejection fraction)    Persistent atrial fibrillation    Presence of Watchman left atrial appendage closure device         Current Outpatient Medications:     aspirin 81 MG EC tablet, Take 1 tablet by mouth Daily., Disp: , Rfl:     atorvastatin (LIPITOR) 40 MG tablet, Take 1 tablet by mouth every night at bedtime., Disp: 90 tablet, Rfl: 3    citalopram (CeleXA) 10 MG tablet, Take 1 tablet by mouth Daily., Disp: , Rfl:     clopidogrel (PLAVIX) 75 MG tablet, Take 1 tablet by mouth Daily., Disp: , Rfl:     Cyanocobalamin (B-12 Compliance Injection) 1000 MCG/ML kit, Inject 1 mL as directed Every 14 (Fourteen) Days., Disp: , Rfl:     Diclofenac Sodium (VOLTAREN) 1 % gel  gel, Apply 4 g topically to the appropriate area as directed As Needed., Disp: , Rfl:     docusate sodium 100 MG capsule, Take 1 capsule by mouth., Disp: , Rfl:     empagliflozin (JARDIANCE) 10 MG tablet tablet, Take 1 tablet by mouth Daily., Disp: , Rfl:     famotidine (PEPCID) 20 MG tablet, Take 1 tablet by mouth 2 (Two) Times a Day., Disp: , Rfl:     furosemide (LASIX) 20 MG tablet, Take 1 tablet by mouth Daily., Disp: , Rfl:     glipiZIDE (GLUCOTROL) 10 MG 24 hr tablet, Take 1 tablet by mouth Daily. Pt only takes one time per day (nightly), Disp: , Rfl:     levocetirizine (XYZAL) 5 MG tablet, Take 1 tablet by mouth Daily., Disp: , Rfl:     levothyroxine sodium (TIROSINT) 150 MCG capsule, Take 1 capsule by mouth Every Morning., Disp: , Rfl:     Magnesium Oxide -Mg Supplement 400 (240 Mg) MG tablet, Take 1 tablet by mouth Daily., Disp: , Rfl:     metoprolol succinate XL (TOPROL-XL) 25 MG 24 hr tablet, Take 1 tablet by mouth Daily., Disp: 60 tablet, Rfl: 5    midodrine (PROAMATINE) 5 MG tablet, Take 1 tablet by mouth 3 (Three) Times a Day Before Meals., Disp: 180 tablet, Rfl: 5    montelukast (SINGULAIR) 10 MG tablet, Take 1 tablet by mouth Every Night., Disp: , Rfl:     omeprazole (priLOSEC) 40 MG capsule, Take 1 capsule by mouth., Disp: , Rfl:     oxyCODONE (ROXICODONE) 15 MG immediate release tablet, Take 1 tablet by mouth Every 6 (Six) Hours As Needed for Moderate Pain., Disp: , Rfl:     Ozempic, 0.25 or 0.5 MG/DOSE, 2 MG/3ML solution pen-injector, Inject 0.25 mg under the skin into the appropriate area as directed 1 (One) Time Per Week. Patient instructed to hold prior to procedure. Last dose 1/18/25, Disp: , Rfl:     vitamin D (ERGOCALCIFEROL) 1.25 MG (30817 UT) capsule capsule, Take 1 capsule by mouth 1 (One) Time Per Week., Disp: , Rfl:      reports that she has never smoked. She has never used smokeless tobacco.      Objective:   Physical exam:  /78 (BP Location: Left arm, Patient Position: Sitting)   " Pulse 82   Ht 154.9 cm (61\")   Wt 78.9 kg (174 lb)   SpO2 95%   BMI 32.88 kg/m²   CONSTITUTIONAL: No acute distress  CARDIOVASCULAR: Regular rhythm with ectopic heartbeats with normal S1 and S2. Without murmur.  PERIPHERAL VASCULAR:Normal radial pulse.       Labs:    Lab Results   Component Value Date    LDL 77 10/31/2023     No components found for: \"LDLDIRECTC\"    Diagnostic Data:    Procedures    Results for orders placed during the hospital encounter of 01/29/25    STAT Adult Transthoracic Echo Complete W/ Cont if Necessary Per Protocol    Interpretation Summary    Left ventricular systolic function is mildly decreased. Calculated left ventricular EF = 49.2% Left ventricular ejection fraction appears to be 46 - 50%.    Left ventricular wall thickness is consistent with mild concentric hypertrophy.    Septal wall motion is abnormal, consistent with a bundle branch block.    The following left ventricular wall segments are hypokinetic: mid inferior and basal inferior.    Left ventricular diastolic function was indeterminate.    The left atrial cavity is mild to moderately dilated.    Estimated right ventricular systolic pressure from tricuspid regurgitation is mildly elevated (35-45 mmHg). Calculated right ventricular systolic pressure from tricuspid regurgitation is 39 mmHg.      Assessment and Plan:     Chronic systolic heart failure  Left bundle branch block  -Nonischemic  -EF improved to 45% with medical therapy  -Continue current related medications.     CAD, small vessel  Elevated cholesterol  -Continue aspirin, statin, beta blocker, valsartan     Paroxysmal atrial fibrillation  -Had severe anemia requiring multiple transfusions on Eliquis.   -Status post Watchman implant 1/29/2025  -Continue beta-blocker  - DAPT timeline per EP service    Primary hypertension  -Continue current related medications.     PAD  -Has small vessel disease below the knee  -Aggressive lifestyle and risk factor modification as " well as medical therapy       - Return for Keep 9/2025 appointment as scheduled.

## 2025-06-23 RX ORDER — METOPROLOL SUCCINATE 25 MG/1
25 TABLET, EXTENDED RELEASE ORAL DAILY
Qty: 30 TABLET | Refills: 3 | Status: SHIPPED | OUTPATIENT
Start: 2025-06-23

## 2025-06-23 RX ORDER — LANOLIN ALCOHOL/MO/W.PET/CERES
400 CREAM (GRAM) TOPICAL DAILY
Qty: 30 TABLET | Refills: 2 | Status: SHIPPED | OUTPATIENT
Start: 2025-06-23

## 2025-06-23 RX ORDER — CITALOPRAM HYDROBROMIDE 10 MG/1
10 TABLET ORAL DAILY
Qty: 30 TABLET | Refills: 2 | Status: SHIPPED | OUTPATIENT
Start: 2025-06-23

## 2025-06-26 ENCOUNTER — OFFICE VISIT (OUTPATIENT)
Dept: GASTROENTEROLOGY | Facility: CLINIC | Age: 80
End: 2025-06-26
Payer: MEDICARE

## 2025-06-26 VITALS
HEART RATE: 90 BPM | BODY MASS INDEX: 33.07 KG/M2 | WEIGHT: 175 LBS | SYSTOLIC BLOOD PRESSURE: 122 MMHG | OXYGEN SATURATION: 92 % | DIASTOLIC BLOOD PRESSURE: 80 MMHG

## 2025-06-26 DIAGNOSIS — R19.7 DIARRHEA, UNSPECIFIED TYPE: ICD-10-CM

## 2025-06-26 DIAGNOSIS — K21.9 GASTROESOPHAGEAL REFLUX DISEASE, UNSPECIFIED WHETHER ESOPHAGITIS PRESENT: ICD-10-CM

## 2025-06-26 DIAGNOSIS — D50.9 IRON DEFICIENCY ANEMIA, UNSPECIFIED IRON DEFICIENCY ANEMIA TYPE: ICD-10-CM

## 2025-06-26 DIAGNOSIS — K50.00 CROHN'S DISEASE OF SMALL INTESTINE WITHOUT COMPLICATION: Primary | ICD-10-CM

## 2025-06-26 RX ORDER — ACETAMINOPHEN 325 MG/1
650 TABLET ORAL ONCE
OUTPATIENT
Start: 2025-06-30 | End: 2025-06-30

## 2025-06-26 RX ORDER — SODIUM CHLORIDE 9 MG/ML
20 INJECTION, SOLUTION INTRAVENOUS ONCE
OUTPATIENT
Start: 2025-06-30

## 2025-06-26 RX ORDER — DIPHENHYDRAMINE HCL 25 MG
25 CAPSULE ORAL ONCE
OUTPATIENT
Start: 2025-06-30 | End: 2025-06-30

## 2025-06-26 NOTE — PATIENT INSTRUCTIONS
We will try for approval for vedolizumab (Entyvio) which is an infusion for moderate-severe Crohn's disease treatment to be given at [facility] infusion clinic. If you don't hear back on this in the next 2 weeks, please call us back to check on this. *changed recommendation from Remicade to Entyvio after visit
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
## 2025-07-15 ENCOUNTER — HOSPITAL ENCOUNTER (OUTPATIENT)
Facility: HOSPITAL | Age: 80
Discharge: HOME | End: 2025-07-15
Admitting: PHYSICIAN ASSISTANT
Payer: MEDICARE

## 2025-07-15 VITALS
DIASTOLIC BLOOD PRESSURE: 77 MMHG | OXYGEN SATURATION: 95 % | TEMPERATURE: 98.3 F | SYSTOLIC BLOOD PRESSURE: 121 MMHG | RESPIRATION RATE: 16 BRPM | HEART RATE: 76 BPM

## 2025-07-15 DIAGNOSIS — K50.00 CROHN'S DISEASE OF SMALL INTESTINE WITHOUT COMPLICATION: Primary | ICD-10-CM

## 2025-07-15 PROCEDURE — 63710000001 DIPHENHYDRAMINE PER 50 MG: Performed by: PHYSICIAN ASSISTANT

## 2025-07-15 PROCEDURE — 25810000003 SODIUM CHLORIDE 0.9 % SOLUTION 250 ML FLEX CONT: Performed by: PHYSICIAN ASSISTANT

## 2025-07-15 PROCEDURE — 96365 THER/PROPH/DIAG IV INF INIT: CPT

## 2025-07-15 PROCEDURE — 25010000002 VEDOLIZUMAB 300 MG RECONSTITUTED SOLUTION 1 EACH VIAL: Performed by: PHYSICIAN ASSISTANT

## 2025-07-15 RX ORDER — SODIUM CHLORIDE 9 MG/ML
20 INJECTION, SOLUTION INTRAVENOUS ONCE
Status: DISCONTINUED | OUTPATIENT
Start: 2025-07-15 | End: 2025-07-16 | Stop reason: HOSPADM

## 2025-07-15 RX ORDER — DIPHENHYDRAMINE HCL 25 MG
25 CAPSULE ORAL ONCE
OUTPATIENT
Start: 2025-07-29 | End: 2025-07-29

## 2025-07-15 RX ORDER — GLIPIZIDE 10 MG/1
10 TABLET, FILM COATED, EXTENDED RELEASE ORAL DAILY
Qty: 30 TABLET | Refills: 3 | Status: SHIPPED | OUTPATIENT
Start: 2025-07-15

## 2025-07-15 RX ORDER — ACETAMINOPHEN 325 MG/1
650 TABLET ORAL ONCE
OUTPATIENT
Start: 2025-07-29 | End: 2025-07-29

## 2025-07-15 RX ORDER — ACETAMINOPHEN 325 MG/1
650 TABLET ORAL ONCE
Status: COMPLETED | OUTPATIENT
Start: 2025-07-15 | End: 2025-07-15

## 2025-07-15 RX ORDER — SODIUM CHLORIDE 9 MG/ML
20 INJECTION, SOLUTION INTRAVENOUS ONCE
OUTPATIENT
Start: 2025-07-29

## 2025-07-15 RX ORDER — ATORVASTATIN CALCIUM 40 MG/1
40 TABLET, FILM COATED ORAL DAILY
Qty: 30 TABLET | Refills: 3 | Status: SHIPPED | OUTPATIENT
Start: 2025-07-15

## 2025-07-15 RX ORDER — DIPHENHYDRAMINE HCL 25 MG
25 CAPSULE ORAL ONCE
Status: COMPLETED | OUTPATIENT
Start: 2025-07-15 | End: 2025-07-15

## 2025-07-15 RX ADMIN — ACETAMINOPHEN 650 MG: 325 TABLET ORAL at 14:30

## 2025-07-15 RX ADMIN — SODIUM CHLORIDE 300 MG: 9 INJECTION, SOLUTION INTRAVENOUS at 14:52

## 2025-07-15 RX ADMIN — DIPHENHYDRAMINE HYDROCHLORIDE 25 MG: 25 CAPSULE ORAL at 14:31

## 2025-07-24 RX ORDER — LEVOCETIRIZINE DIHYDROCHLORIDE 5 MG/1
5 TABLET, FILM COATED ORAL NIGHTLY
Qty: 30 TABLET | Refills: 3 | Status: SHIPPED | OUTPATIENT
Start: 2025-07-24

## 2025-07-24 NOTE — PROGRESS NOTES
6 Month LAAO Follow-up    Patient presents today for follow-up s/p watchman placement by Dr. Cates on 1/29/2025. Patient has completed follow-up chest CTA that revealed a well-positioned watchman device and transitioned to DAPT.  Reports no other cardiac devices or procedures in the past year that would require DAPT. Patient instructed to transition to aspirin monotherapy.       Discussed 1 year LAAO follow-up with planned CARMELA around 1 year anniversary date of LAAO implant.  1 year CARMELA ordered per LAAO protocol.  Patient receptive of plan, appreciative of call.

## 2025-07-25 ENCOUNTER — TELEMEDICINE (OUTPATIENT)
Dept: CARDIOLOGY | Facility: HOSPITAL | Age: 80
End: 2025-07-25
Payer: MEDICARE

## 2025-07-25 DIAGNOSIS — I48.0 PAROXYSMAL ATRIAL FIBRILLATION: ICD-10-CM

## 2025-07-25 DIAGNOSIS — Z95.818 PRESENCE OF WATCHMAN LEFT ATRIAL APPENDAGE CLOSURE DEVICE: Primary | ICD-10-CM

## 2025-07-29 ENCOUNTER — HOSPITAL ENCOUNTER (OUTPATIENT)
Facility: HOSPITAL | Age: 80
Discharge: HOME | End: 2025-07-29
Admitting: PHYSICIAN ASSISTANT
Payer: MEDICARE

## 2025-07-29 VITALS
SYSTOLIC BLOOD PRESSURE: 131 MMHG | TEMPERATURE: 98.3 F | RESPIRATION RATE: 16 BRPM | HEART RATE: 85 BPM | DIASTOLIC BLOOD PRESSURE: 84 MMHG | OXYGEN SATURATION: 95 %

## 2025-07-29 DIAGNOSIS — K50.00 CROHN'S DISEASE OF SMALL INTESTINE WITHOUT COMPLICATION: Primary | ICD-10-CM

## 2025-07-29 PROCEDURE — 25810000003 SODIUM CHLORIDE 0.9 % SOLUTION 250 ML FLEX CONT: Performed by: PHYSICIAN ASSISTANT

## 2025-07-29 PROCEDURE — 25010000002 VEDOLIZUMAB 300 MG RECONSTITUTED SOLUTION 1 EACH VIAL: Performed by: PHYSICIAN ASSISTANT

## 2025-07-29 PROCEDURE — 63710000001 DIPHENHYDRAMINE PER 50 MG: Performed by: PHYSICIAN ASSISTANT

## 2025-07-29 PROCEDURE — 96365 THER/PROPH/DIAG IV INF INIT: CPT

## 2025-07-29 RX ORDER — ACETAMINOPHEN 325 MG/1
650 TABLET ORAL ONCE
OUTPATIENT
Start: 2025-08-26 | End: 2025-08-26

## 2025-07-29 RX ORDER — ACETAMINOPHEN 325 MG/1
650 TABLET ORAL ONCE
Status: CANCELLED | OUTPATIENT
Start: 2025-08-26 | End: 2025-08-26

## 2025-07-29 RX ORDER — DIPHENHYDRAMINE HCL 25 MG
25 CAPSULE ORAL ONCE
Status: CANCELLED | OUTPATIENT
Start: 2025-08-26 | End: 2025-08-26

## 2025-07-29 RX ORDER — DIPHENHYDRAMINE HCL 25 MG
25 CAPSULE ORAL ONCE
OUTPATIENT
Start: 2025-08-26 | End: 2025-08-26

## 2025-07-29 RX ORDER — SODIUM CHLORIDE 9 MG/ML
20 INJECTION, SOLUTION INTRAVENOUS ONCE
Status: DISCONTINUED | OUTPATIENT
Start: 2025-07-29 | End: 2025-07-30 | Stop reason: HOSPADM

## 2025-07-29 RX ORDER — ACETAMINOPHEN 325 MG/1
650 TABLET ORAL ONCE
Status: COMPLETED | OUTPATIENT
Start: 2025-07-29 | End: 2025-07-29

## 2025-07-29 RX ORDER — DIPHENHYDRAMINE HCL 25 MG
25 CAPSULE ORAL ONCE
Status: COMPLETED | OUTPATIENT
Start: 2025-07-29 | End: 2025-07-29

## 2025-07-29 RX ORDER — SODIUM CHLORIDE 9 MG/ML
20 INJECTION, SOLUTION INTRAVENOUS ONCE
OUTPATIENT
Start: 2025-08-26

## 2025-07-29 RX ADMIN — ACETAMINOPHEN 650 MG: 325 TABLET ORAL at 14:06

## 2025-07-29 RX ADMIN — DIPHENHYDRAMINE HYDROCHLORIDE 25 MG: 25 CAPSULE ORAL at 14:06

## 2025-07-29 RX ADMIN — SODIUM CHLORIDE 300 MG: 9 INJECTION, SOLUTION INTRAVENOUS at 14:29

## 2025-07-29 NOTE — CODE DOCUMENTATION
PIV established on second attempt. Treatment provided per tx plan (see EMAR). Pt tolerated tx w/o complications. PIV d/c'd per protocol, AVS provided. Pt ambulated out of clinic w/o any acute s/sx of distress.

## 2025-08-11 ENCOUNTER — OFFICE VISIT (OUTPATIENT)
Age: 80
End: 2025-08-11
Payer: MEDICARE

## 2025-08-11 ENCOUNTER — TELEPHONE (OUTPATIENT)
Age: 80
End: 2025-08-11

## 2025-08-11 ENCOUNTER — HOSPITAL ENCOUNTER (OUTPATIENT)
Facility: HOSPITAL | Age: 80
Discharge: HOME | End: 2025-08-11
Payer: MEDICARE

## 2025-08-11 VITALS
SYSTOLIC BLOOD PRESSURE: 133 MMHG | OXYGEN SATURATION: 96 % | WEIGHT: 174 LBS | HEART RATE: 76 BPM | RESPIRATION RATE: 18 BRPM | BODY MASS INDEX: 33.98 KG/M2 | DIASTOLIC BLOOD PRESSURE: 78 MMHG

## 2025-08-11 DIAGNOSIS — R79.89 ELEVATED D-DIMER: ICD-10-CM

## 2025-08-11 DIAGNOSIS — I50.22 CHRONIC SYSTOLIC CONGESTIVE HEART FAILURE (HCC): ICD-10-CM

## 2025-08-11 DIAGNOSIS — M06.4 INFLAMMATORY POLYARTHROPATHY (HCC): Chronic | ICD-10-CM

## 2025-08-11 DIAGNOSIS — E53.8 VITAMIN B12 DEFICIENCY: ICD-10-CM

## 2025-08-11 DIAGNOSIS — I48.0 PAROXYSMAL ATRIAL FIBRILLATION (HCC): ICD-10-CM

## 2025-08-11 DIAGNOSIS — E11.69 TYPE 2 DIABETES MELLITUS WITH OTHER SPECIFIED COMPLICATION, WITHOUT LONG-TERM CURRENT USE OF INSULIN (HCC): Primary | ICD-10-CM

## 2025-08-11 DIAGNOSIS — Z87.898 HISTORY OF CHEST PAIN: ICD-10-CM

## 2025-08-11 PROBLEM — R63.4 UNINTENTIONAL WEIGHT LOSS: Status: RESOLVED | Noted: 2024-03-13 | Resolved: 2025-08-11

## 2025-08-11 LAB
ALBUMIN SERPL-MCNC: 4.3 G/DL (ref 3.4–4.8)
ALBUMIN/GLOB SERPL: 1.3 {RATIO} (ref 0.8–2)
ALP SERPL-CCNC: 170 U/L (ref 25–100)
ALT SERPL-CCNC: 19 U/L (ref 4–36)
ANION GAP SERPL CALCULATED.3IONS-SCNC: 19 MMOL/L (ref 3–16)
AST SERPL-CCNC: 17 U/L (ref 8–33)
BASOPHILS # BLD: 0.1 K/UL (ref 0–0.1)
BASOPHILS NFR BLD: 0.9 %
BILIRUB SERPL-MCNC: 0.5 MG/DL (ref 0.3–1.2)
BUN SERPL-MCNC: 18 MG/DL (ref 6–20)
CALCIUM SERPL-MCNC: 10 MG/DL (ref 8.3–10.6)
CHLORIDE SERPL-SCNC: 98 MMOL/L (ref 98–107)
CO2 SERPL-SCNC: 27 MMOL/L (ref 20–30)
CREAT SERPL-MCNC: 1.2 MG/DL (ref 0.6–1.2)
CRP SERPL-MCNC: 5.5 MG/L (ref 0–5.1)
D DIMER PPP DDU-MCNC: 1.11 UG/ML FEU (ref 0–0.6)
EOSINOPHIL # BLD: 0.3 K/UL (ref 0–0.4)
EOSINOPHIL NFR BLD: 2.4 %
ERYTHROCYTE [DISTWIDTH] IN BLOOD BY AUTOMATED COUNT: 17.3 % (ref 11–16)
GFR SERPLBLD CREATININE-BSD FMLA CKD-EPI: 46 ML/MIN/{1.73_M2}
GLOBULIN SER CALC-MCNC: 3.2 G/DL
GLUCOSE SERPL-MCNC: 149 MG/DL (ref 74–106)
HCT VFR BLD AUTO: 48.5 % (ref 37–47)
HGB BLD-MCNC: 15 G/DL (ref 11.5–16.5)
IMM GRANULOCYTES # BLD: 0.1 K/UL
IMM GRANULOCYTES NFR BLD: 1.1 % (ref 0–5)
LYMPHOCYTES # BLD: 2.8 K/UL (ref 1.5–4)
LYMPHOCYTES NFR BLD: 22.5 %
MCH RBC QN AUTO: 28.2 PG (ref 27–32)
MCHC RBC AUTO-ENTMCNC: 30.9 G/DL (ref 31–35)
MCV RBC AUTO: 91.2 FL (ref 80–100)
MONOCYTES # BLD: 0.6 K/UL (ref 0.2–0.8)
MONOCYTES NFR BLD: 5 %
NEUTROPHILS # BLD: 8.4 K/UL (ref 2–7.5)
NEUTS SEG NFR BLD: 68.1 %
PLATELET # BLD AUTO: 211 K/UL (ref 150–400)
PMV BLD AUTO: 11.5 FL (ref 6–10)
POTASSIUM SERPL-SCNC: 4.9 MMOL/L (ref 3.4–5.1)
PROT SERPL-MCNC: 7.5 G/DL (ref 6.4–8.3)
RBC # BLD AUTO: 5.32 M/UL (ref 3.8–5.8)
SODIUM SERPL-SCNC: 144 MMOL/L (ref 136–145)
TSH SERPL DL<=0.005 MIU/L-ACNC: 0.17 UIU/ML (ref 0.27–4.2)
WBC # BLD AUTO: 12.3 K/UL (ref 4–11)

## 2025-08-11 PROCEDURE — G8417 CALC BMI ABV UP PARAM F/U: HCPCS | Performed by: INTERNAL MEDICINE

## 2025-08-11 PROCEDURE — 80053 COMPREHEN METABOLIC PANEL: CPT

## 2025-08-11 PROCEDURE — 3052F HG A1C>EQUAL 8.0%<EQUAL 9.0%: CPT | Performed by: INTERNAL MEDICINE

## 2025-08-11 PROCEDURE — 1160F RVW MEDS BY RX/DR IN RCRD: CPT | Performed by: INTERNAL MEDICINE

## 2025-08-11 PROCEDURE — 96372 THER/PROPH/DIAG INJ SC/IM: CPT | Performed by: INTERNAL MEDICINE

## 2025-08-11 PROCEDURE — 1124F ACP DISCUSS-NO DSCNMKR DOCD: CPT | Performed by: INTERNAL MEDICINE

## 2025-08-11 PROCEDURE — 85379 FIBRIN DEGRADATION QUANT: CPT

## 2025-08-11 PROCEDURE — G8427 DOCREV CUR MEDS BY ELIG CLIN: HCPCS | Performed by: INTERNAL MEDICINE

## 2025-08-11 PROCEDURE — 3075F SYST BP GE 130 - 139MM HG: CPT | Performed by: INTERNAL MEDICINE

## 2025-08-11 PROCEDURE — 1159F MED LIST DOCD IN RCRD: CPT | Performed by: INTERNAL MEDICINE

## 2025-08-11 PROCEDURE — 1036F TOBACCO NON-USER: CPT | Performed by: INTERNAL MEDICINE

## 2025-08-11 PROCEDURE — 84443 ASSAY THYROID STIM HORMONE: CPT

## 2025-08-11 PROCEDURE — 1090F PRES/ABSN URINE INCON ASSESS: CPT | Performed by: INTERNAL MEDICINE

## 2025-08-11 PROCEDURE — 3078F DIAST BP <80 MM HG: CPT | Performed by: INTERNAL MEDICINE

## 2025-08-11 PROCEDURE — 99214 OFFICE O/P EST MOD 30 MIN: CPT | Performed by: INTERNAL MEDICINE

## 2025-08-11 PROCEDURE — 85025 COMPLETE CBC W/AUTO DIFF WBC: CPT

## 2025-08-11 PROCEDURE — G8400 PT W/DXA NO RESULTS DOC: HCPCS | Performed by: INTERNAL MEDICINE

## 2025-08-11 PROCEDURE — 86140 C-REACTIVE PROTEIN: CPT

## 2025-08-11 PROCEDURE — 36415 COLL VENOUS BLD VENIPUNCTURE: CPT

## 2025-08-11 RX ORDER — CYANOCOBALAMIN 1000 UG/ML
1000 INJECTION, SOLUTION INTRAMUSCULAR; SUBCUTANEOUS ONCE
Status: COMPLETED | OUTPATIENT
Start: 2025-08-11 | End: 2025-08-11

## 2025-08-11 RX ADMIN — CYANOCOBALAMIN 1000 MCG: 1000 INJECTION, SOLUTION INTRAMUSCULAR; SUBCUTANEOUS at 12:47

## 2025-08-11 ASSESSMENT — ENCOUNTER SYMPTOMS
SHORTNESS OF BREATH: 0
VOMITING: 0
NAUSEA: 0
SORE THROAT: 0
EYE DISCHARGE: 0
SINUS PRESSURE: 0
BACK PAIN: 1
COUGH: 0
ABDOMINAL PAIN: 0
WHEEZING: 0

## 2025-08-13 DIAGNOSIS — K21.9 GASTROESOPHAGEAL REFLUX DISEASE, UNSPECIFIED WHETHER ESOPHAGITIS PRESENT: ICD-10-CM

## 2025-08-13 RX ORDER — OMEPRAZOLE 40 MG/1
CAPSULE, DELAYED RELEASE ORAL
Qty: 30 CAPSULE | Refills: 5 | Status: SHIPPED | OUTPATIENT
Start: 2025-08-13

## 2025-08-13 RX ORDER — LEVOTHYROXINE SODIUM 150 UG/1
150 TABLET ORAL DAILY
Qty: 90 TABLET | Refills: 0 | Status: SHIPPED | OUTPATIENT
Start: 2025-08-13

## 2025-08-15 ENCOUNTER — TELEPHONE (OUTPATIENT)
Dept: GASTROENTEROLOGY | Facility: CLINIC | Age: 80
End: 2025-08-15
Payer: MEDICARE

## 2025-08-18 ENCOUNTER — TELEPHONE (OUTPATIENT)
Dept: GASTROENTEROLOGY | Facility: CLINIC | Age: 80
End: 2025-08-18
Payer: MEDICARE

## 2025-08-19 ENCOUNTER — TELEPHONE (OUTPATIENT)
Dept: GASTROENTEROLOGY | Facility: CLINIC | Age: 80
End: 2025-08-19
Payer: MEDICARE

## 2025-08-26 ENCOUNTER — HOSPITAL ENCOUNTER (OUTPATIENT)
Facility: HOSPITAL | Age: 80
Discharge: HOME | End: 2025-08-26
Admitting: PHYSICIAN ASSISTANT
Payer: MEDICARE

## 2025-08-26 VITALS
SYSTOLIC BLOOD PRESSURE: 152 MMHG | HEART RATE: 86 BPM | OXYGEN SATURATION: 95 % | RESPIRATION RATE: 18 BRPM | DIASTOLIC BLOOD PRESSURE: 81 MMHG

## 2025-08-26 DIAGNOSIS — K50.00 CROHN'S DISEASE OF SMALL INTESTINE WITHOUT COMPLICATION: Primary | ICD-10-CM

## 2025-08-26 PROCEDURE — 25010000002 VEDOLIZUMAB 300 MG RECONSTITUTED SOLUTION 1 EACH VIAL: Performed by: PHYSICIAN ASSISTANT

## 2025-08-26 PROCEDURE — 96365 THER/PROPH/DIAG IV INF INIT: CPT

## 2025-08-26 PROCEDURE — 63710000001 DIPHENHYDRAMINE PER 50 MG: Performed by: PHYSICIAN ASSISTANT

## 2025-08-26 PROCEDURE — 25810000003 SODIUM CHLORIDE 0.9 % SOLUTION 250 ML FLEX CONT: Performed by: PHYSICIAN ASSISTANT

## 2025-08-26 RX ORDER — ACETAMINOPHEN 325 MG/1
650 TABLET ORAL ONCE
OUTPATIENT
Start: 2025-10-21 | End: 2025-10-21

## 2025-08-26 RX ORDER — DIPHENHYDRAMINE HCL 25 MG
25 CAPSULE ORAL ONCE
Status: CANCELLED | OUTPATIENT
Start: 2025-10-21 | End: 2025-10-21

## 2025-08-26 RX ORDER — DIPHENHYDRAMINE HCL 25 MG
25 CAPSULE ORAL ONCE
Status: COMPLETED | OUTPATIENT
Start: 2025-08-26 | End: 2025-08-26

## 2025-08-26 RX ORDER — SODIUM CHLORIDE 9 MG/ML
20 INJECTION, SOLUTION INTRAVENOUS ONCE
Status: DISCONTINUED | OUTPATIENT
Start: 2025-08-26 | End: 2025-08-27 | Stop reason: HOSPADM

## 2025-08-26 RX ORDER — ACETAMINOPHEN 325 MG/1
650 TABLET ORAL ONCE
Status: CANCELLED | OUTPATIENT
Start: 2025-10-21 | End: 2025-10-21

## 2025-08-26 RX ORDER — DIPHENHYDRAMINE HCL 25 MG
25 CAPSULE ORAL ONCE
OUTPATIENT
Start: 2025-10-21 | End: 2025-10-21

## 2025-08-26 RX ORDER — ACETAMINOPHEN 325 MG/1
650 TABLET ORAL ONCE
Status: COMPLETED | OUTPATIENT
Start: 2025-08-26 | End: 2025-08-26

## 2025-08-26 RX ORDER — SODIUM CHLORIDE 9 MG/ML
20 INJECTION, SOLUTION INTRAVENOUS ONCE
OUTPATIENT
Start: 2025-10-21

## 2025-08-26 RX ADMIN — ACETAMINOPHEN 650 MG: 325 TABLET ORAL at 13:26

## 2025-08-26 RX ADMIN — DIPHENHYDRAMINE HYDROCHLORIDE 25 MG: 25 CAPSULE ORAL at 13:26

## 2025-08-26 RX ADMIN — SODIUM CHLORIDE 300 MG: 9 INJECTION, SOLUTION INTRAVENOUS at 14:00

## (undated) DEVICE — ST EXT IV SMARTSITE PINCH/CLMP 5ML 46CM

## (undated) DEVICE — SET PRIMARY GRVTY 10DP MALE LL 104IN

## (undated) DEVICE — LIMB HOLDER, WRIST/ANKLE: Brand: DEROYAL

## (undated) DEVICE — HANDPIECE SET WITH HIGH FLOW TIP AND SUCTION TUBE: Brand: INTERPULSE

## (undated) DEVICE — KT MANIFLD EP

## (undated) DEVICE — Device: Brand: MEDEX

## (undated) DEVICE — ACCESS SHEATH WITH DILATOR: Brand: WATCHMAN TRUSTEER™ ACCESS SYSTEM

## (undated) DEVICE — ELECTRD BLD EZ CLN STD 6.5IN

## (undated) DEVICE — DRSNG SURESITE123 4X4.8IN

## (undated) DEVICE — POSTN HD UNIV

## (undated) DEVICE — PK MAJ SHLDR SPLT 10

## (undated) DEVICE — ADULT, W/LG. BACK PAD, RADIOTRANSPARENT ELEMENT AND LEAD WIRE COMPATIBLE W/: Brand: DEFIBRILLATION ELECTRODES

## (undated) DEVICE — TBG PENCL TELESCP MEGADYNE SMOKE EVAC 10FT

## (undated) DEVICE — CANN NASL CAPNOFLEX SMPL CO2/O2 W/O2/DEL A/

## (undated) DEVICE — KT PUMP INFUBLOCK MDL 2100 PMKITSOLIS

## (undated) DEVICE — LEX ELECTRO PHYSIOLOGY: Brand: MEDLINE INDUSTRIES, INC.

## (undated) DEVICE — CONTRL CONTRST CHMBRD W/TBG72IN REUS

## (undated) DEVICE — SYS CLS VASC/VENI VASCADE MVP 6TO12F

## (undated) DEVICE — GW DIAG .032

## (undated) DEVICE — GW PERIPH GUIDERIGHT STD/EXCHNG/J/TIP SS 0.035IN 5X260CM

## (undated) DEVICE — MODEL AT P65, P/N 701554-001KIT CONTENTS: HAND CONTROLLER, 3-WAY HIGH-PRESSURE STOPCOCK WITH ROTATING END AND PREMIUM HIGH-PRESSURE TUBING: Brand: ANGIOTOUCH® KIT

## (undated) DEVICE — PRESSURE MONITORING SET: Brand: TRUWAVE

## (undated) DEVICE — INTRO SHEATH FAST/CATH LG/LUM 11F .038IN 12CM

## (undated) DEVICE — ST EXT IV SMRTSTE 2VLV FIX M LL 6ML 41

## (undated) DEVICE — STERILE (15.2 TAPERED TO 7.6 X 183CM) POLYETHYLENE ACCORDION-FOLDED COVER FOR USE WITH SIEMENS ACUNAV ULTRASOUND CATHETER FAMILY CONNECTOR: Brand: SWIFTLINK TRANSDUCER COVER

## (undated) DEVICE — SYS CLS VASC/VENI VASCADE/MVP 10TO12F XL

## (undated) DEVICE — ANTIBACTERIAL UNDYED BRAIDED (POLYGLACTIN 910), SYNTHETIC ABSORBABLE SUTURE: Brand: COATED VICRYL

## (undated) DEVICE — MODEL BT2000 P/N 700287-012KIT CONTENTS: MANIFOLD WITH SALINE AND CONTRAST PORTS, SALINE TUBING WITH SPIKE AND HAND SYRINGE, TRANSDUCER: Brand: BT2000 AUTOMATED MANIFOLD KIT

## (undated) DEVICE — DECANT BG O JET

## (undated) DEVICE — GLV SURG SIGNATURE TOUCH PF LTX 8 STRL BX/50

## (undated) DEVICE — SYR LUERLOK 30CC

## (undated) DEVICE — NDL ANGIOGR ADV THN SMOTH SGLWALL 21G 1.5

## (undated) DEVICE — GLV SURG SENSICARE PI ORTHO SZ7.5 LF STRL

## (undated) DEVICE — DRAPE,SHOULDER,BEACH CHAIR,STERILE: Brand: MEDLINE

## (undated) DEVICE — ELECTRD RETRN/GRND MEGADYNE SGL/PLT W/CORD 9FT DISP

## (undated) DEVICE — GLIDESHEATH SLENDER STAINLESS STEEL KIT: Brand: GLIDESHEATH SLENDER

## (undated) DEVICE — DEV COMP RAD PRELUDESYNC 29CM

## (undated) DEVICE — PULSED FIELD ABLATION CATHETER: Brand: FARAWAVE™

## (undated) DEVICE — STEERABLE SHEATH CLEAR: Brand: FARADRIVE™

## (undated) DEVICE — STRYKER PERFORMANCE SERIES SAGITTAL BLADE: Brand: STRYKER PERFORMANCE SERIES

## (undated) DEVICE — ADHS SKIN PREMIERPRO EXOFIN TOPICAL HI/VISC .5ML

## (undated) DEVICE — CATH ULTRS NUVISION

## (undated) DEVICE — SUT MONOCRYL PLS ANTIB UND 3/0  PS1 27IN

## (undated) DEVICE — CATH DIAG EXPO .045 FL3.5 5F 100CM

## (undated) DEVICE — ST INF PRI SMRTSTE 20DRP 2VLV 24ML 117

## (undated) DEVICE — GOWN,NON-REINFORCED,SIRUS,SET IN SLV,XL: Brand: MEDLINE

## (undated) DEVICE — PAD ARMBRD SURG CONVOL 7.5X20X2IN

## (undated) DEVICE — 1 X VERSACROSS CONNECT TRANSSEPTAL DILATOR (INCLUDING 1 X J-TIP MECHANICAL GUIDEWIRE); 1 X VERSACROSS RF WIRE (INCLUDING 1 X CONNECTOR CABLE (SINGLE USE)); 1 X DISPERSIVE ELECTRODE: Brand: VERSACROSS CONNECT LAAC ACCESS SOLUTION

## (undated) DEVICE — Device: Brand: WEBSTER CS

## (undated) DEVICE — KT DISP ARRHYTHMIA VMAP 91200008 DISP

## (undated) DEVICE — SOL NACL 0.9PCT 1000ML

## (undated) DEVICE — CATH ULTRASND ECHOCARDIOGRAPHY ACUNAV

## (undated) DEVICE — PK CATH CARD 10

## (undated) DEVICE — PINNACLE INTRODUCER SHEATH: Brand: PINNACLE

## (undated) DEVICE — DOME MONITORING W BONDED STPCK BIOTRANS2

## (undated) DEVICE — CATHETER CONNECTION CABLE: Brand: FARASTAR™

## (undated) DEVICE — PATIENT RETURN ELECTRODE, SINGLE-USE, CONTACT QUALITY MONITORING, ADULT, WITH 9FT CORD, FOR PATIENTS WEIGING OVER 33LBS. (15KG): Brand: MEGADYNE

## (undated) DEVICE — CATH DIAG EXPO M/ PK 5F FL4/FR4 PIG

## (undated) DEVICE — 1 X VERSACROSS CONNECT TRANSSEPTAL DILATOR;  1 X VERSACROSS RF WIRE (INCLUDING 1 X CONNECTOR CABLE (SINGLE USE)): Brand: VERSACROSS CONNECT ACCESS SOLUTION FOR FARADRIVE

## (undated) DEVICE — GW INQWIRE ROSEN/TIP PTFE FC J/TP/1.5MM 0.035IN 180CM

## (undated) DEVICE — BLANKT WARM LOWR/BDY 100X120CM

## (undated) DEVICE — DISPOSABLE HOT BIOPSY FORCEPS: Brand: DISPOSABLE HOT BIOPSY FORCEPS

## (undated) DEVICE — SINGLE-USE POLYPECTOMY SNARE: Brand: CAPTIVATOR

## (undated) DEVICE — INTRO SHEATH ENGAGE W/50 GW .038 8F12

## (undated) DEVICE — LOCATION REFERENCE PATCH KIT: Brand: RHYTHMIA™ MAPPING SYSTEM

## (undated) DEVICE — STERILE PVP: Brand: MEDLINE INDUSTRIES, INC.

## (undated) DEVICE — HIGH RESOLUTION MAPPING CATHETER: Brand: INTELLAMAP ORION™

## (undated) DEVICE — CATH DIAG EXPO .052 PIG145 6F 110CM